# Patient Record
Sex: FEMALE | Race: AMERICAN INDIAN OR ALASKA NATIVE | NOT HISPANIC OR LATINO | Employment: OTHER | URBAN - METROPOLITAN AREA
[De-identification: names, ages, dates, MRNs, and addresses within clinical notes are randomized per-mention and may not be internally consistent; named-entity substitution may affect disease eponyms.]

---

## 2017-07-30 ENCOUNTER — HOSPITAL ENCOUNTER (EMERGENCY)
Facility: HOSPITAL | Age: 72
Discharge: HOME | End: 2017-07-31
Attending: EMERGENCY MEDICINE
Payer: MEDICARE

## 2017-07-30 DIAGNOSIS — R42 DIZZINESS: ICD-10-CM

## 2017-07-30 DIAGNOSIS — M54.50 ACUTE MIDLINE LOW BACK PAIN WITHOUT SCIATICA: Primary | ICD-10-CM

## 2017-07-30 DIAGNOSIS — M54.50 PAIN, LUMBAR REGION: ICD-10-CM

## 2017-07-30 LAB
ANION GAP SERPL CALC-SCNC: 10 MMOL/L
BASOPHILS # BLD AUTO: 0 K/UL
BASOPHILS NFR BLD: 0.3 %
BUN SERPL-MCNC: 10 MG/DL
CALCIUM SERPL-MCNC: 9.4 MG/DL
CHLORIDE SERPL-SCNC: 103 MMOL/L
CO2 SERPL-SCNC: 27 MMOL/L
CREAT SERPL-MCNC: 0.8 MG/DL
DIFFERENTIAL METHOD: ABNORMAL
EOSINOPHIL # BLD AUTO: 0 K/UL
EOSINOPHIL NFR BLD: 0.4 %
ERYTHROCYTE [DISTWIDTH] IN BLOOD BY AUTOMATED COUNT: 14.2 %
EST. GFR  (AFRICAN AMERICAN): >60 ML/MIN/1.73 M^2
EST. GFR  (NON AFRICAN AMERICAN): >60 ML/MIN/1.73 M^2
GLUCOSE SERPL-MCNC: 96 MG/DL
HCT VFR BLD AUTO: 36.5 %
HGB BLD-MCNC: 12.3 G/DL
LYMPHOCYTES # BLD AUTO: 2.4 K/UL
LYMPHOCYTES NFR BLD: 29.2 %
MCH RBC QN AUTO: 29.6 PG
MCHC RBC AUTO-ENTMCNC: 33.7 G/DL
MCV RBC AUTO: 88 FL
MONOCYTES # BLD AUTO: 0.6 K/UL
MONOCYTES NFR BLD: 7.4 %
NEUTROPHILS # BLD AUTO: 5.2 K/UL
NEUTROPHILS NFR BLD: 62.7 %
PLATELET # BLD AUTO: 319 K/UL
PMV BLD AUTO: 7.1 FL
POTASSIUM SERPL-SCNC: 4.1 MMOL/L
RBC # BLD AUTO: 4.15 M/UL
SODIUM SERPL-SCNC: 140 MMOL/L
WBC # BLD AUTO: 8.4 K/UL

## 2017-07-30 PROCEDURE — 85025 COMPLETE CBC W/AUTO DIFF WBC: CPT

## 2017-07-30 PROCEDURE — 80048 BASIC METABOLIC PNL TOTAL CA: CPT

## 2017-07-30 PROCEDURE — 25000003 PHARM REV CODE 250: Performed by: EMERGENCY MEDICINE

## 2017-07-30 PROCEDURE — 36415 COLL VENOUS BLD VENIPUNCTURE: CPT

## 2017-07-30 PROCEDURE — 99284 EMERGENCY DEPT VISIT MOD MDM: CPT

## 2017-07-30 RX ORDER — ONDANSETRON 4 MG/1
4 TABLET, ORALLY DISINTEGRATING ORAL
Status: COMPLETED | OUTPATIENT
Start: 2017-07-30 | End: 2017-07-30

## 2017-07-30 RX ORDER — MECLIZINE HYDROCHLORIDE 25 MG/1
25 TABLET ORAL
Status: COMPLETED | OUTPATIENT
Start: 2017-07-30 | End: 2017-07-30

## 2017-07-30 RX ADMIN — MECLIZINE HYDROCHLORIDE 25 MG: 25 TABLET ORAL at 10:07

## 2017-07-30 RX ADMIN — ONDANSETRON 4 MG: 4 TABLET, ORALLY DISINTEGRATING ORAL at 10:07

## 2017-07-31 VITALS
OXYGEN SATURATION: 95 % | BODY MASS INDEX: 21.6 KG/M2 | DIASTOLIC BLOOD PRESSURE: 73 MMHG | HEIGHT: 60 IN | SYSTOLIC BLOOD PRESSURE: 166 MMHG | RESPIRATION RATE: 16 BRPM | HEART RATE: 66 BPM | WEIGHT: 110 LBS

## 2017-07-31 RX ORDER — MECLIZINE HYDROCHLORIDE 25 MG/1
25 TABLET ORAL 3 TIMES DAILY PRN
Qty: 20 TABLET | Refills: 0 | Status: SHIPPED | OUTPATIENT
Start: 2017-07-31 | End: 2017-12-24

## 2017-07-31 NOTE — ED NOTES
Pt presents with complaints of back pain and dizziness. Pt reports falling down concrete steps a week ago and hit her lower back and has been hurting since then. No loss of bowel or urine and no noted changes in normal elimination routines. Pt moves all extremities well. Pt requesting pain meds but can hardly keep eyes open. Somnolent. Pt denies taking any pain meds.

## 2017-07-31 NOTE — ED NOTES
Continues to complain of pain but easily drifts off to sleep and has to be aroused from sleep everytime the room is entered. Pt reinforced MD instructions that he was not giving her any pain meds at this time.

## 2017-07-31 NOTE — ED NOTES
Pt ambulating well in [place] but complains of lower back pain with ambulation. More alert than upon arrival.

## 2017-07-31 NOTE — ED PROVIDER NOTES
Encounter Date: 7/30/2017    SCRIBE #1 NOTE: ISpencer, am scribing for, and in the presence of, Dr. Moe .       History     Chief Complaint   Patient presents with    Fatigue     Started yesterday     Back Pain       07/30/2017 10:02 PM     Chief Complaint: Dizziness      Josseline Stinson is a 71 y.o. female with a history of CHF, COPD, CAD and GERD who presents to the ED via EMS with an complaint of severe lower back pain and dizziness. Associated Sx are blurry vision nausea, and vomiting. Pt reports Sx began after slipping on steps and landing on her back 2 days ago. She denies head trauma. Pt denies CP, upper back pain, prior dizziness and incontinence. Pt has not taken any medication for the pain. Allergens include Cortisone, Darvocet A500, Toradol, and Tramadol.      The history is provided by the patient.     Review of patient's allergies indicates:   Allergen Reactions    Cortisone      SWELLING    Darvocet a500 [propoxyphene n-acetaminophen]     Toradol [ketorolac]     Tramadol Nausea Only     Past Medical History:   Diagnosis Date    Allergy     Anxiety     CHF (congestive heart failure)     COPD (chronic obstructive pulmonary disease)     Coronary artery disease     Encounter for blood transfusion     GERD (gastroesophageal reflux disease)     Ulcer      Past Surgical History:   Procedure Laterality Date    FRACTURE SURGERY Left     elbow and wrist    HIP SURGERY Right     x 4    HYSTERECTOMY      SMALL INTESTINE SURGERY       Family History   Problem Relation Age of Onset    Cancer Mother      colon    Diabetes Mother     Heart disease Mother     Heart disease Father     Cancer Sister      breast    Heart disease Sister     Cancer Brother      leukemia    Heart disease Brother     Heart disease Maternal Grandmother     Heart disease Maternal Grandfather      Social History   Substance Use Topics    Smoking status: Current Some Day Smoker     Packs/day: 0.25     Years:  45.00     Types: Cigarettes    Smokeless tobacco: Never Used    Alcohol use No     Review of Systems   Constitutional: Negative for fever.   HENT: Negative for sore throat.    Eyes: Positive for visual disturbance (Blurry).   Respiratory: Negative for shortness of breath.    Cardiovascular: Negative for chest pain.   Gastrointestinal: Positive for nausea and vomiting.   Genitourinary: Negative for dysuria.        - for incontinence.   Musculoskeletal: Positive for back pain (Lower).   Skin: Negative for rash.   Neurological: Positive for dizziness. Negative for weakness.   Hematological: Does not bruise/bleed easily.       Physical Exam     Initial Vitals [07/30/17 2128]   BP Pulse Resp Temp SpO2   118/74 78 16 -- 96 %      MAP       88.67         Physical Exam    Nursing note and vitals reviewed.  Constitutional: She appears well-developed and well-nourished. She is not diaphoretic. She is sleeping.  Non-toxic appearance. She does not have a sickly appearance. She does not appear ill. No distress.   HENT:   Head: Normocephalic and atraumatic.   Neck: Normal range of motion. Neck supple.   Cardiovascular: Normal rate, regular rhythm, normal heart sounds and intact distal pulses.   No murmur heard.  Pulmonary/Chest: No respiratory distress. She has no wheezes. She has no rhonchi. She has no rales.   Abdominal: Soft. She exhibits no distension. There is no tenderness. There is no rebound.   Musculoskeletal: She exhibits no edema.        Cervical back: Normal.        Thoracic back: Normal.        Lumbar back: She exhibits tenderness and bony tenderness.        Back:    Neurological: She is oriented to person, place, and time.   Trouble following basic commands due to somnolence. Hard time keeping eyes open. Strength limited by pain and somnolence. No apparent nystagmus.   Skin: Skin is warm and dry.   Psychiatric: She has a normal mood and affect. Her behavior is normal. Judgment and thought content normal.          ED Course   Procedures  Labs Reviewed - No data to display       X-Rays:   Independently Interpreted Readings:   Other Readings:  L spine nad    Medical Decision Making:   History:   Old Medical Records: I decided to obtain old medical records.  Old Records Summarized: other records.       <> Summary of Records: Prescription database reveals numerous recent prescriptions for narcotics and benzodiazepines from multiple providers  Clinical Tests:   Radiological Study: Ordered and Reviewed            Scribe Attestation:   Scribe #1: I performed the above scribed service and the documentation accurately describes the services I performed. I attest to the accuracy of the note.    Attending Attestation:           Physician Attestation for Scribe:  Physician Attestation Statement for Scribe #1: I, Dr. Moe, reviewed documentation, as scribed by Spencer Hoskins in my presence, and it is both accurate and complete.                 ED Course   Comment By Time   Patient presents complaining of back pain after a fall 2 days ago also significant dizziness.  On exam she is somnolent and has trouble keeping her eyes open.  Review of the prescription database demonstrates multiple narcotics and benzodiazepines from numerous prescribers.  I will hold back on anything for pain at this time given her clinical somnolence.  She was actually asleep on the EMS gurney when she arrived.  Complaining of dizziness but no sign at this time of cerebellar stroke clinically.  I will give her something for the dizziness and get some basic labs and imaging her low back. Neil Moe MD 07/30 2210   Got up and ambulated easily without difficulty.  Numerous prescriptions for controlled substances recently.  No indication for any further prescriptions.  X-rays unremarkable labs are unremarkable.  On arrival patient was somnolent and exhibited signs of impairment secondary to most likely prescription drugs.  That has improved and she can  be discharged.  I doubt any intracranial cause of the dizziness.  Has responded well to meclizine.  I do not think she needs an MRI, no sign of cerebellar findings on exam. On DC re-examined with improvement in level of alertness which is now normal and normal gait without cerebellar findings Neil Moe MD 07/31 4388     Clinical Impression:   The primary encounter diagnosis was Acute midline low back pain without sciatica. Diagnoses of Pain, lumbar region and Dizziness were also pertinent to this visit.                           Neil Moe MD  07/31/17 0666

## 2017-10-11 ENCOUNTER — TELEPHONE (OUTPATIENT)
Dept: PRIMARY CARE CLINIC | Facility: CLINIC | Age: 72
End: 2017-10-11

## 2018-01-02 PROBLEM — I21.4 NSTEMI (NON-ST ELEVATED MYOCARDIAL INFARCTION): Status: ACTIVE | Noted: 2018-01-02

## 2018-01-03 PROBLEM — E44.1 MALNUTRITION OF MILD DEGREE: Chronic | Status: ACTIVE | Noted: 2018-01-03

## 2018-05-30 ENCOUNTER — HOSPITAL ENCOUNTER (EMERGENCY)
Facility: HOSPITAL | Age: 73
Discharge: LEFT BEFORE DISCHARGE | End: 2018-05-30
Payer: MEDICARE

## 2018-05-30 VITALS
TEMPERATURE: 98 F | DIASTOLIC BLOOD PRESSURE: 60 MMHG | HEIGHT: 64 IN | BODY MASS INDEX: 18.1 KG/M2 | SYSTOLIC BLOOD PRESSURE: 114 MMHG | RESPIRATION RATE: 26 BRPM | WEIGHT: 106 LBS | HEART RATE: 97 BPM | OXYGEN SATURATION: 99 %

## 2018-05-30 DIAGNOSIS — R06.02 SHORTNESS OF BREATH: ICD-10-CM

## 2018-05-30 PROCEDURE — 99900 PR LEFT WITHOUTBEING SEEN-EMERGENCY: CPT | Mod: ,,, | Performed by: EMERGENCY MEDICINE

## 2018-05-30 PROCEDURE — 93010 ELECTROCARDIOGRAM REPORT: CPT | Mod: ,,, | Performed by: INTERNAL MEDICINE

## 2018-05-30 PROCEDURE — 93005 ELECTROCARDIOGRAM TRACING: CPT

## 2018-05-30 PROCEDURE — 99900041 HC LEFT WITHOUT BEING SEEN- EMERGENCY

## 2019-05-04 PROBLEM — R07.9 CHEST PAIN: Status: ACTIVE | Noted: 2019-05-04

## 2019-05-04 PROBLEM — R55 NEAR SYNCOPE: Status: ACTIVE | Noted: 2019-05-04

## 2019-05-04 PROBLEM — E87.6 HYPOKALEMIA: Status: ACTIVE | Noted: 2019-05-04

## 2019-05-04 PROBLEM — R94.31 EKG ABNORMALITIES: Status: ACTIVE | Noted: 2019-05-04

## 2019-05-05 PROBLEM — R07.9 CHEST PAIN: Status: RESOLVED | Noted: 2019-05-04 | Resolved: 2019-05-05

## 2019-05-05 PROBLEM — R55 NEAR SYNCOPE: Status: RESOLVED | Noted: 2019-05-04 | Resolved: 2019-05-05

## 2019-05-05 PROBLEM — E87.6 HYPOKALEMIA: Status: RESOLVED | Noted: 2019-05-04 | Resolved: 2019-05-05

## 2019-05-14 ENCOUNTER — OFFICE VISIT (OUTPATIENT)
Dept: PRIMARY CARE CLINIC | Facility: CLINIC | Age: 74
End: 2019-05-14
Payer: MEDICARE

## 2019-05-14 VITALS
RESPIRATION RATE: 18 BRPM | WEIGHT: 111 LBS | BODY MASS INDEX: 18.95 KG/M2 | HEIGHT: 64 IN | HEART RATE: 61 BPM | OXYGEN SATURATION: 100 % | SYSTOLIC BLOOD PRESSURE: 166 MMHG | DIASTOLIC BLOOD PRESSURE: 69 MMHG

## 2019-05-14 DIAGNOSIS — M81.0 OSTEOPOROSIS, UNSPECIFIED OSTEOPOROSIS TYPE, UNSPECIFIED PATHOLOGICAL FRACTURE PRESENCE: ICD-10-CM

## 2019-05-14 DIAGNOSIS — M25.571 ARTHRALGIA OF RIGHT FOOT: ICD-10-CM

## 2019-05-14 DIAGNOSIS — F32.A ANXIETY AND DEPRESSION: ICD-10-CM

## 2019-05-14 DIAGNOSIS — I10 ESSENTIAL HYPERTENSION: Chronic | ICD-10-CM

## 2019-05-14 DIAGNOSIS — R42 DIZZINESS: ICD-10-CM

## 2019-05-14 DIAGNOSIS — F32.A DEPRESSION, UNSPECIFIED DEPRESSION TYPE: ICD-10-CM

## 2019-05-14 DIAGNOSIS — I25.10 CORONARY ARTERY DISEASE INVOLVING NATIVE CORONARY ARTERY OF NATIVE HEART WITHOUT ANGINA PECTORIS: Chronic | ICD-10-CM

## 2019-05-14 DIAGNOSIS — J44.9 CHRONIC OBSTRUCTIVE PULMONARY DISEASE, UNSPECIFIED COPD TYPE: ICD-10-CM

## 2019-05-14 DIAGNOSIS — J18.9 PNEUMONIA OF RIGHT LOWER LOBE DUE TO INFECTIOUS ORGANISM: ICD-10-CM

## 2019-05-14 DIAGNOSIS — K21.9 GASTROESOPHAGEAL REFLUX DISEASE, ESOPHAGITIS PRESENCE NOT SPECIFIED: ICD-10-CM

## 2019-05-14 DIAGNOSIS — Z12.31 VISIT FOR SCREENING MAMMOGRAM: ICD-10-CM

## 2019-05-14 DIAGNOSIS — Z87.891 HISTORY OF TOBACCO ABUSE: ICD-10-CM

## 2019-05-14 DIAGNOSIS — E78.00 HYPERCHOLESTEREMIA: Chronic | ICD-10-CM

## 2019-05-14 DIAGNOSIS — G43.511 INTRACTABLE PERSISTENT MIGRAINE AURA WITHOUT CEREBRAL INFARCTION AND WITH STATUS MIGRAINOSUS: ICD-10-CM

## 2019-05-14 DIAGNOSIS — F41.9 ANXIETY AND DEPRESSION: ICD-10-CM

## 2019-05-14 DIAGNOSIS — E87.6 HYPOKALEMIA: Primary | ICD-10-CM

## 2019-05-14 DIAGNOSIS — I21.4 NSTEMI (NON-ST ELEVATED MYOCARDIAL INFARCTION): ICD-10-CM

## 2019-05-14 DIAGNOSIS — E03.9 HYPOTHYROIDISM, UNSPECIFIED TYPE: ICD-10-CM

## 2019-05-14 DIAGNOSIS — Z95.5 HX OF HEART ARTERY STENT: ICD-10-CM

## 2019-05-14 DIAGNOSIS — M89.9 DISORDER OF BONE: ICD-10-CM

## 2019-05-14 DIAGNOSIS — R55 SYNCOPE, UNSPECIFIED SYNCOPE TYPE: ICD-10-CM

## 2019-05-14 DIAGNOSIS — M19.90 OSTEOARTHRITIS, UNSPECIFIED OSTEOARTHRITIS TYPE, UNSPECIFIED SITE: ICD-10-CM

## 2019-05-14 DIAGNOSIS — Z87.11 HISTORY OF GASTRIC ULCER: ICD-10-CM

## 2019-05-14 PROCEDURE — G0009 PNEUMOCOCCAL CONJUGATE VACCINE 13-VALENT LESS THAN 5YO & GREATER THAN: ICD-10-PCS | Mod: S$GLB,,, | Performed by: FAMILY MEDICINE

## 2019-05-14 PROCEDURE — 3077F PR MOST RECENT SYSTOLIC BLOOD PRESSURE >= 140 MM HG: ICD-10-PCS | Mod: CPTII,S$GLB,, | Performed by: FAMILY MEDICINE

## 2019-05-14 PROCEDURE — 3078F DIAST BP <80 MM HG: CPT | Mod: CPTII,S$GLB,, | Performed by: FAMILY MEDICINE

## 2019-05-14 PROCEDURE — 3077F SYST BP >= 140 MM HG: CPT | Mod: CPTII,S$GLB,, | Performed by: FAMILY MEDICINE

## 2019-05-14 PROCEDURE — G0009 ADMIN PNEUMOCOCCAL VACCINE: HCPCS | Mod: S$GLB,,, | Performed by: FAMILY MEDICINE

## 2019-05-14 PROCEDURE — 99999 PR PBB SHADOW E&M-EST. PATIENT-LVL V: ICD-10-PCS | Mod: PBBFAC,,, | Performed by: FAMILY MEDICINE

## 2019-05-14 PROCEDURE — 99999 PR PBB SHADOW E&M-EST. PATIENT-LVL V: CPT | Mod: PBBFAC,,, | Performed by: FAMILY MEDICINE

## 2019-05-14 PROCEDURE — 99214 PR OFFICE/OUTPT VISIT, EST, LEVL IV, 30-39 MIN: ICD-10-PCS | Mod: 25,S$GLB,, | Performed by: FAMILY MEDICINE

## 2019-05-14 PROCEDURE — 99499 UNLISTED E&M SERVICE: CPT | Mod: S$GLB,,, | Performed by: FAMILY MEDICINE

## 2019-05-14 PROCEDURE — 1101F PR PT FALLS ASSESS DOC 0-1 FALLS W/OUT INJ PAST YR: ICD-10-PCS | Mod: CPTII,S$GLB,, | Performed by: FAMILY MEDICINE

## 2019-05-14 PROCEDURE — 1101F PT FALLS ASSESS-DOCD LE1/YR: CPT | Mod: CPTII,S$GLB,, | Performed by: FAMILY MEDICINE

## 2019-05-14 PROCEDURE — 99214 OFFICE O/P EST MOD 30 MIN: CPT | Mod: 25,S$GLB,, | Performed by: FAMILY MEDICINE

## 2019-05-14 PROCEDURE — 99499 RISK ADDL DX/OHS AUDIT: ICD-10-PCS | Mod: S$GLB,,, | Performed by: FAMILY MEDICINE

## 2019-05-14 PROCEDURE — 90670 PNEUMOCOCCAL CONJUGATE VACCINE 13-VALENT LESS THAN 5YO & GREATER THAN: ICD-10-PCS | Mod: S$GLB,,, | Performed by: FAMILY MEDICINE

## 2019-05-14 PROCEDURE — 90670 PCV13 VACCINE IM: CPT | Mod: S$GLB,,, | Performed by: FAMILY MEDICINE

## 2019-05-14 PROCEDURE — 3078F PR MOST RECENT DIASTOLIC BLOOD PRESSURE < 80 MM HG: ICD-10-PCS | Mod: CPTII,S$GLB,, | Performed by: FAMILY MEDICINE

## 2019-05-14 RX ORDER — OMEPRAZOLE 40 MG/1
40 CAPSULE, DELAYED RELEASE ORAL DAILY
Qty: 30 CAPSULE | Refills: 5 | Status: SHIPPED | OUTPATIENT
Start: 2019-05-14 | End: 2019-06-27 | Stop reason: SDUPTHER

## 2019-05-14 RX ORDER — ALENDRONATE SODIUM 70 MG/1
70 TABLET ORAL
Qty: 4 TABLET | Refills: 5 | Status: SHIPPED | OUTPATIENT
Start: 2019-05-14 | End: 2019-09-12

## 2019-05-14 RX ORDER — POTASSIUM CHLORIDE 750 MG/1
10 CAPSULE, EXTENDED RELEASE ORAL ONCE
Qty: 1 CAPSULE | Refills: 0 | Status: SHIPPED | OUTPATIENT
Start: 2019-05-14 | End: 2019-05-14

## 2019-05-14 RX ORDER — METOPROLOL SUCCINATE 25 MG/1
25 TABLET, EXTENDED RELEASE ORAL DAILY
Qty: 30 TABLET | Refills: 5 | Status: SHIPPED | OUTPATIENT
Start: 2019-05-14 | End: 2019-12-11 | Stop reason: SDUPTHER

## 2019-05-14 RX ORDER — BUTALBITAL, ACETAMINOPHEN AND CAFFEINE 50; 325; 40 MG/1; MG/1; MG/1
1 TABLET ORAL EVERY 4 HOURS PRN
Qty: 30 TABLET | Refills: 2 | Status: SHIPPED | OUTPATIENT
Start: 2019-05-14 | End: 2019-06-13

## 2019-05-14 RX ORDER — ATORVASTATIN CALCIUM 40 MG/1
40 TABLET, FILM COATED ORAL DAILY
Qty: 30 TABLET | Refills: 5 | Status: SHIPPED | OUTPATIENT
Start: 2019-05-14 | End: 2019-11-11 | Stop reason: SDUPTHER

## 2019-05-14 RX ORDER — CLOPIDOGREL BISULFATE 75 MG/1
75 TABLET ORAL DAILY
Qty: 30 TABLET | Refills: 5 | Status: ON HOLD | OUTPATIENT
Start: 2019-05-14 | End: 2019-07-09 | Stop reason: HOSPADM

## 2019-05-14 RX ORDER — ALENDRONATE SODIUM 70 MG/1
TABLET ORAL
COMMUNITY
Start: 2019-05-03 | End: 2019-05-14 | Stop reason: SDUPTHER

## 2019-05-14 RX ORDER — IBUPROFEN 400 MG/1
400 TABLET ORAL 3 TIMES DAILY
Qty: 60 TABLET | Refills: 5 | Status: SHIPPED | OUTPATIENT
Start: 2019-05-14 | End: 2019-06-27

## 2019-05-14 RX ORDER — TRAZODONE HYDROCHLORIDE 100 MG/1
100 TABLET ORAL NIGHTLY
COMMUNITY
Start: 2019-05-10 | End: 2019-09-12

## 2019-05-14 RX ORDER — MELOXICAM 15 MG/1
15 TABLET ORAL DAILY
Qty: 30 TABLET | Refills: 5 | Status: SHIPPED | OUTPATIENT
Start: 2019-05-14 | End: 2019-06-27

## 2019-05-14 RX ORDER — HYDROCODONE BITARTRATE AND ACETAMINOPHEN 5; 325 MG/1; MG/1
1 TABLET ORAL EVERY 6 HOURS PRN
Qty: 30 TABLET | Refills: 0 | Status: SHIPPED | OUTPATIENT
Start: 2019-05-14 | End: 2019-06-04

## 2019-05-14 RX ORDER — PANTOPRAZOLE SODIUM 40 MG/1
40 TABLET, DELAYED RELEASE ORAL DAILY
Qty: 30 TABLET | Refills: 5 | Status: SHIPPED | OUTPATIENT
Start: 2019-05-14 | End: 2019-06-27

## 2019-05-14 RX ORDER — ESCITALOPRAM OXALATE 20 MG/1
20 TABLET ORAL DAILY
Qty: 30 TABLET | Refills: 5 | Status: SHIPPED | OUTPATIENT
Start: 2019-05-14 | End: 2020-02-17

## 2019-05-14 NOTE — PROGRESS NOTES
Patient ID verified by name and . Allergies reviewed with patient. Prevnar 13 vaccine administered IM in left deltoid using aseptic technique. Aspirated with no blood noted. Patient tolerated well. Given per physicians order. No adverse reaction noted.

## 2019-05-14 NOTE — PROGRESS NOTES
Subjective:       Patient ID: Josseline Stinson is a 73 y.o. female.    Chief Complaint: Establish Care and Medication Refill    HPI:  73-year-old female in for new PCP and medication refills---patient seen in the emergency room [facility]---yesterday.  Patient was sitting talking to her brother became dizzy and had an episode of loss of consciousness.  Unconscious about 15 min.  Patient was transported to emergency room via ambulance.  In ER took blood did EKG placed on monitor--given medication for nausea.  Told had a slight case of pneumonia in the right lung and was discharged.  Was given Fioricet an antibiotic.Pt did not fill yet-Levaquin.       Patient been in the emergency room x3 05/04/2019 chest pain, near syncope, poison ivy, hypokalemia, contusion to the foot--patient had x-rays supposed to be in a cast boot but hard to walk in the cast boot.  Was in the hospital for 2 days.  Seen in the emergency room 05/11/2019 arthralgia of the foot in chest pain. Seen in the emergency room again on 05/13/2019 costochondritis and right lower lobe pneumonia.       Patient in requesting refills Fioricet Norco Klonopin.       No fever, no runny nose, no sore throat, +cough, +phlegm white  ROS:  Skin: no psoriasis, eczema, skin cancer  HEENT: No headache, ocular pain, blurred vision, diplopia, epistaxis,+hoarseness +change in voice, no  thyroid trouble  Lung: No pneumonia, asthma, Tb, wheezing, SOB, +COPD has inhalers albuterol  Heart: + chest pain--9 now--was in the epigastric area near the xiphoid process radiating to the back was told secondary to anxiety,no  ankle edema, palpitations, MI, rinku murmur, +hypertension,+ hyperlipidemia , +CAD with stents times 15 sees Dr Baltazar has not seen him in over a year no bypass  Abdomen: No nausea, vomiting, diarrhea, constipation, +ulcers, +GERD-on Protonix no hepatitis, gallbladder disease, melena, hematochezia, hematemesis  : no UTI, renal disease, stones  MS: no  fractures, O/A, lupus, rheumatoid, gout--history of fracture right hip with 5 hip surgeries, fractured left elbow fractured right wrist with a plate no history of osteoporosis history of ankle demineralization  Neuro:+ dizziness,+ LOC, seizures   No diabetes, no anemia, + anxiety, + depression--lives by herself--2 dogs--no money no food  , 1 daughter estranged-patient states is not seen on so long not sure which he looks like, 3 grandchildren one , lives alone--on disability  benefitsx     Objective:   Physical Exam:  General: Well nourished, well developed,  + thin slightly emaciated  Skin: No lesions  HEENT: Eyes PERRLA, EOM intact, irregular pupil secondary to bilateral cataract surgery nose patent, throat non-erythematous upper dentures edentulous lower jaw ears TMs clear  NECK: Supple, no bruits, No JVD, no nodes  Lungs: Clear, no rales, rhonchi, wheezing decreased breath sounds bilaterally  Heart: Regular rate and rhythm, no murmurs, gallops, or rubs  Abdomen: flat, bowel sounds positive, no tenderness, or organomegaly  MS:  +kyphosis--arthritis several joints right hip left wrist left elbow--all had surgeries all with arthritis--especially sore in the right ankle reflexes 2/4  Neuro: Alert, CN intact, oriented X 3 Romberg negative heel-toe intact  Extremities: No cyanosis, clubbing, or edema         Assessment:       1. Hypokalemia    2. Pneumonia of right lower lobe due to infectious organism    3. Arthralgia of right foot    4. 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 Essential hypertension    6. Hypercholesteremia    7. Coronary artery disease involving native coronary artery of native heart without angina pectoris    8. NSTEMI (non-ST elevated myocardial infarction)    9. Hx of heart artery stent    10. Chronic obstructive pulmonary disease, unspecified COPD type    11. History of tobacco abuse    12. Osteoarthritis, unspecified  osteoarthritis type, unspecified site    13. Osteoporosis, unspecified osteoporosis type, unspecified pathological fracture presence    14. Dizziness    15. Syncope, unspecified syncope type    16. Anxiety and depression    17. Depression, unspecified depression type    18. History of gastric ulcer    19. Gastroesophageal reflux disease, esophagitis presence not specified    20. Hypothyroidism, unspecified type    21. Disorder of bone     22. Visit for screening mammogram        Plan:       Hypokalemia  -     Magnesium; Future; Expected date: 05/14/2019  -     Ambulatory Referral to Cardiology    Pneumonia of right lower lobe due to infectious organism    Arthralgia of right foot  -     meloxicam (MOBIC) 15 MG tablet; Take 1 tablet (15 mg total) by mouth once daily.  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  -     CT Head W Wo Contrast; Future; Expected date: 05/14/2019    Essential hypertension  -     CBC auto differential; Future; Expected date: 05/14/2019  -     Comprehensive metabolic panel; Future; Expected date: 05/14/2019  -     Fecal Immunochemical Test (iFOBT); Future; Expected date: 05/14/2019  -     X-Ray Chest PA And Lateral; Future; Expected date: 05/14/2019  -     POCT urine dipstick without microscope  -     Ambulatory Referral to Cardiology    Hypercholesteremia  -     Lipid panel; Future; Expected date: 05/14/2019  -     Ambulatory Referral to Cardiology    Coronary artery disease involving native coronary artery of native heart without angina pectoris  -     Ambulatory Referral to Cardiology    NSTEMI (non-ST elevated myocardial infarction)  -     Ambulatory Referral to Cardiology    Hx of heart artery stent  -     Ambulatory Referral to Cardiology    Chronic obstructive pulmonary disease, unspecified COPD type    History of tobacco abuse    Osteoarthritis, unspecified  osteoarthritis type, unspecified site  -     Ambulatory referral to Psychiatry    Osteoporosis, unspecified osteoporosis type, unspecified pathological fracture presence  -     Ambulatory referral to Psychiatry    Dizziness  -     Transthoracic echo (TTE) 2D with Color Flow; Future  -     Holter monitor - 24 hour; Future  -     US Carotid Bilateral; Future; Expected date: 05/14/2019  -     CT Head W Wo Contrast; Future; Expected date: 05/14/2019    Syncope, unspecified syncope type  -     Transthoracic echo (TTE) 2D with Color Flow; Future  -     Holter monitor - 24 hour; Future  -     US Carotid Bilateral; Future; Expected date: 05/14/2019  -     CT Head W Wo Contrast; Future; Expected date: 05/14/2019    Anxiety and depression  -     DXA Bone Density Spine And Hip; Future; Expected date: 05/14/2019    Depression, unspecified depression type  -     DXA Bone Density Spine And Hip; Future; Expected date: 05/14/2019    History of gastric ulcer    Gastroesophageal reflux disease, esophagitis presence not specified    Hypothyroidism, unspecified type  -     T4, free; Future; Expected date: 05/14/2019  -     TSH; Future; Expected date: 05/14/2019    Disorder of bone   -     DXA Bone Density Spine And Hip; Future; Expected date: 05/14/2019    Visit for screening mammogram  -     Mammo Digital Screening Bilat without CA; Future; Expected date: 05/28/2019    Other orders  -     pantoprazole (PROTONIX) 40 MG tablet; Take 1 tablet (40 mg total) by mouth once daily.  Dispense: 30 tablet; Refill: 5  -     metoprolol succinate (TOPROL-XL) 25 MG 24 hr tablet; Take 1 tablet (25 mg total) by mouth once daily.  Dispense: 30 tablet; Refill: 5  -     escitalopram oxalate (LEXAPRO) 20 MG tablet; Take 1 tablet (20 mg total) by mouth once daily.  Dispense: 30 tablet; Refill: 5  -     clopidogrel (PLAVIX) 75 mg tablet; Take 1 tablet (75 mg total) by mouth once daily.  Dispense: 30 tablet; Refill: 5  -     atorvastatin (LIPITOR) 40 MG  tablet; Take 1 tablet (40 mg total) by mouth once daily.  Dispense: 30 tablet; Refill: 5  -     alendronate (FOSAMAX) 70 MG tablet; Take 1 tablet (70 mg total) by mouth every 7 days.  Dispense: 4 tablet; Refill: 5  -     (In Office Administered) Pneumococcal Conjugate Vaccine (13 Valent) (IM)  -     potassium chloride (MICRO-K) 10 MEQ CpSR; Take 1 capsule (10 mEq total) by mouth once. for 1 dose  Dispense: 1 capsule; Refill: 0  -     butalbital-acetaminophen-caffeine -40 mg (FIORICET, ESGIC) -40 mg per tablet; Take 1 tablet by mouth every 4 (four) hours as needed for Pain.  Dispense: 30 tablet; Refill: 2  -     HYDROcodone-acetaminophen (NORCO) 5-325 mg per tablet; Take 1 tablet by mouth every 6 (six) hours as needed.  Dispense: 30 tablet; Refill: 0  -     omeprazole (PRILOSEC) 40 MG capsule; Take 1 capsule (40 mg total) by mouth once daily.  Dispense: 30 capsule; Refill: 5  -     ibuprofen (ADVIL,MOTRIN) 400 MG tablet; Take 1 tablet (400 mg total) by mouth 3 (three) times daily.  Dispense: 60 tablet; Refill: 5      Micro-K 10 mg 1 p.o. b.i.d. x7 days--CBCs CMP T4 TSH stool guaiac UA lipid magnesium chest x-ray  Patient with syncopal episode--dizziness-carotid ultrasound/24 hr Holter/see if echocardiogram was done in the hospital of  get report give to patient/CT scan the brain with and without contrast--patient has history of hypertension/hyperlipidemia/old MI/CAD with stents times 15--sees Dr Baltazar   COPD--patient quit smoking 2 months ago was smoking 1 pack per day times 20 years--told should help her gain weight  Patient appears somewhat emaciated weight 111 needs to take multivitamin consider B12 shots Periactin in sure--needs social service to help her to get food stamps any other financial assistance to help her to get her medical care  Patient has and ankle irregularity with demineralization needs a bone scan--may need MRI the ankle to rule out fracture--has a cast boot--will send 2  orthopedistDr Vicente or Dr Hartman evaluate ankle   Pt requesting Fioricet Norco in Klonopin--will give Fioricet--[place]--ibuprofen--with omeprazole due to history of ulcer--[facility] and if she needs Klonopin told government does not want as mixing Norco and benzodiazepines  Patient has allergies listed but has been on Fioricet Norco in the past without difficulty has taken ibuprofen told to stop if develops any GI upset

## 2019-05-16 ENCOUNTER — TELEPHONE (OUTPATIENT)
Dept: ORTHOPEDICS | Facility: CLINIC | Age: 74
End: 2019-05-16

## 2019-05-16 DIAGNOSIS — M79.671 CHRONIC FOOT PAIN, RIGHT: Primary | ICD-10-CM

## 2019-05-16 DIAGNOSIS — G89.29 CHRONIC FOOT PAIN, RIGHT: Primary | ICD-10-CM

## 2019-05-16 NOTE — TELEPHONE ENCOUNTER
Called and spoke with patient regarding the orthopedic referral. Appointment scheduled for Dr. Hartman for Aug 15, 2019 at 9:45. Order placed for an updated xray of her right foot. Patient is aware of the appointment time and date. She was also informed that she will need to have the xray completed before her scheduled appointment. Patient verbalized understanding; all questions answered; no other issues discussed.

## 2019-05-29 ENCOUNTER — TELEPHONE (OUTPATIENT)
Dept: PRIMARY CARE CLINIC | Facility: CLINIC | Age: 74
End: 2019-05-29

## 2019-05-29 NOTE — TELEPHONE ENCOUNTER
----- Message from Ade Mckeon sent at 5/29/2019  1:08 PM CDT -----  Contact: Patient  Type:  Patient Returning Call    Who Called:  Josseline, patient  Who Left Message for Patient:  Tigist  Does the patient know what this is regarding?:  Test results  Best Call Back Number:  [phone]  Additional Information:  Missed your call, please call her back. Thanks.

## 2019-05-29 NOTE — TELEPHONE ENCOUNTER
----- Message from Aram Mitchell sent at 5/29/2019  1:31 PM CDT -----  Type:  Patient Returning Call    Who Called:  Patient  Who Left Message for Patient:  NA  Does the patient know what this is regarding?:  Test results  Best Call Back Number:  [phone]  Additional Information:

## 2019-05-29 NOTE — TELEPHONE ENCOUNTER
----- Message from Dony Woo MD sent at 5/22/2019 11:52 PM CDT -----  Call tell Bone density Osteoporosis hips needs be on fosfamax 70 mg 1 po q week #12 3 refills redo bone density 2-3 years . ECHO cardigram WNL, Chest Xrayu Linear density left lower lobe ??early pneumonia Mammogram WNL repeat one year Lab Na 131   better 150 Plt 366 No new tx Needs do CBC,CMP,lipid in 6 mo If still with cough symptoms needs come in get rechecked

## 2019-05-31 NOTE — TELEPHONE ENCOUNTER
Patient tolerates Fosamax fine, but has been currently taking fosamax and bone density has not improved does she continue current dose?

## 2019-06-04 ENCOUNTER — OFFICE VISIT (OUTPATIENT)
Dept: PRIMARY CARE CLINIC | Facility: CLINIC | Age: 74
End: 2019-06-04
Payer: MEDICARE

## 2019-06-04 VITALS
RESPIRATION RATE: 18 BRPM | HEART RATE: 66 BPM | SYSTOLIC BLOOD PRESSURE: 93 MMHG | BODY MASS INDEX: 18.95 KG/M2 | DIASTOLIC BLOOD PRESSURE: 49 MMHG | OXYGEN SATURATION: 99 % | WEIGHT: 111 LBS | HEIGHT: 64 IN

## 2019-06-04 DIAGNOSIS — M79.671 PAIN OF RIGHT HEEL: ICD-10-CM

## 2019-06-04 DIAGNOSIS — M25.571 CHRONIC PAIN OF RIGHT ANKLE: ICD-10-CM

## 2019-06-04 DIAGNOSIS — G43.511 INTRACTABLE PERSISTENT MIGRAINE AURA WITHOUT CEREBRAL INFARCTION AND WITH STATUS MIGRAINOSUS: ICD-10-CM

## 2019-06-04 DIAGNOSIS — F41.9 ANXIETY AND DEPRESSION: ICD-10-CM

## 2019-06-04 DIAGNOSIS — Z95.5 HX OF HEART ARTERY STENT: ICD-10-CM

## 2019-06-04 DIAGNOSIS — S39.012D SACROILIAC STRAIN, SUBSEQUENT ENCOUNTER: ICD-10-CM

## 2019-06-04 DIAGNOSIS — I25.10 CORONARY ARTERY DISEASE INVOLVING NATIVE CORONARY ARTERY OF NATIVE HEART WITHOUT ANGINA PECTORIS: Chronic | ICD-10-CM

## 2019-06-04 DIAGNOSIS — F32.A ANXIETY AND DEPRESSION: ICD-10-CM

## 2019-06-04 DIAGNOSIS — M19.90 OSTEOARTHRITIS, UNSPECIFIED OSTEOARTHRITIS TYPE, UNSPECIFIED SITE: ICD-10-CM

## 2019-06-04 DIAGNOSIS — G89.4 CHRONIC PAIN SYNDROME: ICD-10-CM

## 2019-06-04 DIAGNOSIS — F41.9 ANXIETY: ICD-10-CM

## 2019-06-04 DIAGNOSIS — I10 ESSENTIAL HYPERTENSION: Chronic | ICD-10-CM

## 2019-06-04 DIAGNOSIS — G89.29 CHRONIC PAIN OF RIGHT ANKLE: ICD-10-CM

## 2019-06-04 DIAGNOSIS — M25.519 SHOULDER PAIN, UNSPECIFIED CHRONICITY, UNSPECIFIED LATERALITY: ICD-10-CM

## 2019-06-04 DIAGNOSIS — I21.4 NSTEMI (NON-ST ELEVATED MYOCARDIAL INFARCTION): ICD-10-CM

## 2019-06-04 DIAGNOSIS — I50.9 CONGESTIVE HEART FAILURE, UNSPECIFIED HF CHRONICITY, UNSPECIFIED HEART FAILURE TYPE: ICD-10-CM

## 2019-06-04 DIAGNOSIS — I95.9 HYPOTENSION, UNSPECIFIED HYPOTENSION TYPE: Primary | ICD-10-CM

## 2019-06-04 DIAGNOSIS — F32.A DEPRESSION, UNSPECIFIED DEPRESSION TYPE: ICD-10-CM

## 2019-06-04 DIAGNOSIS — E78.00 HYPERCHOLESTEREMIA: Chronic | ICD-10-CM

## 2019-06-04 DIAGNOSIS — J44.9 CHRONIC OBSTRUCTIVE PULMONARY DISEASE, UNSPECIFIED COPD TYPE: ICD-10-CM

## 2019-06-04 DIAGNOSIS — S39.012D LUMBOSACRAL STRAIN, SUBSEQUENT ENCOUNTER: ICD-10-CM

## 2019-06-04 PROCEDURE — 3078F DIAST BP <80 MM HG: CPT | Mod: CPTII,S$GLB,, | Performed by: FAMILY MEDICINE

## 2019-06-04 PROCEDURE — 99214 PR OFFICE/OUTPT VISIT, EST, LEVL IV, 30-39 MIN: ICD-10-PCS | Mod: S$GLB,,, | Performed by: FAMILY MEDICINE

## 2019-06-04 PROCEDURE — 99214 OFFICE O/P EST MOD 30 MIN: CPT | Mod: S$GLB,,, | Performed by: FAMILY MEDICINE

## 2019-06-04 PROCEDURE — 99499 RISK ADDL DX/OHS AUDIT: ICD-10-PCS | Mod: S$GLB,,, | Performed by: FAMILY MEDICINE

## 2019-06-04 PROCEDURE — 99999 PR PBB SHADOW E&M-EST. PATIENT-LVL V: CPT | Mod: PBBFAC,,, | Performed by: FAMILY MEDICINE

## 2019-06-04 PROCEDURE — 99499 UNLISTED E&M SERVICE: CPT | Mod: S$GLB,,, | Performed by: FAMILY MEDICINE

## 2019-06-04 PROCEDURE — 3078F PR MOST RECENT DIASTOLIC BLOOD PRESSURE < 80 MM HG: ICD-10-PCS | Mod: CPTII,S$GLB,, | Performed by: FAMILY MEDICINE

## 2019-06-04 PROCEDURE — 99999 PR PBB SHADOW E&M-EST. PATIENT-LVL V: ICD-10-PCS | Mod: PBBFAC,,, | Performed by: FAMILY MEDICINE

## 2019-06-04 PROCEDURE — 1101F PT FALLS ASSESS-DOCD LE1/YR: CPT | Mod: CPTII,S$GLB,, | Performed by: FAMILY MEDICINE

## 2019-06-04 PROCEDURE — 3074F PR MOST RECENT SYSTOLIC BLOOD PRESSURE < 130 MM HG: ICD-10-PCS | Mod: CPTII,S$GLB,, | Performed by: FAMILY MEDICINE

## 2019-06-04 PROCEDURE — 1101F PR PT FALLS ASSESS DOC 0-1 FALLS W/OUT INJ PAST YR: ICD-10-PCS | Mod: CPTII,S$GLB,, | Performed by: FAMILY MEDICINE

## 2019-06-04 PROCEDURE — 3074F SYST BP LT 130 MM HG: CPT | Mod: CPTII,S$GLB,, | Performed by: FAMILY MEDICINE

## 2019-06-04 RX ORDER — LORAZEPAM 1 MG/1
TABLET ORAL
Qty: 30 TABLET | Refills: 1 | Status: SHIPPED | OUTPATIENT
Start: 2019-06-04 | End: 2019-06-27

## 2019-06-04 RX ORDER — HYDROCODONE BITARTRATE AND ACETAMINOPHEN 5; 325 MG/1; MG/1
1 TABLET ORAL EVERY 6 HOURS PRN
Qty: 30 TABLET | Refills: 0 | Status: SHIPPED | OUTPATIENT
Start: 2019-06-04 | End: 2019-06-27

## 2019-06-04 NOTE — PROGRESS NOTES
Subjective:       Patient ID: Josseline Stinson is a 73 y.o. female.    Chief Complaint: Anxiety and Pain    HPI:  73-year-old female in for anxiety and pain--blood pressure noted to be 93/49.  Patient states blood pressures have been dropping since yesterday--yesterday a.m. was 96/51.  Patient with history of recent right lower lobe pneumonia/hyperlipidemia/hypertension/CHF/NSTEMI/CAD with stents times 15 no CABG.  History hypothyroidism GERD gastric ulcer osteopenia on Fosamax COPD with bronchospasm on albuterol.       Pain --all over--especially right leg--history of a fractured right heel ---orthopedic gave patient some Tylenol No.  3--patient states dumped them--may patient very sick and vomiting.        Quit smoking 2 months ago       Anxiety--no food, no money, lives in apartment--gets $600 from social security--and $550 goes to the apartment---has someone helping with light bill.  Occasionally gets fish every now and then--friend brings patient 2 bottles of propane to use because she does not have a stove.       Office visit 05/14/2019  73-year-old female in for new PCP and medication refills---patient seen in the emergency room [facility]---yesterday.  Patient was sitting talking to her brother became dizzy and had an episode of loss of consciousness.  Unconscious about 15 min.  Patient was transported to emergency room via ambulance.  In ER took blood did EKG placed on monitor--given medication for nausea.  Told had a slight case of pneumonia in the right lung and was discharged.  Was given Fioricet an antibiotic.Pt did not fill yet-Levaquin.       Patient been in the emergency room x3 05/04/2019 chest pain, near syncope, poison ivy, hypokalemia, contusion to the foot--patient had x-rays supposed to be in a cast boot but hard to walk in the cast boot.  Was in the hospital for 2 days.  Seen in the emergency room 05/11/2019 arthralgia of the foot in chest pain. Seen in the emergency room again on  2019 costochondritis and right lower lobe pneumonia.       Patient in requesting refills Fioricfely Brunnerco Klonopin.       No fever, no runny nose, no sore throat, +cough, +phlegm white  ROS:  Skin: no psoriasis, eczema, skin cancer   HEENT: + occas headache due nerves, no  ocular pain, blurred vision, diplopia, epistaxis,hoarseness change in voice, no  thyroid trouble-history cataract surgery  Lung: No pneumonia, asthma, Tb, wheezing, SOB, +COPD has inhalers albuterol, quit smoking  Heart: + chest pain--occas ,no  ankle edema, palpitations, +MI, no rinku murmur, +hypertension,+ hyperlipidemia , +CAD with stents times 15 sees Dr Baltazar has not seen him in over a year no bypass  Abdomen: No nausea, vomiting, diarrhea, constipation, +ulcers, +GERD-on Protonix no hepatitis, gallbladder disease, melena, hematochezia, hematemesis  : no UTI, renal disease, stones  MS: no fractures, O/A, lupus, rheumatoid, gout--history of a fracture right heel has a cast boot presently not wearing it has a cane wearing high top shoes to support the ankle history of fracture right hip with 5 hip surgeries, fractured left elbow fractured right wrist with a plate no history of osteoporosis history of ankle demineralization  Neuro:+ dizziness,+ LOC, seizures   No diabetes, no anemia, + anxiety, + depression--lives by herself--2 dogs--no money no food  , 1 daughter estranged-patient states is not seen on so long not sure which he looks like, 3 grandchildren one , lives alone--on disability  benefitsx     Objective:   Physical Exam:  General: Well nourished, well developed,  + thin slightly emaciated  Skin: No lesions  HEENT: Eyes PERRLA, EOM intact, irregular pupil secondary to bilateral cataract surgery nose patent, throat non-erythematous upper dentures edentulous lower jaw ears TMs clear  NECK: Supple, no bruits, No JVD, no nodes  Lungs: Clear, no rales, rhonchi, wheezing decreased breath sounds bilaterally--left  greater than right--recent pneumonia  Heart: Regular rate and rhythm, gallops, or rubs +heart murmur  Abdomen: flat, bowel sounds positive, no tenderness, or organomegaly  MS:  +kyphosis--arthritis several joints right hip left wrist left elbow--all had surgeries all with arthritis--especially sore in the right ankle also complaining of low back pain L1 S1 bilaterally and sacroiliac areas bilaterally anterior flexion 20° extension 10° lateral flexion rotation 10°--difficulty ambulating but appears to be due to ankle and heel pain right foot reflexes 2/4  Neuro: Alert, CN intact, oriented X 3 Romberg negative heel-toe intact  Extremities: No cyanosis, clubbing, or edema         Assessment:       1. Hypotension, unspecified hypotension type    2. Anxiety    3. Chronic pain syndrome    4. Essential hypertension    5. Hypercholesteremia    6. Coronary artery disease involving native coronary artery of native heart without angina pectoris    7. NSTEMI (non-ST elevated myocardial infarction)    8. Hx of heart artery stent    9. Congestive heart failure, unspecified HF chronicity, unspecified heart failure type    10. Chronic obstructive pulmonary disease, unspecified COPD type    11. Anxiety and depression    12. Depression, unspecified depression type    13. 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 Osteoarthritis, unspecified osteoarthritis type, unspecified site    15. Pain of right heel    16. Chronic pain of right ankle    17. Lumbosacral strain, subsequent encounter    18. Sacroiliac strain, subsequent encounter    19. Shoulder pain, unspecified chronicity, unspecified laterality        Plan:       Hypotension, unspecified hypotension type  -     Ambulatory referral to Cardiology    Anxiety  -     Ambulatory referral to Psychiatry    Chronic pain syndrome  -     Ambulatory Referral to Pain Clinic    Essential hypertension  -     Ambulatory referral to  Cardiology    Hypercholesteremia  -     Ambulatory referral to Cardiology    Coronary artery disease involving native coronary artery of native heart without angina pectoris  -     Ambulatory referral to Cardiology    NSTEMI (non-ST elevated myocardial infarction)  -     Ambulatory referral to Cardiology    Hx of heart artery stent  -     Ambulatory referral to Cardiology    Congestive heart failure, unspecified HF chronicity, unspecified heart failure type  -     Ambulatory referral to Cardiology    Chronic obstructive pulmonary disease, unspecified COPD type    Anxiety and depression    Depression, unspecified depression type    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  Dispense: 30 tablet; Refill: 0  -     LORazepam (ATIVAN) 1 MG tablet; Half p.o. b.i.d. or 1 p.o. q.d. p.r.n. anxiety  Dispense: 30 tablet; Refill: 1      hypotension--BP 93/49--patient with significant cardiac history--hypertension/hyperlipidemia/congestive heart failure/heart murmur/CAD with stents times 15/right carotid surgery--patient was seeing  --has not seen in about a year --had recent echocardiogram ejection fraction 60%--no significant findings despite heart murmur--24 Holter unable fine results appears to be in progress--patient is on Toprol 25 mg q.d.--patient needs to stop this if okay with Dr. Baltazar   review of recent lab 05/21/2019--CBCs CMP lipid T4 TSH all appear basically normal--x-ray of the ankle and heel show some significant arthritis ankle and 1st MTP area  History  recent syncopal episode-carotid ultrasound no hemodynamically significant lesion--has had surgery right carotid  COPD--patient quit smoking 2 months ago was smoking 1 pack per day times 20 years--told should help her gain weight  Patient appears somewhat emaciated weight 111 needs to take multivitamin consider B12 shots Periactin in sure--needs social service to help her to get food stamps any other financial assistance to help her to get her medical care  Patient has and ankle irregularity with demineralization needs a bone scan--may need MRI the ankle to rule out fracture--has a cast boot--will send 2 orthopedistDr Vicente or Dr Hartman evaluate ankle --x-rays of the ankle and foot only show arthritis  Pt requesting Fioricet Norco ands Xanax--will give -[place]--ibuprofen--with omeprazole due to history of ulcer-- Ativan 1 mg 1/2 po bid or 1 po qhs prn anxiety-- [facility] needs for anxiety and depression ---tell ligament does not like makes a opioids and benzodiazepine a.m.--needs to go to pain clinic for her pain medication  Patient has allergies listed but has been on Fioricet Norco in the past without difficulty has taken ibuprofen told to stop if develops any GI upset  Due to hypotension needs go to ER Get Chest Xray EKG, cardiac test BNP, Treponin -discuss findings with Dr Baltazar for ?? Admit if test warrant verse follow up in his office . May just need IV fluids  If pt admitted see if social

## 2019-06-05 ENCOUNTER — TELEPHONE (OUTPATIENT)
Dept: PRIMARY CARE CLINIC | Facility: CLINIC | Age: 74
End: 2019-06-05

## 2019-06-05 NOTE — TELEPHONE ENCOUNTER
----- Message from Aram Mitchell sent at 6/5/2019 10:52 AM CDT -----  Type:  Pharmacy Calling to Clarify an RX    Name of Caller:  Jazmine  Pharmacy Name:    [facility] - [address] [place] [address]  Phone: [phone] Fax: [phone]      Prescription Name:  HYDROcodone-acetaminophen (NORCO) 5-325 mg per tablet  What do they need to clarify?:  Caller states that the patient has Tylenol 3 from a different doctor  Best Call Back Number:   [phone]   Additional Information:

## 2019-06-05 NOTE — TELEPHONE ENCOUNTER
----- Message from Jasmyn Montero sent at 6/5/2019  3:19 PM CDT -----  Contact: self  Patient need authorization on traZODone (DESYREL) 100 MG tablet and LORazepam (ATIVAN) 1 MG tablet        [facility] [place], [place] - [address]  [address] [place] [address]  Phone: [phone] Fax: [phone]    Patient contact is [phone] (home)

## 2019-06-12 DIAGNOSIS — J40 BRONCHITIS: ICD-10-CM

## 2019-06-18 RX ORDER — ALBUTEROL SULFATE 90 UG/1
AEROSOL, METERED RESPIRATORY (INHALATION)
Qty: 18 G | Refills: 5 | Status: SHIPPED | OUTPATIENT
Start: 2019-06-18 | End: 2019-09-12

## 2019-06-20 ENCOUNTER — NURSE TRIAGE (OUTPATIENT)
Dept: ADMINISTRATIVE | Facility: CLINIC | Age: 74
End: 2019-06-20

## 2019-06-20 NOTE — TELEPHONE ENCOUNTER
She walked 3 blocks and she started sweating and couldn't breath or walk, thought she was going to pass out. She had to have someone come and pick her up.      96/50, hr 83, about 20 minutes ago.  She took another reading while on the line with me, now 126/73, hr 83, she is feeling tired and widespread body aches.  Hx of htn and is on toprol.  She is having upper back pain, between her shoulder blades, has had it before, only when her bp is acting up.  She also reports the inside of her mouth is dry.  She declines [facility] due to they don't take her insurance, she declines ED due to not having transportation.  States she'll see Dr. Woo next week.        Reason for Disposition   Drinking very little and has signs of dehydration (e.g., no urine > 12 hours, very dry mouth, very lightheaded)    Protocols used: LOW BLOOD PRESSURE-A-OH

## 2019-06-20 NOTE — TELEPHONE ENCOUNTER
Spoke to patient, strongly advised her to go to the ED. Pt states she will call the ambulance to come pick her up.

## 2019-06-27 ENCOUNTER — OFFICE VISIT (OUTPATIENT)
Dept: PRIMARY CARE CLINIC | Facility: CLINIC | Age: 74
End: 2019-06-27
Payer: MEDICARE

## 2019-06-27 VITALS
SYSTOLIC BLOOD PRESSURE: 129 MMHG | OXYGEN SATURATION: 100 % | WEIGHT: 110 LBS | HEIGHT: 64 IN | HEART RATE: 62 BPM | RESPIRATION RATE: 18 BRPM | DIASTOLIC BLOOD PRESSURE: 66 MMHG | BODY MASS INDEX: 18.78 KG/M2

## 2019-06-27 DIAGNOSIS — Z95.5 HX OF HEART ARTERY STENT: ICD-10-CM

## 2019-06-27 DIAGNOSIS — M19.90 OSTEOARTHRITIS, UNSPECIFIED OSTEOARTHRITIS TYPE, UNSPECIFIED SITE: ICD-10-CM

## 2019-06-27 DIAGNOSIS — K21.9 GASTROESOPHAGEAL REFLUX DISEASE, ESOPHAGITIS PRESENCE NOT SPECIFIED: Chronic | ICD-10-CM

## 2019-06-27 DIAGNOSIS — S39.012D LUMBOSACRAL STRAIN, SUBSEQUENT ENCOUNTER: ICD-10-CM

## 2019-06-27 DIAGNOSIS — I50.9 CONGESTIVE HEART FAILURE, UNSPECIFIED HF CHRONICITY, UNSPECIFIED HEART FAILURE TYPE: ICD-10-CM

## 2019-06-27 DIAGNOSIS — I95.9 HYPOTENSION, UNSPECIFIED HYPOTENSION TYPE: ICD-10-CM

## 2019-06-27 DIAGNOSIS — R51.9 NONINTRACTABLE HEADACHE, UNSPECIFIED CHRONICITY PATTERN, UNSPECIFIED HEADACHE TYPE: ICD-10-CM

## 2019-06-27 DIAGNOSIS — L73.9 FOLLICULITIS: Primary | ICD-10-CM

## 2019-06-27 DIAGNOSIS — J44.9 CHRONIC OBSTRUCTIVE PULMONARY DISEASE, UNSPECIFIED COPD TYPE: ICD-10-CM

## 2019-06-27 DIAGNOSIS — I21.4 NSTEMI (NON-ST ELEVATED MYOCARDIAL INFARCTION): ICD-10-CM

## 2019-06-27 DIAGNOSIS — I10 ESSENTIAL HYPERTENSION: Chronic | ICD-10-CM

## 2019-06-27 DIAGNOSIS — Z87.891 HISTORY OF TOBACCO ABUSE: ICD-10-CM

## 2019-06-27 DIAGNOSIS — E03.9 HYPOTHYROIDISM, UNSPECIFIED TYPE: ICD-10-CM

## 2019-06-27 DIAGNOSIS — E78.00 HYPERCHOLESTEREMIA: Chronic | ICD-10-CM

## 2019-06-27 PROBLEM — S39.012A LUMBOSACRAL STRAIN: Status: ACTIVE | Noted: 2019-06-27

## 2019-06-27 PROCEDURE — 99499 UNLISTED E&M SERVICE: CPT | Mod: S$GLB,,, | Performed by: FAMILY MEDICINE

## 2019-06-27 PROCEDURE — 3078F DIAST BP <80 MM HG: CPT | Mod: CPTII,S$GLB,, | Performed by: FAMILY MEDICINE

## 2019-06-27 PROCEDURE — 99999 PR PBB SHADOW E&M-EST. PATIENT-LVL V: CPT | Mod: PBBFAC,,, | Performed by: FAMILY MEDICINE

## 2019-06-27 PROCEDURE — 99214 PR OFFICE/OUTPT VISIT, EST, LEVL IV, 30-39 MIN: ICD-10-PCS | Mod: S$GLB,,, | Performed by: FAMILY MEDICINE

## 2019-06-27 PROCEDURE — 3074F PR MOST RECENT SYSTOLIC BLOOD PRESSURE < 130 MM HG: ICD-10-PCS | Mod: CPTII,S$GLB,, | Performed by: FAMILY MEDICINE

## 2019-06-27 PROCEDURE — 99499 RISK ADDL DX/OHS AUDIT: ICD-10-PCS | Mod: S$GLB,,, | Performed by: FAMILY MEDICINE

## 2019-06-27 PROCEDURE — 3078F PR MOST RECENT DIASTOLIC BLOOD PRESSURE < 80 MM HG: ICD-10-PCS | Mod: CPTII,S$GLB,, | Performed by: FAMILY MEDICINE

## 2019-06-27 PROCEDURE — 1101F PT FALLS ASSESS-DOCD LE1/YR: CPT | Mod: CPTII,S$GLB,, | Performed by: FAMILY MEDICINE

## 2019-06-27 PROCEDURE — 99214 OFFICE O/P EST MOD 30 MIN: CPT | Mod: S$GLB,,, | Performed by: FAMILY MEDICINE

## 2019-06-27 PROCEDURE — 99999 PR PBB SHADOW E&M-EST. PATIENT-LVL V: ICD-10-PCS | Mod: PBBFAC,,, | Performed by: FAMILY MEDICINE

## 2019-06-27 PROCEDURE — 1101F PR PT FALLS ASSESS DOC 0-1 FALLS W/OUT INJ PAST YR: ICD-10-PCS | Mod: CPTII,S$GLB,, | Performed by: FAMILY MEDICINE

## 2019-06-27 PROCEDURE — 3074F SYST BP LT 130 MM HG: CPT | Mod: CPTII,S$GLB,, | Performed by: FAMILY MEDICINE

## 2019-06-27 RX ORDER — ISOSORBIDE MONONITRATE 20 MG/1
TABLET ORAL DAILY
COMMUNITY
Start: 2019-06-26 | End: 2019-09-12

## 2019-06-27 RX ORDER — OMEPRAZOLE 40 MG/1
40 CAPSULE, DELAYED RELEASE ORAL DAILY
Qty: 30 CAPSULE | Refills: 5 | Status: ON HOLD | OUTPATIENT
Start: 2019-06-27 | End: 2019-07-06 | Stop reason: HOSPADM

## 2019-06-27 RX ORDER — ARIPIPRAZOLE 2 MG/1
2 TABLET ORAL DAILY
Refills: 12 | COMMUNITY
Start: 2019-06-05 | End: 2019-09-12

## 2019-06-27 RX ORDER — MUPIROCIN 20 MG/G
OINTMENT TOPICAL 3 TIMES DAILY
Qty: 22 G | Refills: 1 | Status: SHIPPED | OUTPATIENT
Start: 2019-06-27 | End: 2019-07-02 | Stop reason: SDUPTHER

## 2019-06-27 RX ORDER — BETAMETHASONE VALERATE 1.2 MG/G
CREAM TOPICAL 2 TIMES DAILY
Qty: 60 G | Refills: 2 | Status: SHIPPED | OUTPATIENT
Start: 2019-06-27 | End: 2019-08-15

## 2019-06-27 RX ORDER — BUTALBITAL, ACETAMINOPHEN AND CAFFEINE 50; 325; 40 MG/1; MG/1; MG/1
TABLET ORAL
Qty: 30 TABLET | Refills: 2 | Status: ON HOLD | OUTPATIENT
Start: 2019-06-27 | End: 2019-07-09 | Stop reason: SDUPTHER

## 2019-06-27 RX ORDER — LORAZEPAM 1 MG/1
TABLET ORAL
Qty: 30 TABLET | Refills: 2 | Status: SHIPPED | OUTPATIENT
Start: 2019-06-27 | End: 2019-07-22 | Stop reason: SDUPTHER

## 2019-06-27 RX ORDER — SULFAMETHOXAZOLE AND TRIMETHOPRIM 800; 160 MG/1; MG/1
1 TABLET ORAL 2 TIMES DAILY
Qty: 20 TABLET | Refills: 0 | Status: ON HOLD | OUTPATIENT
Start: 2019-06-27 | End: 2019-07-06 | Stop reason: HOSPADM

## 2019-06-27 RX ORDER — BUTALBITAL, ACETAMINOPHEN AND CAFFEINE 50; 325; 40 MG/1; MG/1; MG/1
1 TABLET ORAL EVERY 4 HOURS PRN
Refills: 2 | COMMUNITY
Start: 2019-06-13 | End: 2019-06-27 | Stop reason: SDUPTHER

## 2019-06-27 RX ORDER — DICLOFENAC SODIUM 50 MG/1
TABLET, DELAYED RELEASE ORAL
Qty: 60 TABLET | Refills: 5 | Status: ON HOLD | OUTPATIENT
Start: 2019-06-27 | End: 2019-07-06 | Stop reason: HOSPADM

## 2019-06-27 NOTE — PROGRESS NOTES
Subjective:       Patient ID: Josseline Stinson is a 73 y.o. female.    Chief Complaint: Headache and Rash (on arms and leg)    HPI:  73-year-old female in for multiple medical complaints---  Headache--states never went away--only relief is Fioricet--freq at least once a month -- occipital area temples bilaterally to the frontal area--qualityh sharp stabbing---severity 7/10  Rash --follicular lesion legs bilaterally arms bilaterally--1 swimming in a private pulled near her home--pruritus  Body pain--lower back and legs hurt--has arthritis in hands fingers are crocked--patient gets occasional cramps in her feet and her calves --a mainly low back pain  Still with anxiety  Quit smoking 3 months ago  Lab done in May CBCs CMP lipids T4 TSH and magnesium were all within normal limits except for slightly decreased sodium 131 chloride 92      Office visit 06/04/2019  73-year-old female in for anxiety and pain--blood pressure noted to be 93/49.  Patient states blood pressures have been dropping since yesterday--yesterday a.m. was 96/51.  Patient with history of recent right lower lobe pneumonia/hyperlipidemia/hypertension/CHF/NSTEMI/CAD with stents times 15 no CABG.  History hypothyroidism GERD gastric ulcer osteopenia on Fosamax COPD with bronchospasm on albuterol.       Pain --all over--especially right leg--history of a fractured right heel ---orthopedic gave patient some Tylenol No.  3--patient states dumped them--may patient very sick and vomiting.        Quit smoking 2 months ago       Anxiety--no food, no money, lives in apartment--gets $600 from social security--and $550 goes to the apartment---has someone helping with light bill.  Occasionally gets fish every now and then--friend brings patient 2 bottles of propane to use because she does not have a stove.       ROS:  Skin: no psoriasis, eczema, skin cancer   HEENT: + occas headache see history of present illness no  ocular pain, blurred vision, diplopia,  epistaxis,hoarseness change in voice, no  thyroid trouble-history cataract surgery  Lung: No pneumonia, asthma, Tb, wheezing, SOB, +COPD has inhalers albuterol, quit smoking  Heart: + chest pain--occas ,no  ankle edema, palpitations, +MI, no rinku murmur, +hypertension /66,+ hyperlipidemia , +CAD with stents times 15 sees Dr Baltazar has not seen him in over a year no bypass  Abdomen: No nausea, vomiting, diarrhea, constipation, +ulcers, +GERD-on Protonix no hepatitis, gallbladder disease, melena, hematochezia, hematemesis  : no UTI, renal disease, stones  MS: no fractures, O/A, lupus, rheumatoid, gout--history of a fracture right heel was in a cast boot--has a history of osteoporosis no history of a bone density--has pain lower back hands feet calves  Neuro:+ dizziness,+ LOC, seizures   No diabetes, no anemia, + anxiety, + depression--lives by herself--2 dogs--no money no food--doing better --patient keeping herself busy  , 1 daughter estranged-patient states is not seen on so long not sure which he looks like, 3 grandchildren one , lives alone--on disability  benefitsx     Objective:   Physical Exam:  General: Well nourished, well developed,  + thin slightly emaciated  Skin: No lesions  HEENT: Eyes PERRLA, EOM intact, irregular pupil secondary to bilateral cataract surgery nose patent, throat non-erythematous upper dentures edentulous lower jaw ears TMs clear  NECK: Supple, no bruits, No JVD, no nodes  Lungs: Clear, no rales, rhonchi, wheezing decreased breath sounds bilaterally--left greater than right--recent pneumonia  Heart: Regular rate and rhythm, gallops, or rubs +heart murmur  Abdomen: flat, bowel sounds positive, no tenderness, or organomegaly  MS:  +kyphosis--arthritis several joints right hip left wrist left elbow--all had surgeries all with arthritis--especially sore in the right ankle also complaining of low back pain L1 S1 bilaterally and sacroiliac areas bilaterally anterior  flexion 20° extension 10° lateral flexion rotation 10°--some pain in the hand 2nd arthritis in the hands bilaterally but overall good hand grasp and good opposition thumb index thumb 5th digit--still with some pain in the left ankle main pain back  Neuro: Alert, CN intact, oriented X 3 Romberg negative heel-toe intact  Extremities: No cyanosis, clubbing, or edema         Assessment:       1. Folliculitis    2. Nonintractable headache, unspecified chronicity pattern, unspecified headache type    3. Osteoarthritis, unspecified osteoarthritis type, unspecified site    4. Lumbosacral strain, subsequent encounter    5. Chronic obstructive pulmonary disease, unspecified COPD type    6. Hypercholesteremia    7. Essential hypertension    8. NSTEMI (non-ST elevated myocardial infarction)    9. Hx of heart artery stent    10. Congestive heart failure, unspecified HF chronicity, unspecified heart failure type    11. Hypotension, unspecified hypotension type    12. Hypothyroidism, unspecified type    13. History of tobacco abuse    14. Gastroesophageal reflux disease, esophagitis presence not specified        Plan:       Folliculitis    Nonintractable headache, unspecified chronicity pattern, unspecified headache type    Osteoarthritis, unspecified osteoarthritis type, unspecified site  -     Ambulatory Referral to Orthopedics    Lumbosacral strain, subsequent encounter  -     Ambulatory Referral to Orthopedics  -     Ambulatory Referral to Orthopedics    Chronic obstructive pulmonary disease, unspecified COPD type    Hypercholesteremia    Essential hypertension    NSTEMI (non-ST elevated myocardial infarction)    Hx of heart artery stent    Congestive heart failure, unspecified HF chronicity, unspecified heart failure type    Hypotension, unspecified hypotension type    Hypothyroidism, unspecified type    History of tobacco abuse    Gastroesophageal reflux disease, esophagitis presence not specified    Other orders  -      sulfamethoxazole-trimethoprim 800-160mg (BACTRIM DS) 800-160 mg Tab; Take 1 tablet by mouth 2 (two) times daily. for 10 days  Dispense: 20 tablet; Refill: 0  -     mupirocin (BACTROBAN) 2 % ointment; Apply topically 3 (three) times daily.  Dispense: 22 g; Refill: 1  -     betamethasone valerate 0.1% (VALISONE) 0.1 % Crea; Apply topically 2 (two) times daily.  Dispense: 60 g; Refill: 2  -     omeprazole (PRILOSEC) 40 MG capsule; Take 1 capsule (40 mg total) by mouth once daily.  Dispense: 30 capsule; Refill: 5  -     diclofenac (VOLTAREN) 50 MG EC tablet; One p.o. b.i.d. p.r.n.  are ankle or foot pain--no other NSAIDs be sure to take with omeprazole to prevent gastritis.  If develops gastritis  Dispense: 60 tablet; Refill: 5  -     LORazepam (ATIVAN) 1 MG tablet; One p.o. q.d. p.r.n. anxiety use p.r.n. not q.d.  Dispense: 30 tablet; Refill: 2  -     butalbital-acetaminophen-caffeine -40 mg (FIORICET, ESGIC) -40 mg per tablet; One p.o. q.d. p.r.n. headache not q.d. but p.r.n. headache  Dispense: 30 tablet; Refill: 2      Sees Dr Baltazar patient with significant cardiac history--hypertension/hyperlipidemia/congestive heart failure/heart murmur/CAD with stents times 15/right carotid surgery  COPD--patient quit smoking 3 months ago was smoking 1 pack per day times 20 years--told should help her gain weight  Patient has and ankle irregularity with demineralization needs a bone density  Pt requesting Fioricet Norco ands Xanax--will give Ativan--Fioricet--Ultram--referral to orthopedist Dr Vicente  Patient may benefit from omeprazole along with Voltaren 50 mg b.i.d. has a history of GERD and gastritis with NSAIDs  If symptoms persist in continues knee pain medication need to go pain clinic  Needs to do a headache diary---requiring Fioricet routinely will need to have a trial of Imitrex or Sonny Gore neurologist  Skin rash--folliculitis--swimming in different pools--appears to be staph--spotting similar  to impetigo--needs Bactrim DS 1 p.o. b.i.d. and Bactroban ointment if itching persist can add Valisone  Redo lab in 6 months CBCs CMP lipid

## 2019-07-04 ENCOUNTER — HOSPITAL ENCOUNTER (INPATIENT)
Facility: HOSPITAL | Age: 74
LOS: 2 days | Discharge: HOME | DRG: 378 | End: 2019-07-06
Attending: FAMILY MEDICINE | Admitting: FAMILY MEDICINE
Payer: MEDICARE

## 2019-07-04 DIAGNOSIS — E87.5 HYPERKALEMIA: Primary | ICD-10-CM

## 2019-07-04 DIAGNOSIS — K92.2 GI BLEED: ICD-10-CM

## 2019-07-04 DIAGNOSIS — K92.2 ACUTE GI HEMORRHAGE: ICD-10-CM

## 2019-07-04 PROBLEM — E87.6 HYPOKALEMIA: Status: RESOLVED | Noted: 2019-05-04 | Resolved: 2019-07-04

## 2019-07-04 PROBLEM — Z87.11 HISTORY OF GASTRIC ULCER: Status: RESOLVED | Noted: 2019-05-14 | Resolved: 2019-07-04

## 2019-07-04 PROBLEM — R42 DIZZINESS: Status: RESOLVED | Noted: 2019-05-14 | Resolved: 2019-07-04

## 2019-07-04 PROBLEM — R55 SYNCOPE: Status: RESOLVED | Noted: 2019-05-14 | Resolved: 2019-07-04

## 2019-07-04 PROBLEM — Z12.31 VISIT FOR SCREENING MAMMOGRAM: Status: RESOLVED | Noted: 2019-05-14 | Resolved: 2019-07-04

## 2019-07-04 PROBLEM — D64.9 ANEMIA: Status: ACTIVE | Noted: 2019-07-04

## 2019-07-04 PROBLEM — S39.012A LUMBOSACRAL STRAIN: Status: RESOLVED | Noted: 2019-06-27 | Resolved: 2019-07-04

## 2019-07-04 PROBLEM — M89.9 DISORDER OF BONE: Status: RESOLVED | Noted: 2019-05-14 | Resolved: 2019-07-04

## 2019-07-04 PROBLEM — F32.A DEPRESSION: Status: RESOLVED | Noted: 2019-05-14 | Resolved: 2019-07-04

## 2019-07-04 PROBLEM — I95.9 HYPOTENSION: Status: RESOLVED | Noted: 2019-06-04 | Resolved: 2019-07-04

## 2019-07-04 PROBLEM — J18.9 PNEUMONIA OF RIGHT LOWER LOBE DUE TO INFECTIOUS ORGANISM: Status: RESOLVED | Noted: 2019-05-14 | Resolved: 2019-07-04

## 2019-07-04 PROBLEM — I21.4 NSTEMI (NON-ST ELEVATED MYOCARDIAL INFARCTION): Status: RESOLVED | Noted: 2018-01-02 | Resolved: 2019-07-04

## 2019-07-04 LAB
ALBUMIN SERPL BCP-MCNC: 2.5 G/DL (ref 3.5–5.2)
ALP SERPL-CCNC: 102 U/L (ref 55–135)
ALT SERPL W/O P-5'-P-CCNC: 31 U/L (ref 10–44)
ANION GAP SERPL CALC-SCNC: 16 MMOL/L (ref 8–16)
ANION GAP SERPL CALC-SCNC: 17 MMOL/L (ref 8–16)
AST SERPL-CCNC: 48 U/L (ref 10–40)
BASOPHILS # BLD AUTO: 0.04 K/UL (ref 0–0.2)
BASOPHILS NFR BLD: 0.5 % (ref 0–1.9)
BILIRUB SERPL-MCNC: 0.4 MG/DL (ref 0.1–1)
BUN SERPL-MCNC: 57 MG/DL (ref 8–23)
BUN SERPL-MCNC: 63 MG/DL (ref 8–23)
CALCIUM SERPL-MCNC: 9.6 MG/DL (ref 8.7–10.5)
CALCIUM SERPL-MCNC: 9.7 MG/DL (ref 8.7–10.5)
CHLORIDE SERPL-SCNC: 124 MMOL/L (ref 95–110)
CHLORIDE SERPL-SCNC: 125 MMOL/L (ref 95–110)
CO2 SERPL-SCNC: 11 MMOL/L (ref 23–29)
CO2 SERPL-SCNC: 14 MMOL/L (ref 23–29)
CREAT SERPL-MCNC: 1.1 MG/DL (ref 0.5–1.4)
CREAT SERPL-MCNC: 1.2 MG/DL (ref 0.5–1.4)
DIFFERENTIAL METHOD: ABNORMAL
EOSINOPHIL # BLD AUTO: 0 K/UL (ref 0–0.5)
EOSINOPHIL NFR BLD: 0 % (ref 0–8)
ERYTHROCYTE [DISTWIDTH] IN BLOOD BY AUTOMATED COUNT: 15.6 % (ref 11.5–14.5)
EST. GFR  (AFRICAN AMERICAN): 52 ML/MIN/1.73 M^2
EST. GFR  (AFRICAN AMERICAN): 58 ML/MIN/1.73 M^2
EST. GFR  (NON AFRICAN AMERICAN): 45 ML/MIN/1.73 M^2
EST. GFR  (NON AFRICAN AMERICAN): 50 ML/MIN/1.73 M^2
GLUCOSE SERPL-MCNC: 127 MG/DL (ref 70–110)
GLUCOSE SERPL-MCNC: 127 MG/DL (ref 70–110)
HCT VFR BLD AUTO: 31.5 % (ref 37–48.5)
HGB BLD-MCNC: 10.2 G/DL (ref 12–16)
LYMPHOCYTES # BLD AUTO: 0.9 K/UL (ref 1–4.8)
LYMPHOCYTES NFR BLD: 10.8 % (ref 18–48)
MCH RBC QN AUTO: 29.7 PG (ref 27–31)
MCHC RBC AUTO-ENTMCNC: 32.4 G/DL (ref 32–36)
MCV RBC AUTO: 92 FL (ref 82–98)
MONOCYTES # BLD AUTO: 1.1 K/UL (ref 0.3–1)
MONOCYTES NFR BLD: 12.5 % (ref 4–15)
NEUTROPHILS # BLD AUTO: 6.5 K/UL (ref 1.8–7.7)
NEUTROPHILS NFR BLD: 76.2 % (ref 38–73)
PLATELET # BLD AUTO: 319 K/UL (ref 150–350)
PMV BLD AUTO: 9 FL (ref 9.2–12.9)
POTASSIUM SERPL-SCNC: 4.3 MMOL/L (ref 3.5–5.1)
POTASSIUM SERPL-SCNC: 5.8 MMOL/L (ref 3.5–5.1)
PROT SERPL-MCNC: 7.2 G/DL (ref 6–8.4)
RBC # BLD AUTO: 3.43 M/UL (ref 4–5.4)
SODIUM SERPL-SCNC: 153 MMOL/L (ref 136–145)
SODIUM SERPL-SCNC: 154 MMOL/L (ref 136–145)
WBC # BLD AUTO: 8.72 K/UL (ref 3.9–12.7)

## 2019-07-04 PROCEDURE — 94640 AIRWAY INHALATION TREATMENT: CPT

## 2019-07-04 PROCEDURE — 80053 COMPREHEN METABOLIC PANEL: CPT

## 2019-07-04 PROCEDURE — 36569 INSJ PICC 5 YR+ W/O IMAGING: CPT

## 2019-07-04 PROCEDURE — 94761 N-INVAS EAR/PLS OXIMETRY MLT: CPT

## 2019-07-04 PROCEDURE — 11000001 HC ACUTE MED/SURG PRIVATE ROOM

## 2019-07-04 PROCEDURE — 85025 COMPLETE CBC W/AUTO DIFF WBC: CPT

## 2019-07-04 PROCEDURE — 63600175 PHARM REV CODE 636 W HCPCS: Performed by: FAMILY MEDICINE

## 2019-07-04 PROCEDURE — 36415 COLL VENOUS BLD VENIPUNCTURE: CPT

## 2019-07-04 PROCEDURE — 25000242 PHARM REV CODE 250 ALT 637 W/ HCPCS: Performed by: FAMILY MEDICINE

## 2019-07-04 PROCEDURE — 25000003 PHARM REV CODE 250: Performed by: NURSE PRACTITIONER

## 2019-07-04 PROCEDURE — 25000003 PHARM REV CODE 250: Performed by: FAMILY MEDICINE

## 2019-07-04 PROCEDURE — 80048 BASIC METABOLIC PNL TOTAL CA: CPT

## 2019-07-04 RX ORDER — SODIUM CHLORIDE 0.9 % (FLUSH) 0.9 %
10 SYRINGE (ML) INJECTION
Status: DISCONTINUED | OUTPATIENT
Start: 2019-07-04 | End: 2019-07-06 | Stop reason: HOSPADM

## 2019-07-04 RX ORDER — ONDANSETRON 2 MG/ML
4 INJECTION INTRAMUSCULAR; INTRAVENOUS EVERY 8 HOURS PRN
Status: DISCONTINUED | OUTPATIENT
Start: 2019-07-04 | End: 2019-07-06 | Stop reason: HOSPADM

## 2019-07-04 RX ORDER — ALBUTEROL SULFATE 2.5 MG/.5ML
20 SOLUTION RESPIRATORY (INHALATION) ONCE
Status: COMPLETED | OUTPATIENT
Start: 2019-07-04 | End: 2019-07-04

## 2019-07-04 RX ORDER — PROMETHAZINE HYDROCHLORIDE 25 MG/1
25 TABLET ORAL ONCE
Status: COMPLETED | OUTPATIENT
Start: 2019-07-04 | End: 2019-07-04

## 2019-07-04 RX ORDER — ALBUTEROL SULFATE 90 UG/1
2 AEROSOL, METERED RESPIRATORY (INHALATION) EVERY 6 HOURS PRN
Status: DISCONTINUED | OUTPATIENT
Start: 2019-07-04 | End: 2019-07-06 | Stop reason: HOSPADM

## 2019-07-04 RX ORDER — MUPIROCIN 20 MG/G
OINTMENT TOPICAL
Qty: 22 G | Refills: 1 | Status: SHIPPED | OUTPATIENT
Start: 2019-07-04 | End: 2019-07-22 | Stop reason: SDUPTHER

## 2019-07-04 RX ORDER — METOPROLOL TARTRATE 1 MG/ML
5 INJECTION, SOLUTION INTRAVENOUS EVERY 6 HOURS PRN
Status: DISCONTINUED | OUTPATIENT
Start: 2019-07-04 | End: 2019-07-06

## 2019-07-04 RX ORDER — HYDRALAZINE HYDROCHLORIDE 20 MG/ML
10 INJECTION INTRAMUSCULAR; INTRAVENOUS EVERY 6 HOURS
Status: DISCONTINUED | OUTPATIENT
Start: 2019-07-04 | End: 2019-07-06

## 2019-07-04 RX ORDER — ONDANSETRON 8 MG/1
8 TABLET, ORALLY DISINTEGRATING ORAL ONCE
Status: COMPLETED | OUTPATIENT
Start: 2019-07-04 | End: 2019-07-04

## 2019-07-04 RX ORDER — SODIUM CHLORIDE 9 MG/ML
INJECTION, SOLUTION INTRAVENOUS CONTINUOUS
Status: DISCONTINUED | OUTPATIENT
Start: 2019-07-04 | End: 2019-07-05

## 2019-07-04 RX ORDER — METOPROLOL TARTRATE 1 MG/ML
5 INJECTION, SOLUTION INTRAVENOUS EVERY 8 HOURS
Status: DISCONTINUED | OUTPATIENT
Start: 2019-07-04 | End: 2019-07-06

## 2019-07-04 RX ORDER — HYDRALAZINE HYDROCHLORIDE 25 MG/1
25 TABLET, FILM COATED ORAL ONCE
Status: COMPLETED | OUTPATIENT
Start: 2019-07-04 | End: 2019-07-04

## 2019-07-04 RX ORDER — SODIUM CHLORIDE 0.9 % (FLUSH) 0.9 %
10 SYRINGE (ML) INJECTION EVERY 6 HOURS
Status: DISCONTINUED | OUTPATIENT
Start: 2019-07-05 | End: 2019-07-06 | Stop reason: HOSPADM

## 2019-07-04 RX ADMIN — ALBUTEROL SULFATE 20 MG: 2.5 SOLUTION RESPIRATORY (INHALATION) at 03:07

## 2019-07-04 RX ADMIN — PROMETHAZINE HYDROCHLORIDE 25 MG: 25 TABLET ORAL at 05:07

## 2019-07-04 RX ADMIN — METOPROLOL TARTRATE 5 MG: 1 INJECTION, SOLUTION INTRAVENOUS at 08:07

## 2019-07-04 RX ADMIN — HYDRALAZINE HYDROCHLORIDE 25 MG: 25 TABLET, FILM COATED ORAL at 01:07

## 2019-07-04 RX ADMIN — LORAZEPAM 1 MG: 2 INJECTION INTRAMUSCULAR; INTRAVENOUS at 07:07

## 2019-07-04 RX ADMIN — SODIUM CHLORIDE: 0.9 INJECTION, SOLUTION INTRAVENOUS at 07:07

## 2019-07-04 RX ADMIN — HYDRALAZINE HYDROCHLORIDE 10 MG: 20 INJECTION INTRAMUSCULAR; INTRAVENOUS at 07:07

## 2019-07-04 RX ADMIN — ONDANSETRON 8 MG: 8 TABLET, ORALLY DISINTEGRATING ORAL at 01:07

## 2019-07-04 RX ADMIN — METOPROLOL TARTRATE 5 MG: 1 INJECTION, SOLUTION INTRAVENOUS at 10:07

## 2019-07-04 NOTE — ASSESSMENT & PLAN NOTE
History of gastric ulcer  History of GI bleed per patient   GERD  Anemia  Hold Plavix and Voltaren  start protonix drip   NPO  GI consult

## 2019-07-04 NOTE — PLAN OF CARE
" (Physician in Lead of Transfers) /Regional Referral Center Note    Patients name/MRN: Josseline Stinson/MRN [id number]     Referring Physician or Mid-Level provider giving report: Dr. Alex Matos (Emergency Medicine)  Contact number: [phone]    Referral Facility: [facility]     Date/Time of Acceptance: 7/4/2019 7:34 AM    : Annetta Spencer MD; Case discussed with Dr. Anastasia Fernández at [facility] who is accepting physician      Accepting facility: [facility]    Reason for transfer request: Needs GI service not available at [place] [facility]    Report from Physician/Mid-Level Provider/HPI: 74 y/o female with chronic pain, migraine headaches, COPD, history of gastric ulcers, GERD, anxiety, CAD with prior NSTEMI and coronary stenting with last stent on 1/3/2018 to left circumflex presented to [facility] ED with 2-day history of nausea and vomiting and mid-epigastric pain and noted blood in her stool. Patient initially was very agitated and given Benadryl + Haldol + Ativan and much calmer. While in ED, patient had large melanotic stool that was heme positive. Patient had labs drawn with H/H 10.8/32 at 2200 and repeat H/H 10.2/30.3 at 0630 and stable. Patient given Protonix 80 mg IV x 1 in ED. Patient lucid and conversant at present according ED physician. Patient self-reports history of gastric ulcers and GI bleed in past. Patient at home on Plavix but not on Coumadin or NOACS. Patient does admit to NSAID use. Patient has been hemodynamically stable in ED. Abdominal X-rays done in ED was unremarkable. INR 1.2 and Platelet count normal at 296,000. No GI available at [facility] so request made for transfer for hospital admit for evaluation for GI bleed.     VS: /93   Pulse 105   Temp 98.5 °F (36.9 °C) (Oral)   Resp 18   Ht 5' 4" (1.626 m)   Wt 45.4 kg (100 lb)   SpO2 97%   BMI 17.16 kg/m²     Past Medical/Surgical History: See EPIC    Meds: " See EPIC    Labs: See EPIC    Imaging: See EPIC    To Do List upon arrival:     Admit to Inpatient   NPO for now   GI consult   Continue IV Protonix BID to treat GI bleed    Hold Plavix     Annetta Spencer MD  Senior Staff Physician  Department of Hospital Medicine  [facility]/   [phone]

## 2019-07-04 NOTE — H&P
[facility] Hospital Medicine  History & Physical    Patient Name: Josseline Stinson  MRN: [id number]  Admission Date: 7/4/2019  Attending Physician: Anastasia Fernández MD  Primary Care Provider: Bradford Crockett MD PhD         Patient information was obtained from patient and ER records.     Subjective:     Principal Problem:Acute GI hemorrhage    Chief Complaint:   Chief Complaint   Patient presents with    Fatigue        HPI: Josseline Stinson is a 72 y/o female with chronic pain, migraine headaches, COPD, history of gastric ulcers, GERD, anxiety, CAD with prior NSTEMI and coronary stenting with last stent on 1/3/2018 to left circumflex presented to [place] ED with 2-day history of nausea and vomiting and mid-epigastric pain and noted blood in her stool. Patient initially was very agitated and given Benadryl + Haldol + Ativan and much calmer. While in ED, patient had large melanotic stool that was heme positive. Patient had labs drawn with H/H 10.8/32 at 2200 and repeat H/H 10.2/30.3 at 0630 and stable. Patient given Protonix 80 mg IV x 1 in ED. Patient lucid and conversant at present according ED physician. Patient self-reports history of gastric ulcers and GI bleed in past. Patient at home on Plavix but not on Coumadin or NOACS. Patient does admit to NSAID use. Patient has been hemodynamically stable in ED. Abdominal X-rays done in ED was unremarkable. INR 1.2 and Platelet count normal at 296,000.   Patient transfer to [facility] for GI evaluation and recommendation. Patient admitted to hospital medicine service for medical management. Hold Plavix and Voltaren, start protonix drip, NPO, GI consult    Past Medical History:   Diagnosis Date    Anxiety     CHF (congestive heart failure)     Chronic pain syndrome 6/4/2019    COPD (chronic obstructive pulmonary disease)     Coronary artery disease involving native coronary artery of native heart without angina pectoris 3/21/2016    Encounter for  blood transfusion     Essential hypertension 3/21/2016    GERD (gastroesophageal reflux disease)     History of gastric ulcer 5/14/2019    Hx of heart artery stent 5/14/2019    Hypercholesteremia 3/21/2016    MI (myocardial infarction)     x4    NSTEMI (non-ST elevated myocardial infarction) 1/2/2018    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 3/21/2016    Pneumonia of right lower lobe due to infectious organism 5/14/2019    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    Ulcer        Past Surgical History:   Procedure Laterality Date    carotid artery surgeriy      catheterization cardiac cath Left 01/03/2017    Angioplasty    FRACTURE SURGERY Left     elbow and wrist    HIP SURGERY Right     x 4    HYSTERECTOMY  1972    Left heart cath Left 1/3/2018    Performed by [facility] at [facility] CATH [facility]    Stent KAN coronary  1/3/2018    Performed by [facility]zar MD at [facility] CATH LAB       Review of patient's allergies indicates:   Allergen Reactions    Cortisone Swelling     SWELLING    Darvocet a500 [propoxyphene n-acetaminophen] Nausea And Vomiting    Toradol [ketorolac] Nausea And Vomiting    Tramadol Nausea And Vomiting    Codeine Palpitations     dizzy       Current Facility-Administered Medications on File Prior to Encounter   Medication    [COMPLETED] 0.9%  NaCl infusion    [COMPLETED] diphenhydrAMINE injection 25 mg    [COMPLETED] famotidine (PF) injection 20 mg    [COMPLETED] fentaNYL injection 25 mcg    [COMPLETED] haloperidol lactate injection 5 mg    [COMPLETED] LORazepam tablet 1 mg    [COMPLETED] pantoprazole injection 80 mg     Current Outpatient Medications on File Prior to Encounter   Medication Sig    albuterol (PROVENTIL/VENTOLIN HFA) 90 mcg/actuation inhaler USE ONE PUFF EVERY FOUR TO SIX HOURS AS NEEDED    alendronate (FOSAMAX) 70 MG tablet Take 1 tablet (70 mg total) by mouth every 7 days.    ARIPiprazole (ABILIFY) 2 MG Tab Take 2 mg by mouth once  daily.    atorvastatin (LIPITOR) 40 MG tablet Take 1 tablet (40 mg total) by mouth once daily.    betamethasone valerate 0.1% (VALISONE) 0.1 % Crea Apply topically 2 (two) times daily.    butalbital-acetaminophen-caffeine -40 mg (FIORICET, ESGIC) -40 mg per tablet One p.o. q.d. p.r.n. headache not q.d. but p.r.n. headache    clopidogrel (PLAVIX) 75 mg tablet Take 1 tablet (75 mg total) by mouth once daily.    diclofenac (VOLTAREN) 50 MG EC tablet One p.o. b.i.d. p.r.n.  are ankle or foot pain--no other NSAIDs be sure to take with omeprazole to prevent gastritis.  If develops gastritis    escitalopram oxalate (LEXAPRO) 20 MG tablet Take 1 tablet (20 mg total) by mouth once daily.    isosorbide mononitrate (ISMO,MONOKET) 20 MG Tab     LORazepam (ATIVAN) 1 MG tablet One p.o. q.d. p.r.n. anxiety use p.r.n. not q.d.    metoprolol succinate (TOPROL-XL) 25 MG 24 hr tablet Take 1 tablet (25 mg total) by mouth once daily.    mupirocin (BACTROBAN) 2 % ointment APPLY TO THE AFFECTED AREA THREE TIMES DAILY    omeprazole (PRILOSEC) 40 MG capsule Take 1 capsule (40 mg total) by mouth once daily.    prochlorperazine (COMPAZINE) 10 MG tablet Take 1 tablet (10 mg total) by mouth every 6 (six) hours as needed.    sulfamethoxazole-trimethoprim 800-160mg (BACTRIM DS) 800-160 mg Tab Take 1 tablet by mouth 2 (two) times daily. for 10 days    traZODone (DESYREL) 100 MG tablet Take 100 mg by mouth every evening.      Family History     Problem Relation (Age of Onset)    Breast cancer Daughter    Cancer Mother, Sister, Brother    Diabetes Mother    Heart disease Mother, Father, Sister, Brother, Maternal Grandmother, Maternal Grandfather        Tobacco Use    Smoking status: Heavy Tobacco Smoker     Packs/day: 0.25     Years: 45.00     Pack years: 11.25     Types: Cigarettes    Smokeless tobacco: Never Used   Substance and Sexual Activity    Alcohol use: No     Alcohol/week: 0.0 oz    Drug use: No     Sexual activity: Yes     Partners: Male     Review of Systems   Constitutional: Negative for fatigue and fever.   HENT: Positive for sinus pressure. Negative for congestion.    Eyes: Negative for photophobia and visual disturbance.   Respiratory: Negative for cough and chest tightness.    Cardiovascular: Negative for chest pain.   Gastrointestinal: Positive for nausea. Negative for abdominal distention, abdominal pain, anal bleeding, blood in stool, constipation, diarrhea and vomiting.   Endocrine: Negative for polydipsia.   Genitourinary: Negative for dysuria, hematuria and urgency.   Musculoskeletal: Negative for arthralgias.   Skin: Negative for rash.   Neurological: Negative for dizziness, weakness and headaches.   Psychiatric/Behavioral: Negative for decreased concentration and dysphoric mood.     Objective:     Vital Signs (Most Recent):  Temp: 97.6 °F (36.4 °C) (07/04/19 1055)  Pulse: 106 (07/04/19 1055)  Resp: 16 (07/04/19 1055)  BP: (!) 206/110 (07/04/19 1055)  SpO2: 99 % (07/04/19 1055) Vital Signs (24h Range):  Temp:  [97.6 °F (36.4 °C)-98.5 °F (36.9 °C)] 97.6 °F (36.4 °C)  Pulse:  [] 106  Resp:  [15-26] 16  SpO2:  [95 %-100 %] 99 %  BP: (128-206)/() 206/110     Weight: 46.8 kg (103 lb 2.8 oz)  Body mass index is 17.71 kg/m².    Physical Exam   Constitutional: She is oriented to person, place, and time. She appears well-developed and well-nourished.   HENT:   Head: Normocephalic and atraumatic.   Mouth/Throat: Oropharynx is clear and moist.   Eyes: Pupils are equal, round, and reactive to light. EOM are normal.   Neck: Neck supple.   Cardiovascular: Normal rate, regular rhythm, normal heart sounds and intact distal pulses. Exam reveals no gallop and no friction rub.   No murmur heard.  Pulmonary/Chest: Breath sounds normal.   Abdominal: Soft. Bowel sounds are normal. She exhibits no distension and no mass. There is tenderness. There is no rebound.   Musculoskeletal: She exhibits no edema or  tenderness.   Neurological: She is alert and oriented to person, place, and time. She has normal reflexes.   Skin: Skin is warm.   Psychiatric: She has a normal mood and affect. Her behavior is normal.         CRANIAL NERVES     CN III, IV, VI   Pupils are equal, round, and reactive to light.  Extraocular motions are normal.        Significant Labs:   CBC:   Recent Labs   Lab 07/03/19 2202 07/04/19  0631   WBC 5.40  --    HGB 10.8* 10.2*   HCT 32.7* 30.3*     --      CMP:   Recent Labs   Lab 07/03/19 2202      K 5.1   *   CO2 14*   *   BUN 81*   CREATININE 1.7*   CALCIUM 8.1*   PROT 7.1   ALBUMIN 3.0*   BILITOT 0.7   ALKPHOS 89   AST 46*   ALT 30   ANIONGAP 15   EGFRNONAA 29.7*     Lactic Acid: No results for input(s): LACTATE in the last 48 hours.  Prealbumin: No results for input(s): PREALBUMIN in the last 48 hours.    Significant Imaging: none    Assessment/Plan:     * Acute GI hemorrhage  History of gastric ulcer  History of GI bleed per patient   GERD  Anemia  Hold Plavix and Voltaren  start protonix drip   NPO  GI consult    Hyperkalemia  Treat        Congestive heart failure  Stable  meds on hold patient NPO        Hypothyroidism  Not on medication  TSH on 5/2019 wnl    Chronic obstructive pulmonary disease  Stable   Continue nebs        Essential hypertension  Uncontrolled  Hold po Metoprolol  IV hydralazine and lopressor prn       Coronary artery disease involving native coronary artery of native heart without angina pectoris  Hypercholesterolemia  Hold Lipitor and Plavix        VTE Risk Mitigation (From admission, onward)        Ordered     Place sequential compression device  Until discontinued      07/04/19 1132     Place JARROD hose  Until discontinued      07/04/19 1132     IP VTE HIGH RISK PATIENT  Once      07/04/19 1132             Anastasia Fernández MD  Department of Hospital Medicine   [facility]

## 2019-07-04 NOTE — PROCEDURES
"Josseline Stinson is a 73 y.o. female patient.    Temp: 96.7 °F (35.9 °C) (07/04/19 1546)  Pulse: (!) 114 (07/04/19 1600)  Resp: 20 (07/04/19 1546)  BP: (!) 174/88 (07/04/19 1714)  SpO2: 98 % (07/04/19 1544)  Weight: 46.8 kg (103 lb 2.8 oz) (07/04/19 1055)  Height: 5' 4" (162.6 cm) (07/04/19 1055)    PICC  Date/Time: 7/4/2019 5:15 PM  Performed by: Bony Godoy RN  Consent Done: Yes  Time out: Immediately prior to procedure a time out was called to verify the correct patient, procedure, equipment, support staff and site/side marked as required  Indications: med administration and vascular access  Anesthesia: local infiltration  Local anesthetic: lidocaine 1% without epinephrine  Anesthetic Total (mL): 2  Preparation: skin prepped with ChloraPrep  Skin prep agent dried: skin prep agent completely dried prior to procedure  Sterile barriers: all five maximum sterile barriers used - cap, mask, sterile gown, sterile gloves, and large sterile sheet  Hand hygiene: hand hygiene performed prior to central venous catheter insertion  Location details: right basilic  Catheter type: double lumen  Catheter size: 5 Fr  Catheter Length: 32 (2cm external)cm    Ultrasound guidance: yes  Vessel Caliber: medium, compressibility normal  Needle advanced into vessel with real time Ultrasound guidance.  Guidewire confirmed in vessel.  Sterile sheath used.  Number of attempts: 1  Post-procedure: blood return through all ports, chlorhexidine patch and sterile dressing applied            Bony Godoy  7/4/2019  "

## 2019-07-04 NOTE — HPI
Josseline Stinson is a 74 y/o female with chronic pain, migraine headaches, COPD, history of gastric ulcers, GERD, anxiety, CAD with prior NSTEMI and coronary stenting with last stent on 1/3/2018 to left circumflex presented to [place] ED with 2-day history of nausea and vomiting and mid-epigastric pain and noted blood in her stool. Patient initially was very agitated and given Benadryl + Haldol + Ativan and much calmer. While in ED, patient had large melanotic stool that was heme positive. Patient had labs drawn with H/H 10.8/32 at 2200 and repeat H/H 10.2/30.3 at 0630 and stable. Patient given Protonix 80 mg IV x 1 in ED. Patient lucid and conversant at present according ED physician. Patient self-reports history of gastric ulcers and GI bleed in past. Patient at home on Plavix but not on Coumadin or NOACS. Patient does admit to NSAID use. Patient has been hemodynamically stable in ED. Abdominal X-rays done in ED was unremarkable. INR 1.2 and Platelet count normal at 296,000.   Patient transfer to [facility] for GI evaluation and recommendation. Patient admitted to hospital medicine service for medical management. Hold Plavix and Voltaren, start protonix drip, NPO, GI consult

## 2019-07-04 NOTE — SUBJECTIVE & OBJECTIVE
Past Medical History:   Diagnosis Date    Anxiety     CHF (congestive heart failure)     Chronic pain syndrome 6/4/2019    COPD (chronic obstructive pulmonary disease)     Coronary artery disease involving native coronary artery of native heart without angina pectoris 3/21/2016    Encounter for blood transfusion     Essential hypertension 3/21/2016    GERD (gastroesophageal reflux disease)     History of gastric ulcer 5/14/2019    Hx of heart artery stent 5/14/2019    Hypercholesteremia 3/21/2016    MI (myocardial infarction)     x4    NSTEMI (non-ST elevated myocardial infarction) 1/2/2018    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 3/21/2016    Pneumonia of right lower lobe due to infectious organism 5/14/2019    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    Ulcer        Past Surgical History:   Procedure Laterality Date    carotid artery surgeriy      catheterization cardiac cath Left 01/03/2017    Angioplasty    FRACTURE SURGERY Left     elbow and wrist    HIP SURGERY Right     x 4    HYSTERECTOMY  1972    Left heart cath Left 1/3/2018    [facility]    Stent KAN coronary  1/3/2018    Performed by [facility] at [facility] CATH [facility]       Review of patient's allergies indicates:   Allergen Reactions    Cortisone Swelling     SWELLING    Darvocet a500 [propoxyphene n-acetaminophen] Nausea And Vomiting    Toradol [ketorolac] Nausea And Vomiting    Tramadol Nausea And Vomiting    Codeine Palpitations     dizzy       Current Facility-Administered Medications on File Prior to Encounter   Medication    [COMPLETED] 0.9%  NaCl infusion    [COMPLETED] diphenhydrAMINE injection 25 mg    [COMPLETED] famotidine (PF) injection 20 mg    [COMPLETED] fentaNYL injection 25 mcg    [COMPLETED] haloperidol lactate injection 5 mg    [COMPLETED] LORazepam tablet 1 mg    [COMPLETED] pantoprazole injection 80 mg     Current Outpatient Medications on  File Prior to Encounter   Medication Sig    albuterol (PROVENTIL/VENTOLIN HFA) 90 mcg/actuation inhaler USE ONE PUFF EVERY FOUR TO SIX HOURS AS NEEDED    alendronate (FOSAMAX) 70 MG tablet Take 1 tablet (70 mg total) by mouth every 7 days.    ARIPiprazole (ABILIFY) 2 MG Tab Take 2 mg by mouth once daily.    atorvastatin (LIPITOR) 40 MG tablet Take 1 tablet (40 mg total) by mouth once daily.    betamethasone valerate 0.1% (VALISONE) 0.1 % Crea Apply topically 2 (two) times daily.    butalbital-acetaminophen-caffeine -40 mg (FIORICET, ESGIC) -40 mg per tablet One p.o. q.d. p.r.n. headache not q.d. but p.r.n. headache    clopidogrel (PLAVIX) 75 mg tablet Take 1 tablet (75 mg total) by mouth once daily.    diclofenac (VOLTAREN) 50 MG EC tablet One p.o. b.i.d. p.r.n.  are ankle or foot pain--no other NSAIDs be sure to take with omeprazole to prevent gastritis.  If develops gastritis    escitalopram oxalate (LEXAPRO) 20 MG tablet Take 1 tablet (20 mg total) by mouth once daily.    isosorbide mononitrate (ISMO,MONOKET) 20 MG Tab     LORazepam (ATIVAN) 1 MG tablet One p.o. q.d. p.r.n. anxiety use p.r.n. not q.d.    metoprolol succinate (TOPROL-XL) 25 MG 24 hr tablet Take 1 tablet (25 mg total) by mouth once daily.    mupirocin (BACTROBAN) 2 % ointment APPLY TO THE AFFECTED AREA THREE TIMES DAILY    omeprazole (PRILOSEC) 40 MG capsule Take 1 capsule (40 mg total) by mouth once daily.    prochlorperazine (COMPAZINE) 10 MG tablet Take 1 tablet (10 mg total) by mouth every 6 (six) hours as needed.    sulfamethoxazole-trimethoprim 800-160mg (BACTRIM DS) 800-160 mg Tab Take 1 tablet by mouth 2 (two) times daily. for 10 days    traZODone (DESYREL) 100 MG tablet Take 100 mg by mouth every evening.      Family History     Problem Relation (Age of Onset)    Breast cancer Daughter    Cancer Mother, Sister, Brother    Diabetes Mother    Heart disease Mother, Father, Sister, Brother, Maternal  Grandmother, Maternal Grandfather        Tobacco Use    Smoking status: Heavy Tobacco Smoker     Packs/day: 0.25     Years: 45.00     Pack years: 11.25     Types: Cigarettes    Smokeless tobacco: Never Used   Substance and Sexual Activity    Alcohol use: No     Alcohol/week: 0.0 oz    Drug use: No    Sexual activity: Yes     Partners: Male     Review of Systems   Constitutional: Negative for fatigue and fever.   HENT: Positive for sinus pressure. Negative for congestion.    Eyes: Negative for photophobia and visual disturbance.   Respiratory: Negative for cough and chest tightness.    Cardiovascular: Negative for chest pain.   Gastrointestinal: Positive for nausea. Negative for abdominal distention, abdominal pain, anal bleeding, blood in stool, constipation, diarrhea and vomiting.   Endocrine: Negative for polydipsia.   Genitourinary: Negative for dysuria, hematuria and urgency.   Musculoskeletal: Negative for arthralgias.   Skin: Negative for rash.   Neurological: Negative for dizziness, weakness and headaches.   Psychiatric/Behavioral: Negative for decreased concentration and dysphoric mood.     Objective:     Vital Signs (Most Recent):  Temp: 97.6 °F (36.4 °C) (07/04/19 1055)  Pulse: 106 (07/04/19 1055)  Resp: 16 (07/04/19 1055)  BP: (!) 206/110 (07/04/19 1055)  SpO2: 99 % (07/04/19 1055) Vital Signs (24h Range):  Temp:  [97.6 °F (36.4 °C)-98.5 °F (36.9 °C)] 97.6 °F (36.4 °C)  Pulse:  [] 106  Resp:  [15-26] 16  SpO2:  [95 %-100 %] 99 %  BP: (128-206)/() 206/110     Weight: 46.8 kg (103 lb 2.8 oz)  Body mass index is 17.71 kg/m².    Physical Exam   Constitutional: She is oriented to person, place, and time. She appears well-developed and well-nourished.   HENT:   Head: Normocephalic and atraumatic.   Mouth/Throat: Oropharynx is clear and moist.   Eyes: Pupils are equal, round, and reactive to light. EOM are normal.   Neck: Neck supple.   Cardiovascular: Normal rate, regular rhythm, normal heart  sounds and intact distal pulses. Exam reveals no gallop and no friction rub.   No murmur heard.  Pulmonary/Chest: Breath sounds normal.   Abdominal: Soft. Bowel sounds are normal. She exhibits no distension and no mass. There is tenderness. There is no rebound.   Musculoskeletal: She exhibits no edema or tenderness.   Neurological: She is alert and oriented to person, place, and time. She has normal reflexes.   Skin: Skin is warm.   Psychiatric: She has a normal mood and affect. Her behavior is normal.         CRANIAL NERVES     CN III, IV, VI   Pupils are equal, round, and reactive to light.  Extraocular motions are normal.        Significant Labs:   CBC:   Recent Labs   Lab 07/03/19 2202 07/04/19  0631   WBC 5.40  --    HGB 10.8* 10.2*   HCT 32.7* 30.3*     --      CMP:   Recent Labs   Lab 07/03/19 2202      K 5.1   *   CO2 14*   *   BUN 81*   CREATININE 1.7*   CALCIUM 8.1*   PROT 7.1   ALBUMIN 3.0*   BILITOT 0.7   ALKPHOS 89   AST 46*   ALT 30   ANIONGAP 15   EGFRNONAA 29.7*     Lactic Acid: No results for input(s): LACTATE in the last 48 hours.  Prealbumin: No results for input(s): PREALBUMIN in the last 48 hours.    Significant Imaging: none

## 2019-07-04 NOTE — PLAN OF CARE
Performed Virtual round and attempted to complete admission, but pt having CXR for PICC line placement.  Pt reports nausea relief from Phenergan, but not feeling well enough at this time to answer questions.

## 2019-07-05 ENCOUNTER — ANESTHESIA (OUTPATIENT)
Dept: ENDOSCOPY | Facility: HOSPITAL | Age: 74
DRG: 378 | End: 2019-07-05
Payer: MEDICARE

## 2019-07-05 ENCOUNTER — ANESTHESIA EVENT (OUTPATIENT)
Dept: ENDOSCOPY | Facility: HOSPITAL | Age: 74
DRG: 378 | End: 2019-07-05
Payer: MEDICARE

## 2019-07-05 ENCOUNTER — CLINICAL SUPPORT (OUTPATIENT)
Dept: SMOKING CESSATION | Facility: CLINIC | Age: 74
End: 2019-07-05
Payer: COMMERCIAL

## 2019-07-05 DIAGNOSIS — F17.210 CIGARETTE SMOKER: Primary | ICD-10-CM

## 2019-07-05 PROBLEM — E87.0 HYPERNATREMIA: Status: ACTIVE | Noted: 2019-07-05

## 2019-07-05 LAB
ALBUMIN SERPL BCP-MCNC: 2.3 G/DL (ref 3.5–5.2)
ALP SERPL-CCNC: 86 U/L (ref 55–135)
ALT SERPL W/O P-5'-P-CCNC: 28 U/L (ref 10–44)
ANION GAP SERPL CALC-SCNC: 11 MMOL/L (ref 8–16)
AST SERPL-CCNC: 45 U/L (ref 10–40)
BASOPHILS # BLD AUTO: 0.01 K/UL (ref 0–0.2)
BASOPHILS NFR BLD: 0.1 % (ref 0–1.9)
BILIRUB SERPL-MCNC: 0.4 MG/DL (ref 0.1–1)
BUN SERPL-MCNC: 48 MG/DL (ref 8–23)
CALCIUM SERPL-MCNC: 9.2 MG/DL (ref 8.7–10.5)
CHLORIDE SERPL-SCNC: 129 MMOL/L (ref 95–110)
CO2 SERPL-SCNC: 18 MMOL/L (ref 23–29)
CREAT SERPL-MCNC: 1 MG/DL (ref 0.5–1.4)
DIFFERENTIAL METHOD: ABNORMAL
EOSINOPHIL # BLD AUTO: 0 K/UL (ref 0–0.5)
EOSINOPHIL NFR BLD: 0 % (ref 0–8)
ERYTHROCYTE [DISTWIDTH] IN BLOOD BY AUTOMATED COUNT: 15.5 % (ref 11.5–14.5)
EST. GFR  (AFRICAN AMERICAN): >60 ML/MIN/1.73 M^2
EST. GFR  (NON AFRICAN AMERICAN): 56 ML/MIN/1.73 M^2
GLUCOSE SERPL-MCNC: 128 MG/DL (ref 70–110)
HCT VFR BLD AUTO: 26.8 % (ref 37–48.5)
HGB BLD-MCNC: 8.7 G/DL (ref 12–16)
LYMPHOCYTES # BLD AUTO: 1.6 K/UL (ref 1–4.8)
LYMPHOCYTES NFR BLD: 19.5 % (ref 18–48)
MCH RBC QN AUTO: 29.8 PG (ref 27–31)
MCHC RBC AUTO-ENTMCNC: 32.5 G/DL (ref 32–36)
MCV RBC AUTO: 92 FL (ref 82–98)
MONOCYTES # BLD AUTO: 0.8 K/UL (ref 0.3–1)
MONOCYTES NFR BLD: 10.2 % (ref 4–15)
NEUTROPHILS # BLD AUTO: 5.5 K/UL (ref 1.8–7.7)
NEUTROPHILS NFR BLD: 70.2 % (ref 38–73)
PLATELET # BLD AUTO: 316 K/UL (ref 150–350)
PMV BLD AUTO: 8.5 FL (ref 9.2–12.9)
POTASSIUM SERPL-SCNC: 4.1 MMOL/L (ref 3.5–5.1)
PROT SERPL-MCNC: 6 G/DL (ref 6–8.4)
RBC # BLD AUTO: 2.92 M/UL (ref 4–5.4)
SODIUM SERPL-SCNC: 158 MMOL/L (ref 136–145)
WBC # BLD AUTO: 8.02 K/UL (ref 3.9–12.7)

## 2019-07-05 PROCEDURE — 99999 PR PBB SHADOW E&M-EST. PATIENT-LVL II: ICD-10-PCS | Mod: PBBFAC,,,

## 2019-07-05 PROCEDURE — 80053 COMPREHEN METABOLIC PANEL: CPT

## 2019-07-05 PROCEDURE — 99407 PR TOBACCO USE CESSATION INTENSIVE >10 MINUTES: ICD-10-PCS | Mod: S$GLB,,,

## 2019-07-05 PROCEDURE — 43235 EGD DIAGNOSTIC BRUSH WASH: CPT | Mod: ,,, | Performed by: INTERNAL MEDICINE

## 2019-07-05 PROCEDURE — 37000009 HC ANESTHESIA EA ADD 15 MINS: Performed by: INTERNAL MEDICINE

## 2019-07-05 PROCEDURE — 25000003 PHARM REV CODE 250: Performed by: FAMILY MEDICINE

## 2019-07-05 PROCEDURE — 94761 N-INVAS EAR/PLS OXIMETRY MLT: CPT

## 2019-07-05 PROCEDURE — 37000008 HC ANESTHESIA 1ST 15 MINUTES: Performed by: INTERNAL MEDICINE

## 2019-07-05 PROCEDURE — 43235 PR EGD, FLEX, DIAGNOSTIC: ICD-10-PCS | Mod: ,,, | Performed by: INTERNAL MEDICINE

## 2019-07-05 PROCEDURE — 85025 COMPLETE CBC W/AUTO DIFF WBC: CPT

## 2019-07-05 PROCEDURE — 99999 PR PBB SHADOW E&M-EST. PATIENT-LVL II: CPT | Mod: PBBFAC,,,

## 2019-07-05 PROCEDURE — 99407 BEHAV CHNG SMOKING > 10 MIN: CPT | Mod: S$GLB,,,

## 2019-07-05 PROCEDURE — 25000003 PHARM REV CODE 250: Performed by: NURSE PRACTITIONER

## 2019-07-05 PROCEDURE — 99223 1ST HOSP IP/OBS HIGH 75: CPT | Mod: ,,, | Performed by: INTERNAL MEDICINE

## 2019-07-05 PROCEDURE — 43235 EGD DIAGNOSTIC BRUSH WASH: CPT | Performed by: INTERNAL MEDICINE

## 2019-07-05 PROCEDURE — 63600175 PHARM REV CODE 636 W HCPCS: Performed by: INTERNAL MEDICINE

## 2019-07-05 PROCEDURE — S4991 NICOTINE PATCH NONLEGEND: HCPCS | Performed by: FAMILY MEDICINE

## 2019-07-05 PROCEDURE — 63600175 PHARM REV CODE 636 W HCPCS: Performed by: NURSE ANESTHETIST, CERTIFIED REGISTERED

## 2019-07-05 PROCEDURE — A4216 STERILE WATER/SALINE, 10 ML: HCPCS | Performed by: FAMILY MEDICINE

## 2019-07-05 PROCEDURE — 11000001 HC ACUTE MED/SURG PRIVATE ROOM

## 2019-07-05 PROCEDURE — 25000003 PHARM REV CODE 250: Performed by: NURSE ANESTHETIST, CERTIFIED REGISTERED

## 2019-07-05 PROCEDURE — 63600175 PHARM REV CODE 636 W HCPCS: Performed by: FAMILY MEDICINE

## 2019-07-05 PROCEDURE — C9113 INJ PANTOPRAZOLE SODIUM, VIA: HCPCS | Performed by: INTERNAL MEDICINE

## 2019-07-05 PROCEDURE — 99223 PR INITIAL HOSPITAL CARE,LEVL III: ICD-10-PCS | Mod: ,,, | Performed by: INTERNAL MEDICINE

## 2019-07-05 RX ORDER — LIDOCAINE HCL/PF 100 MG/5ML
SYRINGE (ML) INTRAVENOUS
Status: DISCONTINUED | OUTPATIENT
Start: 2019-07-05 | End: 2019-07-05

## 2019-07-05 RX ORDER — IBUPROFEN 200 MG
1 TABLET ORAL DAILY
Status: DISCONTINUED | OUTPATIENT
Start: 2019-07-05 | End: 2019-07-06 | Stop reason: HOSPADM

## 2019-07-05 RX ORDER — OXYCODONE AND ACETAMINOPHEN 7.5; 325 MG/1; MG/1
1 TABLET ORAL EVERY 6 HOURS PRN
Status: DISCONTINUED | OUTPATIENT
Start: 2019-07-05 | End: 2019-07-06 | Stop reason: HOSPADM

## 2019-07-05 RX ORDER — SODIUM CHLORIDE 9 MG/ML
INJECTION, SOLUTION INTRAVENOUS CONTINUOUS PRN
Status: DISCONTINUED | OUTPATIENT
Start: 2019-07-05 | End: 2019-07-05

## 2019-07-05 RX ORDER — PANTOPRAZOLE SODIUM 40 MG/1
40 TABLET, DELAYED RELEASE ORAL 2 TIMES DAILY
Status: DISCONTINUED | OUTPATIENT
Start: 2019-07-05 | End: 2019-07-06 | Stop reason: HOSPADM

## 2019-07-05 RX ORDER — PROPOFOL 10 MG/ML
VIAL (ML) INTRAVENOUS
Status: DISCONTINUED | OUTPATIENT
Start: 2019-07-05 | End: 2019-07-05

## 2019-07-05 RX ORDER — PROPOFOL 10 MG/ML
VIAL (ML) INTRAVENOUS CONTINUOUS PRN
Status: DISCONTINUED | OUTPATIENT
Start: 2019-07-05 | End: 2019-07-05

## 2019-07-05 RX ORDER — DEXTROSE MONOHYDRATE 50 MG/ML
INJECTION, SOLUTION INTRAVENOUS CONTINUOUS
Status: DISCONTINUED | OUTPATIENT
Start: 2019-07-05 | End: 2019-07-06

## 2019-07-05 RX ORDER — OXYCODONE AND ACETAMINOPHEN 7.5; 325 MG/1; MG/1
1 TABLET ORAL EVERY 4 HOURS PRN
Status: DISCONTINUED | OUTPATIENT
Start: 2019-07-05 | End: 2019-07-05

## 2019-07-05 RX ADMIN — DEXTROSE MONOHYDRATE: 5 INJECTION, SOLUTION INTRAVENOUS at 07:07

## 2019-07-05 RX ADMIN — METOPROLOL TARTRATE 5 MG: 1 INJECTION, SOLUTION INTRAVENOUS at 11:07

## 2019-07-05 RX ADMIN — OXYCODONE AND ACETAMINOPHEN 1 TABLET: 7.5; 325 TABLET ORAL at 02:07

## 2019-07-05 RX ADMIN — Medication 10 ML: at 12:07

## 2019-07-05 RX ADMIN — OXYCODONE AND ACETAMINOPHEN 1 TABLET: 7.5; 325 TABLET ORAL at 08:07

## 2019-07-05 RX ADMIN — Medication 10 ML: at 05:07

## 2019-07-05 RX ADMIN — SODIUM CHLORIDE: 0.9 INJECTION, SOLUTION INTRAVENOUS at 05:07

## 2019-07-05 RX ADMIN — Medication 10 ML: at 06:07

## 2019-07-05 RX ADMIN — NICOTINE 1 PATCH: 21 PATCH, EXTENDED RELEASE TRANSDERMAL at 03:07

## 2019-07-05 RX ADMIN — PROPOFOL 50 MG: 10 INJECTION, EMULSION INTRAVENOUS at 12:07

## 2019-07-05 RX ADMIN — HYDRALAZINE HYDROCHLORIDE 10 MG: 20 INJECTION INTRAMUSCULAR; INTRAVENOUS at 12:07

## 2019-07-05 RX ADMIN — PANTOPRAZOLE SODIUM 40 MG: 40 TABLET, DELAYED RELEASE ORAL at 08:07

## 2019-07-05 RX ADMIN — Medication 10 ML: at 11:07

## 2019-07-05 RX ADMIN — METOPROLOL TARTRATE 5 MG: 1 INJECTION, SOLUTION INTRAVENOUS at 05:07

## 2019-07-05 RX ADMIN — METOPROLOL TARTRATE 5 MG: 1 INJECTION, SOLUTION INTRAVENOUS at 10:07

## 2019-07-05 RX ADMIN — PROPOFOL 150 MCG/KG/MIN: 10 INJECTION, EMULSION INTRAVENOUS at 12:07

## 2019-07-05 RX ADMIN — METOPROLOL TARTRATE 5 MG: 1 INJECTION, SOLUTION INTRAVENOUS at 03:07

## 2019-07-05 RX ADMIN — DEXTROSE MONOHYDRATE: 5 INJECTION, SOLUTION INTRAVENOUS at 08:07

## 2019-07-05 RX ADMIN — OXYCODONE AND ACETAMINOPHEN 1 TABLET: 7.5; 325 TABLET ORAL at 09:07

## 2019-07-05 RX ADMIN — HYDRALAZINE HYDROCHLORIDE 10 MG: 20 INJECTION INTRAMUSCULAR; INTRAVENOUS at 05:07

## 2019-07-05 RX ADMIN — SODIUM CHLORIDE: 0.9 INJECTION, SOLUTION INTRAVENOUS at 11:07

## 2019-07-05 RX ADMIN — PANTOPRAZOLE SODIUM 8 MG/HR: 40 INJECTION, POWDER, FOR SOLUTION INTRAVENOUS at 11:07

## 2019-07-05 RX ADMIN — LIDOCAINE HYDROCHLORIDE 75 MG: 20 INJECTION, SOLUTION INTRAVENOUS at 12:07

## 2019-07-05 RX ADMIN — HYDRALAZINE HYDROCHLORIDE 10 MG: 20 INJECTION INTRAMUSCULAR; INTRAVENOUS at 06:07

## 2019-07-05 NOTE — NURSING
Charge nurse note- Secure chat sent to NP chairs. notified her of patient HR is the 130's- 140's. Awaiting further orders

## 2019-07-05 NOTE — PROGRESS NOTES
[facility] Hospital Medicine  Progress Note    Patient Name: Josseline Stinson  MRN: [id number]  Patient Class: IP- Inpatient   Admission Date: 7/4/2019  Length of Stay: 1 days  Attending Physician: Anastasia Fernández*  Primary Care Provider: Bradford Crockett MD PhD        Subjective:     Principal Problem:Acute GI hemorrhage      HPI:  Josseline Stinson is a 74 y/o female with chronic pain, migraine headaches, COPD, history of gastric ulcers, GERD, anxiety, CAD with prior NSTEMI and coronary stenting with last stent on 1/3/2018 to left circumflex presented to [facility] ED with 2-day history of nausea and vomiting and mid-epigastric pain and noted blood in her stool. Patient initially was very agitated and given Benadryl + Haldol + Ativan and much calmer. While in ED, patient had large melanotic stool that was heme positive. Patient had labs drawn with H/H 10.8/32 at 2200 and repeat H/H 10.2/30.3 at 0630 and stable. Patient given Protonix 80 mg IV x 1 in ED. Patient lucid and conversant at present according ED physician. Patient self-reports history of gastric ulcers and GI bleed in past. Patient at home on Plavix but not on Coumadin or NOACS. Patient does admit to NSAID use. Patient has been hemodynamically stable in ED. Abdominal X-rays done in ED was unremarkable. INR 1.2 and Platelet count normal at 296,000.   Patient transfer to [facility] for GI evaluation and recommendation. Patient admitted to hospital medicine service for medical management. Hold Plavix and Voltaren, start protonix drip, NPO, GI consult    Overview/Hospital Course:  S/p endoscopy- rec's switch PPI to po x 6-8 weeks, starts liquid diet and advance. D/c in Am if H/H is stable. FU with GI in 4 weeks.   Hypernatremia- switch her IVF to dex     Interval History: awake and alert.   H/H down trending -awaiting GI eval for possible endoscopy- rec's switch PPI to po x 6-8 weeks, starts liquid diet and advance  D/c in Am if H/H is stable.  FU with GI in 4 weeks.       Hypernatremia- switch her IVF to dex       Review of Systems   Constitutional: Negative for fatigue and fever.   Cardiovascular: Negative for chest pain.   Gastrointestinal: Positive for nausea. Negative for abdominal distention, abdominal pain and vomiting.   Musculoskeletal: Negative for arthralgias.   Skin: Negative for rash.   Neurological: Negative for headaches.   Psychiatric/Behavioral: Negative for decreased concentration and dysphoric mood.     Objective:     Vital Signs (Most Recent):  Temp: 98.3 °F (36.8 °C) (07/05/19 0448)  Pulse: 107 (07/05/19 0645)  Resp: 20 (07/05/19 0448)  BP: (!) 146/69 (07/05/19 0645)  SpO2: 96 % (07/05/19 0406) Vital Signs (24h Range):  Temp:  [96.6 °F (35.9 °C)-99 °F (37.2 °C)] 98.3 °F (36.8 °C)  Pulse:  [] 107  Resp:  [16-22] 20  SpO2:  [96 %-99 %] 96 %  BP: (124-206)/() 146/69     Weight: 44.8 kg (98 lb 12.3 oz)  Body mass index is 16.95 kg/m².    Intake/Output Summary (Last 24 hours) at 7/5/2019 0709  Last data filed at 7/5/2019 0520  Gross per 24 hour   Intake 995 ml   Output 1300 ml   Net -305 ml      Physical Exam   Constitutional: She is oriented to person, place, and time. She appears well-developed and well-nourished.   HENT:   Head: Normocephalic and atraumatic.   Neck: Neck supple.   Cardiovascular: Normal rate, regular rhythm, normal heart sounds and intact distal pulses. Exam reveals no gallop and no friction rub.   Pulmonary/Chest: Breath sounds normal.   Abdominal: Soft. Bowel sounds are normal. She exhibits no distension. There is no tenderness.   Musculoskeletal: She exhibits no edema or tenderness.   Neurological: She is alert and oriented to person, place, and time. She has normal reflexes.   Skin: Skin is warm.   Psychiatric: She has a normal mood and affect. Her behavior is normal.       Significant Labs:   CBC:   Recent Labs   Lab 07/03/19  2202 07/04/19  0631 07/04/19  1216 07/05/19  0625   WBC 5.40  --  8.72 8.02    HGB 10.8* 10.2* 10.2* 8.7*   HCT 32.7* 30.3* 31.5* 26.8*     --  319 316     CMP:   Recent Labs   Lab 07/03/19  2202 07/04/19  1216 07/04/19  1919 07/05/19  0625    153* 154* 158*   K 5.1 5.8* 4.3 4.1   * 125* 124* 129*   CO2 14* 11* 14* 18*   * 127* 127* 128*   BUN 81* 63* 57* 48*   CREATININE 1.7* 1.2 1.1 1.0   CALCIUM 8.1* 9.7 9.6 9.2   PROT 7.1 7.2  --  6.0   ALBUMIN 3.0* 2.5*  --  2.3*   BILITOT 0.7 0.4  --  0.4   ALKPHOS 89 102  --  86   AST 46* 48*  --  45*   ALT 30 31  --  28   ANIONGAP 15 17* 16 11   EGFRNONAA 29.7* 45* 50* 56*     Troponin: No results for input(s): TROPONINI in the last 48 hours.  Urine Culture: No results for input(s): LABURIN in the last 48 hours.  Urine Studies: No results for input(s): COLORU, APPEARANCEUA, PHUR, SPECGRAV, PROTEINUA, GLUCUA, KETONESU, BILIRUBINUA, OCCULTUA, NITRITE, UROBILINOGEN, LEUKOCYTESUR, RBCUA, WBCUA, BACTERIA, SQUAMEPITHEL, HYALINECASTS in the last 48 hours.    Invalid input(s): WRIGHTSUR    Significant Imaging: I have reviewed all pertinent imaging results/findings within the past 24 hours.      Assessment/Plan:      * Acute GI hemorrhage  History of gastric ulcer  History of GI bleed per patient   GERD  Anemia  Hold Plavix and Voltaren  start protonix drip   NPO.d/c  GI consult-  appreciate rec's- endoscopy suspect melena from old GI bleed.    switch PPI to po x 6-8 weeks, starts liquid diet and advance  D/c in Am if H/H is stable. FU with GI in 4 weeks.       Hypernatremia    Switch IVF to dex.     Hyperkalemia  Treat        Congestive heart failure  Stable  meds on hold patient NPO        Hypothyroidism  Not on medication  TSH on 5/2019 wnl    Chronic obstructive pulmonary disease  Stable   Continue nebs        Essential hypertension  Uncontrolled  Hold po Metoprolol  IV hydralazine and lopressor prn       Coronary artery disease involving native coronary artery of native heart without angina pectoris  Hypercholesterolemia  Hold  Lipitor and Plavix        VTE Risk Mitigation (From admission, onward)        Ordered     Place sequential compression device  Until discontinued      07/04/19 1132     Place JARROD hose  Until discontinued      07/04/19 1132     IP VTE HIGH RISK PATIENT  Once      07/04/19 1132                Anastasia Fernández MD  Department of Hospital Medicine   [facility]

## 2019-07-05 NOTE — CONSULTS
[facility]  Gastroenterology  Consult Note    Patient Name: Josseline Stinson  MRN: [id number]  Admission Date: 7/4/2019  Hospital Length of Stay: 1 days  Code Status: Full Code   Attending Provider: Anastasia Fernández*   Consulting Provider: Lorna Paula MD  Primary Care Physician: Bradford Crockett MD PhD  Principal Problem:Acute GI hemorrhage    Inpatient consult to Gastroenterology  Consult performed by: Lorna Paula MD  Consult ordered by: Anastasia Fernández MD  Reason for consult: GI Bleed        Subjective:     HPI:  This is a 72yo female with a h/o tob use, cad s/p PCI here with melena. She had two episodes, moderate amount, tarry over the last 2 days. Associated n/v. She takes plavix, denies other nsaids. No abdominal pain, but notes chronic diffuse arthralgias. No hematemesis, dysphagia or odynophagia.     The following portions of the patient's history were reviewed and updated as appropriate: allergies, current medications, past family history, past medical history, past social history, past surgical history and problem list.      Past Medical History:   Diagnosis Date    Anxiety     CHF (congestive heart failure)     Chronic pain syndrome 6/4/2019    COPD (chronic obstructive pulmonary disease)     Coronary artery disease involving native coronary artery of native heart without angina pectoris 3/21/2016    Encounter for blood transfusion     Essential hypertension 3/21/2016    GERD (gastroesophageal reflux disease)     History of gastric ulcer 5/14/2019    Hx of heart artery stent 5/14/2019    Hypercholesteremia 3/21/2016    MI (myocardial infarction)     x4    NSTEMI (non-ST elevated myocardial infarction) 1/2/2018    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 3/21/2016    Pneumonia of right lower lobe due to infectious organism 5/14/2019    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    Ulcer        Past Surgical History:   Procedure Laterality Date     carotid artery surgeriy      catheterization cardiac cath Left 01/03/2017    Angioplasty    FRACTURE SURGERY Left     elbow and wrist    HIP SURGERY Right     x 4    HYSTERECTOMY  1972    Left heart cath Left 1/3/2018    Performed by [facility] CATH LAB    Stent KAN coronary  1/3/2018    Performed by [facility]zar MD at [facility] CATH LAB       Review of patient's allergies indicates:   Allergen Reactions    Cortisone Swelling     SWELLING    Darvocet a500 [propoxyphene n-acetaminophen] Nausea And Vomiting    Toradol [ketorolac] Nausea And Vomiting    Tramadol Nausea And Vomiting    Codeine Palpitations     dizzy     Family History     Problem Relation (Age of Onset)    Breast cancer Daughter    Cancer Mother, Sister, Brother    Diabetes Mother    Heart disease Mother, Father, Sister, Brother, Maternal Grandmother, Maternal Grandfather        Tobacco Use    Smoking status: Heavy Tobacco Smoker     Packs/day: 1.00     Years: 51.00     Pack years: 51.00     Types: Cigarettes     Start date: 1968    Smokeless tobacco: Never Used    Tobacco comment: Patient enrolled in tobacco trust and ambulatory referral  to cessation   Substance and Sexual Activity    Alcohol use: No     Alcohol/week: 0.0 oz    Drug use: No    Sexual activity: Yes     Partners: Male     Review of Systems   Constitutional: Negative for appetite change, chills and fever.   HENT: Negative for postnasal drip and trouble swallowing.    Eyes: Negative for pain and redness.   Respiratory: Negative for chest tightness and shortness of breath.    Cardiovascular: Negative for chest pain and leg swelling.   Gastrointestinal: Positive for blood in stool and nausea. Negative for anal bleeding, constipation, diarrhea, rectal pain and vomiting.   Endocrine: Negative for cold intolerance and heat intolerance.   Genitourinary: Negative for difficulty urinating and hematuria.   Musculoskeletal: Positive for arthralgias and back pain.    Skin: Negative for color change and pallor.   Allergic/Immunologic: Negative for environmental allergies and food allergies.   Neurological: Negative for dizziness and light-headedness.   Hematological: Negative for adenopathy. Does not bruise/bleed easily.   Psychiatric/Behavioral: Negative for agitation and behavioral problems.     Objective:     Vital Signs (Most Recent):  Temp: 97.9 °F (36.6 °C) (07/05/19 0727)  Pulse: (!) 111 (07/05/19 0800)  Resp: 20 (07/05/19 0727)  BP: (!) 150/73 (07/05/19 0727)  SpO2: 96 % (07/05/19 0406) Vital Signs (24h Range):  Temp:  [96.6 °F (35.9 °C)-99 °F (37.2 °C)] 97.9 °F (36.6 °C)  Pulse:  [] 111  Resp:  [16-20] 20  SpO2:  [96 %-99 %] 96 %  BP: (124-206)/() 150/73     Weight: 44.8 kg (98 lb 12.3 oz) (07/05/19 0448)  Body mass index is 16.95 kg/m².      Intake/Output Summary (Last 24 hours) at 7/5/2019 1015  Last data filed at 7/5/2019 0520  Gross per 24 hour   Intake 995 ml   Output 1300 ml   Net -305 ml       Lines/Drains/Airways     Peripherally Inserted Central Catheter Line                 PICC Double Lumen 07/04/19 1715 right basilic less than 1 day                Physical Exam   Constitutional: She is oriented to person, place, and time. She appears well-developed and well-nourished. No distress.   HENT:   Head: Normocephalic and atraumatic.   Eyes: Conjunctivae are normal. No scleral icterus.   Neck: Normal range of motion. Neck supple. No tracheal deviation present. No thyromegaly present.   Cardiovascular: Normal rate and regular rhythm. Exam reveals no gallop and no friction rub.   Pulmonary/Chest: Effort normal and breath sounds normal. No respiratory distress. She has no wheezes.   Abdominal: Soft. Bowel sounds are normal. She exhibits no distension. There is no tenderness.   Musculoskeletal:        Right wrist: She exhibits normal range of motion and no tenderness.        Left wrist: She exhibits normal range of motion and no tenderness.    Lymphadenopathy:        Head (right side): No submental and no submandibular adenopathy present.        Head (left side): No submental and no submandibular adenopathy present.   Neurological: She is alert and oriented to person, place, and time.   Skin: Skin is warm and dry. No rash noted. She is not diaphoretic. No erythema.   Psychiatric: She has a normal mood and affect. Her behavior is normal.   Nursing note and vitals reviewed.      Significant Labs:  CBC:   Recent Labs   Lab 07/03/19  2202 07/04/19  0631 07/04/19  1216 07/05/19  0625   WBC 5.40  --  8.72 8.02   HGB 10.8* 10.2* 10.2* 8.7*   HCT 32.7* 30.3* 31.5* 26.8*     --  319 316       Significant Imaging:  Imaging results within the past 24 hours have been reviewed.    Assessment/Plan:     * Acute GI hemorrhage  - ppi  - npo  - egd; risks/benefits explained in detail  - monitor hct  - hold plavix        Thank you for your consult. I will follow-up with patient. Please contact us if you have any additional questions.    Lorna Paula MD  Gastroenterology  [facility]    Patient has evidence of significant GI bleed. We believe urgent EGD is indicated.

## 2019-07-05 NOTE — PLAN OF CARE
Problem: Adult Inpatient Plan of Care  Goal: Plan of Care Review  Outcome: Ongoing (interventions implemented as appropriate)  Pt AAOx4, no family members at bedside throughout shift. Pt complains of intermittent pain but denies n/v/d and SOB. Pt remains NPO pending GI evaluation. IVF infusing per MAR to PICC - dressing CDI. Pt had two large bloody BMs on shift, NP notified. Cardiac monitoring continued. Safety maintained, bed alarm on - will cont to monitor.

## 2019-07-05 NOTE — ASSESSMENT & PLAN NOTE
History of gastric ulcer  History of GI bleed per patient   GERD  Anemia  Hold Plavix and Voltaren  start protonix drip   NPO.d/c  GI consult-  appreciate rec's- endoscopy suspect melena from old GI bleed.    switch PPI to po x 6-8 weeks, starts liquid diet and advance  D/c in Am if H/H is stable. FU with GI in 4 weeks.

## 2019-07-05 NOTE — HOSPITAL COURSE
S/p endoscopy- rec's switch PPI to po x 6-8 weeks, starts liquid diet and advance. D/c in Am if H/H is stable. FU with GI in 4 weeks.   Hypernatremia- switch her IVF to dex   H/H down trending. Repeat H/H at 2pm if stable will discharge

## 2019-07-05 NOTE — HPI
This is a 74yo female with a h/o tob use, cad s/p PCI here with melena. She had two episodes, moderate amount, tarry over the last 2 days. Associated n/v. She takes plavix, denies other nsaids. No abdominal pain, but notes chronic diffuse arthralgias. No hematemesis, dysphagia or odynophagia.     The following portions of the patient's history were reviewed and updated as appropriate: allergies, current medications, past family history, past medical history, past social history, past surgical history and problem list.

## 2019-07-05 NOTE — ANESTHESIA PREPROCEDURE EVALUATION
07/05/2019  Josseline Stinson is a 73 y.o., female admitted 7/4/19 with acute GIB/melena needs urgent EGD.     Past Medical History:   Diagnosis Date    Anxiety     CHF (congestive heart failure)     Chronic pain syndrome 6/4/2019    COPD (chronic obstructive pulmonary disease)     Coronary artery disease involving native coronary artery of native heart without angina pectoris 3/21/2016    Encounter for blood transfusion     Essential hypertension 3/21/2016    GERD (gastroesophageal reflux disease)     History of gastric ulcer 5/14/2019    Hx of heart artery stent 5/14/2019    Hypercholesteremia 3/21/2016    MI (myocardial infarction)     x4    NSTEMI (non-ST elevated myocardial infarction) 1/2/2018    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    I have reviewed the Nursing Notes.   I have reviewed the Medications.     Review of Systems  Anesthesia Hx:   Denies Personal Hx of Anesthesia complications.   Social:  Smoker    Hematology/Oncology:         -- Anemia:   Cardiovascular:   Hypertension Past MI CAD  CABG/stent (PCI 5/2019)  CHF hyperlipidemia ECG has been reviewed.    Pulmonary:   COPD    Renal/:  Renal/ Normal     Hepatic/GI:   GERD    Musculoskeletal:   Arthritis     Neurological:   Headaches    Endocrine:   Hypothyroidism    Psych:   anxiety          Physical Exam  General:  Cachexia      Dental:  Dental Findings: Edentulous   Chest/Lungs:  Chest/Lungs Clear    Heart/Vascular:  Heart Findings: Rate: Tachycardia  Rhythm: Regular Rhythm           Lab Results   Component Value Date    WBC 8.02 07/05/2019    HGB 8.7 (L) 07/05/2019    HCT 26.8 (L) 07/05/2019      07/05/2019    CHOL 168 05/21/2019    TRIG 177 (H) 05/21/2019    HDL 49 05/21/2019    ALT 28 07/05/2019    AST 45 (H) 07/05/2019     (H) 07/05/2019    K 4.1 07/05/2019     (HH) 07/05/2019    CREATININE 1.0 07/05/2019    BUN 48 (H) 07/05/2019    CO2 18 (L) 07/05/2019    TSH 1.32 05/21/2019    INR 1.2 07/04/2019    HGBA1C 5.6 05/05/2019     Echo 5/2019  · Mild concentric left ventricular hypertrophy.  · Normal left ventricular systolic function. The estimated ejection fraction is 60%  · Grade I (mild) left ventricular diastolic dysfunction consistent with impaired relaxation.  · Normal right ventricular systolic function.  · Mild left atrial enlargement.  · Mild mitral regurgitation.  · Normal central venous pressure (3 mm Hg).  · The estimated PA systolic pressure is 28 mm Hg         Anesthesia Plan  Type of Anesthesia, risks & benefits discussed:  Anesthesia Type:  MAC, general  Patient's Preference:   Intra-op Monitoring Plan:   Intra-op Monitoring Plan Comments:   Post Op Pain Control Plan:   Post Op Pain Control Plan Comments:   Induction:    Beta Blocker:  Patient is on a Beta-Blocker and has not received dose within the past 24 hours due to non-compliance or for other reasons (Patient should receive a perioperative dose or document why it is withheld). Perioperative Beta Blocker not given due to: Other (see comments)    Beta Blocker Comments: GIB  Informed Consent: Patient understands risks and agrees with Anesthesia plan.  Questions answered. Anesthesia consent signed with patient.  ASA Score: 4  emergent   Day of Surgery Review of History & Physical:            Ready For Surgery From Anesthesia Perspective.

## 2019-07-05 NOTE — PLAN OF CARE
VN note: VN cued into patient's room for introduction. VN informed patient that VN would be working closely along side bedside nurse, PCT, and the rest of care team and making rounds throughout the shift. She verbalized understanding. Allowed time for questions. VN will continue to be available to patient and intervene prn.      Admission questions completed. Home Medication list reviewed.

## 2019-07-05 NOTE — PLAN OF CARE
Pt is A+Ox4. C/o pain, nausea with mild relief from oral medication per MAR. Pt has had consistently elevated BP, medicated with oral hydraliazine x1, IV orders received, PICC placement in process at this time. Pt voiding appropriately via bedpan, no BM this shift. Safety precautions maintained. Will continue to monitor and intervene as needed.

## 2019-07-05 NOTE — [employer] PATIENT INSTRUCTIONS
Discharge Summary/Instructions after an Endoscopic Procedure  Patient Name: Josseline Stinson  Patient MRN: [id number]  Patient YOB: 1945 Friday, July 05, 2019  Dane Khoury MD  RESTRICTIONS:  During your procedure today, you received medications for sedation.  These   medications may affect your judgment, balance and coordination.  Therefore,   for 24 hours, you have the following restrictions:   - DO NOT drive a car, operate machinery, make legal/financial decisions,   sign important papers or drink alcohol.    ACTIVITY:  Today: no heavy lifting, straining or running due to procedural   sedation/anesthesia.  The following day: return to full activity including work.  DIET:  Eat and drink normally unless instructed otherwise.     TREATMENT FOR COMMON SIDE EFFECTS:  - Mild abdominal pain, nausea, belching, bloating or excessive gas:  rest,   eat lightly and use a heating pad.  - Sore Throat: treat with throat lozenges and/or gargle with warm salt   water.  - Because air was used during the procedure, expelling large amounts of air   from your rectum or belching is normal.  - If a bowel prep was taken, you may not have a bowel movement for 1-3 days.    This is normal.  SYMPTOMS TO WATCH FOR AND REPORT TO YOUR PHYSICIAN:  1. Abdominal pain or bloating, other than gas cramps.  2. Chest pain.  3. Back pain.  4. Signs of infection such as: chills or fever occurring within 24 hours   after the procedure.  5. Rectal bleeding, which would show as bright red, maroon, or black stools.   (A tablespoon of blood from the rectum is not serious, especially if   hemorrhoids are present.)  6. Vomiting.  7. Weakness or dizziness.  GO DIRECTLY TO THE NEAREST EMERGENCY ROOM IF YOU HAVE ANY OF THE FOLLOWING:      Difficulty breathing              Chills and/or fever over 101 F   Persistent vomiting and/or vomiting blood   Severe abdominal pain   Severe chest pain   Black, tarry stools   Bleeding- more than one tablespoon   Any  other symptom or condition that you feel may need urgent attention  Your doctor recommends these additional instructions:  If any biopsies were taken, your doctors clinic will contact you in 1 to 2   weeks with any results.  - Return patient to hospital [facility] for ongoing care.   - Suspect melena is from recent GI bleeding from gastric ulcers.   - May change PPI from IV drip to 40mg PO BID.  Continue that for next 6-8   weeks, before resuming 40mg PO QD.  - Would continue holding plavix for 48-72 hours (if OK from cardiovascular   standpoint).  - May start liquid diet, and advanced as tolerated.  - Follow CBC.  - If no ongoing evidence for blood loss in next 24 hours-> may DC with above   recommendations.  - Follow-up in [facility] GI clinic in 4 weeks.  For questions, problems or results please call your physician - Dane Khoury MD at Work:  [phone].  EMERGENCY PHONE NUMBER: [phone],  LAB RESULTS: [phone]  IF A COMPLICATION OR EMERGENCY SITUATION ARISES AND YOU ARE UNABLE TO REACH   YOUR PHYSICIAN - GO DIRECTLY TO THE EMERGENCY ROOM.  Dane Khoury MD  7/5/2019 12:26:42 PM  This report has been verified and signed electronically.  [employer]

## 2019-07-05 NOTE — TRANSFER OF CARE
"Anesthesia Transfer of Care Note    Patient: Josseline Stinson    Procedure(s) Performed: Procedure(s) (LRB):  ESOPHAGOGASTRODUODENOSCOPY (EGD) (N/A)    Patient location: GI    Anesthesia Type: MAC    Transport from OR: Transported from OR on room air with adequate spontaneous ventilation    Post pain: adequate analgesia    Post assessment: no apparent anesthetic complications    Post vital signs: stable    Level of consciousness: awake    Nausea/Vomiting: no nausea/vomiting    Complications: none    Transfer of care protocol was followed      Last vitals:   Visit Vitals  BP (!) 162/87   Pulse 95   Temp 36.6 °C (97.9 °F)   Resp 16   Ht 5' 4" (1.626 m)   Wt 44.8 kg (98 lb 12.3 oz)   SpO2 (!) 94%   Breastfeeding? No   BMI 16.95 kg/m²     "

## 2019-07-05 NOTE — SUBJECTIVE & OBJECTIVE
Interval History: awake and alert.   H/H down trending -awaiting GI eval for possible endoscopy- rec's switch PPI to po x 6-8 weeks, starts liquid diet and advance  D/c in Am if H/H is stable. FU with GI in 4 weeks.       Hypernatremia- switch her IVF to dex       Review of Systems   Constitutional: Negative for fatigue and fever.   Cardiovascular: Negative for chest pain.   Gastrointestinal: Positive for nausea. Negative for abdominal distention, abdominal pain and vomiting.   Musculoskeletal: Negative for arthralgias.   Skin: Negative for rash.   Neurological: Negative for headaches.   Psychiatric/Behavioral: Negative for decreased concentration and dysphoric mood.     Objective:     Vital Signs (Most Recent):  Temp: 98.3 °F (36.8 °C) (07/05/19 0448)  Pulse: 107 (07/05/19 0645)  Resp: 20 (07/05/19 0448)  BP: (!) 146/69 (07/05/19 0645)  SpO2: 96 % (07/05/19 0406) Vital Signs (24h Range):  Temp:  [96.6 °F (35.9 °C)-99 °F (37.2 °C)] 98.3 °F (36.8 °C)  Pulse:  [] 107  Resp:  [16-22] 20  SpO2:  [96 %-99 %] 96 %  BP: (124-206)/() 146/69     Weight: 44.8 kg (98 lb 12.3 oz)  Body mass index is 16.95 kg/m².    Intake/Output Summary (Last 24 hours) at 7/5/2019 0709  Last data filed at 7/5/2019 0520  Gross per 24 hour   Intake 995 ml   Output 1300 ml   Net -305 ml      Physical Exam   Constitutional: She is oriented to person, place, and time. She appears well-developed and well-nourished.   HENT:   Head: Normocephalic and atraumatic.   Neck: Neck supple.   Cardiovascular: Normal rate, regular rhythm, normal heart sounds and intact distal pulses. Exam reveals no gallop and no friction rub.   Pulmonary/Chest: Breath sounds normal.   Abdominal: Soft. Bowel sounds are normal. She exhibits no distension. There is no tenderness.   Musculoskeletal: She exhibits no edema or tenderness.   Neurological: She is alert and oriented to person, place, and time. She has normal reflexes.   Skin: Skin is warm.   Psychiatric: She  has a normal mood and affect. Her behavior is normal.       Significant Labs:   CBC:   Recent Labs   Lab 07/03/19 2202 07/04/19  0631 07/04/19 1216 07/05/19  0625   WBC 5.40  --  8.72 8.02   HGB 10.8* 10.2* 10.2* 8.7*   HCT 32.7* 30.3* 31.5* 26.8*     --  319 316     CMP:   Recent Labs   Lab 07/03/19 2202 07/04/19 1216 07/04/19  1919 07/05/19  0625    153* 154* 158*   K 5.1 5.8* 4.3 4.1   * 125* 124* 129*   CO2 14* 11* 14* 18*   * 127* 127* 128*   BUN 81* 63* 57* 48*   CREATININE 1.7* 1.2 1.1 1.0   CALCIUM 8.1* 9.7 9.6 9.2   PROT 7.1 7.2  --  6.0   ALBUMIN 3.0* 2.5*  --  2.3*   BILITOT 0.7 0.4  --  0.4   ALKPHOS 89 102  --  86   AST 46* 48*  --  45*   ALT 30 31  --  28   ANIONGAP 15 17* 16 11   EGFRNONAA 29.7* 45* 50* 56*     Troponin: No results for input(s): TROPONINI in the last 48 hours.  Urine Culture: No results for input(s): LABURIN in the last 48 hours.  Urine Studies: No results for input(s): COLORU, APPEARANCEUA, PHUR, SPECGRAV, PROTEINUA, GLUCUA, KETONESU, BILIRUBINUA, OCCULTUA, NITRITE, UROBILINOGEN, LEUKOCYTESUR, RBCUA, WBCUA, BACTERIA, SQUAMEPITHEL, HYALINECASTS in the last 48 hours.    Invalid input(s): WRIGHTSUR    Significant Imaging: I have reviewed all pertinent imaging results/findings within the past 24 hours.

## 2019-07-05 NOTE — PLAN OF CARE
This  put name on white board and explained blue discharge folder to patient. Discharge planning brochure and/or business card given to patient.  Patient verbalized understanding.    TN met with patient. She states prior to arrival she was living at home with significant other. Pt reports that neither one of them drive. Pt uses [employer] transportation to get to and from dr roman. Pt states that she has a straight cane for home use if needed. No HH needed prior to admission.     Future Appointments   Date Time Provider Department Center   8/15/2019  9:45 AM Albino Hartman MD [facility]   9/27/2019 11:00 AM Dony Woo MD [facility]      07/05/19 1400   Discharge Assessment   Assessment Type Discharge Planning Assessment   Confirmed/corrected address and phone number on facesheet? Yes   Assessment information obtained from? Patient;Medical Record   Expected Length of Stay (days) 3   Communicated expected length of stay with patient/caregiver yes   Prior to hospitilization cognitive status: Alert/Oriented   Prior to hospitalization functional status: Independent   Current cognitive status: Alert/Oriented   Current Functional Status: Independent   Facility Arrived From: ED   Lives With significant other   Able to Return to Prior Arrangements yes   Is patient able to care for self after discharge? Yes   Who are your caregiver(s) and their phone number(s)? Yan (CHARISMA) [phone]   Patient's perception of discharge disposition home or selfcare   Readmission Within the Last 30 Days no previous admission in last 30 days   Patient currently being followed by outpatient case management? No   Patient currently receives any other outside agency services? No   Equipment Currently Used at Home cane, straight   Do you have any problems affording any of your prescribed medications? No   Is the patient taking medications as prescribed? yes   Does the patient have transportation  home? Yes   Transportation Anticipated health plan transportation   Does the patient receive services at the [facility]? No   Discharge Plan A Home;Home with family   DME Needed Upon Discharge  none   Patient/Family in Agreement with Plan yes

## 2019-07-05 NOTE — PROGRESS NOTES
Patient enrolled in tobacco trust.  Referral to ambulatory smoking cessation program.  Requested order for 21mg nicotine patch.  Handout given.

## 2019-07-05 NOTE — SUBJECTIVE & OBJECTIVE
Past Medical History:   Diagnosis Date    Anxiety     CHF (congestive heart failure)     Chronic pain syndrome 6/4/2019    COPD (chronic obstructive pulmonary disease)     Coronary artery disease involving native coronary artery of native heart without angina pectoris 3/21/2016    Encounter for blood transfusion     Essential hypertension 3/21/2016    GERD (gastroesophageal reflux disease)     History of gastric ulcer 5/14/2019    Hx of heart artery stent 5/14/2019    Hypercholesteremia 3/21/2016    MI (myocardial infarction)     x4    NSTEMI (non-ST elevated myocardial infarction) 1/2/2018    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 CATH LAB    Stent KAN coronary  1/3/2018    Performed by [facility]akrishna Bal[facility]zar MD at [facility] CATH LAB       Review of patient's allergies indicates:   Allergen Reactions    Cortisone Swelling     SWELLING    Darvocet a500 [propoxyphene n-acetaminophen] Nausea And Vomiting    Toradol [ketorolac] Nausea And Vomiting    Tramadol Nausea And Vomiting    Codeine Palpitations     dizzy     Family History     Problem Relation (Age of Onset)    Breast cancer Daughter    Cancer Mother, Sister, Brother    Diabetes Mother    Heart disease Mother, Father, Sister, Brother, Maternal Grandmother, Maternal Grandfather        Tobacco Use    Smoking status: Heavy Tobacco Smoker     Packs/day: 1.00     Years: 51.00     Pack years: 51.00     Types: Cigarettes     Start date: 1968    Smokeless tobacco: Never Used    Tobacco  comment: Patient enrolled in tobacco trust and ambulatory referral  to cessation   Substance and Sexual Activity    Alcohol use: No     Alcohol/week: 0.0 oz    Drug use: No    Sexual activity: Yes     Partners: Male     Review of Systems   Constitutional: Negative for appetite change, chills and fever.   HENT: Negative for postnasal drip and trouble swallowing.    Eyes: Negative for pain and redness.   Respiratory: Negative for chest tightness and shortness of breath.    Cardiovascular: Negative for chest pain and leg swelling.   Gastrointestinal: Positive for blood in stool and nausea. Negative for anal bleeding, constipation, diarrhea, rectal pain and vomiting.   Endocrine: Negative for cold intolerance and heat intolerance.   Genitourinary: Negative for difficulty urinating and hematuria.   Musculoskeletal: Positive for arthralgias and back pain.   Skin: Negative for color change and pallor.   Allergic/Immunologic: Negative for environmental allergies and food allergies.   Neurological: Negative for dizziness and light-headedness.   Hematological: Negative for adenopathy. Does not bruise/bleed easily.   Psychiatric/Behavioral: Negative for agitation and behavioral problems.     Objective:     Vital Signs (Most Recent):  Temp: 97.9 °F (36.6 °C) (07/05/19 0727)  Pulse: (!) 111 (07/05/19 0800)  Resp: 20 (07/05/19 0727)  BP: (!) 150/73 (07/05/19 0727)  SpO2: 96 % (07/05/19 0406) Vital Signs (24h Range):  Temp:  [96.6 °F (35.9 °C)-99 °F (37.2 °C)] 97.9 °F (36.6 °C)  Pulse:  [] 111  Resp:  [16-20] 20  SpO2:  [96 %-99 %] 96 %  BP: (124-206)/() 150/73     Weight: 44.8 kg (98 lb 12.3 oz) (07/05/19 0448)  Body mass index is 16.95 kg/m².      Intake/Output Summary (Last 24 hours) at 7/5/2019 1015  Last data filed at 7/5/2019 0520  Gross per 24 hour   Intake 995 ml   Output 1300 ml   Net -305 ml       Lines/Drains/Airways     Peripherally Inserted Central Catheter Line                 PICC Double Lumen  07/04/19 1715 right basilic less than 1 day                Physical Exam   Constitutional: She is oriented to person, place, and time. She appears well-developed and well-nourished. No distress.   HENT:   Head: Normocephalic and atraumatic.   Eyes: Conjunctivae are normal. No scleral icterus.   Neck: Normal range of motion. Neck supple. No tracheal deviation present. No thyromegaly present.   Cardiovascular: Normal rate and regular rhythm. Exam reveals no gallop and no friction rub.   Pulmonary/Chest: Effort normal and breath sounds normal. No respiratory distress. She has no wheezes.   Abdominal: Soft. Bowel sounds are normal. She exhibits no distension. There is no tenderness.   Musculoskeletal:        Right wrist: She exhibits normal range of motion and no tenderness.        Left wrist: She exhibits normal range of motion and no tenderness.   Lymphadenopathy:        Head (right side): No submental and no submandibular adenopathy present.        Head (left side): No submental and no submandibular adenopathy present.   Neurological: She is alert and oriented to person, place, and time.   Skin: Skin is warm and dry. No rash noted. She is not diaphoretic. No erythema.   Psychiatric: She has a normal mood and affect. Her behavior is normal.   Nursing note and vitals reviewed.      Significant Labs:  CBC:   Recent Labs   Lab 07/03/19  2202 07/04/19  0631 07/04/19  1216 07/05/19  0625   WBC 5.40  --  8.72 8.02   HGB 10.8* 10.2* 10.2* 8.7*   HCT 32.7* 30.3* 31.5* 26.8*     --  319 316       Significant Imaging:  Imaging results within the past 24 hours have been reviewed.

## 2019-07-05 NOTE — ANESTHESIA POSTPROCEDURE EVALUATION
Anesthesia Post Evaluation    Patient: Josseline Stinson    Procedure(s) Performed: Procedure(s) (LRB):  ESOPHAGOGASTRODUODENOSCOPY (EGD) (N/A)    Final Anesthesia Type: MAC  Patient location during evaluation: GI PACU  Patient participation: Yes- Able to Participate  Level of consciousness: awake and alert and oriented  Post-procedure vital signs: reviewed and stable  Pain management: adequate  Airway patency: patent  PONV status at discharge: No PONV  Anesthetic complications: no      Cardiovascular status: blood pressure returned to baseline  Respiratory status: unassisted, room air and spontaneous ventilation  Hydration status: euvolemic  Follow-up not needed.          Vitals Value Taken Time   /77 7/5/2019 12:18 PM   Temp 36.4 °C (97.5 °F) 7/5/2019 12:18 PM   Pulse 98 7/5/2019 12:18 PM   Resp 20 7/5/2019 12:18 PM   SpO2 100 % 7/5/2019 12:18 PM         No case tracking events are documented in the log.      Pain/Vibha Score: Pain Rating Prior to Med Admin: 10 (7/5/2019  9:11 AM)  Vibha Score: 10 (7/5/2019 12:18 PM)

## 2019-07-05 NOTE — PLAN OF CARE
Problem: Adult Inpatient Plan of Care  Goal: Plan of Care Review  Outcome: Ongoing (interventions implemented as appropriate)  Pt resting comfortably on room air with no distress noted. Will continue to monitor.

## 2019-07-06 VITALS
BODY MASS INDEX: 19 KG/M2 | WEIGHT: 111.31 LBS | SYSTOLIC BLOOD PRESSURE: 105 MMHG | HEIGHT: 64 IN | OXYGEN SATURATION: 98 % | RESPIRATION RATE: 18 BRPM | DIASTOLIC BLOOD PRESSURE: 60 MMHG | TEMPERATURE: 98 F | HEART RATE: 108 BPM

## 2019-07-06 LAB
ALBUMIN SERPL BCP-MCNC: 2.1 G/DL (ref 3.5–5.2)
ALP SERPL-CCNC: 64 U/L (ref 55–135)
ALT SERPL W/O P-5'-P-CCNC: 26 U/L (ref 10–44)
ANION GAP SERPL CALC-SCNC: 4 MMOL/L (ref 8–16)
AST SERPL-CCNC: 36 U/L (ref 10–40)
BASOPHILS # BLD AUTO: 0.01 K/UL (ref 0–0.2)
BASOPHILS # BLD AUTO: 0.01 K/UL (ref 0–0.2)
BASOPHILS NFR BLD: 0.1 % (ref 0–1.9)
BASOPHILS NFR BLD: 0.1 % (ref 0–1.9)
BILIRUB SERPL-MCNC: 0.3 MG/DL (ref 0.1–1)
BUN SERPL-MCNC: 27 MG/DL (ref 8–23)
CALCIUM SERPL-MCNC: 8.5 MG/DL (ref 8.7–10.5)
CHLORIDE SERPL-SCNC: 114 MMOL/L (ref 95–110)
CO2 SERPL-SCNC: 23 MMOL/L (ref 23–29)
CREAT SERPL-MCNC: 0.7 MG/DL (ref 0.5–1.4)
DIFFERENTIAL METHOD: ABNORMAL
DIFFERENTIAL METHOD: ABNORMAL
EOSINOPHIL # BLD AUTO: 0 K/UL (ref 0–0.5)
EOSINOPHIL # BLD AUTO: 0 K/UL (ref 0–0.5)
EOSINOPHIL NFR BLD: 0.1 % (ref 0–8)
EOSINOPHIL NFR BLD: 0.2 % (ref 0–8)
ERYTHROCYTE [DISTWIDTH] IN BLOOD BY AUTOMATED COUNT: 14.9 % (ref 11.5–14.5)
ERYTHROCYTE [DISTWIDTH] IN BLOOD BY AUTOMATED COUNT: 15.2 % (ref 11.5–14.5)
EST. GFR  (AFRICAN AMERICAN): >60 ML/MIN/1.73 M^2
EST. GFR  (NON AFRICAN AMERICAN): >60 ML/MIN/1.73 M^2
GLUCOSE SERPL-MCNC: 111 MG/DL (ref 70–110)
HCT VFR BLD AUTO: 22.7 % (ref 37–48.5)
HCT VFR BLD AUTO: 23.3 % (ref 37–48.5)
HGB BLD-MCNC: 7.3 G/DL (ref 12–16)
HGB BLD-MCNC: 7.7 G/DL (ref 12–16)
LYMPHOCYTES # BLD AUTO: 1.9 K/UL (ref 1–4.8)
LYMPHOCYTES # BLD AUTO: 2.1 K/UL (ref 1–4.8)
LYMPHOCYTES NFR BLD: 19.9 % (ref 18–48)
LYMPHOCYTES NFR BLD: 24.6 % (ref 18–48)
MCH RBC QN AUTO: 29.7 PG (ref 27–31)
MCH RBC QN AUTO: 30.3 PG (ref 27–31)
MCHC RBC AUTO-ENTMCNC: 32.2 G/DL (ref 32–36)
MCHC RBC AUTO-ENTMCNC: 33 G/DL (ref 32–36)
MCV RBC AUTO: 92 FL (ref 82–98)
MCV RBC AUTO: 92 FL (ref 82–98)
MONOCYTES # BLD AUTO: 0.9 K/UL (ref 0.3–1)
MONOCYTES # BLD AUTO: 1.1 K/UL (ref 0.3–1)
MONOCYTES NFR BLD: 10.6 % (ref 4–15)
MONOCYTES NFR BLD: 11.3 % (ref 4–15)
NEUTROPHILS # BLD AUTO: 5.4 K/UL (ref 1.8–7.7)
NEUTROPHILS # BLD AUTO: 6.1 K/UL (ref 1.8–7.7)
NEUTROPHILS NFR BLD: 64.6 % (ref 38–73)
NEUTROPHILS NFR BLD: 68.5 % (ref 38–73)
PLATELET # BLD AUTO: 245 K/UL (ref 150–350)
PLATELET # BLD AUTO: 262 K/UL (ref 150–350)
PMV BLD AUTO: 8.2 FL (ref 9.2–12.9)
PMV BLD AUTO: 8.2 FL (ref 9.2–12.9)
POTASSIUM SERPL-SCNC: 3.6 MMOL/L (ref 3.5–5.1)
PROT SERPL-MCNC: 4.9 G/DL (ref 6–8.4)
RBC # BLD AUTO: 2.46 M/UL (ref 4–5.4)
RBC # BLD AUTO: 2.54 M/UL (ref 4–5.4)
SODIUM SERPL-SCNC: 141 MMOL/L (ref 136–145)
WBC # BLD AUTO: 8.57 K/UL (ref 3.9–12.7)
WBC # BLD AUTO: 9.38 K/UL (ref 3.9–12.7)

## 2019-07-06 PROCEDURE — A4216 STERILE WATER/SALINE, 10 ML: HCPCS | Performed by: FAMILY MEDICINE

## 2019-07-06 PROCEDURE — 25000003 PHARM REV CODE 250: Performed by: NURSE PRACTITIONER

## 2019-07-06 PROCEDURE — 85025 COMPLETE CBC W/AUTO DIFF WBC: CPT | Mod: 91

## 2019-07-06 PROCEDURE — S4991 NICOTINE PATCH NONLEGEND: HCPCS | Performed by: FAMILY MEDICINE

## 2019-07-06 PROCEDURE — 94761 N-INVAS EAR/PLS OXIMETRY MLT: CPT

## 2019-07-06 PROCEDURE — 25000003 PHARM REV CODE 250: Performed by: FAMILY MEDICINE

## 2019-07-06 PROCEDURE — 63600175 PHARM REV CODE 636 W HCPCS: Performed by: FAMILY MEDICINE

## 2019-07-06 PROCEDURE — 80053 COMPREHEN METABOLIC PANEL: CPT

## 2019-07-06 RX ORDER — ESCITALOPRAM OXALATE 20 MG/1
20 TABLET ORAL DAILY
Status: DISCONTINUED | OUTPATIENT
Start: 2019-07-06 | End: 2019-07-06 | Stop reason: HOSPADM

## 2019-07-06 RX ORDER — ARIPIPRAZOLE 2 MG/1
2 TABLET ORAL DAILY
Status: DISCONTINUED | OUTPATIENT
Start: 2019-07-06 | End: 2019-07-06 | Stop reason: HOSPADM

## 2019-07-06 RX ORDER — IBUPROFEN 200 MG
1 TABLET ORAL DAILY
Refills: 0 | COMMUNITY
Start: 2019-07-06 | End: 2019-09-12

## 2019-07-06 RX ORDER — METOPROLOL SUCCINATE 25 MG/1
25 TABLET, EXTENDED RELEASE ORAL DAILY
Status: DISCONTINUED | OUTPATIENT
Start: 2019-07-06 | End: 2019-07-06 | Stop reason: HOSPADM

## 2019-07-06 RX ORDER — PANTOPRAZOLE SODIUM 40 MG/1
40 TABLET, DELAYED RELEASE ORAL 2 TIMES DAILY
Qty: 60 TABLET | Refills: 11 | Status: ON HOLD | OUTPATIENT
Start: 2019-07-06 | End: 2019-07-08 | Stop reason: HOSPADM

## 2019-07-06 RX ORDER — ISOSORBIDE MONONITRATE 10 MG/1
20 TABLET ORAL DAILY
Status: DISCONTINUED | OUTPATIENT
Start: 2019-07-06 | End: 2019-07-06 | Stop reason: HOSPADM

## 2019-07-06 RX ORDER — ATORVASTATIN CALCIUM 40 MG/1
40 TABLET, FILM COATED ORAL DAILY
Status: DISCONTINUED | OUTPATIENT
Start: 2019-07-06 | End: 2019-07-06 | Stop reason: HOSPADM

## 2019-07-06 RX ADMIN — ARIPIPRAZOLE 2 MG: 2 TABLET ORAL at 02:07

## 2019-07-06 RX ADMIN — NICOTINE 1 PATCH: 21 PATCH, EXTENDED RELEASE TRANSDERMAL at 09:07

## 2019-07-06 RX ADMIN — HYDRALAZINE HYDROCHLORIDE 10 MG: 20 INJECTION INTRAMUSCULAR; INTRAVENOUS at 12:07

## 2019-07-06 RX ADMIN — OXYCODONE AND ACETAMINOPHEN 1 TABLET: 7.5; 325 TABLET ORAL at 02:07

## 2019-07-06 RX ADMIN — PANTOPRAZOLE SODIUM 40 MG: 40 TABLET, DELAYED RELEASE ORAL at 09:07

## 2019-07-06 RX ADMIN — ISOSORBIDE MONONITRATE 20 MG: 10 TABLET ORAL at 02:07

## 2019-07-06 RX ADMIN — OXYCODONE AND ACETAMINOPHEN 1 TABLET: 7.5; 325 TABLET ORAL at 05:07

## 2019-07-06 RX ADMIN — Medication 10 ML: at 12:07

## 2019-07-06 RX ADMIN — ESCITALOPRAM OXALATE 20 MG: 20 TABLET, FILM COATED ORAL at 02:07

## 2019-07-06 RX ADMIN — DEXTROSE MONOHYDRATE: 5 INJECTION, SOLUTION INTRAVENOUS at 06:07

## 2019-07-06 RX ADMIN — OXYCODONE AND ACETAMINOPHEN 1 TABLET: 7.5; 325 TABLET ORAL at 08:07

## 2019-07-06 RX ADMIN — ATORVASTATIN CALCIUM 40 MG: 40 TABLET, FILM COATED ORAL at 02:07

## 2019-07-06 RX ADMIN — Medication 10 ML: at 06:07

## 2019-07-06 RX ADMIN — METOPROLOL SUCCINATE 25 MG: 25 TABLET, FILM COATED, EXTENDED RELEASE ORAL at 02:07

## 2019-07-06 NOTE — NURSING
Pain escalation  Requesting pain medication after drinking sips of cold water   No pain at 7:10   Now level 10

## 2019-07-06 NOTE — PLAN OF CARE
Cued into patient's room.  Permission received per patient to turn camera to view patient.  Introduced as VN for night shift that will be working with floor nurse and nursing assistant.  Suzette CERRATO at bedside.  Educated patient on VN's role in patient care. Plan of care reviewed with patient. Education per flowsheet.  Opportunity given for questions and questions answered.  States she is hungry; Susan PCT notified of patient to be on clear liquid diet.  Instructed to call for assistance.  Will cont to monitor.

## 2019-07-06 NOTE — PROGRESS NOTES
[facility] Hospital Medicine  Progress Note    Patient Name: Josseline Stinson  MRN: [id number]  Patient Class: IP- Inpatient   Admission Date: 7/4/2019  Length of Stay: 2 days  Attending Physician: Anastasia Fernández*  Primary Care Provider: Dony Woo MD        Subjective:     Principal Problem:Acute GI hemorrhage      HPI:  Josseline Stinson is a 74 y/o female with chronic pain, migraine headaches, COPD, history of gastric ulcers, GERD, anxiety, CAD with prior NSTEMI and coronary stenting with last stent on 1/3/2018 to left circumflex presented to [place] ED with 2-day history of nausea and vomiting and mid-epigastric pain and noted blood in her stool. Patient initially was very agitated and given Benadryl + Haldol + Ativan and much calmer. While in ED, patient had large melanotic stool that was heme positive. Patient had labs drawn with H/H 10.8/32 at 2200 and repeat H/H 10.2/30.3 at 0630 and stable. Patient given Protonix 80 mg IV x 1 in ED. Patient lucid and conversant at present according ED physician. Patient self-reports history of gastric ulcers and GI bleed in past. Patient at home on Plavix but not on Coumadin or NOACS. Patient does admit to NSAID use. Patient has been hemodynamically stable in ED. Abdominal X-rays done in ED was unremarkable. INR 1.2 and Platelet count normal at 296,000.   Patient transfer to [facility] for GI evaluation and recommendation. Patient admitted to hospital medicine service for medical management. Hold Plavix and Voltaren, start protonix drip, NPO, GI consult    Overview/Hospital Course:  S/p endoscopy- rec's switch PPI to po x 6-8 weeks, starts liquid diet and advance. D/c in Am if H/H is stable. FU with GI in 4 weeks.   Hypernatremia- switch her IVF to dex   H/H down trending. Repeat H/H at 2pm if stable will discharge    Interval History: awake and alert. Tolerating meals. Denies nausea. Requesting to be discharge    Reported 2 BM yesterday, one with  blood .   H/H trending down. Will repeat 2pm if stable will discharge.   Endoscopy- rec's switch PPI to po x 6-8 weeks, starts liquid diet and advance  D/c in Am if H/H is stable. FU with GI in 4 weeks.       Hypernatremia- switch her IVF to dex. Resolved. Discontinue      Review of Systems   Constitutional: Negative for fatigue and fever.   Cardiovascular: Negative for chest pain.   Gastrointestinal: Negative for abdominal distention, abdominal pain, nausea and vomiting.   Musculoskeletal: Negative for arthralgias.   Skin: Negative for rash.   Neurological: Negative for headaches.   Psychiatric/Behavioral: Negative for decreased concentration and dysphoric mood.     Objective:     Vital Signs (Most Recent):  Temp: 97.6 °F (36.4 °C) (07/06/19 0712)  Pulse: 108 (07/06/19 0808)  Resp: 18 (07/06/19 0712)  BP: 112/62 (07/06/19 0712)  SpO2: 98 % (07/06/19 0736) Vital Signs (24h Range):  Temp:  [96.2 °F (35.7 °C)-98.7 °F (37.1 °C)] 97.6 °F (36.4 °C)  Pulse:  [] 108  Resp:  [16-20] 18  SpO2:  [94 %-100 %] 98 %  BP: (103-178)/(56-87) 112/62     Weight: 50.5 kg (111 lb 5.3 oz)  Body mass index is 19.11 kg/m².    Intake/Output Summary (Last 24 hours) at 7/6/2019 0822  Last data filed at 7/6/2019 0500  Gross per 24 hour   Intake 2416.67 ml   Output 1800 ml   Net 616.67 ml      Physical Exam   Constitutional: She is oriented to person, place, and time. She appears well-developed and well-nourished.   HENT:   Head: Normocephalic and atraumatic.   Neck: Neck supple.   Cardiovascular: Normal rate, regular rhythm, normal heart sounds and intact distal pulses. Exam reveals no gallop and no friction rub.   Pulmonary/Chest: Breath sounds normal.   Abdominal: Soft. Bowel sounds are normal. She exhibits no distension. There is no tenderness.   Musculoskeletal: She exhibits no edema or tenderness.   Neurological: She is alert and oriented to person, place, and time. She has normal reflexes.   Skin: Skin is warm.   Psychiatric: She  has a normal mood and affect. Her behavior is normal.       Significant Labs:   CBC:   Recent Labs   Lab 07/04/19  1216 07/05/19  0625 07/06/19  0507   WBC 8.72 8.02 8.57   HGB 10.2* 8.7* 7.3*   HCT 31.5* 26.8* 22.7*    316 245     CMP:   Recent Labs   Lab 07/04/19  1216 07/04/19  1919 07/05/19  0625 07/06/19  0507   * 154* 158* 141   K 5.8* 4.3 4.1 3.6   * 124* 129* 114*   CO2 11* 14* 18* 23   * 127* 128* 111*   BUN 63* 57* 48* 27*   CREATININE 1.2 1.1 1.0 0.7   CALCIUM 9.7 9.6 9.2 8.5*   PROT 7.2  --  6.0 4.9*   ALBUMIN 2.5*  --  2.3* 2.1*   BILITOT 0.4  --  0.4 0.3   ALKPHOS 102  --  86 64   AST 48*  --  45* 36   ALT 31  --  28 26   ANIONGAP 17* 16 11 4*   EGFRNONAA 45* 50* 56* >60     Troponin: No results for input(s): TROPONINI in the last 48 hours.  Urine Culture: No results for input(s): LABURIN in the last 48 hours.  Urine Studies: No results for input(s): COLORU, APPEARANCEUA, PHUR, SPECGRAV, PROTEINUA, GLUCUA, KETONESU, BILIRUBINUA, OCCULTUA, NITRITE, UROBILINOGEN, LEUKOCYTESUR, RBCUA, WBCUA, BACTERIA, SQUAMEPITHEL, HYALINECASTS in the last 48 hours.    Invalid input(s): WRIGHTSUR    Significant Imaging: I have reviewed all pertinent imaging results/findings within the past 24 hours.      Assessment/Plan:      * Acute GI hemorrhage  History of gastric ulcer  History of GI bleed per patient   GERD  Anemia  Hold Plavix and Voltaren  start protonix drip   NPO.d/c  GI consult-  appreciate rec's- endoscopy suspect melena from old GI bleed.    switch PPI to po x 6-8 weeks, starts liquid diet and advance  D/c in Am if H/H is stable. FU with GI in 4 weeks.       Hypernatremia    Switch IVF to dex.     Hyperkalemia  Treat        Congestive heart failure  Stable  meds on hold patient NPO        Hypothyroidism  Not on medication  TSH on 5/2019 wnl    Chronic obstructive pulmonary disease  Stable   Continue nebs        Essential hypertension  Uncontrolled  Hold po Metoprolol  IV hydralazine  and lopressor prn       Coronary artery disease involving native coronary artery of native heart without angina pectoris  Hypercholesterolemia  Hold Lipitor and Plavix        VTE Risk Mitigation (From admission, onward)        Ordered     Place sequential compression device  Until discontinued      07/04/19 1132     Place JARROD hose  Until discontinued      07/04/19 1132     IP VTE HIGH RISK PATIENT  Once      07/04/19 1132                Anastasia Fernández MD  Department of Hospital Medicine   [facility]

## 2019-07-06 NOTE — PLAN OF CARE
Discharge teaching given via VN. Pt demonstrated understanding utilizing the teachback method. All questions answered. Floor nurse notified. Awaiting transport.

## 2019-07-06 NOTE — PLAN OF CARE
4:48 pm, TN contacted by floor nurse, pt requires transportation home. TN contacted case mgmt supervisor, Sridevi Liao, she gave permission to arrange transportation with [facility]. TN placed order with Patient [facility] [phone] option 5.  time requested for 6:00pm.    Discharge orders noted, no HH or HME ordered.    Future Appointments   Date Time Provider Department Center   8/15/2019  9:45 AM Albino Hartman MD [facility]   9/27/2019 11:00 AM Dony Woo MD [facility]       Pt's nurse will go over medications/signs and symptoms prior to discharge       07/06/19 1544   Final Note   Assessment Type Final Discharge Note   Anticipated Discharge Disposition Home   What phone number can be called within the next 1-3 days to see how you are doing after discharge? [phone]   Hospital Follow Up  Appt(s) scheduled? No  (Ambulatory Referral placed with gastroenterology, their offices will contact patient with appointment information.)   Right Care Referral Info   Post Acute Recommendation No Care     Magy Rdz, RN Transitional Navigator  [phone]

## 2019-07-06 NOTE — DISCHARGE SUMMARY
[facility] Hospital Medicine  Discharge Summary      Patient Name: Josseline Stinson  MRN: [id number]  Admission Date: 7/4/2019  Hospital Length of Stay: 2 days  Discharge Date and Time: 7/6/2019  6:59 PM  Attending Physician: Anastasia Fernández*   Discharging Provider: Anastasia Fernández MD  Primary Care Provider: Dony Woo MD      HPI:   Josseline Stinson is a 72 y/o female with chronic pain, migraine headaches, COPD, history of gastric ulcers, GERD, anxiety, CAD with prior NSTEMI and coronary stenting with last stent on 1/3/2018 to left circumflex presented to [place] ED with 2-day history of nausea and vomiting and mid-epigastric pain and noted blood in her stool. Patient initially was very agitated and given Benadryl + Haldol + Ativan and much calmer. While in ED, patient had large melanotic stool that was heme positive. Patient had labs drawn with H/H 10.8/32 at 2200 and repeat H/H 10.2/30.3 at 0630 and stable. Patient given Protonix 80 mg IV x 1 in ED. Patient lucid and conversant at present according ED physician. Patient self-reports history of gastric ulcers and GI bleed in past. Patient at home on Plavix but not on Coumadin or NOACS. Patient does admit to NSAID use. Patient has been hemodynamically stable in ED. Abdominal X-rays done in ED was unremarkable. INR 1.2 and Platelet count normal at 296,000.   Patient transfer to [facility] for GI evaluation and recommendation. Patient admitted to hospital medicine service for medical management. Hold Plavix and Voltaren, start protonix drip, NPO, GI consult    Procedure(s) (LRB):  ESOPHAGOGASTRODUODENOSCOPY (EGD) (N/A)      Hospital Course:   S/p endoscopy- rec's switch PPI to po x 6-8 weeks, starts liquid diet and advance. D/c in Am if H/H is stable. FU with GI in 4 weeks.   Hypernatremia- switch her IVF to dex   H/H down trending. Repeat H/H at 2pm if stable will discharge     Consults:   Consults (From admission, onward)         Status Ordering Provider     Inpatient consult to Gastroenterology  Once     Provider:  Lorna Paula MD    Completed RUTH ARGUETA     Inpatient consult to PICC team ([facility])  Once     Provider:  (Not yet assigned)    Completed RUTH ARGUETA.          * Acute GI hemorrhage  History of gastric ulcer  History of GI bleed per patient   GERD  Anemia  Hold Plavix and Voltaren  start protonix drip   NPO.d/c  GI consult-  appreciate rec's- endoscopy suspect melena from old GI bleed.    switch PPI to po x 6-8 weeks, starts liquid diet and advance  D/c in Am if H/H is stable. FU with GI in 4 weeks.       Hypernatremia    Switch IVF to dex. resolved    Hyperkalemia  Treat        Chronic pain syndrome        Congestive heart failure  Stable  meds on hold patient NPO  restart        Hypothyroidism  Not on medication  TSH on 5/2019 wnl    Chronic obstructive pulmonary disease  Stable   Continue nebs        Essential hypertension  Uncontrolled  Hold po Metoprolol  IV hydralazine and lopressor prn   Restart home meds      Coronary artery disease involving native coronary artery of native heart without angina pectoris  Hypercholesterolemia  Hold Lipitor and Plavix  restart        Final Active Diagnoses:    Diagnosis Date Noted POA    PRINCIPAL PROBLEM:  Acute GI hemorrhage [K92.2] 07/04/2019 Yes    Hypernatremia [E87.0] 07/05/2019 Yes    Anemia [D64.9] 07/04/2019 Yes    Hyperkalemia [E87.5] 07/04/2019 Yes    Chronic pain syndrome [G89.4] 06/04/2019 Yes    Congestive heart failure [I50.9] 06/04/2019 Yes    Chronic obstructive pulmonary disease [J44.9] 05/14/2019 Yes    Hypothyroidism [E03.9] 05/14/2019 Yes    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 [G43.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] 03/21/2016 Yes    Coronary artery disease involving native coronary artery of native heart without angina pectoris [I25.10] 03/21/2016 Yes     Chronic    Essential hypertension [I10] 03/21/2016 Yes     Chronic    Gastroesophageal  reflux disease [K21.9] 03/21/2016 Yes     Chronic    Hypercholesteremia [E78.00] 03/21/2016 Yes     Chronic      Problems Resolved During this Admission:       Discharged Condition: stable    Disposition: Home or Self Care    Follow Up:    Patient Instructions:      Ambulatory referral to Gastroenterology   Referral Priority: Routine Referral Type: Consultation   Referral Reason: Specialty Services Required   Requested Specialty: Gastroenterology   Number of Visits Requested: 1     Diet Cardiac     Activity as tolerated       Significant Diagnostic Studies:     Pending Diagnostic Studies:     None         Medications:  Reconciled Home Medications:      Medication List      START taking these medications    nicotine 21 mg/24 hr  Commonly known as:  NICODERM CQ  Place 1 patch onto the skin once daily.     pantoprazole 40 MG tablet  Commonly known as:  PROTONIX  Take 1 tablet (40 mg total) by mouth 2 (two) times daily.        CONTINUE taking these medications    albuterol 90 mcg/actuation inhaler  Commonly known as:  PROVENTIL/VENTOLIN HFA  USE ONE PUFF EVERY FOUR TO SIX HOURS AS NEEDED     alendronate 70 MG tablet  Commonly known as:  FOSAMAX  Take 1 tablet (70 mg total) by mouth every 7 days.     butalbital-acetaminophen-caffeine -40 mg -40 mg per tablet  Commonly known as:  FIORICET, ESGIC  One p.o. q.d. p.r.n. headache not q.d. but p.r.n. headache     clopidogrel 75 mg tablet  Commonly known as:  PLAVIX  Take 1 tablet (75 mg total) by mouth once daily.     LORazepam 1 MG tablet  Commonly known as:  ATIVAN  One p.o. q.d. p.r.n. anxiety use p.r.n. not q.d.     metoprolol succinate 25 MG 24 hr tablet  Commonly known as:  TOPROL-XL  Take 1 tablet (25 mg total) by mouth once daily.     mupirocin 2 % ointment  Commonly known as:  BACTROBAN  APPLY TO THE AFFECTED AREA THREE TIMES DAILY     prochlorperazine 10 MG tablet  Commonly known as:  COMPAZINE  Take 1 tablet (10 mg total) by mouth every 6 (six) hours as  needed.     traZODone 100 MG tablet  Commonly known as:  DESYREL  Take 100 mg by mouth every evening.        STOP taking these medications    diclofenac 50 MG EC tablet  Commonly known as:  VOLTAREN     omeprazole 40 MG capsule  Commonly known as:  PRILOSEC     sulfamethoxazole-trimethoprim 800-160mg 800-160 mg Tab  Commonly known as:  BACTRIM DS        ASK your doctor about these medications    ARIPiprazole 2 MG Tab  Commonly known as:  ABILIFY  Take 2 mg by mouth once daily.     atorvastatin 40 MG tablet  Commonly known as:  LIPITOR  Take 1 tablet (40 mg total) by mouth once daily.     betamethasone valerate 0.1% 0.1 % Crea  Commonly known as:  VALISONE  Apply topically 2 (two) times daily.     escitalopram oxalate 20 MG tablet  Commonly known as:  LEXAPRO  Take 1 tablet (20 mg total) by mouth once daily.     isosorbide mononitrate 20 MG Tab  Commonly known as:  ISMO,MONOKET  once daily.            Indwelling Lines/Drains at time of discharge:   Lines/Drains/Airways     Peripherally Inserted Central Catheter Line                 PICC Double Lumen 07/04/19 1715 right basilic 1 day                Time spent on the discharge of patient: 45 minutes  Patient was seen and examined on the date of discharge and determined to be suitable for discharge.         Anastasia Fernández MD  Department of Hospital Medicine  [facility]

## 2019-07-06 NOTE — SUBJECTIVE & OBJECTIVE
Interval History: awake and alert. Tolerating meals. Denies nausea. Requesting to be discharge    Reported 2 BM yesterday, one with blood .   H/H trending down. Will repeat 2pm if stable will discharge.   Endoscopy- rec's switch PPI to po x 6-8 weeks, starts liquid diet and advance  D/c in Am if H/H is stable. FU with GI in 4 weeks.       Hypernatremia- switch her IVF to dex. Resolved. Discontinue      Review of Systems   Constitutional: Negative for fatigue and fever.   Cardiovascular: Negative for chest pain.   Gastrointestinal: Negative for abdominal distention, abdominal pain, nausea and vomiting.   Musculoskeletal: Negative for arthralgias.   Skin: Negative for rash.   Neurological: Negative for headaches.   Psychiatric/Behavioral: Negative for decreased concentration and dysphoric mood.     Objective:     Vital Signs (Most Recent):  Temp: 97.6 °F (36.4 °C) (07/06/19 0712)  Pulse: 108 (07/06/19 0808)  Resp: 18 (07/06/19 0712)  BP: 112/62 (07/06/19 0712)  SpO2: 98 % (07/06/19 0736) Vital Signs (24h Range):  Temp:  [96.2 °F (35.7 °C)-98.7 °F (37.1 °C)] 97.6 °F (36.4 °C)  Pulse:  [] 108  Resp:  [16-20] 18  SpO2:  [94 %-100 %] 98 %  BP: (103-178)/(56-87) 112/62     Weight: 50.5 kg (111 lb 5.3 oz)  Body mass index is 19.11 kg/m².    Intake/Output Summary (Last 24 hours) at 7/6/2019 0822  Last data filed at 7/6/2019 0500  Gross per 24 hour   Intake 2416.67 ml   Output 1800 ml   Net 616.67 ml      Physical Exam   Constitutional: She is oriented to person, place, and time. She appears well-developed and well-nourished.   HENT:   Head: Normocephalic and atraumatic.   Neck: Neck supple.   Cardiovascular: Normal rate, regular rhythm, normal heart sounds and intact distal pulses. Exam reveals no gallop and no friction rub.   Pulmonary/Chest: Breath sounds normal.   Abdominal: Soft. Bowel sounds are normal. She exhibits no distension. There is no tenderness.   Musculoskeletal: She exhibits no edema or tenderness.    Neurological: She is alert and oriented to person, place, and time. She has normal reflexes.   Skin: Skin is warm.   Psychiatric: She has a normal mood and affect. Her behavior is normal.       Significant Labs:   CBC:   Recent Labs   Lab 07/04/19  1216 07/05/19  0625 07/06/19  0507   WBC 8.72 8.02 8.57   HGB 10.2* 8.7* 7.3*   HCT 31.5* 26.8* 22.7*    316 245     CMP:   Recent Labs   Lab 07/04/19  1216 07/04/19  1919 07/05/19  0625 07/06/19  0507   * 154* 158* 141   K 5.8* 4.3 4.1 3.6   * 124* 129* 114*   CO2 11* 14* 18* 23   * 127* 128* 111*   BUN 63* 57* 48* 27*   CREATININE 1.2 1.1 1.0 0.7   CALCIUM 9.7 9.6 9.2 8.5*   PROT 7.2  --  6.0 4.9*   ALBUMIN 2.5*  --  2.3* 2.1*   BILITOT 0.4  --  0.4 0.3   ALKPHOS 102  --  86 64   AST 48*  --  45* 36   ALT 31  --  28 26   ANIONGAP 17* 16 11 4*   EGFRNONAA 45* 50* 56* >60     Troponin: No results for input(s): TROPONINI in the last 48 hours.  Urine Culture: No results for input(s): LABURIN in the last 48 hours.  Urine Studies: No results for input(s): COLORU, APPEARANCEUA, PHUR, SPECGRAV, PROTEINUA, GLUCUA, KETONESU, BILIRUBINUA, OCCULTUA, NITRITE, UROBILINOGEN, LEUKOCYTESUR, RBCUA, WBCUA, BACTERIA, SQUAMEPITHEL, HYALINECASTS in the last 48 hours.    Invalid input(s): GENO    Significant Imaging: I have reviewed all pertinent imaging results/findings within the past 24 hours.

## 2019-07-06 NOTE — PLAN OF CARE
Problem: Adult Inpatient Plan of Care  Goal: Plan of Care Review  Outcome: Ongoing (interventions implemented as appropriate)  Pt on RA with documented sats.

## 2019-07-07 NOTE — NURSING
Pt ride here  Telemetry removed  Has all discharge instructions and personal belongings   To [facility] wheelchair [facility]

## 2019-07-07 NOTE — NURSING
Pt notified will be discharged today  Pt stated transportation had previously discussed with hospital personnel   Pt stated she was to have transportation provided for her since she has no way to get home    Case management contacted

## 2019-07-08 ENCOUNTER — HOSPITAL ENCOUNTER (INPATIENT)
Facility: HOSPITAL | Age: 74
LOS: 2 days | Discharge: LEFT BEFORE DISCHARGE | DRG: 378 | End: 2019-07-10
Attending: FAMILY MEDICINE | Admitting: FAMILY MEDICINE
Payer: MEDICARE

## 2019-07-08 ENCOUNTER — CLINICAL SUPPORT (OUTPATIENT)
Dept: SMOKING CESSATION | Facility: CLINIC | Age: 74
End: 2019-07-08
Payer: COMMERCIAL

## 2019-07-08 ENCOUNTER — ANESTHESIA (OUTPATIENT)
Dept: ENDOSCOPY | Facility: HOSPITAL | Age: 74
DRG: 378 | End: 2019-07-08
Payer: MEDICARE

## 2019-07-08 ENCOUNTER — ANESTHESIA EVENT (OUTPATIENT)
Dept: MEDSURG UNIT | Facility: HOSPITAL | Age: 74
DRG: 378 | End: 2019-07-08
Payer: MEDICARE

## 2019-07-08 ENCOUNTER — ANESTHESIA EVENT (OUTPATIENT)
Dept: ENDOSCOPY | Facility: HOSPITAL | Age: 74
DRG: 378 | End: 2019-07-08
Payer: MEDICARE

## 2019-07-08 ENCOUNTER — ANESTHESIA (OUTPATIENT)
Dept: MEDSURG UNIT | Facility: HOSPITAL | Age: 74
DRG: 378 | End: 2019-07-08
Payer: MEDICARE

## 2019-07-08 DIAGNOSIS — D64.9 ANEMIA, UNSPECIFIED TYPE: ICD-10-CM

## 2019-07-08 DIAGNOSIS — K92.1 MELENA: Primary | ICD-10-CM

## 2019-07-08 DIAGNOSIS — K92.2 GI BLEED: ICD-10-CM

## 2019-07-08 DIAGNOSIS — F17.210 CIGARETTE SMOKER: Primary | ICD-10-CM

## 2019-07-08 PROBLEM — J44.9 CHRONIC OBSTRUCTIVE PULMONARY DISEASE: Chronic | Status: ACTIVE | Noted: 2019-05-14

## 2019-07-08 PROBLEM — I50.32 CHRONIC DIASTOLIC CONGESTIVE HEART FAILURE: Chronic | Status: ACTIVE | Noted: 2019-06-04

## 2019-07-08 PROBLEM — E03.9 HYPOTHYROIDISM: Chronic | Status: ACTIVE | Noted: 2019-05-14

## 2019-07-08 LAB
ABO + RH BLD: NORMAL
ALBUMIN SERPL BCP-MCNC: 2.7 G/DL (ref 3.5–5.2)
ALP SERPL-CCNC: 80 U/L (ref 55–135)
ALT SERPL W/O P-5'-P-CCNC: 25 U/L (ref 10–44)
ANION GAP SERPL CALC-SCNC: 13 MMOL/L (ref 8–16)
AST SERPL-CCNC: 27 U/L (ref 10–40)
BASOPHILS # BLD AUTO: 0.01 K/UL (ref 0–0.2)
BASOPHILS NFR BLD: 0.1 % (ref 0–1.9)
BILIRUB SERPL-MCNC: 0.3 MG/DL (ref 0.1–1)
BLD GP AB SCN CELLS X3 SERPL QL: NORMAL
BUN SERPL-MCNC: 15 MG/DL (ref 8–23)
CALCIUM SERPL-MCNC: 8.7 MG/DL (ref 8.7–10.5)
CHLORIDE SERPL-SCNC: 107 MMOL/L (ref 95–110)
CO2 SERPL-SCNC: 17 MMOL/L (ref 23–29)
CREAT SERPL-MCNC: 0.6 MG/DL (ref 0.5–1.4)
DIFFERENTIAL METHOD: ABNORMAL
EOSINOPHIL # BLD AUTO: 0.1 K/UL (ref 0–0.5)
EOSINOPHIL NFR BLD: 0.6 % (ref 0–8)
ERYTHROCYTE [DISTWIDTH] IN BLOOD BY AUTOMATED COUNT: 14.6 % (ref 11.5–14.5)
EST. GFR  (AFRICAN AMERICAN): >60 ML/MIN/1.73 M^2
EST. GFR  (NON AFRICAN AMERICAN): >60 ML/MIN/1.73 M^2
GLUCOSE SERPL-MCNC: 85 MG/DL (ref 70–110)
HCT VFR BLD AUTO: 25.9 % (ref 37–48.5)
HGB BLD-MCNC: 7.1 G/DL (ref 12–16)
HGB BLD-MCNC: 8.2 G/DL (ref 12–16)
LYMPHOCYTES # BLD AUTO: 1.6 K/UL (ref 1–4.8)
LYMPHOCYTES NFR BLD: 16.3 % (ref 18–48)
MCH RBC QN AUTO: 29.5 PG (ref 27–31)
MCHC RBC AUTO-ENTMCNC: 31.7 G/DL (ref 32–36)
MCV RBC AUTO: 93 FL (ref 82–98)
MONOCYTES # BLD AUTO: 0.8 K/UL (ref 0.3–1)
MONOCYTES NFR BLD: 7.9 % (ref 4–15)
NEUTROPHILS # BLD AUTO: 7 K/UL (ref 1.8–7.7)
NEUTROPHILS NFR BLD: 75.1 % (ref 38–73)
OB PNL STL: POSITIVE
PLATELET # BLD AUTO: 316 K/UL (ref 150–350)
PMV BLD AUTO: 8.6 FL (ref 9.2–12.9)
POTASSIUM SERPL-SCNC: 3.7 MMOL/L (ref 3.5–5.1)
PROT SERPL-MCNC: 5.8 G/DL (ref 6–8.4)
RBC # BLD AUTO: 2.78 M/UL (ref 4–5.4)
SODIUM SERPL-SCNC: 137 MMOL/L (ref 136–145)
WBC # BLD AUTO: 9.68 K/UL (ref 3.9–12.7)

## 2019-07-08 PROCEDURE — 36430 TRANSFUSION BLD/BLD COMPNT: CPT

## 2019-07-08 PROCEDURE — 43235 PR EGD, FLEX, DIAGNOSTIC: ICD-10-PCS | Mod: ,,, | Performed by: INTERNAL MEDICINE

## 2019-07-08 PROCEDURE — 99223 PR INITIAL HOSPITAL CARE,LEVL III: ICD-10-PCS | Mod: ,,, | Performed by: INTERNAL MEDICINE

## 2019-07-08 PROCEDURE — 25000003 PHARM REV CODE 250: Performed by: PHYSICIAN ASSISTANT

## 2019-07-08 PROCEDURE — 37000009 HC ANESTHESIA EA ADD 15 MINS: Performed by: INTERNAL MEDICINE

## 2019-07-08 PROCEDURE — 25000003 PHARM REV CODE 250: Performed by: INTERNAL MEDICINE

## 2019-07-08 PROCEDURE — 63600175 PHARM REV CODE 636 W HCPCS: Performed by: NURSE PRACTITIONER

## 2019-07-08 PROCEDURE — 85018 HEMOGLOBIN: CPT

## 2019-07-08 PROCEDURE — 25000003 PHARM REV CODE 250: Performed by: NURSE ANESTHETIST, CERTIFIED REGISTERED

## 2019-07-08 PROCEDURE — 43235 EGD DIAGNOSTIC BRUSH WASH: CPT | Performed by: INTERNAL MEDICINE

## 2019-07-08 PROCEDURE — P9021 RED BLOOD CELLS UNIT: HCPCS

## 2019-07-08 PROCEDURE — 43235 EGD DIAGNOSTIC BRUSH WASH: CPT | Mod: ,,, | Performed by: INTERNAL MEDICINE

## 2019-07-08 PROCEDURE — 11000001 HC ACUTE MED/SURG PRIVATE ROOM

## 2019-07-08 PROCEDURE — 99999 PR PBB SHADOW E&M-EST. PATIENT-LVL II: CPT | Mod: PBBFAC,,,

## 2019-07-08 PROCEDURE — 99407 PR TOBACCO USE CESSATION INTENSIVE >10 MINUTES: ICD-10-PCS | Mod: S$GLB,,,

## 2019-07-08 PROCEDURE — 80053 COMPREHEN METABOLIC PANEL: CPT

## 2019-07-08 PROCEDURE — S4991 NICOTINE PATCH NONLEGEND: HCPCS | Performed by: FAMILY MEDICINE

## 2019-07-08 PROCEDURE — 36415 COLL VENOUS BLD VENIPUNCTURE: CPT

## 2019-07-08 PROCEDURE — 82272 OCCULT BLD FECES 1-3 TESTS: CPT

## 2019-07-08 PROCEDURE — 63600175 PHARM REV CODE 636 W HCPCS: Performed by: NURSE ANESTHETIST, CERTIFIED REGISTERED

## 2019-07-08 PROCEDURE — 86920 COMPATIBILITY TEST SPIN: CPT

## 2019-07-08 PROCEDURE — 86901 BLOOD TYPING SEROLOGIC RH(D): CPT

## 2019-07-08 PROCEDURE — C9113 INJ PANTOPRAZOLE SODIUM, VIA: HCPCS | Performed by: NURSE PRACTITIONER

## 2019-07-08 PROCEDURE — 25000003 PHARM REV CODE 250: Performed by: FAMILY MEDICINE

## 2019-07-08 PROCEDURE — 94761 N-INVAS EAR/PLS OXIMETRY MLT: CPT

## 2019-07-08 PROCEDURE — 63600175 PHARM REV CODE 636 W HCPCS: Performed by: FAMILY MEDICINE

## 2019-07-08 PROCEDURE — 99223 1ST HOSP IP/OBS HIGH 75: CPT | Mod: ,,, | Performed by: INTERNAL MEDICINE

## 2019-07-08 PROCEDURE — 25000003 PHARM REV CODE 250: Performed by: NURSE PRACTITIONER

## 2019-07-08 PROCEDURE — 37000008 HC ANESTHESIA 1ST 15 MINUTES: Performed by: INTERNAL MEDICINE

## 2019-07-08 PROCEDURE — 99999 PR PBB SHADOW E&M-EST. PATIENT-LVL II: ICD-10-PCS | Mod: PBBFAC,,,

## 2019-07-08 PROCEDURE — 85025 COMPLETE CBC W/AUTO DIFF WBC: CPT

## 2019-07-08 PROCEDURE — 99407 BEHAV CHNG SMOKING > 10 MIN: CPT | Mod: S$GLB,,,

## 2019-07-08 PROCEDURE — 36000 PLACE NEEDLE IN VEIN: CPT | Performed by: STUDENT IN AN ORGANIZED HEALTH CARE EDUCATION/TRAINING PROGRAM

## 2019-07-08 RX ORDER — PANTOPRAZOLE SODIUM 20 MG/1
40 TABLET, DELAYED RELEASE ORAL
Qty: 180 TABLET | Refills: 1 | Status: SHIPPED | OUTPATIENT
Start: 2019-07-08 | End: 2019-11-07 | Stop reason: ALTCHOICE

## 2019-07-08 RX ORDER — ATORVASTATIN CALCIUM 40 MG/1
40 TABLET, FILM COATED ORAL DAILY
Status: DISCONTINUED | OUTPATIENT
Start: 2019-07-08 | End: 2019-07-10 | Stop reason: HOSPADM

## 2019-07-08 RX ORDER — DICYCLOMINE HYDROCHLORIDE 10 MG/ML
10 INJECTION INTRAMUSCULAR EVERY 6 HOURS PRN
Status: DISCONTINUED | OUTPATIENT
Start: 2019-07-08 | End: 2019-07-10 | Stop reason: HOSPADM

## 2019-07-08 RX ORDER — HYDROCODONE BITARTRATE AND ACETAMINOPHEN 500; 5 MG/1; MG/1
TABLET ORAL
Status: DISCONTINUED | OUTPATIENT
Start: 2019-07-08 | End: 2019-07-10 | Stop reason: HOSPADM

## 2019-07-08 RX ORDER — ISOSORBIDE MONONITRATE 10 MG/1
20 TABLET ORAL DAILY
Status: DISCONTINUED | OUTPATIENT
Start: 2019-07-08 | End: 2019-07-10 | Stop reason: HOSPADM

## 2019-07-08 RX ORDER — METOPROLOL SUCCINATE 25 MG/1
25 TABLET, EXTENDED RELEASE ORAL DAILY
Status: DISCONTINUED | OUTPATIENT
Start: 2019-07-08 | End: 2019-07-10 | Stop reason: HOSPADM

## 2019-07-08 RX ORDER — ARIPIPRAZOLE 2 MG/1
2 TABLET ORAL DAILY
Status: DISCONTINUED | OUTPATIENT
Start: 2019-07-08 | End: 2019-07-10 | Stop reason: HOSPADM

## 2019-07-08 RX ORDER — FENTANYL CITRATE 50 UG/ML
INJECTION, SOLUTION INTRAMUSCULAR; INTRAVENOUS
Status: DISCONTINUED | OUTPATIENT
Start: 2019-07-08 | End: 2019-07-08

## 2019-07-08 RX ORDER — ESCITALOPRAM OXALATE 20 MG/1
20 TABLET ORAL DAILY
Status: DISCONTINUED | OUTPATIENT
Start: 2019-07-08 | End: 2019-07-10 | Stop reason: HOSPADM

## 2019-07-08 RX ORDER — BUTALBITAL, ACETAMINOPHEN AND CAFFEINE 50; 325; 40 MG/1; MG/1; MG/1
1 TABLET ORAL EVERY 4 HOURS PRN
Status: DISCONTINUED | OUTPATIENT
Start: 2019-07-08 | End: 2019-07-10 | Stop reason: HOSPADM

## 2019-07-08 RX ORDER — OXYCODONE AND ACETAMINOPHEN 7.5; 325 MG/1; MG/1
1 TABLET ORAL EVERY 6 HOURS PRN
Status: DISCONTINUED | OUTPATIENT
Start: 2019-07-08 | End: 2019-07-10 | Stop reason: HOSPADM

## 2019-07-08 RX ORDER — PANTOPRAZOLE SODIUM 40 MG/1
40 TABLET, DELAYED RELEASE ORAL 2 TIMES DAILY
Status: DISCONTINUED | OUTPATIENT
Start: 2019-07-08 | End: 2019-07-08

## 2019-07-08 RX ORDER — IBUPROFEN 200 MG
1 TABLET ORAL DAILY
Status: DISCONTINUED | OUTPATIENT
Start: 2019-07-08 | End: 2019-07-10 | Stop reason: HOSPADM

## 2019-07-08 RX ORDER — SODIUM CHLORIDE 0.9 % (FLUSH) 0.9 %
10 SYRINGE (ML) INJECTION
Status: DISCONTINUED | OUTPATIENT
Start: 2019-07-08 | End: 2019-07-10 | Stop reason: HOSPADM

## 2019-07-08 RX ORDER — PANTOPRAZOLE SODIUM 40 MG/1
40 TABLET, DELAYED RELEASE ORAL 2 TIMES DAILY
Status: DISCONTINUED | OUTPATIENT
Start: 2019-07-08 | End: 2019-07-10 | Stop reason: HOSPADM

## 2019-07-08 RX ORDER — MIDAZOLAM HYDROCHLORIDE 1 MG/ML
INJECTION, SOLUTION INTRAMUSCULAR; INTRAVENOUS
Status: DISCONTINUED | OUTPATIENT
Start: 2019-07-08 | End: 2019-07-08

## 2019-07-08 RX ORDER — DEXTROMETHORPHAN/PSEUDOEPHED 2.5-7.5/.8
DROPS ORAL
Status: COMPLETED | OUTPATIENT
Start: 2019-07-08 | End: 2019-07-08

## 2019-07-08 RX ORDER — ONDANSETRON 2 MG/ML
4 INJECTION INTRAMUSCULAR; INTRAVENOUS EVERY 8 HOURS PRN
Status: DISCONTINUED | OUTPATIENT
Start: 2019-07-08 | End: 2019-07-10 | Stop reason: HOSPADM

## 2019-07-08 RX ORDER — POLYETHYLENE GLYCOL 3350, SODIUM SULFATE ANHYDROUS, SODIUM BICARBONATE, SODIUM CHLORIDE, POTASSIUM CHLORIDE 236; 22.74; 6.74; 5.86; 2.97 G/4L; G/4L; G/4L; G/4L; G/4L
4000 POWDER, FOR SOLUTION ORAL ONCE
Status: COMPLETED | OUTPATIENT
Start: 2019-07-08 | End: 2019-07-08

## 2019-07-08 RX ORDER — ALBUTEROL SULFATE 90 UG/1
2 AEROSOL, METERED RESPIRATORY (INHALATION) EVERY 6 HOURS PRN
Status: DISCONTINUED | OUTPATIENT
Start: 2019-07-08 | End: 2019-07-10 | Stop reason: HOSPADM

## 2019-07-08 RX ORDER — PROPOFOL 10 MG/ML
VIAL (ML) INTRAVENOUS
Status: DISCONTINUED | OUTPATIENT
Start: 2019-07-08 | End: 2019-07-08

## 2019-07-08 RX ORDER — SODIUM CHLORIDE 9 MG/ML
INJECTION, SOLUTION INTRAVENOUS CONTINUOUS PRN
Status: DISCONTINUED | OUTPATIENT
Start: 2019-07-08 | End: 2019-07-08

## 2019-07-08 RX ORDER — RAMELTEON 8 MG/1
8 TABLET ORAL NIGHTLY PRN
Status: DISCONTINUED | OUTPATIENT
Start: 2019-07-08 | End: 2019-07-10 | Stop reason: HOSPADM

## 2019-07-08 RX ADMIN — ONDANSETRON 4 MG: 2 INJECTION INTRAMUSCULAR; INTRAVENOUS at 07:07

## 2019-07-08 RX ADMIN — ESCITALOPRAM OXALATE 20 MG: 20 TABLET, FILM COATED ORAL at 09:07

## 2019-07-08 RX ADMIN — OXYCODONE AND ACETAMINOPHEN 1 TABLET: 7.5; 325 TABLET ORAL at 07:07

## 2019-07-08 RX ADMIN — SODIUM CHLORIDE: 0.9 INJECTION, SOLUTION INTRAVENOUS at 03:07

## 2019-07-08 RX ADMIN — OXYCODONE AND ACETAMINOPHEN 1 TABLET: 7.5; 325 TABLET ORAL at 08:07

## 2019-07-08 RX ADMIN — SIMETHICONE 66 MG: 20 SUSPENSION/ DROPS ORAL at 04:07

## 2019-07-08 RX ADMIN — PANTOPRAZOLE SODIUM 40 MG: 40 TABLET, DELAYED RELEASE ORAL at 09:07

## 2019-07-08 RX ADMIN — FENTANYL CITRATE 100 MCG: 50 INJECTION, SOLUTION INTRAMUSCULAR; INTRAVENOUS at 03:07

## 2019-07-08 RX ADMIN — ATORVASTATIN CALCIUM 40 MG: 40 TABLET, FILM COATED ORAL at 09:07

## 2019-07-08 RX ADMIN — ONDANSETRON 4 MG: 2 INJECTION INTRAMUSCULAR; INTRAVENOUS at 03:07

## 2019-07-08 RX ADMIN — ISOSORBIDE MONONITRATE 20 MG: 10 TABLET ORAL at 09:07

## 2019-07-08 RX ADMIN — ARIPIPRAZOLE 2 MG: 2 TABLET ORAL at 09:07

## 2019-07-08 RX ADMIN — MIDAZOLAM 2 MG: 1 INJECTION INTRAMUSCULAR; INTRAVENOUS at 03:07

## 2019-07-08 RX ADMIN — METOPROLOL SUCCINATE 25 MG: 25 TABLET, FILM COATED, EXTENDED RELEASE ORAL at 09:07

## 2019-07-08 RX ADMIN — BUTALBITAL, ACETAMINOPHEN, AND CAFFEINE 1 TABLET: 50; 325; 40 TABLET ORAL at 09:07

## 2019-07-08 RX ADMIN — POLYETHYLENE GLYCOL 3350, SODIUM SULFATE ANHYDROUS, SODIUM BICARBONATE, SODIUM CHLORIDE, POTASSIUM CHLORIDE 4000 ML: 236; 22.74; 6.74; 5.86; 2.97 POWDER, FOR SOLUTION ORAL at 05:07

## 2019-07-08 RX ADMIN — PROPOFOL 40 MG: 10 INJECTION, EMULSION INTRAVENOUS at 04:07

## 2019-07-08 RX ADMIN — NICOTINE 1 PATCH: 21 PATCH, EXTENDED RELEASE TRANSDERMAL at 09:07

## 2019-07-08 RX ADMIN — PANTOPRAZOLE SODIUM 8 MG/HR: 40 INJECTION, POWDER, FOR SOLUTION INTRAVENOUS at 03:07

## 2019-07-08 RX ADMIN — DICYCLOMINE HYDROCHLORIDE 10 MG: 20 INJECTION, SOLUTION INTRAMUSCULAR at 05:07

## 2019-07-08 RX ADMIN — SODIUM CHLORIDE: 0.9 INJECTION, SOLUTION INTRAVENOUS at 07:07

## 2019-07-08 RX ADMIN — PROPOFOL 80 MG: 10 INJECTION, EMULSION INTRAVENOUS at 04:07

## 2019-07-08 NOTE — INTERVAL H&P NOTE
The patient has been examined and the H&P has been reviewed:    I concur with the findings and no changes have occurred since H&P was written.    Anesthesia/Surgery risks, benefits and alternative options discussed and understood by patient/family.          Active Hospital Problems    Diagnosis  POA    *GI bleed [K92.2]  Yes    Congestive heart failure [I50.9]  Yes    Chronic obstructive pulmonary disease [J44.9]  Yes    History of tobacco abuse [Z87.891]  Not Applicable    Anxiety [F41.9]  Yes    Hypothyroidism [E03.9]  Yes    Hypercholesteremia [E78.00]  Yes     Chronic    Coronary artery disease involving native coronary artery of native heart without angina pectoris [I25.10]  Yes     Chronic    Essential hypertension [I10]  Yes     Chronic    Primary insomnia [F51.01]  Yes     Chronic      Resolved Hospital Problems   No resolved problems to display.

## 2019-07-08 NOTE — PLAN OF CARE
Problem: Adult Inpatient Plan of Care  Goal: Plan of Care Review  Outcome: Ongoing (interventions implemented as appropriate)     07/08/19 0380   Plan of Care Review   Plan of Care Reviewed With patient   AAO,vss,no active bleeding reported at this moment,Protonix IV begun per mar to left EJ,safety and comfort maintained.Continuing to carry out orders,lab notified this RN they will send another phlebotomist to collect type screen since the pt is a difficult stick and she couldn't get it and didn't have enough blood for cbc.Yvonne NP notified of above via the VN.

## 2019-07-08 NOTE — SUBJECTIVE & OBJECTIVE
Past Medical History:   Diagnosis Date    Anxiety     Arthritis     CHF (congestive heart failure)     Chronic pain syndrome 6/4/2019    COPD (chronic obstructive pulmonary disease)     Coronary artery disease involving native coronary artery of native heart without angina pectoris 3/21/2016    Encounter for blood transfusion     Essential hypertension 3/21/2016    GERD (gastroesophageal reflux disease)     History of gastric ulcer 5/14/2019    Hx of heart artery stent 5/14/2019    Hypercholesteremia 3/21/2016    MI (myocardial infarction)     x4    NSTEMI (non-ST elevated myocardial infarction) 1/2/2018    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 3/21/2016    Pneumonia of right lower lobe due to infectious organism 5/14/2019    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    Thyroid disease     Ulcer        Past Surgical History:   Procedure Laterality Date    carotid artery surgeriy      catheterization cardiac cath Left 01/03/2017    Angioplasty    ESOPHAGOGASTRODUODENOSCOPY (EGD) N/A 7/5/2019    [facility] ENDO    FRACTURE SURGERY Left     elbow and wrist    HIP SURGERY Right     x 4    HYSTERECTOMY  1972    Left heart cath Left 1/3/2018    Performed by [facility]rish[facility]zar [facility] at [facility] CATH LAB    Stent KAN coronary  1/3/2018    Performed by Ramakrishna Baltazar MD at [facility] CATH LAB       Review of patient's allergies indicates:   Allergen Reactions    Cortisone Swelling     SWELLING    Darvocet a500 [propoxyphene n-acetaminophen] Nausea And Vomiting    Toradol [ketorolac] Nausea And Vomiting    Tramadol Nausea And Vomiting    Codeine Palpitations     dizzy     Family History     Problem Relation (Age of Onset)    Breast cancer Daughter    Cancer Mother, Sister, Brother    Diabetes Mother    Heart disease Mother, Father, Sister, Brother, Maternal Grandmother, Maternal Grandfather        Tobacco Use    Smoking status: Heavy Tobacco Smoker     Packs/day:  1.00     Years: 51.00     Pack years: 51.00     Types: Cigarettes     Start date: 1968    Smokeless tobacco: Never Used    Tobacco comment: Patient enrolled in tobacco trust and ambulatory referral  to cessation   Substance and Sexual Activity    Alcohol use: No     Alcohol/week: 0.0 oz    Drug use: No    Sexual activity: Yes     Partners: Male     Review of Systems   Constitutional: Positive for fatigue. Negative for activity change, appetite change, chills and fever.   HENT: Negative for postnasal drip and trouble swallowing.    Eyes: Negative for pain and redness.   Respiratory: Negative for chest tightness and shortness of breath.    Cardiovascular: Negative for chest pain and leg swelling.   Gastrointestinal: Positive for abdominal pain and blood in stool. Negative for anal bleeding, constipation, diarrhea, nausea, rectal pain and vomiting.   Endocrine: Negative for cold intolerance and heat intolerance.   Genitourinary: Negative for difficulty urinating and hematuria.   Musculoskeletal: Positive for arthralgias and back pain.   Skin: Negative for color change and pallor.   Allergic/Immunologic: Negative for environmental allergies and food allergies.   Neurological: Positive for headaches. Negative for dizziness and light-headedness.   Hematological: Negative for adenopathy. Does not bruise/bleed easily.   Psychiatric/Behavioral: Negative for agitation and behavioral problems.     Objective:     Vital Signs (Most Recent):  Temp: 98 °F (36.7 °C) (07/08/19 0844)  Pulse: 88 (07/08/19 0844)  Resp: 16 (07/08/19 0844)  BP: (!) 142/67 (07/08/19 0844)  SpO2: 96 % (07/08/19 0834) Vital Signs (24h Range):  Temp:  [98 °F (36.7 °C)-98.6 °F (37 °C)] 98 °F (36.7 °C)  Pulse:  [] 88  Resp:  [16-19] 16  SpO2:  [96 %-100 %] 96 %  BP: (110-142)/(58-73) 142/67     Weight: 51 kg (112 lb 7 oz) (07/08/19 0447)  Body mass index is 19.3 kg/m².      Intake/Output Summary (Last 24 hours) at 7/8/2019 1854  Last data filed at  7/8/2019 0622  Gross per 24 hour   Intake 30.33 ml   Output 900 ml   Net -869.67 ml       Lines/Drains/Airways     Peripheral Intravenous Line                 Peripheral IV - Single Lumen 07/07/19 2055 20 G Left Other less than 1 day                Physical Exam   Constitutional: She is oriented to person, place, and time. She appears well-developed and well-nourished. No distress.   HENT:   Head: Normocephalic and atraumatic.   Eyes: Conjunctivae are normal. No scleral icterus.   Neck: Normal range of motion. Neck supple. No tracheal deviation present. No thyromegaly present.   Cardiovascular: Normal rate and regular rhythm. Exam reveals no gallop and no friction rub.   No murmur heard.  Pulmonary/Chest: Effort normal. No respiratory distress. She has no wheezes.   Abdominal: Soft. Bowel sounds are normal. She exhibits no distension. There is no tenderness.   Musculoskeletal:        Right wrist: She exhibits normal range of motion and no tenderness.        Left wrist: She exhibits normal range of motion and no tenderness.   Lymphadenopathy:        Head (right side): No submental and no submandibular adenopathy present.        Head (left side): No submental and no submandibular adenopathy present.   Neurological: She is alert and oriented to person, place, and time.   Skin: Skin is warm and dry. No rash noted. She is not diaphoretic. No erythema.   Psychiatric: She has a normal mood and affect. Her behavior is normal.   Nursing note and vitals reviewed.      Significant Labs:  CBC:   Recent Labs   Lab 07/06/19  1501 07/07/19  2058 07/08/19  0639 07/08/19  1119   WBC 9.38 8.60 9.68  --    HGB 7.7* 7.5* 8.2* 7.1*   HCT 23.3* 22.7* 25.9*  --     305 316  --      CMP:   Recent Labs   Lab 07/08/19  0321   GLU 85   CALCIUM 8.7   ALBUMIN 2.7*   PROT 5.8*      K 3.7   CO2 17*      BUN 15   CREATININE 0.6   ALKPHOS 80   ALT 25   AST 27   BILITOT 0.3       Significant Imaging:  Imaging results within the  past 24 hours have been reviewed.

## 2019-07-08 NOTE — HPI
This is a 73-year-old female with a past medical history of peptic ulcer disease, tobacco use, coronary artery disease on aspirin and Plavix, hypertension, COPD here with recurrent GI bleeding.  She noted the episode as a black bowel movement the day prior to admission.  There was a moderate amount associated with some crampy abdominal discomfort.  No other exacerbating or relieving factors or other associated symptoms.  She had an upper endoscopy last Friday noting multiple gastric ulcers in the duodenal ulcer, no high risk stigmata was noted. +nsaids for HAs.     The following portions of the patient's history were reviewed and updated as appropriate: allergies, current medications, past family history, past medical history, past social history, past surgical history and problem list.

## 2019-07-08 NOTE — PLAN OF CARE
Problem: Adult Inpatient Plan of Care  Goal: Plan of Care Review  Outcome: Ongoing (interventions implemented as appropriate)  Pt AAO x 4. VSS. NAD. Tolerating clear liquid diet. Iv infiltrated 2 times this shift while attempting to give blood. Awaiting anesthesia for new iv placement. EGD performed today. Colonoscopy scheduled for tomorrow, prep given to pt. Pain controlled with prn meds. Safety maintained.WIll continue to monitor.

## 2019-07-08 NOTE — CONSULTS
[facility]  Gastroenterology  Consult Note    Patient Name: Josseline Stinson  MRN: [id number]  Admission Date: 7/8/2019  Hospital Length of Stay: 0 days  Code Status: Full Code   Attending Provider: Anastasia Fernández*   Consulting Provider: Lorna Paula MD  Primary Care Physician: Dony Woo MD  Principal Problem:GI bleed    Inpatient consult to [facility]  Consult performed by: Lorna Paula MD  Consult ordered by: Anastasia Fernández MD  Reason for consult: GI bleed        Subjective:     HPI:  This is a 73-year-old female with a past medical history of peptic ulcer disease, tobacco use, coronary artery disease on aspirin and Plavix, hypertension, COPD here with recurrent GI bleeding.  She noted the episode as a black bowel movement the day prior to admission.  There was a moderate amount associated with some crampy abdominal discomfort.  No other exacerbating or relieving factors or other associated symptoms.  She had an upper endoscopy last Friday noting multiple gastric ulcers in the duodenal ulcer, no high risk stigmata was noted. +nsaids for HAs.     The following portions of the patient's history were reviewed and updated as appropriate: allergies, current medications, past family history, past medical history, past social history, past surgical history and problem list.      Past Medical History:   Diagnosis Date    Anxiety     Arthritis     CHF (congestive heart failure)     Chronic pain syndrome 6/4/2019    COPD (chronic obstructive pulmonary disease)     Coronary artery disease involving native coronary artery of native heart without angina pectoris 3/21/2016    Encounter for blood transfusion     Essential hypertension 3/21/2016    GERD (gastroesophageal reflux disease)     History of gastric ulcer 5/14/2019    Hx of heart artery stent 5/14/2019    Hypercholesteremia 3/21/2016    MI (myocardial infarction)     x4    NSTEMI (non-ST elevated  myocardial infarction) 1/2/2018    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 3/21/2016    Pneumonia of right lower lobe due to infectious organism 5/14/2019    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    Thyroid disease     Ulcer        Past Surgical History:   Procedure Laterality Date    carotid artery surgeriy      catheterization cardiac cath Left 01/03/2017    Angioplasty    ESOPHAGOGASTRODUODENOSCOPY (EGD) N/A 7/5/2019    [facility]O    FRACTURE SURGERY Left     elbow and wrist    HIP SURGERY Right     x 4    HYSTERECTOMY  1972    Left heart cath Left 1/3/2018    Performed by [facility]zar [facility] at [facility] CATH LAB    Stent KAN coronary  1/3/2018    Performed by Ramakrishna Bal[facility]zar MD at [facility] CATH LAB       Review of patient's allergies indicates:   Allergen Reactions    Cortisone Swelling     SWELLING    Darvocet a500 [propoxyphene n-acetaminophen] Nausea And Vomiting    Toradol [ketorolac] Nausea And Vomiting    Tramadol Nausea And Vomiting    Codeine Palpitations     dizzy     Family History     Problem Relation (Age of Onset)    Breast cancer Daughter    Cancer Mother, Sister, Brother    Diabetes Mother    Heart disease Mother, Father, Sister, Brother, Maternal Grandmother, Maternal Grandfather        Tobacco Use    Smoking status: Heavy Tobacco Smoker     Packs/day: 1.00     Years: 51.00     Pack years: 51.00     Types: Cigarettes     Start date: 1968    Smokeless tobacco: Never Used    Tobacco comment: Patient enrolled in tobacco trust and ambulatory referral  to cessation   Substance and Sexual Activity    Alcohol use: No     Alcohol/week: 0.0 oz    Drug use: No    Sexual activity: Yes     Partners: Male     Review of Systems   Constitutional: Positive for fatigue. Negative for activity change, appetite change, chills and fever.   HENT: Negative for postnasal drip and trouble swallowing.    Eyes: Negative for pain and redness.   Respiratory:  Negative for chest tightness and shortness of breath.    Cardiovascular: Negative for chest pain and leg swelling.   Gastrointestinal: Positive for abdominal pain and blood in stool. Negative for anal bleeding, constipation, diarrhea, nausea, rectal pain and vomiting.   Endocrine: Negative for cold intolerance and heat intolerance.   Genitourinary: Negative for difficulty urinating and hematuria.   Musculoskeletal: Positive for arthralgias and back pain.   Skin: Negative for color change and pallor.   Allergic/Immunologic: Negative for environmental allergies and food allergies.   Neurological: Positive for headaches. Negative for dizziness and light-headedness.   Hematological: Negative for adenopathy. Does not bruise/bleed easily.   Psychiatric/Behavioral: Negative for agitation and behavioral problems.     Objective:     Vital Signs (Most Recent):  Temp: 98 °F (36.7 °C) (07/08/19 0844)  Pulse: 88 (07/08/19 0844)  Resp: 16 (07/08/19 0844)  BP: (!) 142/67 (07/08/19 0844)  SpO2: 96 % (07/08/19 0834) Vital Signs (24h Range):  Temp:  [98 °F (36.7 °C)-98.6 °F (37 °C)] 98 °F (36.7 °C)  Pulse:  [] 88  Resp:  [16-19] 16  SpO2:  [96 %-100 %] 96 %  BP: (110-142)/(58-73) 142/67     Weight: 51 kg (112 lb 7 oz) (07/08/19 0447)  Body mass index is 19.3 kg/m².      Intake/Output Summary (Last 24 hours) at 7/8/2019 1209  Last data filed at 7/8/2019 0622  Gross per 24 hour   Intake 30.33 ml   Output 900 ml   Net -869.67 ml       Lines/Drains/Airways     Peripheral Intravenous Line                 Peripheral IV - Single Lumen 07/07/19 2055 20 G Left Other less than 1 day                Physical Exam   Constitutional: She is oriented to person, place, and time. She appears well-developed and well-nourished. No distress.   HENT:   Head: Normocephalic and atraumatic.   Eyes: Conjunctivae are normal. No scleral icterus.   Neck: Normal range of motion. Neck supple. No tracheal deviation present. No thyromegaly present.    Cardiovascular: Normal rate and regular rhythm. Exam reveals no gallop and no friction rub.   No murmur heard.  Pulmonary/Chest: Effort normal. No respiratory distress. She has no wheezes.   Abdominal: Soft. Bowel sounds are normal. She exhibits no distension. There is no tenderness.   Musculoskeletal:        Right wrist: She exhibits normal range of motion and no tenderness.        Left wrist: She exhibits normal range of motion and no tenderness.   Lymphadenopathy:        Head (right side): No submental and no submandibular adenopathy present.        Head (left side): No submental and no submandibular adenopathy present.   Neurological: She is alert and oriented to person, place, and time.   Skin: Skin is warm and dry. No rash noted. She is not diaphoretic. No erythema.   Psychiatric: She has a normal mood and affect. Her behavior is normal.   Nursing note and vitals reviewed.      Significant Labs:  CBC:   Recent Labs   Lab 07/06/19  1501 07/07/19  2058 07/08/19  0639 07/08/19  1119   WBC 9.38 8.60 9.68  --    HGB 7.7* 7.5* 8.2* 7.1*   HCT 23.3* 22.7* 25.9*  --     305 316  --      CMP:   Recent Labs   Lab 07/08/19  0321   GLU 85   CALCIUM 8.7   ALBUMIN 2.7*   PROT 5.8*      K 3.7   CO2 17*      BUN 15   CREATININE 0.6   ALKPHOS 80   ALT 25   AST 27   BILITOT 0.3       Significant Imaging:  Imaging results within the past 24 hours have been reviewed.    Assessment/Plan:     * GI bleed  - repeat egd  - ppi  - npo  - colonoscopy if negative  - monitor hct, transfuse as needed  - hold asa/plavix        Thank you for your consult. I will follow-up with patient. Please contact us if you have any additional questions.    Lorna Paula MD  Gastroenterology  [facility]

## 2019-07-08 NOTE — H&P (VIEW-ONLY)
[facility]  Gastroenterology  Consult Note    Patient Name: Josseline Stinson  MRN: [id number]  Admission Date: 7/8/2019  Hospital Length of Stay: 0 days  Code Status: Full Code   Attending Provider: Anastasia Fernández*   Consulting Provider: Lorna Paula MD  Primary Care Physician: Dony Woo MD  Principal Problem:GI bleed    Inpatient consult to [facility]  Consult performed by: Lorna Paula MD  Consult ordered by: Anastasia Fernández MD  Reason for consult: GI bleed        Subjective:     HPI:  This is a 73-year-old female with a past medical history of peptic ulcer disease, tobacco use, coronary artery disease on aspirin and Plavix, hypertension, COPD here with recurrent GI bleeding.  She noted the episode as a black bowel movement the day prior to admission.  There was a moderate amount associated with some crampy abdominal discomfort.  No other exacerbating or relieving factors or other associated symptoms.  She had an upper endoscopy last Friday noting multiple gastric ulcers in the duodenal ulcer, no high risk stigmata was noted. +nsaids for HAs.     The following portions of the patient's history were reviewed and updated as appropriate: allergies, current medications, past family history, past medical history, past social history, past surgical history and problem list.      Past Medical History:   Diagnosis Date    Anxiety     Arthritis     CHF (congestive heart failure)     Chronic pain syndrome 6/4/2019    COPD (chronic obstructive pulmonary disease)     Coronary artery disease involving native coronary artery of native heart without angina pectoris 3/21/2016    Encounter for blood transfusion     Essential hypertension 3/21/2016    GERD (gastroesophageal reflux disease)     History of gastric ulcer 5/14/2019    Hx of heart artery stent 5/14/2019    Hypercholesteremia 3/21/2016    MI (myocardial infarction)     x4    NSTEMI (non-ST elevated  myocardial infarction) 1/2/2018    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 3/21/2016    Pneumonia of right lower lobe due to infectious organism 5/14/2019    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    Thyroid disease     Ulcer        Past Surgical History:   Procedure Laterality Date    carotid artery surgeriy      catheterization cardiac cath Left 01/03/2017    Angioplasty    ESOPHAGOGASTRODUODENOSCOPY (EGD) N/A 7/5/2019    [facility] ENDO    FRACTURE SURGERY Left     elbow and wrist    HIP SURGERY Right     x 4    HYSTERECTOMY  1972    Left heart cath Left 1/3/2018    Per[facility]    Stent KAN coronary  1/3/2018    Performed by [facility] at [facility] CATH LAB       Review of patient's allergies indicates:   Allergen Reactions    Cortisone Swelling     SWELLING    Darvocet a500 [propoxyphene n-acetaminophen] Nausea And Vomiting    Toradol [ketorolac] Nausea And Vomiting    Tramadol Nausea And Vomiting    Codeine Palpitations     dizzy     Family History     Problem Relation (Age of Onset)    Breast cancer Daughter    Cancer Mother, Sister, Brother    Diabetes Mother    Heart disease Mother, Father, Sister, Brother, Maternal Grandmother, Maternal Grandfather        Tobacco Use    Smoking status: Heavy Tobacco Smoker     Packs/day: 1.00     Years: 51.00     Pack years: 51.00     Types: Cigarettes     Start date: 1968    Smokeless tobacco: Never Used    Tobacco comment: Patient enrolled in tobacco trust and ambulatory referral  to cessation   Substance and Sexual Activity    Alcohol use: No     Alcohol/week: 0.0 oz    Drug use: No    Sexual activity: Yes     Partners: Male     Review of Systems   Constitutional: Positive for fatigue. Negative for activity change, appetite change, chills and fever.   HENT: Negative for postnasal drip and trouble swallowing.    Eyes: Negative for pain and redness.   Respiratory:  Negative for chest tightness and shortness of breath.    Cardiovascular: Negative for chest pain and leg swelling.   Gastrointestinal: Positive for abdominal pain and blood in stool. Negative for anal bleeding, constipation, diarrhea, nausea, rectal pain and vomiting.   Endocrine: Negative for cold intolerance and heat intolerance.   Genitourinary: Negative for difficulty urinating and hematuria.   Musculoskeletal: Positive for arthralgias and back pain.   Skin: Negative for color change and pallor.   Allergic/Immunologic: Negative for environmental allergies and food allergies.   Neurological: Positive for headaches. Negative for dizziness and light-headedness.   Hematological: Negative for adenopathy. Does not bruise/bleed easily.   Psychiatric/Behavioral: Negative for agitation and behavioral problems.     Objective:     Vital Signs (Most Recent):  Temp: 98 °F (36.7 °C) (07/08/19 0844)  Pulse: 88 (07/08/19 0844)  Resp: 16 (07/08/19 0844)  BP: (!) 142/67 (07/08/19 0844)  SpO2: 96 % (07/08/19 0834) Vital Signs (24h Range):  Temp:  [98 °F (36.7 °C)-98.6 °F (37 °C)] 98 °F (36.7 °C)  Pulse:  [] 88  Resp:  [16-19] 16  SpO2:  [96 %-100 %] 96 %  BP: (110-142)/(58-73) 142/67     Weight: 51 kg (112 lb 7 oz) (07/08/19 0447)  Body mass index is 19.3 kg/m².      Intake/Output Summary (Last 24 hours) at 7/8/2019 1209  Last data filed at 7/8/2019 0622  Gross per 24 hour   Intake 30.33 ml   Output 900 ml   Net -869.67 ml       Lines/Drains/Airways     Peripheral Intravenous Line                 Peripheral IV - Single Lumen 07/07/19 2055 20 G Left Other less than 1 day                Physical Exam   Constitutional: She is oriented to person, place, and time. She appears well-developed and well-nourished. No distress.   HENT:   Head: Normocephalic and atraumatic.   Eyes: Conjunctivae are normal. No scleral icterus.   Neck: Normal range of motion. Neck supple. No tracheal deviation present. No thyromegaly present.    Cardiovascular: Normal rate and regular rhythm. Exam reveals no gallop and no friction rub.   No murmur heard.  Pulmonary/Chest: Effort normal. No respiratory distress. She has no wheezes.   Abdominal: Soft. Bowel sounds are normal. She exhibits no distension. There is no tenderness.   Musculoskeletal:        Right wrist: She exhibits normal range of motion and no tenderness.        Left wrist: She exhibits normal range of motion and no tenderness.   Lymphadenopathy:        Head (right side): No submental and no submandibular adenopathy present.        Head (left side): No submental and no submandibular adenopathy present.   Neurological: She is alert and oriented to person, place, and time.   Skin: Skin is warm and dry. No rash noted. She is not diaphoretic. No erythema.   Psychiatric: She has a normal mood and affect. Her behavior is normal.   Nursing note and vitals reviewed.      Significant Labs:  CBC:   Recent Labs   Lab 07/06/19  1501 07/07/19  2058 07/08/19  0639 07/08/19  1119   WBC 9.38 8.60 9.68  --    HGB 7.7* 7.5* 8.2* 7.1*   HCT 23.3* 22.7* 25.9*  --     305 316  --      CMP:   Recent Labs   Lab 07/08/19  0321   GLU 85   CALCIUM 8.7   ALBUMIN 2.7*   PROT 5.8*      K 3.7   CO2 17*      BUN 15   CREATININE 0.6   ALKPHOS 80   ALT 25   AST 27   BILITOT 0.3       Significant Imaging:  Imaging results within the past 24 hours have been reviewed.    Assessment/Plan:     * GI bleed  - repeat egd  - ppi  - npo  - colonoscopy if negative  - monitor hct, transfuse as needed  - hold asa/plavix        Thank you for your consult. I will follow-up with patient. Please contact us if you have any additional questions.    Lorna Paula MD  Gastroenterology  [facility]

## 2019-07-08 NOTE — NURSING
Attempted to start PIV X1, unsuccessful. Patient refused any more attempts. States wants to go home, asking if she can take meds by mouth and go home. Explained plan of care to patient but still states she wants to go home and states sill sign AMA papers. Medical Team paged and notified. Will come to speak with patient.

## 2019-07-08 NOTE — ASSESSMENT & PLAN NOTE
History of Gastric Ulcer  History of GI bleed  GERD]  Anemia    Hold Plavix and Voltaren  Protonix gtt  Transfuse 1-unit PRBCs  GI consulted, possible EGD in am  NPO

## 2019-07-08 NOTE — [employer] PATIENT INSTRUCTIONS
Discharge Summary/Instructions after an Endoscopic Procedure  Patient Name: Josseline Stinson  Patient MRN: [id number]  Patient YOB: 1945 Monday, July 08, 2019  Sandra Alicea MD  RESTRICTIONS:  During your procedure today, you received medications for sedation.  These   medications may affect your judgment, balance and coordination.  Therefore,   for 24 hours, you have the following restrictions:   - DO NOT drive a car, operate machinery, make legal/financial decisions,   sign important papers or drink alcohol.    ACTIVITY:  Today: no heavy lifting, straining or running due to procedural   sedation/anesthesia.  The following day: return to full activity including work.  DIET:  Eat and drink normally unless instructed otherwise.     TREATMENT FOR COMMON SIDE EFFECTS:  - Mild abdominal pain, nausea, belching, bloating or excessive gas:  rest,   eat lightly and use a heating pad.  - Sore Throat: treat with throat lozenges and/or gargle with warm salt   water.  - Because air was used during the procedure, expelling large amounts of air   from your rectum or belching is normal.  - If a bowel prep was taken, you may not have a bowel movement for 1-3 days.    This is normal.  SYMPTOMS TO WATCH FOR AND REPORT TO YOUR PHYSICIAN:  1. Abdominal pain or bloating, other than gas cramps.  2. Chest pain.  3. Back pain.  4. Signs of infection such as: chills or fever occurring within 24 hours   after the procedure.  5. Rectal bleeding, which would show as bright red, maroon, or black stools.   (A tablespoon of blood from the rectum is not serious, especially if   hemorrhoids are present.)  6. Vomiting.  7. Weakness or dizziness.  GO DIRECTLY TO THE NEAREST EMERGENCY ROOM IF YOU HAVE ANY OF THE FOLLOWING:      Difficulty breathing              Chills and/or fever over 101 F   Persistent vomiting and/or vomiting blood   Severe abdominal pain   Severe chest pain   Black, tarry stools   Bleeding- more than one  tablespoon   Any other symptom or condition that you feel may need urgent attention  Your doctor recommends these additional instructions:  If any biopsies were taken, your doctors clinic will contact you in 1 to 2   weeks with any results.  - Return patient to hospital [facility] for ongoing care.   - Clear liquid diet with colon prep tonight.   - Perform a colonoscopy tomorrow.   - Continue to hold Plavix.  - She was advised strongly to stop NSAIDS while taking Plavix as the risk   for ulceration with serious bleeding is high for her.  She will need to   discuss with her PCP about alternative pain medications.  For questions, problems or results please call your physician - Sandra Alicea MD at Work:  [phone].  EMERGENCY PHONE NUMBER: [phone],  LAB RESULTS: [phone]  IF A COMPLICATION OR EMERGENCY SITUATION ARISES AND YOU ARE UNABLE TO REACH   YOUR PHYSICIAN - GO DIRECTLY TO THE EMERGENCY ROOM.  MD Sandra Almaguer MD  7/8/2019 4:36:11 PM  This report has been verified and signed electronically.  [employer]

## 2019-07-08 NOTE — ANESTHESIA PREPROCEDURE EVALUATION
07/08/2019  Josseline Stinson is a 73 y.o., female admitted with acute GIB/melena for repeat EGD.     Past Medical History:   Diagnosis Date    Anxiety     Arthritis     CHF (congestive heart failure)     Chronic pain syndrome 6/4/2019    COPD (chronic obstructive pulmonary disease)     Coronary artery disease involving native coronary artery of native heart without angina pectoris 3/21/2016    Encounter for blood transfusion     Essential hypertension 3/21/2016    GERD (gastroesophageal reflux disease)     History of gastric ulcer 5/14/2019    Hx of heart artery stent 5/14/2019    Hypercholesteremia 3/21/2016    MI (myocardial infarction)     x4    NSTEMI (non-ST elevated myocardial infarction) 1/2/2018    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     I have reviewed the Nursing Notes.   I have reviewed the Medications.     Review of Systems  Anesthesia Hx:   Denies Personal Hx of Anesthesia complications.   Social:  Smoker    Hematology/Oncology:         -- Anemia:   Cardiovascular:   Hypertension Past MI CAD  CABG/stent (PCI 5/2019)  CHF hyperlipidemia ECG has been reviewed.    Pulmonary:   COPD    Renal/:  Renal/ Normal     Hepatic/GI:   GERD    Musculoskeletal:   Arthritis     Neurological:   Headaches    Endocrine:   Hypothyroidism    Psych:   anxiety          Physical Exam  General:  Cachexia      Dental:  Dental Findings: Edentulous   Chest/Lungs:  Chest/Lungs Clear    Heart/Vascular:  Heart Findings: Rate: Tachycardia  Rhythm: Regular Rhythm           Lab Results   Component Value Date    WBC 9.68 07/08/2019    HGB 7.1 (L) 07/08/2019    HCT  25.9 (L) 07/08/2019     07/08/2019    CHOL 168 05/21/2019    TRIG 177 (H) 05/21/2019    HDL 49 05/21/2019    ALT 25 07/08/2019    AST 27 07/08/2019     07/08/2019    K 3.7 07/08/2019     07/08/2019    CREATININE 0.6 07/08/2019    BUN 15 07/08/2019    CO2 17 (L) 07/08/2019    TSH 1.32 05/21/2019    INR 1.2 07/07/2019    HGBA1C 5.6 05/05/2019     Echo 5/2019  · Mild concentric left ventricular hypertrophy.  · Normal left ventricular systolic function. The estimated ejection fraction is 60%  · Grade I (mild) left ventricular diastolic dysfunction consistent with impaired relaxation.  · Normal right ventricular systolic function.  · Mild left atrial enlargement.  · Mild mitral regurgitation.  · Normal central venous pressure (3 mm Hg).  · The estimated PA systolic pressure is 28 mm Hg         Anesthesia Plan  Type of Anesthesia, risks & benefits discussed:  Anesthesia Type:  MAC, general  Patient's Preference:   Intra-op Monitoring Plan:   Intra-op Monitoring Plan Comments:   Post Op Pain Control Plan:   Post Op Pain Control Plan Comments:   Induction:    Beta Blocker:  Patient is on a Beta-Blocker and has not received dose within the past 24 hours due to non-compliance or for other reasons (Patient should receive a perioperative dose or document why it is withheld). Perioperative Beta Blocker not given due to: Other (see comments)    Beta Blocker Comments: GIB  Informed Consent: Patient understands risks and agrees with Anesthesia plan.  Questions answered. Anesthesia consent signed with patient.  ASA Score: 4     Day of Surgery Review of History & Physical:            Ready For Surgery From Anesthesia Perspective.

## 2019-07-08 NOTE — ASSESSMENT & PLAN NOTE
- repeat egd  - ppi  - npo  - colonoscopy if negative  - monitor hct, transfuse as needed  - hold asa/plavix

## 2019-07-08 NOTE — SUBJECTIVE & OBJECTIVE
Past Medical History:   Diagnosis Date    Anxiety     CHF (congestive heart failure)     Chronic pain syndrome 6/4/2019    COPD (chronic obstructive pulmonary disease)     Coronary artery disease involving native coronary artery of native heart without angina pectoris 3/21/2016    Encounter for blood transfusion     Essential hypertension 3/21/2016    GERD (gastroesophageal reflux disease)     History of gastric ulcer 5/14/2019    Hx of heart artery stent 5/14/2019    Hypercholesteremia 3/21/2016    MI (myocardial infarction)     x4    NSTEMI (non-ST elevated myocardial infarction) 1/2/2018    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 3/21/2016    Pneumonia of right lower lobe due to infectious organism 5/14/2019    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    Ulcer        Past Surgical History:   Procedure Laterality Date    carotid artery surgeriy      catheterization cardiac cath Left 01/03/2017    Angioplasty    FRACTURE SURGERY Left     elbow and wrist    HIP SURGERY Right     x 4    HYSTERECTOMY  1972    Left heart cath Left 1/3/2018    Performed by [facility]zar [facility] at [facility] CATH LAB    Stent KAN coronary  1/3/2018    Performed by [facility]zar MD at [facility] CATH LAB       Review of patient's allergies indicates:   Allergen Reactions    Cortisone Swelling     SWELLING    Darvocet a500 [propoxyphene n-acetaminophen] Nausea And Vomiting    Toradol [ketorolac] Nausea And Vomiting    Tramadol Nausea And Vomiting    Codeine Palpitations     dizzy       Current Facility-Administered Medications on File Prior to Encounter   Medication    [COMPLETED] famotidine (PF) injection 40 mg    [COMPLETED] morphine injection 6 mg    [COMPLETED] ondansetron injection 4 mg     Current Outpatient Medications on File Prior to Encounter   Medication Sig    albuterol (PROVENTIL/VENTOLIN HFA) 90 mcg/actuation inhaler USE ONE PUFF EVERY FOUR TO SIX HOURS AS NEEDED    alendronate (FOSAMAX) 70  MG tablet Take 1 tablet (70 mg total) by mouth every 7 days.    ARIPiprazole (ABILIFY) 2 MG Tab Take 2 mg by mouth once daily.    atorvastatin (LIPITOR) 40 MG tablet Take 1 tablet (40 mg total) by mouth once daily.    betamethasone valerate 0.1% (VALISONE) 0.1 % Crea Apply topically 2 (two) times daily.    butalbital-acetaminophen-caffeine -40 mg (FIORICET, ESGIC) -40 mg per tablet One p.o. q.d. p.r.n. headache not q.d. but p.r.n. headache    clopidogrel (PLAVIX) 75 mg tablet Take 1 tablet (75 mg total) by mouth once daily.    escitalopram oxalate (LEXAPRO) 20 MG tablet Take 1 tablet (20 mg total) by mouth once daily.    isosorbide mononitrate (ISMO,MONOKET) 20 MG Tab once daily.     LORazepam (ATIVAN) 1 MG tablet One p.o. q.d. p.r.n. anxiety use p.r.n. not q.d.    metoprolol succinate (TOPROL-XL) 25 MG 24 hr tablet Take 1 tablet (25 mg total) by mouth once daily.    mupirocin (BACTROBAN) 2 % ointment APPLY TO THE AFFECTED AREA THREE TIMES DAILY    nicotine (NICODERM CQ) 21 mg/24 hr Place 1 patch onto the skin once daily.    pantoprazole (PROTONIX) 40 MG tablet Take 1 tablet (40 mg total) by mouth 2 (two) times daily.    prochlorperazine (COMPAZINE) 10 MG tablet Take 1 tablet (10 mg total) by mouth every 6 (six) hours as needed.    traZODone (DESYREL) 100 MG tablet Take 100 mg by mouth every evening.      Family History     Problem Relation (Age of Onset)    Breast cancer Daughter    Cancer Mother, Sister, Brother    Diabetes Mother    Heart disease Mother, Father, Sister, Brother, Maternal Grandmother, Maternal Grandfather        Tobacco Use    Smoking status: Heavy Tobacco Smoker     Packs/day: 1.00     Years: 51.00     Pack years: 51.00     Types: Cigarettes     Start date: 1968    Smokeless tobacco: Never Used    Tobacco comment: Patient enrolled in tobacco trust and ambulatory referral  to cessation   Substance and Sexual Activity    Alcohol use: No     Alcohol/week: 0.0 oz     Drug use: No    Sexual activity: Yes     Partners: Male     Review of Systems   Constitutional: Negative for appetite change, fatigue and fever.   HENT: Negative for congestion, dental problem, facial swelling and nosebleeds.    Eyes: Negative for visual disturbance.   Respiratory: Negative for cough, choking, chest tightness and shortness of breath.    Gastrointestinal: Positive for abdominal pain and blood in stool. Negative for abdominal distention, nausea and vomiting.   Endocrine: Negative for polydipsia and polyuria.   Genitourinary: Negative for difficulty urinating, flank pain and pelvic pain.   Musculoskeletal: Negative for back pain and gait problem.   Skin: Negative for color change and rash.   Allergic/Immunologic: Negative for food allergies.   Neurological: Positive for weakness. Negative for dizziness, seizures, light-headedness and headaches.   Psychiatric/Behavioral: Negative for agitation.     Objective:     Vital Signs (Most Recent):  Temp: 98.6 °F (37 °C) (07/08/19 0229)  Pulse: 86 (07/08/19 0229)  Resp: 16 (07/08/19 0229)  BP: 133/60 (07/08/19 0229) Vital Signs (24h Range):  Temp:  [98.3 °F (36.8 °C)-98.6 °F (37 °C)] 98.6 °F (37 °C)  Pulse:  [] 86  Resp:  [16-19] 16  SpO2:  [97 %-100 %] 98 %  BP: (110-133)/(58-73) 133/60     Weight: 49 kg (108 lb 0.4 oz)  Body mass index is 18.54 kg/m².    Physical Exam   Constitutional: She is oriented to person, place, and time. She appears well-developed and well-nourished. No distress.   HENT:   Head: Normocephalic and atraumatic.   Eyes: Pupils are equal, round, and reactive to light. EOM are normal.   Neck: Normal range of motion. Neck supple. No JVD present.   Cardiovascular: Normal rate and regular rhythm.   Pulmonary/Chest: Effort normal and breath sounds normal. No respiratory distress.   Abdominal: Soft. Bowel sounds are normal. There is tenderness. There is guarding.   Musculoskeletal: Normal range of motion.   Neurological: She is alert and  oriented to person, place, and time.   Skin: Skin is warm and dry. Capillary refill takes less than 2 seconds. She is not diaphoretic.   Psychiatric: She has a normal mood and affect.         CRANIAL NERVES     CN III, IV, VI   Pupils are equal, round, and reactive to light.  Extraocular motions are normal.        Significant Labs:   BMP:   Recent Labs   Lab 07/07/19 2058         K 3.5      CO2 23   BUN 19   CREATININE 0.6   CALCIUM 8.2*     CBC:   Recent Labs   Lab 07/06/19  0507 07/06/19  1501 07/07/19 2058   WBC 8.57 9.38 8.60   HGB 7.3* 7.7* 7.5*   HCT 22.7* 23.3* 22.7*    262 305     CMP:   Recent Labs   Lab 07/06/19 0507 07/07/19 2058    137   K 3.6 3.5   * 107   CO2 23 23   * 102   BUN 27* 19   CREATININE 0.7 0.6   CALCIUM 8.5* 8.2*   PROT 4.9* 5.6*   ALBUMIN 2.1* 2.7*   BILITOT 0.3 0.3   ALKPHOS 64 65   AST 36 29   ALT 26 24   ANIONGAP 4* 7*   EGFRNONAA >60 >60.0     Coagulation:   Recent Labs   Lab 07/07/19 2058   INR 1.2   APTT 24.4     Lactic Acid: No results for input(s): LACTATE in the last 48 hours.  Lipase:   Recent Labs   Lab 07/07/19 2058   LIPASE 40     TSH:   Recent Labs   Lab 05/21/19  0755   TSH 1.32   All pertinent labs within the past 24 hours have been reviewed.    Significant Imaging: I have reviewed all pertinent imaging results/findings within the past 24 hours.

## 2019-07-08 NOTE — HPI
Josseline Stinsno is a 73-year-old female with a past medical history of chronic pain, migraine headaches, COPD, history of gastric ulcers, GERD, anxiety, CAD with prior NSTEMI and coronary stenting with last stent on 1/3/2018 to left circumflex. She lives in [place], [place]. Her PCP is Dr. Dony Woo.    She presented to [place] ED 7/7/19 with a chief complaint of bloody stool. Associated symptoms include abdominal pain. Patient describes a sharp, dull epigastric abdominal pain without radiation. She was admitted to [facility] 7/4-7/6/19 for GI Bleed in which she obtained an EKG that revealed ulcers. On presentation to ED patients states pain was 10/10, currently patient rates pain 5/10. ED workup revealed Hgb (7.5), Hct (22.7), RDW (14.6), Ca (8.2), Total Protein (5.6), Albumin (2.7), FOBT that was positive on 7/4/19. ED consulted GI who request transfusion of 1-unit PRBCs, NPO after midnight for possible EGD in am. Patient transferred to [facility] as no GI service is available at current facility, admitted by Hospital Medicine for further evaluation and treatment.

## 2019-07-08 NOTE — PLAN OF CARE
This  put name on white board and explained blue discharge folder to patient. Discharge planning brochure and/or business card given to patient.  Patient verbalized understanding.    TN called and spoke to pt significant other Yan. TN attempted to meet with patient. Pt currently in Endo procedure. Per SO, pt and him live together. She has straight cane for home use but no other equipment. Pt and SO do not drive. She uses [facility] transport. Will continue to monitor needs through discharge.     Future Appointments   Date Time Provider Department Center   8/15/2019  9:45 AM Albino Hartman MD [facility]   9/27/2019 11:00 AM Dony Woo MD [facility]        07/08/19 1521   Discharge Assessment   Assessment Type Discharge Planning Assessment   Confirmed/corrected address and phone number on facesheet? Yes   Assessment information obtained from? Medical Record;Caregiver   Expected Length of Stay (days) 3   Communicated expected length of stay with patient/caregiver yes  (Pt in procedure)   Prior to hospitilization cognitive status: Alert/Oriented   Prior to hospitalization functional status: Assistive Equipment;Independent   Current cognitive status: Alert/Oriented   Current Functional Status: Independent;Assistive Equipment   Facility Arrived From: ED   Lives With significant other   Able to Return to Prior Arrangements yes   Is patient able to care for self after discharge? Yes   Who are your caregiver(s) and their phone number(s)? Yan (SO) [phone]   Patient's perception of discharge disposition home or selfcare   Readmission Within the Last 30 Days current reason for admission unrelated to previous admission   Patient currently being followed by outpatient case management? No   Patient currently receives any other outside agency services? No   Equipment Currently Used at Home cane, straight   Do you have any problems affording any of your prescribed  medications? No   Is the patient taking medications as prescribed? yes   Does the patient have transportation home? Yes   Transportation Anticipated health plan transportation   Does the patient receive services at the [facility]? No   Discharge Plan A Home;Home with family   DME Needed Upon Discharge  none   Patient/Family in Agreement with Plan yes   Readmission Questionnaire   At the time of your discharge, did someone talk to you about what your health problems were? Yes   At the time of discharge, did someone talk to you about what to watch out for regarding worsening of your health problem? Yes   At the time of discharge, did someone talk to you about what to do if you experienced worsening of your health problem? Yes   At the time of discharge, did someone talk to you about which medication to take when you left the hospital and which ones to stop taking? Yes   At the time of discharge, did someone talk to you about when and where to follow up with a doctor after you left the hospital? Yes   What do you believe caused you to be sick enough to be re-admitted? GI bleed   How often do you need to have someone help you when you read instructions, pamphlets, or other written material from your doctor or pharmacy? Never   Do you have problems taking your medications as prescribed? No   Do you have any problems affording any of  your prescribed medications? Yes   Do you have problems obtaining/receiving your medications? No   Does the patient have transportation to healthcare appointments? Yes   Living Arrangements house   Does the patient have family/friends to help with healtcare needs after discharge? yes   Does your caregiver provide all the help you need? Yes   Are you currently feeling confused? No   Are you currently having problems thinking? No   Are you currently having memory problems? No   Have you felt down, depressed, or hopeless? Unable to Assess   Have you felt little interest or pleasure in  doing things? Unable to assess   In the last 7 days, my sleep quality was: good

## 2019-07-08 NOTE — H&P
[facility] Hospital Medicine  History & Physical    Patient Name: Josseline Stinson  MRN: [id number]  Admission Date: 7/8/2019  Attending Physician: Anastasia Fernández*   Primary Care Provider: Dony Woo MD         Patient information was obtained from patient and ER records.     Subjective:     Principal Problem:GI bleed    Chief Complaint: Blood in stool.       HPI: Josseline Stinson is a 73-year-old female with a past medical history of chronic pain, migraine headaches, COPD, history of gastric ulcers, GERD, anxiety, CAD with prior NSTEMI and coronary stenting with last stent on 1/3/2018 to left circumflex. She lives in [place], [place]. Her PCP is Dr. Dony Woo.    She presented to [place] ED 7/7/19 with a chief complaint of bloody stool. Associated symptoms include abdominal pain. Patient describes a sharp, dull epigastric abdominal pain without radiation. She was admitted to [facility] 7/4-7/6/19 for GI Bleed in which she obtained an EKG that revealed ulcers. On presentation to ED patients states pain was 10/10, currently patient rates pain 5/10. ED workup revealed Hgb (7.5), Hct (22.7), RDW (14.6), Ca (8.2), Total Protein (5.6), Albumin (2.7), FOBT that was positive on 7/4/19. ED consulted GI who request transfusion of 1-unit PRBCs, NPO after midnight for possible EGD in am. Patient transferred to [facility] as no GI service is available at current facility, admitted by Hospital Medicine for further evaluation and treatment.    Past Medical History:   Diagnosis Date    Anxiety     CHF (congestive heart failure)     Chronic pain syndrome 6/4/2019    COPD (chronic obstructive pulmonary disease)     Coronary artery disease involving native coronary artery of native heart without angina pectoris 3/21/2016    Encounter for blood transfusion     Essential hypertension 3/21/2016    GERD (gastroesophageal reflux disease)     History of gastric ulcer 5/14/2019    Hx of heart artery  stent 5/14/2019    Hypercholesteremia 3/21/2016    MI (myocardial infarction)     x4    NSTEMI (non-ST elevated myocardial infarction) 1/2/2018    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 3/21/2016    Pneumonia of right lower lobe due to infectious organism 5/14/2019    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    Ulcer        Past Surgical History:   Procedure Laterality Date    carotid artery surgeriy      catheterization cardiac cath Left 01/03/2017    Angioplasty    FRACTURE SURGERY Left     elbow and wrist    HIP SURGERY Right     x 4    HYSTERECTOMY  1972    Left heart cath Left 1/3/2018    [facility]ATH [facility]    Stent KAN coronary  1/3/2018    Performed by [facility] at [facility] CATH LAB       Review of patient's allergies indicates:   Allergen Reactions    Cortisone Swelling     SWELLING    Darvocet a500 [propoxyphene n-acetaminophen] Nausea And Vomiting    Toradol [ketorolac] Nausea And Vomiting    Tramadol Nausea And Vomiting    Codeine Palpitations     dizzy       Current Facility-Administered Medications on File Prior to Encounter   Medication    [COMPLETED] famotidine (PF) injection 40 mg    [COMPLETED] morphine injection 6 mg    [COMPLETED] ondansetron injection 4 mg     Current Outpatient Medications on File Prior to Encounter   Medication Sig    albuterol (PROVENTIL/VENTOLIN HFA) 90 mcg/actuation inhaler USE ONE PUFF EVERY FOUR TO SIX HOURS AS NEEDED    alendronate (FOSAMAX) 70 MG tablet Take 1 tablet (70 mg total) by mouth every 7 days.    ARIPiprazole (ABILIFY) 2 MG Tab Take 2 mg by mouth once daily.    atorvastatin (LIPITOR) 40 MG tablet Take 1 tablet (40 mg total) by mouth once daily.    betamethasone valerate 0.1% (VALISONE) 0.1 % Crea Apply topically 2 (two) times daily.    butalbital-acetaminophen-caffeine -40 mg (FIORICET, ESGIC) -40 mg per tablet One p.o. q.d. p.r.n. headache not q.d. but p.r.n. headache     clopidogrel (PLAVIX) 75 mg tablet Take 1 tablet (75 mg total) by mouth once daily.    escitalopram oxalate (LEXAPRO) 20 MG tablet Take 1 tablet (20 mg total) by mouth once daily.    isosorbide mononitrate (ISMO,MONOKET) 20 MG Tab once daily.     LORazepam (ATIVAN) 1 MG tablet One p.o. q.d. p.r.n. anxiety use p.r.n. not q.d.    metoprolol succinate (TOPROL-XL) 25 MG 24 hr tablet Take 1 tablet (25 mg total) by mouth once daily.    mupirocin (BACTROBAN) 2 % ointment APPLY TO THE AFFECTED AREA THREE TIMES DAILY    nicotine (NICODERM CQ) 21 mg/24 hr Place 1 patch onto the skin once daily.    pantoprazole (PROTONIX) 40 MG tablet Take 1 tablet (40 mg total) by mouth 2 (two) times daily.    prochlorperazine (COMPAZINE) 10 MG tablet Take 1 tablet (10 mg total) by mouth every 6 (six) hours as needed.    traZODone (DESYREL) 100 MG tablet Take 100 mg by mouth every evening.      Family History     Problem Relation (Age of Onset)    Breast cancer Daughter    Cancer Mother, Sister, Brother    Diabetes Mother    Heart disease Mother, Father, Sister, Brother, Maternal Grandmother, Maternal Grandfather        Tobacco Use    Smoking status: Heavy Tobacco Smoker     Packs/day: 1.00     Years: 51.00     Pack years: 51.00     Types: Cigarettes     Start date: 1968    Smokeless tobacco: Never Used    Tobacco comment: Patient enrolled in tobacco trust and ambulatory referral  to cessation   Substance and Sexual Activity    Alcohol use: No     Alcohol/week: 0.0 oz    Drug use: No    Sexual activity: Yes     Partners: Male     Review of Systems   Constitutional: Negative for appetite change, fatigue and fever.   HENT: Negative for congestion, dental problem, facial swelling and nosebleeds.    Eyes: Negative for visual disturbance.   Respiratory: Negative for cough, choking, chest tightness and shortness of breath.    Gastrointestinal: Positive for abdominal pain and blood in stool. Negative for abdominal distention, nausea  and vomiting.   Endocrine: Negative for polydipsia and polyuria.   Genitourinary: Negative for difficulty urinating, flank pain and pelvic pain.   Musculoskeletal: Negative for back pain and gait problem.   Skin: Negative for color change and rash.   Allergic/Immunologic: Negative for food allergies.   Neurological: Positive for weakness. Negative for dizziness, seizures, light-headedness and headaches.   Psychiatric/Behavioral: Negative for agitation.     Objective:     Vital Signs (Most Recent):  Temp: 98.6 °F (37 °C) (07/08/19 0229)  Pulse: 86 (07/08/19 0229)  Resp: 16 (07/08/19 0229)  BP: 133/60 (07/08/19 0229) Vital Signs (24h Range):  Temp:  [98.3 °F (36.8 °C)-98.6 °F (37 °C)] 98.6 °F (37 °C)  Pulse:  [] 86  Resp:  [16-19] 16  SpO2:  [97 %-100 %] 98 %  BP: (110-133)/(58-73) 133/60     Weight: 49 kg (108 lb 0.4 oz)  Body mass index is 18.54 kg/m².    Physical Exam   Constitutional: She is oriented to person, place, and time. She appears well-developed and well-nourished. No distress.   HENT:   Head: Normocephalic and atraumatic.   Eyes: Pupils are equal, round, and reactive to light. EOM are normal.   Neck: Normal range of motion. Neck supple. No JVD present.   Cardiovascular: Normal rate and regular rhythm.   Pulmonary/Chest: Effort normal and breath sounds normal. No respiratory distress.   Abdominal: Soft. Bowel sounds are normal. There is tenderness. There is guarding.   Musculoskeletal: Normal range of motion.   Neurological: She is alert and oriented to person, place, and time.   Skin: Skin is warm and dry. Capillary refill takes less than 2 seconds. She is not diaphoretic.   Psychiatric: She has a normal mood and affect.         CRANIAL NERVES     CN III, IV, VI   Pupils are equal, round, and reactive to light.  Extraocular motions are normal.        Significant Labs:   BMP:   Recent Labs   Lab 07/07/19 2058         K 3.5      CO2 23   BUN 19   CREATININE 0.6   CALCIUM 8.2*      CBC:   Recent Labs   Lab 07/06/19  0507 07/06/19  1501 07/07/19 2058   WBC 8.57 9.38 8.60   HGB 7.3* 7.7* 7.5*   HCT 22.7* 23.3* 22.7*    262 305     CMP:   Recent Labs   Lab 07/06/19  0507 07/07/19 2058    137   K 3.6 3.5   * 107   CO2 23 23   * 102   BUN 27* 19   CREATININE 0.7 0.6   CALCIUM 8.5* 8.2*   PROT 4.9* 5.6*   ALBUMIN 2.1* 2.7*   BILITOT 0.3 0.3   ALKPHOS 64 65   AST 36 29   ALT 26 24   ANIONGAP 4* 7*   EGFRNONAA >60 >60.0     Coagulation:   Recent Labs   Lab 07/07/19 2058   INR 1.2   APTT 24.4     Lactic Acid: No results for input(s): LACTATE in the last 48 hours.  Lipase:   Recent Labs   Lab 07/07/19 2058   LIPASE 40     TSH:   Recent Labs   Lab 05/21/19  0755   TSH 1.32   All pertinent labs within the past 24 hours have been reviewed.    Significant Imaging: I have reviewed all pertinent imaging results/findings within the past 24 hours.             Assessment/Plan:     * GI bleed  History of Gastric Ulcer  History of GI bleed  GERD]  Anemia    Hold Plavix and Voltaren  Protonix gtt  Transfuse 1-unit PRBCs  GI consulted, possible EGD in am  NPO    Congestive heart failure  Stable, continue home meds    Hypothyroidism  TSH wnl, no home meds      Anxiety  Continue escitalopram oxalate     History of tobacco abuse  Nicotine patch  Smoking cessation counseling       Chronic obstructive pulmonary disease  Continue Duo neb treatments      Primary insomnia  Ramelteon prn insomnia      Essential hypertension  Continue home meds      Coronary artery disease involving native coronary artery of native heart without angina pectoris  Essential Hypertension    Continue Toprol XL and isosorbide      Hypercholesteremia  Continue Lipitor        VTE Risk Mitigation (From admission, onward)        Ordered     Place sequential compression device  Until discontinued      07/08/19 0218     Place JARROD hose  Until discontinued      07/08/19 0218             Yael Edwards, APRN,  FNP-C  Department of Hospital Medicine   [facility]

## 2019-07-08 NOTE — NURSING
NP Chairs notified of patient's nausea and requesting a non-oral pain medication; new order received.

## 2019-07-08 NOTE — PLAN OF CARE
VN rounds:  VN cued into pt's room with pt's permission.  Pt resting in bed in low position and call bell at side. Fall risk protocol discussed with pt.  VN instructed to call for assistance.  Pt aware and agreeable.   Pt reports constant headaches but pain lessened since receiving pain med earlier.  No acute distress noted.  Allowed time for questions. Pt anxious to be discharged, emotional support provided.  Will continue to be available and intervene as needed.

## 2019-07-08 NOTE — PROGRESS NOTES
Individual Follow-Up Form    7/8/2019    Quit Date: To be determined    Clinical Status of Patient: Inpatient    Length of Service: 30 minutes    Continuing Medication: yes  Patches 21 mg    Comments: Smoking cessation education and materials provided. Pt was recently enrolled in the [employer] (7/5/19), but was only home from the hospital for 3 days before this current re-admission.  Pt states she is looking forward to participating in the Ambulatory Smoking Cessation program.    Diagnosis: F17.210    Next Visit: Ambulatory referral to Smoking Cessation program following hospital discharge.

## 2019-07-08 NOTE — OR NURSING
Patient transferred to endo dept for EGD with Dr. Alicea. Alert and talkative, no c/o pain or discomfort. Chart reviewed, vital signs recorded and consent verified prior to exam. Comfort measures provided.

## 2019-07-08 NOTE — ANESTHESIA POSTPROCEDURE EVALUATION
Anesthesia Post Evaluation    Patient: Josseline Stinson    Procedure(s) Performed: Procedure(s) (LRB):  ESOPHAGOGASTRODUODENOSCOPY (EGD) (N/A)    Final Anesthesia Type: MAC  Patient location during evaluation: OPS  Patient participation: Yes- Able to Participate  Level of consciousness: awake and alert  Post-procedure vital signs: reviewed and stable  Airway patency: patent  PONV status at discharge: No PONV  Anesthetic complications: no      Cardiovascular status: blood pressure returned to baseline and hemodynamically stable  Respiratory status: spontaneous ventilation  Hydration status: euvolemic  Follow-up not needed.          Vitals Value Taken Time   /59 7/8/2019 12:19 PM   Temp 37.1 °C (98.7 °F) 7/8/2019 12:19 PM   Pulse 101 7/8/2019 12:19 PM   Resp 18 7/8/2019 12:19 PM   SpO2 96 % 7/8/2019  8:34 AM         No case tracking events are documented in the log.      Pain/Vibha Score: Pain Rating Prior to Med Admin: 5 (7/8/2019  1:20 PM)

## 2019-07-08 NOTE — TRANSFER OF CARE
"Anesthesia Transfer of Care Note    Patient: Josseline Stinson    Procedure(s) Performed: Procedure(s) (LRB):  ESOPHAGOGASTRODUODENOSCOPY (EGD) (N/A)    Patient location: GI    Anesthesia Type: MAC    Transport from OR: Transported from OR on room air with adequate spontaneous ventilation    Post pain: adequate analgesia    Post assessment: no apparent anesthetic complications    Post vital signs: stable    Level of consciousness: awake and alert    Nausea/Vomiting: no nausea/vomiting    Complications: none    Transfer of care protocol was followed      Last vitals:   Visit Vitals  BP (!) 111/59 (Patient Position: Lying)   Pulse 101   Temp 37.1 °C (98.7 °F) (Oral)   Resp 18   Ht 5' 4" (1.626 m)   Wt 51 kg (112 lb 7 oz)   SpO2 96%   Breastfeeding? No   BMI 19.30 kg/m²     "

## 2019-07-08 NOTE — PLAN OF CARE
Cued into patient's room.  Permission received per patient to turn camera to view patient.  Introduced as VN for night shift that will be working with floor nurse and nursing assistant.  Educated patient on VN's role in patient care. Plan of care reviewed with patient. Education per flowsheet.  Opportunity given for questions and questions answered.  No complaints.  Admission assessment questions answered.  Instructed to call for assistance.  Will cont to monitor.

## 2019-07-08 NOTE — PROGRESS NOTES
Pt unit of blood started, iv infiltrated after first 15 mins. MD notified. Awaiting anesthesia to come place new iv. Blood bank notified, Tech stated not to bring blood due to it being started. Will continue to monitor.

## 2019-07-08 NOTE — PROGRESS NOTES
Pt arrived to unit via stretcher. AAO x 4. VSS. NAD. Plan of care reviewed with pt. Pt understands plan. Safety maintained. Will continue to monitor.

## 2019-07-08 NOTE — NURSING
PAtient signed blood tranfusion consent, NS up to infuse KVO to prime line. IV site to left EJ leaking at site, dressing removed to assess, catheter kinked and site leaking- removed. Blood returned to blood bank.

## 2019-07-09 ENCOUNTER — ANESTHESIA (OUTPATIENT)
Dept: ENDOSCOPY | Facility: HOSPITAL | Age: 74
DRG: 378 | End: 2019-07-09
Payer: MEDICARE

## 2019-07-09 ENCOUNTER — ANESTHESIA EVENT (OUTPATIENT)
Dept: ENDOSCOPY | Facility: HOSPITAL | Age: 74
DRG: 378 | End: 2019-07-09
Payer: MEDICARE

## 2019-07-09 VITALS
WEIGHT: 115.75 LBS | DIASTOLIC BLOOD PRESSURE: 75 MMHG | OXYGEN SATURATION: 99 % | SYSTOLIC BLOOD PRESSURE: 163 MMHG | RESPIRATION RATE: 16 BRPM | TEMPERATURE: 98 F | BODY MASS INDEX: 19.76 KG/M2 | HEIGHT: 64 IN | HEART RATE: 95 BPM

## 2019-07-09 PROBLEM — K25.9 GASTRIC ULCER WITHOUT HEMORRHAGE OR PERFORATION: Status: ACTIVE | Noted: 2019-07-09

## 2019-07-09 LAB
ALBUMIN SERPL BCP-MCNC: 2.3 G/DL (ref 3.5–5.2)
ALP SERPL-CCNC: 68 U/L (ref 55–135)
ALT SERPL W/O P-5'-P-CCNC: 19 U/L (ref 10–44)
ANION GAP SERPL CALC-SCNC: 13 MMOL/L (ref 8–16)
AST SERPL-CCNC: 30 U/L (ref 10–40)
BASOPHILS # BLD AUTO: 0.02 K/UL (ref 0–0.2)
BASOPHILS NFR BLD: 0.2 % (ref 0–1.9)
BILIRUB SERPL-MCNC: 0.8 MG/DL (ref 0.1–1)
BUN SERPL-MCNC: 10 MG/DL (ref 8–23)
CALCIUM SERPL-MCNC: 8.4 MG/DL (ref 8.7–10.5)
CHLORIDE SERPL-SCNC: 110 MMOL/L (ref 95–110)
CO2 SERPL-SCNC: 18 MMOL/L (ref 23–29)
CREAT SERPL-MCNC: 0.6 MG/DL (ref 0.5–1.4)
DIFFERENTIAL METHOD: ABNORMAL
EOSINOPHIL # BLD AUTO: 0.2 K/UL (ref 0–0.5)
EOSINOPHIL NFR BLD: 1.5 % (ref 0–8)
ERYTHROCYTE [DISTWIDTH] IN BLOOD BY AUTOMATED COUNT: 14.9 % (ref 11.5–14.5)
EST. GFR  (AFRICAN AMERICAN): >60 ML/MIN/1.73 M^2
EST. GFR  (NON AFRICAN AMERICAN): >60 ML/MIN/1.73 M^2
GLUCOSE SERPL-MCNC: 82 MG/DL (ref 70–110)
HCT VFR BLD AUTO: 29 % (ref 37–48.5)
HGB BLD-MCNC: 9.6 G/DL (ref 12–16)
LYMPHOCYTES # BLD AUTO: 1.4 K/UL (ref 1–4.8)
LYMPHOCYTES NFR BLD: 13 % (ref 18–48)
MCH RBC QN AUTO: 30.1 PG (ref 27–31)
MCHC RBC AUTO-ENTMCNC: 33.1 G/DL (ref 32–36)
MCV RBC AUTO: 91 FL (ref 82–98)
MONOCYTES # BLD AUTO: 0.9 K/UL (ref 0.3–1)
MONOCYTES NFR BLD: 8 % (ref 4–15)
NEUTROPHILS # BLD AUTO: 8.2 K/UL (ref 1.8–7.7)
NEUTROPHILS NFR BLD: 77.3 % (ref 38–73)
PLATELET # BLD AUTO: 278 K/UL (ref 150–350)
PMV BLD AUTO: 9.7 FL (ref 9.2–12.9)
POTASSIUM SERPL-SCNC: 4.5 MMOL/L (ref 3.5–5.1)
PROT SERPL-MCNC: 5.5 G/DL (ref 6–8.4)
RBC # BLD AUTO: 3.19 M/UL (ref 4–5.4)
SODIUM SERPL-SCNC: 141 MMOL/L (ref 136–145)
WBC # BLD AUTO: 10.93 K/UL (ref 3.9–12.7)

## 2019-07-09 PROCEDURE — 25000003 PHARM REV CODE 250: Performed by: NURSE ANESTHETIST, CERTIFIED REGISTERED

## 2019-07-09 PROCEDURE — 45378 PR COLONOSCOPY,DIAGNOSTIC: ICD-10-PCS | Mod: 53,,, | Performed by: INTERNAL MEDICINE

## 2019-07-09 PROCEDURE — 25000003 PHARM REV CODE 250: Performed by: FAMILY MEDICINE

## 2019-07-09 PROCEDURE — 37000008 HC ANESTHESIA 1ST 15 MINUTES: Performed by: INTERNAL MEDICINE

## 2019-07-09 PROCEDURE — 63600175 PHARM REV CODE 636 W HCPCS: Performed by: FAMILY MEDICINE

## 2019-07-09 PROCEDURE — 80053 COMPREHEN METABOLIC PANEL: CPT

## 2019-07-09 PROCEDURE — 94761 N-INVAS EAR/PLS OXIMETRY MLT: CPT

## 2019-07-09 PROCEDURE — 37000009 HC ANESTHESIA EA ADD 15 MINS: Performed by: INTERNAL MEDICINE

## 2019-07-09 PROCEDURE — 63600175 PHARM REV CODE 636 W HCPCS: Performed by: NURSE PRACTITIONER

## 2019-07-09 PROCEDURE — S4991 NICOTINE PATCH NONLEGEND: HCPCS | Performed by: FAMILY MEDICINE

## 2019-07-09 PROCEDURE — 63600175 PHARM REV CODE 636 W HCPCS: Performed by: NURSE ANESTHETIST, CERTIFIED REGISTERED

## 2019-07-09 PROCEDURE — 11000001 HC ACUTE MED/SURG PRIVATE ROOM

## 2019-07-09 PROCEDURE — 25000003 PHARM REV CODE 250: Performed by: PHYSICIAN ASSISTANT

## 2019-07-09 PROCEDURE — 45378 DIAGNOSTIC COLONOSCOPY: CPT | Mod: 53 | Performed by: INTERNAL MEDICINE

## 2019-07-09 PROCEDURE — 45378 DIAGNOSTIC COLONOSCOPY: CPT | Mod: 53,,, | Performed by: INTERNAL MEDICINE

## 2019-07-09 PROCEDURE — 85025 COMPLETE CBC W/AUTO DIFF WBC: CPT

## 2019-07-09 PROCEDURE — 36415 COLL VENOUS BLD VENIPUNCTURE: CPT

## 2019-07-09 RX ORDER — PROPOFOL 10 MG/ML
VIAL (ML) INTRAVENOUS CONTINUOUS PRN
Status: DISCONTINUED | OUTPATIENT
Start: 2019-07-09 | End: 2019-07-09

## 2019-07-09 RX ORDER — LIDOCAINE HCL/PF 100 MG/5ML
SYRINGE (ML) INTRAVENOUS
Status: DISCONTINUED | OUTPATIENT
Start: 2019-07-09 | End: 2019-07-09

## 2019-07-09 RX ORDER — PROPOFOL 10 MG/ML
VIAL (ML) INTRAVENOUS
Status: DISCONTINUED | OUTPATIENT
Start: 2019-07-09 | End: 2019-07-09

## 2019-07-09 RX ORDER — SODIUM CHLORIDE 9 MG/ML
INJECTION, SOLUTION INTRAVENOUS CONTINUOUS PRN
Status: DISCONTINUED | OUTPATIENT
Start: 2019-07-09 | End: 2019-07-09

## 2019-07-09 RX ORDER — ONDANSETRON 2 MG/ML
INJECTION INTRAMUSCULAR; INTRAVENOUS
Status: DISCONTINUED | OUTPATIENT
Start: 2019-07-09 | End: 2019-07-09

## 2019-07-09 RX ORDER — BUTALBITAL, ACETAMINOPHEN AND CAFFEINE 50; 325; 40 MG/1; MG/1; MG/1
TABLET ORAL
Qty: 30 TABLET | Refills: 0 | Status: SHIPPED | OUTPATIENT
Start: 2019-07-09 | End: 2019-07-22 | Stop reason: SDUPTHER

## 2019-07-09 RX ADMIN — METOPROLOL SUCCINATE 25 MG: 25 TABLET, FILM COATED, EXTENDED RELEASE ORAL at 04:07

## 2019-07-09 RX ADMIN — ONDANSETRON 4 MG: 2 INJECTION, SOLUTION INTRAMUSCULAR; INTRAVENOUS at 03:07

## 2019-07-09 RX ADMIN — OXYCODONE AND ACETAMINOPHEN 1 TABLET: 7.5; 325 TABLET ORAL at 04:07

## 2019-07-09 RX ADMIN — ATORVASTATIN CALCIUM 40 MG: 40 TABLET, FILM COATED ORAL at 04:07

## 2019-07-09 RX ADMIN — NICOTINE 1 PATCH: 21 PATCH, EXTENDED RELEASE TRANSDERMAL at 08:07

## 2019-07-09 RX ADMIN — ISOSORBIDE MONONITRATE 20 MG: 10 TABLET ORAL at 04:07

## 2019-07-09 RX ADMIN — DICYCLOMINE HYDROCHLORIDE 10 MG: 20 INJECTION, SOLUTION INTRAMUSCULAR at 10:07

## 2019-07-09 RX ADMIN — ESCITALOPRAM OXALATE 20 MG: 20 TABLET, FILM COATED ORAL at 04:07

## 2019-07-09 RX ADMIN — SODIUM CHLORIDE: 0.9 INJECTION, SOLUTION INTRAVENOUS at 02:07

## 2019-07-09 RX ADMIN — LIDOCAINE HYDROCHLORIDE 75 MG: 20 INJECTION, SOLUTION INTRAVENOUS at 02:07

## 2019-07-09 RX ADMIN — PROPOFOL 70 MG: 10 INJECTION, EMULSION INTRAVENOUS at 02:07

## 2019-07-09 RX ADMIN — ONDANSETRON 4 MG: 2 INJECTION INTRAMUSCULAR; INTRAVENOUS at 04:07

## 2019-07-09 RX ADMIN — PROPOFOL 150 MCG/KG/MIN: 10 INJECTION, EMULSION INTRAVENOUS at 02:07

## 2019-07-09 RX ADMIN — PANTOPRAZOLE SODIUM 40 MG: 40 TABLET, DELAYED RELEASE ORAL at 04:07

## 2019-07-09 RX ADMIN — ARIPIPRAZOLE 2 MG: 2 TABLET ORAL at 04:07

## 2019-07-09 NOTE — ANESTHESIA POSTPROCEDURE EVALUATION
Anesthesia Post Evaluation    Patient: Josseline Stinson    Procedure(s) Performed: Procedure(s) (LRB):  COLONOSCOPY (N/A)    Final Anesthesia Type: MAC  Patient location during evaluation: GI PACU  Patient participation: Yes- Able to Participate  Level of consciousness: awake and alert and oriented  Post-procedure vital signs: reviewed and stable  Pain management: adequate  Airway patency: patent  PONV status at discharge: No PONV  Anesthetic complications: no      Cardiovascular status: blood pressure returned to baseline  Respiratory status: unassisted, spontaneous ventilation and room air  Hydration status: euvolemic  Follow-up not needed.          Vitals Value Taken Time   /80 7/9/2019  1:45 PM   Temp 36.8 °C (98.2 °F) 7/9/2019  1:45 PM   Pulse 93 7/9/2019  1:45 PM   Resp 18 7/9/2019  1:45 PM   SpO2 100 % 7/9/2019  1:45 PM         No case tracking events are documented in the log.      Pain/Vibha Score: Pain Rating Prior to Med Admin: 10 (7/9/2019  4:08 AM)  Vibha Score: 10 (7/8/2019  4:55 PM)

## 2019-07-09 NOTE — PLAN OF CARE
Problem: Adult Inpatient Plan of Care  Goal: Plan of Care Review  Outcome: Ongoing (interventions implemented as appropriate)  Patient resting in bed, AAOx4. Medications administered as ordered. Patient received one unit of blood this shift. Golytely prep in process. Telemetry on, NSR. Encouraged to call with needs or concerns. Will continue to monitor.

## 2019-07-09 NOTE — HOSPITAL COURSE
The patient was admitted to the CDU for observation. EGD done on yesterday; unrevealing. Colonoscopy is scheduled for today. Per GI, colonoscopy was aborted as the patients bowels were noted with large amounts of stool and melena. The patient was instructed that she will need to undergo another prep overnight for a repeat colonoscopy in the AM. The patient states she is leaving against medical advice as she will not stay another night. Will have the patient follow up with her PCP and GI in 1 week. Will also ask the patient to follow up with her Cardiologist in 1 week to discuss the need to cont Plavix, asked the patient to hold Plavix and ASA until she speaks with her Cardiologist. Instructed the patient to take Protonix as directed.  Protonix was ordered and delivered to the bedside by the [facility].

## 2019-07-09 NOTE — DISCHARGE SUMMARY
[facility] Hospital Medicine  Discharge Summary      Patient Name: Josseline Stinson  MRN: [id number]  Admission Date: 7/8/2019  Hospital Length of Stay: 1 days  Discharge Date and Time:  07/09/2019 5:52 PM  Attending Physician: Joshua Florez MD   Discharging Provider: Minna Burrell NP  Primary Care Provider: Dony Woo MD      HPI:   Josseline Stinson is a 73-year-old female with a past medical history of chronic pain, migraine headaches, COPD, history of gastric ulcers, GERD, anxiety, CAD with prior NSTEMI and coronary stenting with last stent on 1/3/2018 to left circumflex. She lives in [place], [place]. Her PCP is Dr. Dony Woo.    She presented to [facility] 7/7/19 with a chief complaint of bloody stool. Associated symptoms include abdominal pain. Patient describes a sharp, dull epigastric abdominal pain without radiation. She was admitted to [facility] 7/4-7/6/19 for GI Bleed in which she obtained an EKG that revealed ulcers. On presentation to ED patients states pain was 10/10, currently patient rates pain 5/10. ED workup revealed Hgb (7.5), Hct (22.7), RDW (14.6), Ca (8.2), Total Protein (5.6), Albumin (2.7), FOBT that was positive on 7/4/19. ED consulted GI who request transfusion of 1-unit PRBCs, NPO after midnight for possible EGD in am. Patient transferred to [facility] as no GI service is available at current facility, admitted by Hospital Medicine for further evaluation and treatment.    Procedure(s) (LRB):  COLONOSCOPY (N/A)      Hospital Course:   The patient was admitted to the CDU for observation. EGD done on yesterday; unrevealing. Colonoscopy is scheduled for today. Per GI, colonoscopy was aborted as the patients bowels were noted with large amounts of stool and melena. The patient was instructed that she will need to undergo another prep overnight for a repeat colonoscopy in the AM. The patient states she is leaving against medical advice as she will not stay  another night. Will have the patient follow up with her PCP and GI in 1 week. Will also ask the patient to follow up with her Cardiologist in 1 week to discuss the need to cont Plavix, asked the patient to hold Plavix and ASA until she speaks with her Cardiologist. Instructed the patient to take Protonix as directed.  Protonix was ordered and delivered to the bedside by the [facility].      Consults:   Consults (From admission, onward)        Status Ordering Provider     Inpatient consult to Anesthesiology  Once     Provider:  (Not yet assigned)    Acknowledged DENADRE CHUN     Inpatient consult to Gastroenterology-[facility]  Once     Provider:  (Not yet assigned)    Completed INNOCENT-RUTH AGUILAR          * GI bleed  History of Gastric Ulcer  History of GI bleed  GERD  Anemia    -Hold Plavix  -Protonix PO BID  -S/p  1-unit PRBCs  -We appreciate GI, EGD on yesterday, colonoscopy on today. Colonoscopy was aborted on today as the patients bowels are noted with large amounts of stool and melena. The patient was instructed that she will need a repeat colonoscopy on tomorrow. Patient refused, she states she will leave AMA. Instructed the patient on the risk and benefits of not completing treatments, patient states she is not going to stay any longer. AMA paper work signed by the patient, nurses witnessed.   -ordered cardiac diet    Chronic diastolic congestive heart failure  Stable, continue home meds    Hypothyroidism  Most recent TSH stable, will follow.       Anxiety  Continue escitalopram oxalate     History of tobacco abuse  Nicotine patch  Smoking cessation counseling       Chronic obstructive pulmonary disease  Chronic.  -Continue Duo neb treatments.        Primary insomnia  Resume trazodone on d/c       Coronary artery disease involving native coronary artery of native heart without angina pectoris  Essential Hypertension    -Continue Toprol XL and isosorbide.  -SBP  range---125-161      Hypercholesteremia  Continue Lipitor        Final Active Diagnoses:    Diagnosis Date Noted POA    PRINCIPAL PROBLEM:  GI bleed [K92.2] 07/04/2019 Yes    Gastric ulcer without hemorrhage or perforation [K25.9] 07/09/2019 Yes    Chronic diastolic congestive heart failure [I50.32] 06/04/2019 Yes     Chronic    Chronic obstructive pulmonary disease [J44.9] 05/14/2019 Yes     Chronic    History of tobacco abuse [Z87.891] 05/14/2019 Not Applicable    Anxiety [F41.9] 05/14/2019 Yes    Hypothyroidism [E03.9] 05/14/2019 Yes     Chronic    Hypercholesteremia [E78.00] 03/21/2016 Yes     Chronic    Coronary artery disease involving native coronary artery of native heart without angina pectoris [I25.10] 03/21/2016 Yes     Chronic    Essential hypertension [I10] 03/21/2016 Yes     Chronic    Primary insomnia [F51.01] 03/21/2016 Yes     Chronic      Problems Resolved During this Admission:       Discharged Condition: against medical advice    Disposition: Home or Self Care    Follow Up:  Follow-up Information     Dony Woo MD In 1 week.    Specialty:  Family Medicine  Why:  hospital follow up  Contact information:  [address] [facility] [address]  [phone]             Lorna Paula MD In 1 week.    Specialty:  Gastroenterology  Why:  hospital follow up  Contact information:  [address]  [address]  [facility] [address]  [phone]             Ramakrishna Baltazar MD In 1 week.    Specialties:  Cardiology, Electrophysiology  Why:  hospital follow up  Contact information:  [address]  [address]  [facility] [address]  [phone]                 Patient Instructions:      Diet Cardiac     Notify your health care provider if you experience any of the following:  temperature >100.4     Notify your health care provider if you experience any of the following:  persistent nausea and vomiting or diarrhea     Notify your health care provider if you experience any of the  following:  severe uncontrolled pain     Notify your health care provider if you experience any of the following:  difficulty breathing or increased cough     Notify your health care provider if you experience any of the following:  severe persistent headache     Notify your health care provider if you experience any of the following:  worsening rash     Notify your health care provider if you experience any of the following:  persistent dizziness, light-headedness, or visual disturbances     Notify your health care provider if you experience any of the following:  increased confusion or weakness     Activity as tolerated       Significant Diagnostic Studies: Labs:   CMP   Recent Labs   Lab 07/07/19 2058 07/08/19  0321 07/09/19  0342    137 141   K 3.5 3.7 4.5    107 110   CO2 23 17* 18*    85 82   BUN 19 15 10   CREATININE 0.6 0.6 0.6   CALCIUM 8.2* 8.7 8.4*   PROT 5.6* 5.8* 5.5*   ALBUMIN 2.7* 2.7* 2.3*   BILITOT 0.3 0.3 0.8   ALKPHOS 65 80 68   AST 29 27 30   ALT 24 25 19   ANIONGAP 7* 13 13   ESTGFRAFRICA >60.0 >60 >60   EGFRNONAA >60.0 >60 >60    and CBC   Recent Labs   Lab 07/07/19 2058 07/08/19  0639 07/08/19  1119 07/09/19  0342   WBC 8.60 9.68  --  10.93   HGB 7.5* 8.2* 7.1* 9.6*   HCT 22.7* 25.9*  --  29.0*    316  --  278       Pending Diagnostic Studies:     None         Medications:  Reconciled Home Medications:      Medication List      CHANGE how you take these medications    butalbital-acetaminophen-caffeine -40 mg -40 mg per tablet  Commonly known as:  FIORICET, ESGIC  TAKE 1 TABLET BY MOUTH EVERYDAY  What changed:  additional instructions     pantoprazole 20 MG tablet  Commonly known as:  PROTONIX  Take 2 tablets (40 mg total) by mouth 2 (two) times daily before meals.  What changed:    · medication strength  · when to take this        CONTINUE taking these medications    albuterol 90 mcg/actuation inhaler  Commonly known as:  PROVENTIL/VENTOLIN HFA  USE ONE  PUFF EVERY FOUR TO SIX HOURS AS NEEDED     alendronate 70 MG tablet  Commonly known as:  FOSAMAX  Take 1 tablet (70 mg total) by mouth every 7 days.     ARIPiprazole 2 MG Tab  Commonly known as:  ABILIFY  Take 2 mg by mouth once daily.     atorvastatin 40 MG tablet  Commonly known as:  LIPITOR  Take 1 tablet (40 mg total) by mouth once daily.     betamethasone valerate 0.1% 0.1 % Crea  Commonly known as:  VALISONE  Apply topically 2 (two) times daily.     escitalopram oxalate 20 MG tablet  Commonly known as:  LEXAPRO  Take 1 tablet (20 mg total) by mouth once daily.     isosorbide mononitrate 20 MG Tab  Commonly known as:  ISMO,MONOKET  once daily.     LORazepam 1 MG tablet  Commonly known as:  ATIVAN  One p.o. q.d. p.r.n. anxiety use p.r.n. not q.d.     metoprolol succinate 25 MG 24 hr tablet  Commonly known as:  TOPROL-XL  Take 1 tablet (25 mg total) by mouth once daily.     mupirocin 2 % ointment  Commonly known as:  BACTROBAN  APPLY TO THE AFFECTED AREA THREE TIMES DAILY     nicotine 21 mg/24 hr  Commonly known as:  NICODERM CQ  Place 1 patch onto the skin once daily.     prochlorperazine 10 MG tablet  Commonly known as:  COMPAZINE  Take 1 tablet (10 mg total) by mouth every 6 (six) hours as needed.     traZODone 100 MG tablet  Commonly known as:  DESYREL  Take 100 mg by mouth every evening.        STOP taking these medications    clopidogrel 75 mg tablet  Commonly known as:  PLAVIX            Indwelling Lines/Drains at time of discharge:   Lines/Drains/Airways          None          Time spent on the discharge of patient: 35 minutes  Patient was seen and examined on the date of discharge and determined to be suitable for discharge.         Minna Burrell NP  Department of Hospital Medicine  [facility]

## 2019-07-09 NOTE — ASSESSMENT & PLAN NOTE
History of Gastric Ulcer  History of GI bleed  GERD  Anemia    -Hold Plavix  -Protonix PO BID  -S/p  1-unit PRBCs  -We appreciate GI, EGD on yesterday, colonoscopy on today. Colonoscopy was aborted on today as the patients bowels are noted with large amounts of stool and melena. The patient was instructed that she will need a repeat colonoscopy on tomorrow. Patient refused, she states she will leave AMA. Instructed the patient on the risk and benefits of not completing treatments, patient states she is not going to stay any longer. AMA paper work signed by the patient, nurses witnessed.   -ordered cardiac diet

## 2019-07-09 NOTE — TRANSFER OF CARE
"Anesthesia Transfer of Care Note    Patient: Josseline Stinson    Procedure(s) Performed: Procedure(s) (LRB):  COLONOSCOPY (N/A)    Patient location: GI    Anesthesia Type: MAC    Transport from OR: Transported from OR on room air with adequate spontaneous ventilation    Post pain: adequate analgesia    Post assessment: no apparent anesthetic complications    Post vital signs: stable    Level of consciousness: awake, alert and oriented    Nausea/Vomiting: no nausea/vomiting    Complications: none    Transfer of care protocol was followed      Last vitals:   Visit Vitals  BP (!) 148/80   Pulse 93   Temp 36.8 °C (98.2 °F)   Resp 18   Ht 5' 4" (1.626 m)   Wt 52.5 kg (115 lb 11.9 oz)   SpO2 100%   Breastfeeding? No   BMI 19.87 kg/m²     "

## 2019-07-09 NOTE — ANESTHESIA PREPROCEDURE EVALUATION
07/09/2019  Josseline Stinson is a 73 y.o., female admitted with recently discharge for GI, now represented with repeat GI bleed and acute blood loss     Past Medical History:   Diagnosis Date    Anxiety     Arthritis     CHF (congestive heart failure)     Chronic pain syndrome 6/4/2019    COPD (chronic obstructive pulmonary disease)     Coronary artery disease involving native coronary artery of native heart without angina pectoris 3/21/2016    Encounter for blood transfusion     Essential hypertension 3/21/2016    GERD (gastroesophageal reflux disease)     History of gastric ulcer 5/14/2019    Hx of heart artery stent 5/14/2019    Hypercholesteremia 3/21/2016    MI (myocardial infarction)     x4    NSTEMI (non-ST elevated myocardial infarction) 1/2/2018    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     I have reviewed the Nursing Notes.   I have reviewed the Medications.     Review of Systems  Anesthesia Hx:   Denies Personal Hx of Anesthesia complications.   Social:  Smoker    Hematology/Oncology:         -- Anemia:   Cardiovascular:   Hypertension Past MI CAD  CABG/stent (PCI 5/2019)  CHF hyperlipidemia ECG has been reviewed.    Pulmonary:   COPD    Renal/:  Renal/ Normal     Hepatic/GI:   GERD    Musculoskeletal:   Arthritis     Neurological:   Headaches    Endocrine:    Hypothyroidism    Psych:   anxiety          Physical Exam  General:  Cachexia      Dental:  Dental Findings: Edentulous   Chest/Lungs:  Chest/Lungs Clear    Heart/Vascular:  Heart Findings: Rate: Tachycardia  Rhythm: Regular Rhythm           Lab Results   Component Value Date    WBC 10.93 07/09/2019    HGB 9.6 (L) 07/09/2019    HCT 29.0 (L) 07/09/2019     07/09/2019    CHOL 168 05/21/2019    TRIG 177 (H) 05/21/2019    HDL 49 05/21/2019    ALT 19 07/09/2019    AST 30 07/09/2019     07/09/2019    K 4.5 07/09/2019     07/09/2019    CREATININE 0.6 07/09/2019    BUN 10 07/09/2019    CO2 18 (L) 07/09/2019    TSH 1.32 05/21/2019    INR 1.2 07/07/2019    HGBA1C 5.6 05/05/2019     Echo 5/2019  · Mild concentric left ventricular hypertrophy.  · Normal left ventricular systolic function. The estimated ejection fraction is 60%  · Grade I (mild) left ventricular diastolic dysfunction consistent with impaired relaxation.  · Normal right ventricular systolic function.  · Mild left atrial enlargement.  · Mild mitral regurgitation.  · Normal central venous pressure (3 mm Hg).  · The estimated PA systolic pressure is 28 mm Hg         Anesthesia Plan  Type of Anesthesia, risks & benefits discussed:  Anesthesia Type:  MAC  Patient's Preference:   Intra-op Monitoring Plan: standard ASA monitors  Intra-op Monitoring Plan Comments:   Post Op Pain Control Plan: per primary service following discharge from PACU  Post Op Pain Control Plan Comments:   Induction:    Beta Blocker:  Patient is on a Beta-Blocker and has received one dose within the past 24 hours (No further documentation required).       Informed Consent: Patient understands risks and agrees with Anesthesia plan.  Questions answered. Anesthesia consent signed with patient.  ASA Score: 3     Day of Surgery Review of History & Physical:            Ready For Surgery From Anesthesia Perspective.

## 2019-07-09 NOTE — ANESTHESIA PREPROCEDURE EVALUATION
07/09/2019  Josseline Stinson is a 73 y.o., female admitted with acute GIB/melena for colonoscopy under MAC. Had EGD NAAC.    Past Medical History:   Diagnosis Date    Anxiety     Arthritis     CHF (congestive heart failure)     Chronic pain syndrome 6/4/2019    COPD (chronic obstructive pulmonary disease)     Coronary artery disease involving native coronary artery of native heart without angina pectoris 3/21/2016    Encounter for blood transfusion     Essential hypertension 3/21/2016    GERD (gastroesophageal reflux disease)     History of gastric ulcer 5/14/2019    Hx of heart artery stent 5/14/2019    Hypercholesteremia 3/21/2016    MI (myocardial infarction)     x4    NSTEMI (non-ST elevated myocardial infarction) 1/2/2018    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     I have reviewed the Nursing Notes.   I have reviewed the Medications.     Review of Systems  Anesthesia Hx:   Denies Personal Hx of Anesthesia complications.   Social:  Smoker    Hematology/Oncology:         -- Anemia:   Cardiovascular:   Hypertension Past MI CAD  CABG/stent (PCI 5/2019)  CHF hyperlipidemia ECG has been reviewed.    Pulmonary:   COPD    Renal/:  Renal/ Normal     Hepatic/GI:   GERD    Musculoskeletal:   Arthritis     Neurological:   Headaches    Endocrine:   Hypothyroidism    Psych:   anxiety          Physical Exam  General:  Cachexia      Dental:  Dental Findings: Edentulous   Chest/Lungs:  Chest/Lungs Clear    Heart/Vascular:  Heart Findings: Rate: Tachycardia  Rhythm: Regular Rhythm           Lab Results   Component Value Date    WBC 10.93 07/09/2019    HGB  9.6 (L) 07/09/2019    HCT 29.0 (L) 07/09/2019     07/09/2019    CHOL 168 05/21/2019    TRIG 177 (H) 05/21/2019    HDL 49 05/21/2019    ALT 19 07/09/2019    AST 30 07/09/2019     07/09/2019    K 4.5 07/09/2019     07/09/2019    CREATININE 0.6 07/09/2019    BUN 10 07/09/2019    CO2 18 (L) 07/09/2019    TSH 1.32 05/21/2019    INR 1.2 07/07/2019    HGBA1C 5.6 05/05/2019     Echo 5/2019  · Mild concentric left ventricular hypertrophy.  · Normal left ventricular systolic function. The estimated ejection fraction is 60%  · Grade I (mild) left ventricular diastolic dysfunction consistent with impaired relaxation.  · Normal right ventricular systolic function.  · Mild left atrial enlargement.  · Mild mitral regurgitation.  · Normal central venous pressure (3 mm Hg).  · The estimated PA systolic pressure is 28 mm Hg         Anesthesia Plan  Type of Anesthesia, risks & benefits discussed:  Anesthesia Type:  MAC, general  Patient's Preference:   Intra-op Monitoring Plan:   Intra-op Monitoring Plan Comments:   Post Op Pain Control Plan:   Post Op Pain Control Plan Comments:   Induction:    Beta Blocker:  Patient is on a Beta-Blocker and has not received dose within the past 24 hours due to non-compliance or for other reasons (Patient should receive a perioperative dose or document why it is withheld). Perioperative Beta Blocker not given due to: Other (see comments)    Beta Blocker Comments: GIB  Informed Consent: Patient understands risks and agrees with Anesthesia plan.  Questions answered. Anesthesia consent signed with patient.  ASA Score: 4     Day of Surgery Review of History & Physical:            Ready For Surgery From Anesthesia Perspective.

## 2019-07-09 NOTE — PLAN OF CARE
Attempted to perform Virtual round, pt off unit for procedure at this time, will be available if needed.

## 2019-07-09 NOTE — PLAN OF CARE
Problem: Adult Inpatient Plan of Care  Goal: Plan of Care Review  Outcome: Ongoing (interventions implemented as appropriate)  Patient on RA, no respiratory distress noted. Will continue to monitor.

## 2019-07-09 NOTE — PROGRESS NOTES
[facility] Hospital Medicine  Progress Note    Patient Name: Josseline Stinson  MRN: [id number]  Patient Class: IP- Inpatient   Admission Date: 7/8/2019  Length of Stay: 1 days  Attending Physician: Joshua Florez MD  Primary Care Provider: Dony Woo MD        Subjective:     Principal Problem:GI bleed      HPI:  Josseline Stinson is a 73-year-old female with a past medical history of chronic pain, migraine headaches, COPD, history of gastric ulcers, GERD, anxiety, CAD with prior NSTEMI and coronary stenting with last stent on 1/3/2018 to left circumflex. She lives in [place], [place]. Her PCP is Dr. Dony Woo.    She presented to [facility] ED 7/7/19 with a chief complaint of bloody stool. Associated symptoms include abdominal pain. Patient describes a sharp, dull epigastric abdominal pain without radiation. She was admitted to [facility] 7/4-7/6/19 for GI Bleed in which she obtained an EKG that revealed ulcers. On presentation to ED patients states pain was 10/10, currently patient rates pain 5/10. ED workup revealed Hgb (7.5), Hct (22.7), RDW (14.6), Ca (8.2), Total Protein (5.6), Albumin (2.7), FOBT that was positive on 7/4/19. ED consulted GI who request transfusion of 1-unit PRBCs, NPO after midnight for possible EGD in am. Patient transferred to [facility] as no GI service is available at current facility, admitted by Hospital Medicine for further evaluation and treatment.    Overview/Hospital Course:  The patient was admitted to the CDU for observation. EGD done on yesterday; unrevealing. Colonoscopy is scheduled for today. Protonix was ordered and delivered to the bedside by the [facility].     Interval History: The patient is in the bed AAOx4, no distress noted. No complaints of pain noted. No family present at the bedside, will follow.     Review of Systems   Constitutional: Negative for activity change, appetite change, chills, diaphoresis, fatigue and fever.   HENT:  Negative for trouble swallowing.    Eyes: Negative for visual disturbance.   Respiratory: Negative for cough, chest tightness, shortness of breath and wheezing.    Cardiovascular: Negative for chest pain, palpitations and leg swelling.   Gastrointestinal: Negative for abdominal distention, abdominal pain, blood in stool, constipation, diarrhea, nausea and vomiting.   Genitourinary: Negative for difficulty urinating and hematuria.   Musculoskeletal: Negative for arthralgias, back pain, gait problem and myalgias.   Skin: Negative for color change, pallor, rash and wound.   Neurological: Negative for dizziness, tremors, seizures, syncope, facial asymmetry, speech difficulty, weakness, light-headedness, numbness and headaches.   Hematological: Does not bruise/bleed easily.   Psychiatric/Behavioral: Negative for agitation, confusion and hallucinations. The patient is not nervous/anxious.      Objective:     Vital Signs (Most Recent):  Temp: 97.5 °F (36.4 °C) (07/09/19 1135)  Pulse: 68 (07/09/19 1138)  Resp: 20 (07/09/19 1135)  BP: 125/63 (07/09/19 1135)  SpO2: 98 % (07/09/19 1221) Vital Signs (24h Range):  Temp:  [97.5 °F (36.4 °C)-99.2 °F (37.3 °C)] 97.5 °F (36.4 °C)  Pulse:  [] 68  Resp:  [16-20] 20  SpO2:  [97 %-100 %] 98 %  BP: (100-177)/(61-84) 125/63     Weight: 52.5 kg (115 lb 11.9 oz)  Body mass index is 19.87 kg/m².    Intake/Output Summary (Last 24 hours) at 7/9/2019 1316  Last data filed at 7/9/2019 0524  Gross per 24 hour   Intake 4592.5 ml   Output --   Net 4592.5 ml      Physical Exam   Constitutional: She is oriented to person, place, and time. She appears well-developed and well-nourished. No distress.   HENT:   Head: Normocephalic and atraumatic.   Eyes: Pupils are equal, round, and reactive to light.   Neck: Normal range of motion. Neck supple. No JVD present.   Cardiovascular: Normal rate, regular rhythm, normal heart sounds and intact distal pulses.   No murmur heard.  Pulmonary/Chest: Effort  normal and breath sounds normal. No stridor. No respiratory distress. She has no wheezes.   Abdominal: Soft. Bowel sounds are normal. She exhibits no distension. There is no tenderness. There is no guarding.   Musculoskeletal: Normal range of motion. She exhibits no edema or tenderness.   Neurological: She is alert and oriented to person, place, and time.   Skin: Skin is warm and dry. Capillary refill takes less than 2 seconds. She is not diaphoretic.   Psychiatric: She has a normal mood and affect. Her behavior is normal. Judgment and thought content normal.   Nursing note and vitals reviewed.      Significant Labs:   CBC:   Recent Labs   Lab 07/07/19 2058 07/08/19  0639 07/08/19  1119 07/09/19  0342   WBC 8.60 9.68  --  10.93   HGB 7.5* 8.2* 7.1* 9.6*   HCT 22.7* 25.9*  --  29.0*    316  --  278     CMP:   Recent Labs   Lab 07/07/19 2058 07/08/19  0321 07/09/19  0342    137 141   K 3.5 3.7 4.5    107 110   CO2 23 17* 18*    85 82   BUN 19 15 10   CREATININE 0.6 0.6 0.6   CALCIUM 8.2* 8.7 8.4*   PROT 5.6* 5.8* 5.5*   ALBUMIN 2.7* 2.7* 2.3*   BILITOT 0.3 0.3 0.8   ALKPHOS 65 80 68   AST 29 27 30   ALT 24 25 19   ANIONGAP 7* 13 13   EGFRNONAA >60.0 >60 >60       Significant Imaging: I have reviewed all pertinent imaging results/findings within the past 24 hours.      Assessment/Plan:      * GI bleed  History of Gastric Ulcer  History of GI bleed  GERD  Anemia    -Hold Plavix and Voltaren  -Protonix PO BID  -S/p  1-unit PRBCs  -We appreciate GI, EGD on yesterday, colonoscopy on today.   -NPO    Chronic diastolic congestive heart failure  Stable, continue home meds    Hypothyroidism  -check TSH.      Anxiety  Continue escitalopram oxalate     History of tobacco abuse  Nicotine patch  Smoking cessation counseling       Chronic obstructive pulmonary disease  Chronic.  -Continue Duo neb treatments.        Primary insomnia  Ramelteon prn insomnia      Essential hypertension        Coronary  artery disease involving native coronary artery of native heart without angina pectoris  Essential Hypertension    -Continue Toprol XL and isosorbide.  -SBP range---125-161      Hypercholesteremia  Continue Lipitor        VTE Risk Mitigation (From admission, onward)        Ordered     IP VTE HIGH RISK PATIENT  Once      07/08/19 1311     Place sequential compression device  Until discontinued      07/08/19 0218     Place JARROD hose  Until discontinued      07/08/19 0218                Minna Burrell NP  Department of Hospital Medicine   [facility]

## 2019-07-09 NOTE — ANESTHESIA PROCEDURE NOTES
Peripheral IV Insertion    Diagnosis: I87.9 Disorder of vein, unspecified. and I99.8 Other disorder of circulatory system    Patient location during procedure: floor  Procedure start time: 7/8/2019 6:54 PM  Timeout: 7/8/2019 6:54 PM  Procedure end time: 7/8/2019 6:59 PM    Staffing  Authorizing Provider: Bradford Powell MD  Performing Provider: Bradford Powell MD      Preanesthetic Checklist  Completed: patient identified, site marked, surgical consent, pre-op evaluation, timeout performed, IV checked, risks and benefits discussed, monitors and equipment checked and anesthesia consent givenPeripheral IV Insertion  Skin Prep: chlorhexidine gluconate and isopropyl alcohol  Local Infiltration: lidocaine  Orientation: right  Location: forearm  Catheter Size: 20 G  Catheter placement by Ultrasound guidance. Heme positive aspiration all ports.  Vessel Caliber: small, patent, compressibility normal  Vascular Doppler:  flow cephaladInsertion Attempts: 1  Assessment  Dressing: secured with tape and tegaderm  Patient: Tolerated well  Line flushed easily.

## 2019-07-09 NOTE — ASSESSMENT & PLAN NOTE
History of Gastric Ulcer  History of GI bleed  GERD  Anemia    -Hold Plavix and Voltaren  -Protonix PO BID  -S/p  1-unit PRBCs  -We appreciate GI, EGD on yesterday, colonoscopy on today.   -NPO

## 2019-07-09 NOTE — [employer]ATION PATIENT INSTRUCTIONS
Discharge Summary/Instructions after an Endoscopic Procedure  Patient Name: Josseline Stinson  Patient MRN: [id number]  Patient YOB: 1945 Tuesday, July 09, 2019  Lorna Paula MD  RESTRICTIONS:  During your procedure today, you received medications for sedation.  These   medications may affect your judgment, balance and coordination.  Therefore,   for 24 hours, you have the following restrictions:   - DO NOT drive a car, operate machinery, make legal/financial decisions,   sign important papers or drink alcohol.    ACTIVITY:  Today: no heavy lifting, straining or running due to procedural   sedation/anesthesia.  The following day: return to full activity including work.  DIET:  Eat and drink normally unless instructed otherwise.     TREATMENT FOR COMMON SIDE EFFECTS:  - Mild abdominal pain, nausea, belching, bloating or excessive gas:  rest,   eat lightly and use a heating pad.  - Sore Throat: treat with throat lozenges and/or gargle with warm salt   water.  - Because air was used during the procedure, expelling large amounts of air   from your rectum or belching is normal.  - If a bowel prep was taken, you may not have a bowel movement for 1-3 days.    This is normal.  SYMPTOMS TO WATCH FOR AND REPORT TO YOUR PHYSICIAN:  1. Abdominal pain or bloating, other than gas cramps.  2. Chest pain.  3. Back pain.  4. Signs of infection such as: chills or fever occurring within 24 hours   after the procedure.  5. Rectal bleeding, which would show as bright red, maroon, or black stools.   (A tablespoon of blood from the rectum is not serious, especially if   hemorrhoids are present.)  6. Vomiting.  7. Weakness or dizziness.  GO DIRECTLY TO THE NEAREST EMERGENCY ROOM IF YOU HAVE ANY OF THE FOLLOWING:      Difficulty breathing              Chills and/or fever over 101 F   Persistent vomiting and/or vomiting blood   Severe abdominal pain   Severe chest pain   Black, tarry stools   Bleeding- more than one tablespoon   Any  other symptom or condition that you feel may need urgent attention  Your doctor recommends these additional instructions:  If any biopsies were taken, your doctors clinic will contact you in 1 to 2   weeks with any results.  - Return patient to hospital [facility] for ongoing care.   - Clear liquid diet.   - Continue present medications.   - Repeat colonoscopy [day].   - Need to d/w cardiology regarding anti-coagulation.  For questions, problems or results please call your physician - Lorna Paula MD at Work:  [phone].  EMERGENCY PHONE NUMBER: [phone],  LAB RESULTS: [phone]  IF A COMPLICATION OR EMERGENCY SITUATION ARISES AND YOU ARE UNABLE TO REACH   YOUR PHYSICIAN - GO DIRECTLY TO THE EMERGENCY ROOM.  Lorna Paula MD  7/9/2019 3:19:34 PM  This report has been verified and signed electronically.  [employer]

## 2019-07-09 NOTE — PROGRESS NOTES
Pt in room requesting to go home AMA. LACE score is 79. Pt has had several frequent admissions recently. [place] informed pt on risk of leaving AMA. Pt states verbal understanding and is still requesting to leave. AMA paperwork signed. Pt requesting transport home to be set up. She was informed she will have to call an uber or cab at her own expense. No further questions.

## 2019-07-09 NOTE — PLAN OF CARE
Report received from Sylvia.   MAICOO since 3am. Prep completed, outcome liquidy brown   Patent IV access.  #[id number] R FA

## 2019-07-09 NOTE — SUBJECTIVE & OBJECTIVE
Interval History: The patient is in the bed AAOx4, no distress noted. No complaints of pain noted. No family present at the bedside, will follow.     Review of Systems   Constitutional: Negative for activity change, appetite change, chills, diaphoresis, fatigue and fever.   HENT: Negative for trouble swallowing.    Eyes: Negative for visual disturbance.   Respiratory: Negative for cough, chest tightness, shortness of breath and wheezing.    Cardiovascular: Negative for chest pain, palpitations and leg swelling.   Gastrointestinal: Negative for abdominal distention, abdominal pain, blood in stool, constipation, diarrhea, nausea and vomiting.   Genitourinary: Negative for difficulty urinating and hematuria.   Musculoskeletal: Negative for arthralgias, back pain, gait problem and myalgias.   Skin: Negative for color change, pallor, rash and wound.   Neurological: Negative for dizziness, tremors, seizures, syncope, facial asymmetry, speech difficulty, weakness, light-headedness, numbness and headaches.   Hematological: Does not bruise/bleed easily.   Psychiatric/Behavioral: Negative for agitation, confusion and hallucinations. The patient is not nervous/anxious.      Objective:     Vital Signs (Most Recent):  Temp: 97.5 °F (36.4 °C) (07/09/19 1135)  Pulse: 68 (07/09/19 1138)  Resp: 20 (07/09/19 1135)  BP: 125/63 (07/09/19 1135)  SpO2: 98 % (07/09/19 1221) Vital Signs (24h Range):  Temp:  [97.5 °F (36.4 °C)-99.2 °F (37.3 °C)] 97.5 °F (36.4 °C)  Pulse:  [] 68  Resp:  [16-20] 20  SpO2:  [97 %-100 %] 98 %  BP: (100-177)/(61-84) 125/63     Weight: 52.5 kg (115 lb 11.9 oz)  Body mass index is 19.87 kg/m².    Intake/Output Summary (Last 24 hours) at 7/9/2019 1316  Last data filed at 7/9/2019 0524  Gross per 24 hour   Intake 4592.5 ml   Output --   Net 4592.5 ml      Physical Exam   Constitutional: She is oriented to person, place, and time. She appears well-developed and well-nourished. No distress.   HENT:   Head:  Normocephalic and atraumatic.   Eyes: Pupils are equal, round, and reactive to light.   Neck: Normal range of motion. Neck supple. No JVD present.   Cardiovascular: Normal rate, regular rhythm, normal heart sounds and intact distal pulses.   No murmur heard.  Pulmonary/Chest: Effort normal and breath sounds normal. No stridor. No respiratory distress. She has no wheezes.   Abdominal: Soft. Bowel sounds are normal. She exhibits no distension. There is no tenderness. There is no guarding.   Musculoskeletal: Normal range of motion. She exhibits no edema or tenderness.   Neurological: She is alert and oriented to person, place, and time.   Skin: Skin is warm and dry. Capillary refill takes less than 2 seconds. She is not diaphoretic.   Psychiatric: She has a normal mood and affect. Her behavior is normal. Judgment and thought content normal.   Nursing note and vitals reviewed.      Significant Labs:   CBC:   Recent Labs   Lab 07/07/19 2058 07/08/19  0639 07/08/19  1119 07/09/19  0342   WBC 8.60 9.68  --  10.93   HGB 7.5* 8.2* 7.1* 9.6*   HCT 22.7* 25.9*  --  29.0*    316  --  278     CMP:   Recent Labs   Lab 07/07/19 2058 07/08/19  0321 07/09/19  0342    137 141   K 3.5 3.7 4.5    107 110   CO2 23 17* 18*    85 82   BUN 19 15 10   CREATININE 0.6 0.6 0.6   CALCIUM 8.2* 8.7 8.4*   PROT 5.6* 5.8* 5.5*   ALBUMIN 2.7* 2.7* 2.3*   BILITOT 0.3 0.3 0.8   ALKPHOS 65 80 68   AST 29 27 30   ALT 24 25 19   ANIONGAP 7* 13 13   EGFRNONAA >60.0 >60 >60       Significant Imaging: I have reviewed all pertinent imaging results/findings within the past 24 hours.

## 2019-07-10 NOTE — PHYSICIAN QUERY
"PT Name: Josseline Stinson  MR #: [id number]    Physician Query Form - Heart  Condition Clarification     CDS/: Yue Davis RN, CDS               Contact information: [email or web address]                                                                                                                        [phone]  This form is a permanent document in the medical record.     Query Date: July 10, 2019    By submitting this query, we are merely seeking further clarification of documentation. Please utilize your independent clinical judgment when addressing the question(s) below.    The medical record contains the following   Indicators     Supporting Clinical Findings Location in Medical Record    BNP     x EF 60% Anesthesia Note 7/5    Radiology findings     x Echo Results Echo 5/2019  · Mild concentric left ventricular hypertrophy.  · Normal left ventricular systolic function. The estimated ejection fraction is 60%  · Grade I (mild) left ventricular diastolic dysfunction consistent with impaired relaxation.  · Normal right ventricular systolic function.  · Mild left atrial enlargement.  · Mild mitral regurgitation.  · Normal central venous pressure (3 mm Hg).  · The estimated PA systolic pressure is 28 mm Hg Anesthesia Note 7/5    "Ascites" documented      "SOB" or "MCCABE" documented      "Hypoxia" documented     x Heart Failure documented Congestive heart failure  Stable DC Summary 7/6    "Edema" documented     x Diuretics/Meds meds on hold patient NPO     Treatment:      Other:      Heart failure (HF) can be acute, chronic or both. It is generally further specificed as systolic, diastolic, or combined. Lastly, it is important to identify an underlying etiology if known or suspected.     Common clues to acute exacerbation:  Rapidly progressive symptoms (w/in 2 weeks of presentation), using IV diuretics to treat, using supplemental O2, pulmonary edema on Xray, MI w/in 4 weeks, and/or BNP >500    Systolic Heart " Failure: is defined as chart documentation of a left ventricular ejection fraction (LVEF) less than 40%     Diastolic Heart Failure: is defined as a left ventricular ejection fraction (LVEF) greater than 40%   +      Evidence of diastolic dysfunction on echocardiography OR    Right heart catheterization wedge pressure above 12 mm Hg OR    Left heart catheterization left ventricular end diastolic pressure 18 mm Hg or above.    References: *American Heart Association    The clinical guidelines noted below are only system guidelines, and do not replace the providers clinical judgment.     Provider, please specify the diagnosis associated with above clinical findings                             [   ] Chronic Diastolic Heart Failure - Pre-existing diastolic HF diagnosis.  EF > 40%  without  acute HF symptoms documented  [   ] Other Type of Heart Failure (please specify type): _________________________  [   ] Other (please specify): ___________________________________  [x   ] Clinically Undetermined                          Please document in your progress notes daily for the duration of treatment until resolved and include in your discharge summary.

## 2019-07-10 NOTE — PHYSICIAN QUERY
PT Name: Josseline Stinson  MR #: [id number]     PHYSICIAN QUERY -  ACUITY OF CONDITION CLARIFICATION      CDS/: Yue Davis RN, CDS               Contact information: [email or web address]                                                                                                                        [phone]  This form is a permanent document in the medical record.     Query Date: July 10, 2019    By submitting this query, we are merely seeking further clarification of documentation to reflect the severity of illness of your patient. Please utilize your independent clinical judgment when addressing the question(s) below.    The Medical record reflects the following:     Indicators   Supporting Clinical Findings Location in Medical Record   x Documentation of condition Gastric ulcer DC Summary 7/6   x Lab Value(s) H&H: 10.2/31.5 --> 7.3/22.7 --> 7.7/23.3  Labs 7/4- 7/6   x Radiology Findings Five non-bleeding superficial gastric ulcers with no stigmata of bleeding were found in the cardia, at the incisura and in the gastric antrum.There was an area of retained food material in the gastric body that   made exam of underlying mucosa difficult. However, no active bleeding or blood clots were seen. A moderate post-ulcer deformity was found in the first portion of the duodenum   EGD 7/5   x Treatment                                 Medication start protonix drip    switch PPI to po x 6-8 weeks  H&P 7/4    HM PN 7/5     x Other history of gastric ulcers    presented to [facility] ED with 2-day history of nausea and vomiting and mid-epigastric pain and noted blood in her stool    Patient does admit to NSAID use H&P 7/4    H&P 7/4        H&P 7/4     Provider, please specify the acuity/chronicity of ___gastric ulcer___:    [   ] Chronic   [x   ] Acute and/on chronic   [   ] Other, please specify _________________________   [   ] Clinically Undetermined       Please document in your progress notes daily for the  duration of treatment until resolved, and include in your discharge summary.

## 2019-07-11 NOTE — PHYSICIAN QUERY
"PT Name: Josseline Stinson  MR #: [id number]    Physician Query Form - Consultant Diagnosis Clarification     Tyesha Mullins RN, CCDS  Desk # [phone]; arnulfo # [phone] [email or web address]    This form is a permanent document in the medical record.     Query Date: July 11, 2019    By submitting this query, we are merely seeking further clarification of documentation.  Please utilize your independent clinical judgment when addressing the question(s) below.    The Medical record contains the following:   Diagnosis Supporting Clinical Information Location in Medical Record   Cachexia  -- per Anesthesia Cachexia  BMI: 19.87    Ht: 5' 4"  Wt: 49 kg  BMI: 18.6    PMHx: chronic pain, migraine headaches     CAD with prior NSTEMI and coronary stenting with last stent on 1/3/2018 to left circumflex    GI Bleed  Gastric Ulcer w/o hemorrhage or perforation  GERD  Anemia  Chronic Diastolic CHF  Hypothyroidism  Anxiety  Tobacco abuse  COPD  Primary Insomnia Anesthesia Chart        VS Flow Sheet      DC Summary                DC Summary     Do you agree with the Consultants diagnosis of "cachexia"    [x  ] Yes   [  ] Yes, but it resolved prior to my assessment of the patient   [  ] No   [  ] Other/Clarification of findings:   [  ] Clinically undetermined                      "

## 2019-07-11 NOTE — PHYSICIAN QUERY
"PT Name: Josseline Stinson  MR #: [id number]     PHYSICIAN QUERY -  ACUITY OF CONDITION CLARIFICATION      Tyesha Mullins RN, CCDS  Desk # [phone]; arnulfo # [phone] [email or web address]      This form is a permanent document in the medical record.     Query Date: July 11, 2019    By submitting this query, we are merely seeking further clarification of documentation to reflect the severity of illness of your patient. Please utilize your independent clinical judgment when addressing the question(s) below.    The Medical record reflects the following:     Indicators   Supporting Clinical Findings Location in Medical Record   x Documentation of condition Gastric Ulcers  Non-bleeding gastric ulcers GI CN 7/18   x Lab Value(s) Hgb 7.1 on 7/8  8.2/25.8 on 7/8 Lab    Radiology Findings     x Treatment                                 Medication GI CN  EGD  Colonoscopy    PRBC x1  Transfer to [facility] for GI services    Protonix PO BID            Hold Plavix            S/p 1 u PRBC Orders          H&P        DC Summary   x Other Impression:      - Melena with acute bleeding or unknown   significance.None of the lesions seen today appear   to have bled recently, and appear endoscopically   improved since EGD 4 days ago.  - Normal esophagus.  - Medium-sized hiatal hernia.  - Non-bleeding gastric ulcers with no stigmata of   bleeding.  - Normal examined duodenum.  - Duodenal deformity.  - No specimens collected. EGD Report 7/8   x   hx pud    endoscopy last Friday noting multiple gastric ulcers in the duodenal ulcer, no high risk stigmata was noted.  GI CN 7/8   x  Per GI, colonoscopy was aborted as the patients bowels were noted with large amounts of stool and melena DC Summary     Provider, please specify the acuity/chronicity of "Gastric Ulcer"    [   ] Chronic   [   ] Acute and/on chronic   [ x]  Clinically Undetermined     Please document in your progress notes daily for the duration of treatment until resolved, and " include in your discharge summary.

## 2019-07-12 LAB
BLD PROD TYP BPU: NORMAL
BLD PROD TYP BPU: NORMAL
BLOOD UNIT EXPIRATION DATE: NORMAL
BLOOD UNIT EXPIRATION DATE: NORMAL
BLOOD UNIT TYPE CODE: 6200
BLOOD UNIT TYPE CODE: 6200
BLOOD UNIT TYPE: NORMAL
BLOOD UNIT TYPE: NORMAL
CODING SYSTEM: NORMAL
CODING SYSTEM: NORMAL
DISPENSE STATUS: NORMAL
DISPENSE STATUS: NORMAL
TRANS ERYTHROCYTES VOL PATIENT: NORMAL ML
TRANS ERYTHROCYTES VOL PATIENT: NORMAL ML

## 2019-07-12 NOTE — PHYSICIAN QUERY
"PT Name: Josseline Stinson  MR #: [id number]     PHYSICIAN QUERY -  ACUITY OF CONDITION CLARIFICATION      Tyesha Mullins RN, CCDS  Desk # [phone]; arnulfo # [phone] [email or web address]      This form is a permanent document in the medical record.     Query Date: July 12, 2019    By submitting this query, we are merely seeking further clarification of documentation to reflect the severity of illness of your patient. Please utilize your independent clinical judgment when addressing the question(s) below.    The Medical record reflects the following:    Indicators                         Supporting Clinical Findings Location in Medical Record   x Documentation of condition Gastric Ulcers  Non-bleeding gastric ulcers GI CN 7/18   x Lab Value(s) Hgb 7.1 on 7/8  8.2/25.8 on 7/8 Lab     Radiology Findings       x Treatment     Medication GI CN  EGD  Colonoscopy     PRBC x1  Transfer to [facility] for GI services     Protonix PO BID            Hold Plavix            S/p 1 u PRBC Orders              H&P           DC Summary   x Other Impression:      - Melena with acute bleeding or unknown   significance.None of the lesions seen today appear   to have bled recently, and appear endoscopically   improved since EGD 4 days ago.  - Normal esophagus.  - Medium-sized hiatal hernia.  - Non-bleeding gastric ulcers with no stigmata of   bleeding.  - Normal examined duodenum.  - Duodenal deformity.  - No specimens collected. EGD Report 7/8   x    hx pud    endoscopy last Friday noting multiple gastric ulcers in the duodenal ulcer, no high risk stigmata was noted.  GI CN 7/8   x   Per GI, colonoscopy was aborted as the patients bowels were noted with large amounts of stool and melena DC Summary      Provider, please specify the acuity/chronicity of "Gastric Ulcer"    [   ] Chronic   [   ] Acute and/on chronic   [   ] Other - specify: ________________   [ x  ]  Clinically Undetermined     Please document in your progress notes daily for " the duration of treatment until resolved, and include in your discharge summary.

## 2019-07-22 ENCOUNTER — OFFICE VISIT (OUTPATIENT)
Dept: PRIMARY CARE CLINIC | Facility: CLINIC | Age: 74
End: 2019-07-22
Payer: MEDICARE

## 2019-07-22 VITALS
OXYGEN SATURATION: 91 % | DIASTOLIC BLOOD PRESSURE: 58 MMHG | BODY MASS INDEX: 19.81 KG/M2 | HEART RATE: 93 BPM | WEIGHT: 116 LBS | TEMPERATURE: 98 F | SYSTOLIC BLOOD PRESSURE: 102 MMHG | HEIGHT: 64 IN | RESPIRATION RATE: 19 BRPM

## 2019-07-22 DIAGNOSIS — J44.9 CHRONIC OBSTRUCTIVE PULMONARY DISEASE, UNSPECIFIED COPD TYPE: Chronic | ICD-10-CM

## 2019-07-22 DIAGNOSIS — K21.9 GASTROESOPHAGEAL REFLUX DISEASE, ESOPHAGITIS PRESENCE NOT SPECIFIED: Chronic | ICD-10-CM

## 2019-07-22 DIAGNOSIS — E78.00 HYPERCHOLESTEREMIA: Chronic | ICD-10-CM

## 2019-07-22 DIAGNOSIS — R21 SKIN RASH: ICD-10-CM

## 2019-07-22 DIAGNOSIS — I87.2 VENOUS STASIS DERMATITIS, UNSPECIFIED LATERALITY: ICD-10-CM

## 2019-07-22 DIAGNOSIS — E03.9 HYPOTHYROIDISM, UNSPECIFIED TYPE: Chronic | ICD-10-CM

## 2019-07-22 DIAGNOSIS — I50.32 CHRONIC DIASTOLIC CONGESTIVE HEART FAILURE: Chronic | ICD-10-CM

## 2019-07-22 DIAGNOSIS — R60.0 PERIPHERAL EDEMA: Primary | ICD-10-CM

## 2019-07-22 DIAGNOSIS — M19.90 OSTEOARTHRITIS, UNSPECIFIED OSTEOARTHRITIS TYPE, UNSPECIFIED SITE: ICD-10-CM

## 2019-07-22 DIAGNOSIS — D64.9 ANEMIA, UNSPECIFIED TYPE: ICD-10-CM

## 2019-07-22 DIAGNOSIS — G89.4 CHRONIC PAIN SYNDROME: ICD-10-CM

## 2019-07-22 DIAGNOSIS — F41.9 ANXIETY: ICD-10-CM

## 2019-07-22 DIAGNOSIS — Z95.5 HX OF HEART ARTERY STENT: ICD-10-CM

## 2019-07-22 PROCEDURE — 1101F PR PT FALLS ASSESS DOC 0-1 FALLS W/OUT INJ PAST YR: ICD-10-PCS | Mod: CPTII,S$GLB,, | Performed by: FAMILY MEDICINE

## 2019-07-22 PROCEDURE — 3078F DIAST BP <80 MM HG: CPT | Mod: CPTII,S$GLB,, | Performed by: FAMILY MEDICINE

## 2019-07-22 PROCEDURE — 99499 RISK ADDL DX/OHS AUDIT: ICD-10-PCS | Mod: S$GLB,,, | Performed by: FAMILY MEDICINE

## 2019-07-22 PROCEDURE — 99214 PR OFFICE/OUTPT VISIT, EST, LEVL IV, 30-39 MIN: ICD-10-PCS | Mod: S$GLB,,, | Performed by: FAMILY MEDICINE

## 2019-07-22 PROCEDURE — 99214 OFFICE O/P EST MOD 30 MIN: CPT | Mod: S$GLB,,, | Performed by: FAMILY MEDICINE

## 2019-07-22 PROCEDURE — 3078F PR MOST RECENT DIASTOLIC BLOOD PRESSURE < 80 MM HG: ICD-10-PCS | Mod: CPTII,S$GLB,, | Performed by: FAMILY MEDICINE

## 2019-07-22 PROCEDURE — 99999 PR PBB SHADOW E&M-EST. PATIENT-LVL V: CPT | Mod: PBBFAC,,, | Performed by: FAMILY MEDICINE

## 2019-07-22 PROCEDURE — 1101F PT FALLS ASSESS-DOCD LE1/YR: CPT | Mod: CPTII,S$GLB,, | Performed by: FAMILY MEDICINE

## 2019-07-22 PROCEDURE — 99499 UNLISTED E&M SERVICE: CPT | Mod: S$GLB,,, | Performed by: FAMILY MEDICINE

## 2019-07-22 PROCEDURE — 3074F SYST BP LT 130 MM HG: CPT | Mod: CPTII,S$GLB,, | Performed by: FAMILY MEDICINE

## 2019-07-22 PROCEDURE — 99999 PR PBB SHADOW E&M-EST. PATIENT-LVL V: ICD-10-PCS | Mod: PBBFAC,,, | Performed by: FAMILY MEDICINE

## 2019-07-22 PROCEDURE — 3074F PR MOST RECENT SYSTOLIC BLOOD PRESSURE < 130 MM HG: ICD-10-PCS | Mod: CPTII,S$GLB,, | Performed by: FAMILY MEDICINE

## 2019-07-22 RX ORDER — POTASSIUM CHLORIDE 750 MG/1
CAPSULE, EXTENDED RELEASE ORAL
Qty: 30 CAPSULE | Refills: 5 | Status: SHIPPED | OUTPATIENT
Start: 2019-07-22 | End: 2020-01-18

## 2019-07-22 RX ORDER — HYDROCODONE BITARTRATE AND ACETAMINOPHEN 5; 325 MG/1; MG/1
1 TABLET ORAL EVERY 6 HOURS PRN
Qty: 30 TABLET | Refills: 0 | Status: SHIPPED | OUTPATIENT
Start: 2019-07-22 | End: 2019-08-15 | Stop reason: ALTCHOICE

## 2019-07-22 RX ORDER — FUROSEMIDE 40 MG/1
TABLET ORAL
Qty: 30 TABLET | Refills: 5 | Status: SHIPPED | OUTPATIENT
Start: 2019-07-22 | End: 2019-09-12

## 2019-07-22 RX ORDER — LORAZEPAM 1 MG/1
TABLET ORAL
Qty: 30 TABLET | Refills: 2 | Status: SHIPPED | OUTPATIENT
Start: 2019-07-22 | End: 2019-09-12

## 2019-07-22 RX ORDER — MUPIROCIN 20 MG/G
OINTMENT TOPICAL
Qty: 22 G | Refills: 1 | Status: SHIPPED | OUTPATIENT
Start: 2019-07-22 | End: 2019-08-07 | Stop reason: SDUPTHER

## 2019-07-22 RX ORDER — BUTALBITAL, ACETAMINOPHEN AND CAFFEINE 50; 325; 40 MG/1; MG/1; MG/1
TABLET ORAL
Qty: 30 TABLET | Refills: 0 | Status: SHIPPED | OUTPATIENT
Start: 2019-07-22 | End: 2019-09-12

## 2019-07-22 NOTE — PROGRESS NOTES
Subjective:       Patient ID: Josseline Stinson is a 73 y.o. female.    Chief Complaint: Hospital Follow Up; Swelling (in both legs and feet); and Medication Refill    HPI:  73-year-old female in for bilateral peripheral edema--started 1/2 weeks ago--states legs are swollen painful--up to the knee.  No history of edema in the past on fluid pills.   Needs medication refills         ROS:  Skin: no psoriasis, eczema, skin cancer   HEENT: + occas headache --frontal area to the occipital area--only twice this month no  ocular pain, blurred vision, diplopia, epistaxis,hoarseness change in voice, no  thyroid trouble-history cataract surgery  Lung: No pneumonia, asthma, Tb, wheezing, SOB, +COPD has inhalers albuterol, quit smoking  Heart:  No chest pain,+  ankle edema, palpitations, +MI, no rinku murmur, +hypertension /58,+ hyperlipidemia , +CAD with stents times 15 sees Dr Baltazar has appt today --back on Plavix  Abdomen: No nausea, vomiting, diarrhea, constipation, +ulcers, +GERD-on Protonix no hepatitis, gallbladder disease, melena, hematochezia, hematemesis + hx GI bleed 7-3-19 was in [facility] for 10 days--in x 10 days-- had EGD/Colon--strict diet and states on percocet for pain   : no UTI, renal disease, stones  GYN hyst last mammogram last month   MS: no fractures, O/A, lupus, rheumatoid, gout--history of a fracture right heel was in a cast boot--has a history of osteoporosis no history of a bone density--has pain lower back hands feet calves Appt orthopedist 19  Neuro:+ dizziness,+ LOC, seizures   No diabetes, no anemia, + anxiety, + depression--lives by herself--2 dogs--no money no food--doing better --patient keeping herself busy  , 1 daughter estranged-patient states is not seen on so long not sure which he looks like, 3 grandchildren one , lives alone--on disability  benefits    Objective:   Physical Exam:  General: Well nourished, well developed,  + thin slightly emaciated  Skin:   Superficial ulcerations x4 on right lower leg no erythema discharge possible early stasis changes secondary peripheral edema   HEENT: Eyes PERRLA, EOM intact, irregular pupil secondary to bilateral cataract surgery nose patent, throat non-erythematous upper dentures edentulous lower jaw ears TMs clear  NECK: Supple, no bruits, No JVD, no nodes  Lungs: Clear, no rales, rhonchi, wheezing decreased breath sounds bilaterally--left greater than right--recent pneumonia  Heart: Regular rate and rhythm, gallops, or rubs +heart murmur  Abdomen: flat, bowel sounds positive, no tenderness, or organomegaly  MS:  +kyphosis--arthritis several joints right hip left wrist left elbow--patient with recent injury to right ankle and heel--unable to wear cast boot due to edema   Neuro: Alert, CN intact, oriented X 3 Romberg negative heel-toe intact  Extremities: No cyanosis, clubbing, or edema         Assessment:       1. Peripheral edema    2. Venous stasis dermatitis, unspecified laterality    3. Skin rash    4. Chronic obstructive pulmonary disease, unspecified COPD type    5. Hypercholesteremia    6. Hx of heart artery stent    7. Chronic diastolic congestive heart failure    8. Anemia, unspecified type    9. Anxiety    10. Chronic pain syndrome    11. Hypothyroidism, unspecified type    12. Gastroesophageal reflux disease, esophagitis presence not specified    13. Osteoarthritis, unspecified osteoarthritis type, unspecified site        Plan:       Peripheral edema    Venous stasis dermatitis, unspecified laterality    Skin rash    Chronic obstructive pulmonary disease, unspecified COPD type    Hypercholesteremia    Hx of heart artery stent    Chronic diastolic congestive heart failure    Anemia, unspecified type    Anxiety    Chronic pain syndrome    Hypothyroidism, unspecified type    Gastroesophageal reflux disease, esophagitis presence not specified    Osteoarthritis, unspecified osteoarthritis type, unspecified site       peripheral edema--x1 half weeks--starting to get stasis changes--Lasix 40 1 p.o. q.a.m. Micro-K 10 1 p.o. q.a.m. with Lasix only blood pressure 100/58 need to monitor pressure may need echocardiogram on metoprolol 25 q.d.   Has appt Dr Baltazar--today-- patient with significant cardiac history--hypertension/hyperlipidemia/congestive heart failure/heart murmur/CAD with stents times 15/right carotid surgery --recently hospitalized at [facility] 07/03/2019 for 10 days strain sees 1 unit of the blood was taken off of Plavix recently restarted herself--Dr. Baltazar to evaluate Plavix and GI bleed  COPD--patient quit smoking 3 months ago was smoking 1 pack per day times 20 years--told should help her gain weight  Has appoint with orthopedist to evaluate right ankle and heel  Pt requesting Fioricet Norco ands Xanax--will give Ativan--Fioricet--Norco ---needs referral to chronic pain clinic that gives pain medication No NSAID's due recent GI bleed   Needs to do a headache diary---requiring Fioricet routinely will need to have a trial of Imitrex or Sonny Gore neurologist  Redo lab  CBCs CMP lipid now and in 3 mo then q 6 mo Monitar Hct and K+   Health maintenance no additional workup squat   oral

## 2019-08-07 RX ORDER — MUPIROCIN 20 MG/G
OINTMENT TOPICAL
Qty: 22 G | Refills: 1 | Status: SHIPPED | OUTPATIENT
Start: 2019-08-07 | End: 2019-09-03 | Stop reason: SDUPTHER

## 2019-08-15 ENCOUNTER — OFFICE VISIT (OUTPATIENT)
Dept: ORTHOPEDICS | Facility: CLINIC | Age: 74
End: 2019-08-15
Payer: MEDICARE

## 2019-08-15 VITALS
HEART RATE: 73 BPM | WEIGHT: 105.63 LBS | DIASTOLIC BLOOD PRESSURE: 55 MMHG | BODY MASS INDEX: 18.13 KG/M2 | SYSTOLIC BLOOD PRESSURE: 97 MMHG

## 2019-08-15 DIAGNOSIS — M79.604 LOWER LIMB PAIN, DIFFUSE, RIGHT: ICD-10-CM

## 2019-08-15 PROCEDURE — 3078F PR MOST RECENT DIASTOLIC BLOOD PRESSURE < 80 MM HG: ICD-10-PCS | Mod: CPTII,S$GLB,, | Performed by: ORTHOPAEDIC SURGERY

## 2019-08-15 PROCEDURE — 1101F PT FALLS ASSESS-DOCD LE1/YR: CPT | Mod: CPTII,S$GLB,, | Performed by: ORTHOPAEDIC SURGERY

## 2019-08-15 PROCEDURE — 3074F SYST BP LT 130 MM HG: CPT | Mod: CPTII,S$GLB,, | Performed by: ORTHOPAEDIC SURGERY

## 2019-08-15 PROCEDURE — 99204 PR OFFICE/OUTPT VISIT, NEW, LEVL IV, 45-59 MIN: ICD-10-PCS | Mod: S$GLB,,, | Performed by: ORTHOPAEDIC SURGERY

## 2019-08-15 PROCEDURE — 99999 PR PBB SHADOW E&M-EST. PATIENT-LVL III: ICD-10-PCS | Mod: PBBFAC,,, | Performed by: ORTHOPAEDIC SURGERY

## 2019-08-15 PROCEDURE — 3074F PR MOST RECENT SYSTOLIC BLOOD PRESSURE < 130 MM HG: ICD-10-PCS | Mod: CPTII,S$GLB,, | Performed by: ORTHOPAEDIC SURGERY

## 2019-08-15 PROCEDURE — 1101F PR PT FALLS ASSESS DOC 0-1 FALLS W/OUT INJ PAST YR: ICD-10-PCS | Mod: CPTII,S$GLB,, | Performed by: ORTHOPAEDIC SURGERY

## 2019-08-15 PROCEDURE — 99204 OFFICE O/P NEW MOD 45 MIN: CPT | Mod: S$GLB,,, | Performed by: ORTHOPAEDIC SURGERY

## 2019-08-15 PROCEDURE — 99999 PR PBB SHADOW E&M-EST. PATIENT-LVL III: CPT | Mod: PBBFAC,,, | Performed by: ORTHOPAEDIC SURGERY

## 2019-08-15 PROCEDURE — 3078F DIAST BP <80 MM HG: CPT | Mod: CPTII,S$GLB,, | Performed by: ORTHOPAEDIC SURGERY

## 2019-08-15 RX ORDER — TRAMADOL HYDROCHLORIDE 50 MG/1
50 TABLET ORAL NIGHTLY PRN
Qty: 31 TABLET | Refills: 0 | Status: SHIPPED | OUTPATIENT
Start: 2019-08-15 | End: 2019-08-25

## 2019-08-15 NOTE — LETTER
August 15, 2019      Dony Woo MD  [address] [place] [address]           [facility]  [address] [place], [facility] [address]  [place] [address]  Phone: [phone]  Fax: [phone]          Patient: Josseline Stinson   MR Number: [id number]   YOB: 1945   Date of Visit: 8/15/2019       Dear Dr. Dony Woo:    Thank you for referring Josseline Stinson to me for evaluation. Attached you will find relevant portions of my assessment and plan of care.    If you have questions, please do not hesitate to call me. I look forward to following Josseline Stinson along with you.    Sincerely,    Albino Hartman MD    Enclosure  CC:  No Recipients    If you would like to receive this communication electronically, please contact [email or web address] or [phone] to request more information on [employer] access.    For providers and/or their staff who would like to refer a patient to [facility], please contact us through our one-stop-shop provider referral line, [facility], at [phone].    If you feel you have received this communication in error or would no longer like to receive these types of communications, please e-mail [email or web address]

## 2019-08-15 NOTE — PROGRESS NOTES
Orthopaedic Surgery History and Physical     History of present illness:   Josseline Stinson is a 73 y.o. female who presents with right lower leg swelling. No trauma.  Presented to the emergency room for evaluation found to have no DVT.  No fever.  No chills.    Allergies:   Review of patient's allergies indicates:   Allergen Reactions    Cortisone Swelling     SWELLING    Darvocet a500 [propoxyphene n-acetaminophen] Nausea And Vomiting    Toradol [ketorolac] Nausea And Vomiting    Tramadol Nausea And Vomiting    Codeine Palpitations     dizzy       Past medical history:   Past Medical History:   Diagnosis Date    Anxiety     Arthritis     CHF (congestive heart failure)     Chronic pain syndrome 6/4/2019    COPD (chronic obstructive pulmonary disease)     Coronary artery disease involving native coronary artery of native heart without angina pectoris 3/21/2016    Encounter for blood transfusion     Essential hypertension 3/21/2016    GERD (gastroesophageal reflux disease)     History of gastric ulcer 5/14/2019    Hx of heart artery stent 5/14/2019    Hypercholesteremia 3/21/2016    MI (myocardial infarction)     x4    NSTEMI (non-ST elevated myocardial infarction) 1/2/2018    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 3/21/2016    Pneumonia of right lower lobe due to infectious organism 5/14/2019    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    Thyroid disease     Ulcer        Past surgical history:  Past Surgical History:   Procedure Laterality Date    carotid artery surgeriy      catheterization cardiac cath Left 01/03/2017    Angioplasty    COLONOSCOPY N/A 7/9/2019    [facility] at [facility]    ESOPHAGOGASTRODUODENOSCOPY (EGD) N/A 7/8/2019    Performed by Sandra Alicea [facility] at [facility] END[facility]    ESOPHAGOGASTRODUODENOSCOPY (EGD) N/A 7/5/2019    Performed by Dane Khoury MD at [facility]    FRACTURE SURGERY Left     elbow and wrist    HIP SURGERY Right     x  4    HYSTERECTOMY  1972    Left heart cath Left 1/3/2018    Performed by [facility]rishna [facility] at [facility]    Stent KAN coronary  1/3/2018    Performed by Ramakrishna [facility]zar MD at [facility]ATH [facility]       Social history:   Reviewed per EPIC history for tobacco or alcohol use     Medications:    Current Outpatient Medications:     albuterol (PROVENTIL/VENTOLIN HFA) 90 mcg/actuation inhaler, USE ONE PUFF EVERY FOUR TO SIX HOURS AS NEEDED, Disp: 18 g, Rfl: 5    alendronate (FOSAMAX) 70 MG tablet, Take 1 tablet (70 mg total) by mouth every 7 days., Disp: 4 tablet, Rfl: 5    escitalopram oxalate (LEXAPRO) 20 MG tablet, Take 1 tablet (20 mg total) by mouth once daily., Disp: 30 tablet, Rfl: 5    isosorbide mononitrate (ISMO,MONOKET) 20 MG Tab, once daily. , Disp: , Rfl:     metoprolol succinate (TOPROL-XL) 25 MG 24 hr tablet, Take 1 tablet (25 mg total) by mouth once daily., Disp: 30 tablet, Rfl: 5    nicotine (NICODERM CQ) 21 mg/24 hr, Place 1 patch onto the skin once daily., Disp: , Rfl: 0    pantoprazole (PROTONIX) 20 MG tablet, Take 2 tablets (40 mg total) by mouth 2 (two) times daily before meals., Disp: 180 tablet, Rfl: 1    potassium chloride (MICRO-K) 10 MEQ CpSR, One p.o. q.a.m. with Lasix only, Disp: 30 capsule, Rfl: 5    ARIPiprazole (ABILIFY) 2 MG Tab, Take 2 mg by mouth once daily., Disp: , Rfl: 12    atorvastatin (LIPITOR) 40 MG tablet, Take 1 tablet (40 mg total) by mouth once daily., Disp: 30 tablet, Rfl: 5    butalbital-acetaminophen-caffeine -40 mg (FIORICET, ESGIC) -40 mg per tablet, TAKE 1 TABLET BY MOUTH EVERYDAY, Disp: 30 tablet, Rfl: 0    furosemide (LASIX) 40 MG tablet, One p.o. q.a.m. wants edema resolved may be able to decrease to half p.o. q.a.m., Disp: 30 tablet, Rfl: 5    LORazepam (ATIVAN) 1 MG tablet, One p.o. q.d. p.r.n. anxiety use p.r.n. not q.d., Disp: 30 tablet, Rfl: 2    mupirocin (BACTROBAN) 2 % ointment, APPLY TO THE AFFECTED AREA THREE TIMES DAILY, Disp: 22 g,  Rfl: 1    traZODone (DESYREL) 100 MG tablet, Take 100 mg by mouth every evening. , Disp: , Rfl:     Review of systems:  Denies chest pain, palpitations, shortness of breath.   Denies excessive thirst, urination or heat or cold intolerance.   Denies nausea, vomiting, melena or hematochezia.    Denies fever, chills, night sweats, weight loss.    Denies dysuria or hematuria.   Denies history of anxiety or depression.   Denies any skin abnormalities or rash.   Denies upper or lower extremity paresthesias or lightheadedness.    Denies cough, shortness of breath or hemoptysis.     Physical Exam:   Vitals:    08/15/19 1009   BP: (!) 97/55   Pulse: 73   Weight: 47.9 kg (105 lb 9.6 oz)     Awake/alert/oriented x3, No acute distress, Afebrile, Vital signs stable  Normocephalic, Atraumatic  Heart is beating at normal rate  Good inspiratory effort with unlaboured breathing  Abdomen soft/nondistended/nontender    Right lower extremity  Motor intact L2-S1  Sensation intact L2-S2  2+ popliteal/dorsalis pedis/posterior tibial pulses  <2s CR refill to digits  3+ pitting edema right lower extremity from tibial tubercle to the great toe  Mild erythema    Assessment:   73 y.o. female with right lower limb pain    Plan:   Compression stockings  Activity as tolerated  Present to the ED if symptoms worsen  Spoke with patient regarding use of tramadol, patient desires prescription despite intolerance  Return to clinic p.rflorence.    Albino Hartman MD  [facility]

## 2019-09-07 RX ORDER — MUPIROCIN 20 MG/G
OINTMENT TOPICAL
Qty: 22 G | Refills: 1 | Status: SHIPPED | OUTPATIENT
Start: 2019-09-07 | End: 2019-09-12

## 2019-09-12 ENCOUNTER — OFFICE VISIT (OUTPATIENT)
Dept: PRIMARY CARE CLINIC | Facility: CLINIC | Age: 74
End: 2019-09-12
Payer: MEDICARE

## 2019-09-12 VITALS
DIASTOLIC BLOOD PRESSURE: 64 MMHG | WEIGHT: 105 LBS | OXYGEN SATURATION: 100 % | RESPIRATION RATE: 18 BRPM | TEMPERATURE: 98 F | HEART RATE: 79 BPM | BODY MASS INDEX: 17.93 KG/M2 | HEIGHT: 64 IN | SYSTOLIC BLOOD PRESSURE: 119 MMHG

## 2019-09-12 DIAGNOSIS — G89.4 CHRONIC PAIN SYNDROME: Primary | ICD-10-CM

## 2019-09-12 PROCEDURE — 3074F SYST BP LT 130 MM HG: CPT | Mod: CPTII,S$GLB,, | Performed by: FAMILY MEDICINE

## 2019-09-12 PROCEDURE — 99499 UNLISTED E&M SERVICE: CPT | Mod: S$GLB,,, | Performed by: FAMILY MEDICINE

## 2019-09-12 PROCEDURE — 99214 PR OFFICE/OUTPT VISIT, EST, LEVL IV, 30-39 MIN: ICD-10-PCS | Mod: S$GLB,,, | Performed by: FAMILY MEDICINE

## 2019-09-12 PROCEDURE — 1101F PT FALLS ASSESS-DOCD LE1/YR: CPT | Mod: CPTII,S$GLB,, | Performed by: FAMILY MEDICINE

## 2019-09-12 PROCEDURE — 99214 OFFICE O/P EST MOD 30 MIN: CPT | Mod: S$GLB,,, | Performed by: FAMILY MEDICINE

## 2019-09-12 PROCEDURE — 99499 RISK ADDL DX/OHS AUDIT: ICD-10-PCS | Mod: S$GLB,,, | Performed by: FAMILY MEDICINE

## 2019-09-12 PROCEDURE — 3074F PR MOST RECENT SYSTOLIC BLOOD PRESSURE < 130 MM HG: ICD-10-PCS | Mod: CPTII,S$GLB,, | Performed by: FAMILY MEDICINE

## 2019-09-12 PROCEDURE — 3078F DIAST BP <80 MM HG: CPT | Mod: CPTII,S$GLB,, | Performed by: FAMILY MEDICINE

## 2019-09-12 PROCEDURE — 3078F PR MOST RECENT DIASTOLIC BLOOD PRESSURE < 80 MM HG: ICD-10-PCS | Mod: CPTII,S$GLB,, | Performed by: FAMILY MEDICINE

## 2019-09-12 PROCEDURE — 99999 PR PBB SHADOW E&M-EST. PATIENT-LVL IV: ICD-10-PCS | Mod: PBBFAC,,, | Performed by: FAMILY MEDICINE

## 2019-09-12 PROCEDURE — 99999 PR PBB SHADOW E&M-EST. PATIENT-LVL IV: CPT | Mod: PBBFAC,,, | Performed by: FAMILY MEDICINE

## 2019-09-12 PROCEDURE — 1101F PR PT FALLS ASSESS DOC 0-1 FALLS W/OUT INJ PAST YR: ICD-10-PCS | Mod: CPTII,S$GLB,, | Performed by: FAMILY MEDICINE

## 2019-09-12 RX ORDER — CYCLOBENZAPRINE HCL 10 MG
10 TABLET ORAL 3 TIMES DAILY PRN
Qty: 30 TABLET | Refills: 5 | Status: SHIPPED | OUTPATIENT
Start: 2019-09-12 | End: 2020-07-07

## 2019-09-12 RX ORDER — HYDROCODONE BITARTRATE AND ACETAMINOPHEN 5; 325 MG/1; MG/1
1 TABLET ORAL EVERY 6 HOURS PRN
Qty: 30 TABLET | Refills: 0 | Status: SHIPPED | OUTPATIENT
Start: 2019-09-12 | End: 2019-09-27 | Stop reason: SDUPTHER

## 2019-09-12 RX ORDER — CLOPIDOGREL BISULFATE 75 MG/1
TABLET ORAL
COMMUNITY
End: 2019-11-13 | Stop reason: SDUPTHER

## 2019-09-12 NOTE — PROGRESS NOTES
Subjective:       Patient ID: Josseline Stinson is a 73 y.o. female.    Chief Complaint: Back Pain and Flank Pain    HPI:  73-year-old female -patient with history of bilateral peripheral edema--states has improved but still has some swelling--was told by orthopedist that she had a bacterial infection.  Treated with Keflex      Patient complaining pain in the right scapular area and shoulder to lower back and right flank--patient thinks pulled a muscle--brother moved into the town house next to patient--she was helping him move boxes up and down steps--patient thinks she over did.  Pain with any move--able to raise right arm up to shoulder level-very-painful tries to get overhead hurts to bring shoulders forward back--unable Ni touch toes--hard to lay down.  No lupus rheumatoid gout no fracture        ROS:  Skin: no psoriasis, eczema, skin cancer -easy bruising secondary being on blood thinners  HEENT: + occas headache no  ocular pain, blurred vision, diplopia, epistaxis,hoarseness change in voice, no  thyroid trouble-history cataract surgery  Lung: No pneumonia, asthma, Tb, wheezing, SOB, +COPD has inhalers albuterol, quit smoking  Heart:  No chest pain,+  ankle edema, palpitations, +MI, no rinku murmur, +hypertension /60,+ hyperlipidemia , +CAD with stents times 15 sees Dr Baltazar has appt today --back on Plavix  Abdomen: No nausea, vomiting, diarrhea, constipation, +ulcers, +GERD-on Protonix no hepatitis, gallbladder disease, melena, hematochezia, hematemesis + hx GI bleed 7-3-19 was in [facility] for 10 days--in x 10 days-- had EGD/Colon--strict diet and states on percocet for pain   : no UTI, renal disease, stones  GYN hyst last mammogram   MS: no fractures, O/A, lupus, rheumatoid, gout--history of a fracture right heel was in a cast boot--has a history of osteoporosis no history of a bone density--has pain lower back hands feet calves Appt orthopedist 8-6-19 history of right hip surgery x5  Neuro:no  dizziness,no  LOC, seizures   No diabetes, no anemia, + anxiety, + depression--lives by herself--2 dogs--no money no food--doing better --patient keeping herself busy  , 1 daughter estranged-patient states is not seen on so long not sure which he looks like, 3 grandchildren one , lives alone--on disability  benefits    Objective:   Physical Exam:  General: Well nourished, well developed,  + thin slightly emaciated  Skin:  Minimal ecchymosis forearms bilaterally due to blood thinners   HEENT: Eyes PERRLA, EOM intact, irregular pupil secondary to bilateral cataract surgery nose patent, throat non-erythematous upper dentures edentulous lower jaw ears TMs clear  NECK: Supple, no bruits, No JVD, no nodes  Lungs: Clear, no rales, rhonchi, wheezing decreased breath sounds bilaterally  Heart: Regular rate and rhythm, gallops, or rubs +heart murmur  Abdomen: flat, bowel sounds positive, no tenderness, or organomegaly  MS:  +kyphosis--arthritis several joints right hip left wrist left elbow--tenderness especially in the right cervical spine right upper trapezius right mid thoracic area lumbar spine--pain with abduction arm to 90° very painful raise arms overhead especially in the mid scapular area pain with anterior posterior flexion the shoulders pain with anterior flexion of the spine to 10° extension 10° lateral flexion rotation 10° straight leg lift pulling sensation back no radiculopathy Neuro: Alert, CN intact, oriented X 3 Romberg negative heel-toe intact  Extremities: No cyanosis, clubbing, or edema         Assessment:       1. Chronic pain syndrome        Plan:       Chronic pain syndrome  -     Ambulatory Referral to Pain Clinic    Other orders  -     HYDROcodone-acetaminophen (NORCO) 5-325 mg per tablet; Take 1 tablet by mouth every 6 (six) hours as needed.  Dispense: 30 tablet; Refill: 0  -     cyclobenzaprine (FLEXERIL) 10 MG tablet; Take 1 tablet (10 mg total) by mouth 3 (three) times daily as  needed.  Dispense: 30 tablet; Refill: 5       --right shoulder strain/cervical strain/upper trapezius strain/mid thoracic strain/lumbosacral strain--secondary help in brother move into his apartment--Pt asked for Norco 10's told I do not taylor these --OK flexeril 10 mg q 8 hrs prn No NSAID's due hx GI bleed and on plavik  Peripheral edema--improve--Lasix 40 1 p.o. q.a.m. Micro-K 10 1 p.o. Q.a.m.  Recently seen by orthopedist----needs to go pain clinic for pain medication or See orthopedist for pain medications or additional treatment so does not needNorco    Keep  appt Dr Baltazar-- significant cardiac history--hypertension/hyperlipidemia/congestive heart failure/heart murmur/CAD with stents times 15/right carotid surgery  COPD--patient quit smoking 3 months ago   Pt requesting Fioricet Norco ands Xanax--will give Ativan--Fioricet--Norco ---needs referral to chronic pain clinic that gives pain medication No NSAID's due recent GI bleed   Needs to do a headache diary---requiring Fioricet routinely will need to have a trial of Imitrex or Sonny Gore neurologist  Redo lab  CBCs CMP lipid now and in 3 mo then q 6 mo Monitar Hct and K+   Health maintenance no additional workup squat  Moist heat , theragesic or tiger balm, ROM exercises  If pain stays Chest Xray and Xray T spine , Cspine right shoulder --no xrays now

## 2019-09-26 ENCOUNTER — PATIENT OUTREACH (OUTPATIENT)
Dept: ADMINISTRATIVE | Facility: OTHER | Age: 74
End: 2019-09-26

## 2019-09-27 ENCOUNTER — OFFICE VISIT (OUTPATIENT)
Dept: PRIMARY CARE CLINIC | Facility: CLINIC | Age: 74
End: 2019-09-27
Payer: MEDICARE

## 2019-09-27 VITALS
DIASTOLIC BLOOD PRESSURE: 72 MMHG | SYSTOLIC BLOOD PRESSURE: 132 MMHG | HEIGHT: 64 IN | RESPIRATION RATE: 18 BRPM | BODY MASS INDEX: 16.93 KG/M2 | WEIGHT: 99.19 LBS | TEMPERATURE: 99 F | HEART RATE: 98 BPM | OXYGEN SATURATION: 99 %

## 2019-09-27 DIAGNOSIS — K21.9 GASTROESOPHAGEAL REFLUX DISEASE, ESOPHAGITIS PRESENCE NOT SPECIFIED: Chronic | ICD-10-CM

## 2019-09-27 DIAGNOSIS — F41.9 ANXIETY: ICD-10-CM

## 2019-09-27 DIAGNOSIS — Z95.5 HX OF HEART ARTERY STENT: ICD-10-CM

## 2019-09-27 DIAGNOSIS — J44.9 CHRONIC OBSTRUCTIVE PULMONARY DISEASE, UNSPECIFIED COPD TYPE: Chronic | ICD-10-CM

## 2019-09-27 DIAGNOSIS — E78.00 HYPERCHOLESTEREMIA: Chronic | ICD-10-CM

## 2019-09-27 DIAGNOSIS — I10 ESSENTIAL HYPERTENSION: Chronic | ICD-10-CM

## 2019-09-27 DIAGNOSIS — E44.1 MALNUTRITION OF MILD DEGREE: Chronic | ICD-10-CM

## 2019-09-27 DIAGNOSIS — M19.90 OSTEOARTHRITIS, UNSPECIFIED OSTEOARTHRITIS TYPE, UNSPECIFIED SITE: ICD-10-CM

## 2019-09-27 DIAGNOSIS — G89.4 CHRONIC PAIN SYNDROME: Primary | ICD-10-CM

## 2019-09-27 DIAGNOSIS — G43.511 INTRACTABLE PERSISTENT MIGRAINE AURA WITHOUT CEREBRAL INFARCTION AND WITH STATUS MIGRAINOSUS: ICD-10-CM

## 2019-09-27 DIAGNOSIS — I50.32 CHRONIC DIASTOLIC CONGESTIVE HEART FAILURE: Chronic | ICD-10-CM

## 2019-09-27 DIAGNOSIS — E03.9 HYPOTHYROIDISM, UNSPECIFIED TYPE: Chronic | ICD-10-CM

## 2019-09-27 DIAGNOSIS — K25.9 GASTRIC ULCER WITHOUT HEMORRHAGE OR PERFORATION, UNSPECIFIED CHRONICITY: ICD-10-CM

## 2019-09-27 PROCEDURE — 99499 RISK ADDL DX/OHS AUDIT: ICD-10-PCS | Mod: S$GLB,,, | Performed by: FAMILY MEDICINE

## 2019-09-27 PROCEDURE — 1101F PR PT FALLS ASSESS DOC 0-1 FALLS W/OUT INJ PAST YR: ICD-10-PCS | Mod: CPTII,S$GLB,, | Performed by: FAMILY MEDICINE

## 2019-09-27 PROCEDURE — 99999 PR PBB SHADOW E&M-EST. PATIENT-LVL III: CPT | Mod: PBBFAC,,, | Performed by: FAMILY MEDICINE

## 2019-09-27 PROCEDURE — 3078F PR MOST RECENT DIASTOLIC BLOOD PRESSURE < 80 MM HG: ICD-10-PCS | Mod: CPTII,S$GLB,, | Performed by: FAMILY MEDICINE

## 2019-09-27 PROCEDURE — 99214 PR OFFICE/OUTPT VISIT, EST, LEVL IV, 30-39 MIN: ICD-10-PCS | Mod: S$GLB,,, | Performed by: FAMILY MEDICINE

## 2019-09-27 PROCEDURE — 99499 UNLISTED E&M SERVICE: CPT | Mod: S$GLB,,, | Performed by: FAMILY MEDICINE

## 2019-09-27 PROCEDURE — 99999 PR PBB SHADOW E&M-EST. PATIENT-LVL III: ICD-10-PCS | Mod: PBBFAC,,, | Performed by: FAMILY MEDICINE

## 2019-09-27 PROCEDURE — 99214 OFFICE O/P EST MOD 30 MIN: CPT | Mod: S$GLB,,, | Performed by: FAMILY MEDICINE

## 2019-09-27 PROCEDURE — 3078F DIAST BP <80 MM HG: CPT | Mod: CPTII,S$GLB,, | Performed by: FAMILY MEDICINE

## 2019-09-27 PROCEDURE — 1101F PT FALLS ASSESS-DOCD LE1/YR: CPT | Mod: CPTII,S$GLB,, | Performed by: FAMILY MEDICINE

## 2019-09-27 PROCEDURE — 3075F SYST BP GE 130 - 139MM HG: CPT | Mod: CPTII,S$GLB,, | Performed by: FAMILY MEDICINE

## 2019-09-27 PROCEDURE — 3075F PR MOST RECENT SYSTOLIC BLOOD PRESS GE 130-139MM HG: ICD-10-PCS | Mod: CPTII,S$GLB,, | Performed by: FAMILY MEDICINE

## 2019-09-27 RX ORDER — HYDROCODONE BITARTRATE AND ACETAMINOPHEN 5; 325 MG/1; MG/1
1 TABLET ORAL EVERY 12 HOURS PRN
Qty: 30 TABLET | Refills: 0 | Status: SHIPPED | OUTPATIENT
Start: 2019-09-27 | End: 2019-10-18 | Stop reason: SDUPTHER

## 2019-09-27 RX ORDER — BUTALBITAL, ACETAMINOPHEN AND CAFFEINE 50; 325; 40 MG/1; MG/1; MG/1
1 TABLET ORAL DAILY PRN
COMMUNITY
End: 2019-09-27 | Stop reason: SDUPTHER

## 2019-09-27 RX ORDER — BUTALBITAL, ACETAMINOPHEN AND CAFFEINE 50; 325; 40 MG/1; MG/1; MG/1
1 TABLET ORAL DAILY PRN
Qty: 30 TABLET | Refills: 1 | Status: SHIPPED | OUTPATIENT
Start: 2019-09-27 | End: 2020-01-16 | Stop reason: SDUPTHER

## 2019-09-27 RX ORDER — LORAZEPAM 1 MG/1
TABLET ORAL
Qty: 30 TABLET | Refills: 2 | Status: SHIPPED | OUTPATIENT
Start: 2019-09-27 | End: 2020-05-12 | Stop reason: SDUPTHER

## 2019-09-27 NOTE — PROGRESS NOTES
Subjective:       Patient ID: Josseline Stnison is a 73 y.o. female.    Chief Complaint: Medication Refill    HPI:  73-year-old female in for refills of Ativan Fioricet--patient states has appointment with pain management October 31 2019--needs pain pills for next month.  On pain medication--for lower back--right leg--some pain in the right scapular area.  On Ativan--for anxiety--living alone, worried about bills food.  Fioricet has HA daily has appointment in October also with neurologist     Office visit 09/12/2019  73-year-old female -patient with history of bilateral peripheral edema--states has improved but still has some swelling--was told by orthopedist that she had a bacterial infection.  Treated with Keflex      Patient complaining pain in the right scapular area and shoulder to lower back and right flank--patient thinks pulled a muscle--brother moved into the town house next to patient--she was helping him move boxes up and down steps--patient thinks she over did.  Pain with any move--able to raise right arm up to shoulder level-very-painful tries to get overhead hurts to bring shoulders forward back--unable Ni touch toes--hard to lay down.  No lupus rheumatoid gout no fracture        ROS:  Skin: no psoriasis, eczema, skin cancer   HEENT: + headache-see history of present illness no  ocular pain, blurred vision, diplopia, epistaxis,hoarseness change in voice, no  thyroid trouble-history cataract surgery  Lung: No pneumonia, asthma, Tb, wheezing, SOB, +COPD has inhalers albuterol, quit smoking  Heart:  No chest pain,+  ankle edema--went down bilaterally,no  palpitations, +MI, no rinku murmur, +hypertension /60,+ hyperlipidemia , +CAD with stents times 15 sees Dr Baltazar has appt today --back on Plavix  Abdomen: No nausea, vomiting, diarrhea, constipation, +ulcers, +GERD-on Protonix no hepatitis, gallbladder disease, melena, hematochezia, hematemesis + hx GI bleed 7-3-19 was in [facility] for 10 days--in x  10 days-- had EGD/Colon--no more bleeding  : no UTI, renal disease, stones  GYN hyst last mammogram   MS: no fractures, O/A, lupus, rheumatoid, gout--pain mainly in the lumbar spine right leg and right scapular areas now  Neuro:no dizziness,no  LOC, seizures   No diabetes, no anemia, + anxiety, + depression--lives by herself--2 dogs--no money no food--doing better --patient keeping herself busy  , 1 daughter estranged-patient states is not seen on so long not sure which he looks like, 3 grandchildren one , lives alone--on disability  benefits    Objective:   Physical Exam:  General: Well nourished, well developed,  + thin slightly emaciated  Skin:  Minimal ecchymosis forearms bilaterally due to blood thinners   HEENT: Eyes PERRLA, EOM intact, irregular pupil secondary to bilateral cataract surgery nose patent, throat non-erythematous upper dentures edentulous lower jaw ears TMs clear  NECK: Supple, no bruits, No JVD, no nodes  Lungs: Clear, no rales, rhonchi, wheezing decreased breath sounds bilaterally  Heart: Regular rate and rhythm, gallops, or rubs +heart murmur  Abdomen: flat, bowel sounds positive, no tenderness, or organomegaly  MS:  +kyphosis--arthritis several joints right hip left wrist left elbow--tenderness especially in the right cervical spine right upper trapezius right mid thoracic area lumbar spine--pain with abduction arm to 90° very painful raise arms overhead especially in the mid scapular area pain with anterior posterior flexion the shoulders pain with anterior flexion of the spine to 10° extension 10° lateral flexion rotation 10° straight leg lift pulling sensation back no radiculopathy Neuro: Alert, CN intact, oriented X 3 Romberg negative heel-toe intact  Extremities: No cyanosis, clubbing, or edema         Assessment:       1. Chronic pain syndrome    2. 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 Anxiety    4.  Chronic obstructive pulmonary disease, unspecified COPD type    5. Chronic diastolic congestive heart failure    6. Essential hypertension    7. Hx of heart artery stent    8. Hypercholesteremia    9. Hypothyroidism, unspecified type    10. Malnutrition of mild degree    11. Gastroesophageal reflux disease, esophagitis presence not specified    12. Gastric ulcer without hemorrhage or perforation, unspecified chronicity    13. 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    Anxiety    Chronic obstructive pulmonary disease, unspecified COPD type    Chronic diastolic congestive heart failure    Essential hypertension    Hx of heart artery stent    Hypercholesteremia  -     CBC auto differential; Future; Expected date: 12/27/2019  -     Comprehensive metabolic panel; Future; Expected date: 12/27/2019  -     Lipid panel; Future; Expected date: 12/27/2019    Hypothyroidism, unspecified type    Malnutrition of mild degree    Gastroesophageal reflux disease, esophagitis presence not specified    Gastric ulcer without hemorrhage or perforation, unspecified chronicity    Osteoarthritis, unspecified osteoarthritis type, unspecified site    Other orders  -     butalbital-acetaminophen-caffeine -40 mg (FIORICET, ESGIC) -40 mg per tablet; Take 1 tablet by mouth daily as needed for Pain.  Dispense: 30 tablet; Refill: 1  -     HYDROcodone-acetaminophen (NORCO) 5-325 mg per tablet; Take 1 tablet by mouth every 12 (twelve) hours as needed.  Dispense: 30 tablet; Refill: 0  -     LORazepam (ATIVAN) 1 MG tablet; 1/2 po bid or 1 po qd prn anxiety  Dispense: 30 tablet; Refill: 2       History back pain, right leg pain, right scapular pain--has appointment with chronic pain clinic October 31, 2019--no NSAIDs due to history of GI bleed and Plavix--Norco 5 mg 1 p.o. Q.12  h  Peripheral edema--improve--Lasix 40 1 p.o. q.a.m. Micro-K 10 1 p.o. Q.a.m.  Daily headache--on Fioricet--needs to see neurologist states has appointment on October--needs to see about getting on Imitrex or tryptan or Inderal to decrease need for Fioricet  Appointment psychologist--on Ativan---patient basically homeless difficulty buying food for knee benefit from ambulatory referral for out patient care   Keep  appt Dr Baltazar-- significant cardiac history--hypertension/hyperlipidemia/congestive heart failure/heart murmur/CAD with stents times 15/right carotid surgery  COPD--patient quit smoking 3 months ago   Needs to do a headache diary---requiring Fioricet routinely will need to have a trial of Imitrex or Sonny Gore neurologist  Redo lab  CBCs CMP lipid in 3 mo then q 6 mo Monitar Hct and K+   Health maintenance no additional workup   Moist heat , theragesic or tiger balm, ROM exercises

## 2019-10-15 ENCOUNTER — TELEPHONE (OUTPATIENT)
Dept: PRIMARY CARE CLINIC | Facility: CLINIC | Age: 74
End: 2019-10-15

## 2019-10-15 NOTE — TELEPHONE ENCOUNTER
----- Message from Sosagay Vidalmons sent at 10/15/2019 12:27 PM CDT -----  Contact:   Type: Rx    Name of medication(s): isosorbide mononitrate tablet 20 mg,   LORazepam (ATIVAN) 1 MG tablet    Is this a refill? New rx? refill    Who prescribed medication? [facility]    Pharmacy Name, Phone, & Location: [facility], [place] - [address]    Comments:   Please advise, thank you

## 2019-10-17 RX ORDER — OMEPRAZOLE 40 MG/1
CAPSULE, DELAYED RELEASE ORAL
Qty: 30 CAPSULE | Refills: 5 | Status: SHIPPED | OUTPATIENT
Start: 2019-10-17 | End: 2020-07-20

## 2019-10-18 ENCOUNTER — OFFICE VISIT (OUTPATIENT)
Dept: PRIMARY CARE CLINIC | Facility: CLINIC | Age: 74
End: 2019-10-18
Payer: MEDICARE

## 2019-10-18 VITALS
TEMPERATURE: 98 F | HEART RATE: 77 BPM | OXYGEN SATURATION: 95 % | HEIGHT: 64 IN | DIASTOLIC BLOOD PRESSURE: 56 MMHG | SYSTOLIC BLOOD PRESSURE: 110 MMHG | BODY MASS INDEX: 18.1 KG/M2 | WEIGHT: 106 LBS | RESPIRATION RATE: 17 BRPM

## 2019-10-18 DIAGNOSIS — F41.9 ANXIETY: ICD-10-CM

## 2019-10-18 DIAGNOSIS — E03.9 HYPOTHYROIDISM, UNSPECIFIED TYPE: Chronic | ICD-10-CM

## 2019-10-18 DIAGNOSIS — E78.00 HYPERCHOLESTEREMIA: Chronic | ICD-10-CM

## 2019-10-18 DIAGNOSIS — D64.9 ANEMIA, UNSPECIFIED TYPE: ICD-10-CM

## 2019-10-18 DIAGNOSIS — I25.10 CORONARY ARTERY DISEASE INVOLVING NATIVE CORONARY ARTERY OF NATIVE HEART WITHOUT ANGINA PECTORIS: Chronic | ICD-10-CM

## 2019-10-18 DIAGNOSIS — Z95.5 HX OF HEART ARTERY STENT: ICD-10-CM

## 2019-10-18 DIAGNOSIS — I10 ESSENTIAL HYPERTENSION: Chronic | ICD-10-CM

## 2019-10-18 DIAGNOSIS — G43.511 INTRACTABLE PERSISTENT MIGRAINE AURA WITHOUT CEREBRAL INFARCTION AND WITH STATUS MIGRAINOSUS: ICD-10-CM

## 2019-10-18 DIAGNOSIS — J44.9 CHRONIC OBSTRUCTIVE PULMONARY DISEASE, UNSPECIFIED COPD TYPE: Chronic | ICD-10-CM

## 2019-10-18 DIAGNOSIS — M19.90 OSTEOARTHRITIS, UNSPECIFIED OSTEOARTHRITIS TYPE, UNSPECIFIED SITE: ICD-10-CM

## 2019-10-18 DIAGNOSIS — G89.4 CHRONIC PAIN SYNDROME: Primary | ICD-10-CM

## 2019-10-18 DIAGNOSIS — I50.32 CHRONIC DIASTOLIC CONGESTIVE HEART FAILURE: Chronic | ICD-10-CM

## 2019-10-18 DIAGNOSIS — J02.9 PHARYNGITIS, UNSPECIFIED ETIOLOGY: ICD-10-CM

## 2019-10-18 PROCEDURE — 3074F SYST BP LT 130 MM HG: CPT | Mod: CPTII,S$GLB,, | Performed by: FAMILY MEDICINE

## 2019-10-18 PROCEDURE — 3078F DIAST BP <80 MM HG: CPT | Mod: CPTII,S$GLB,, | Performed by: FAMILY MEDICINE

## 2019-10-18 PROCEDURE — 3074F PR MOST RECENT SYSTOLIC BLOOD PRESSURE < 130 MM HG: ICD-10-PCS | Mod: CPTII,S$GLB,, | Performed by: FAMILY MEDICINE

## 2019-10-18 PROCEDURE — 99499 RISK ADDL DX/OHS AUDIT: ICD-10-PCS | Mod: S$GLB,,, | Performed by: FAMILY MEDICINE

## 2019-10-18 PROCEDURE — 1101F PR PT FALLS ASSESS DOC 0-1 FALLS W/OUT INJ PAST YR: ICD-10-PCS | Mod: CPTII,S$GLB,, | Performed by: FAMILY MEDICINE

## 2019-10-18 PROCEDURE — 99213 OFFICE O/P EST LOW 20 MIN: CPT | Mod: S$GLB,,, | Performed by: FAMILY MEDICINE

## 2019-10-18 PROCEDURE — 99999 PR PBB SHADOW E&M-EST. PATIENT-LVL IV: CPT | Mod: PBBFAC,,, | Performed by: FAMILY MEDICINE

## 2019-10-18 PROCEDURE — 1101F PT FALLS ASSESS-DOCD LE1/YR: CPT | Mod: CPTII,S$GLB,, | Performed by: FAMILY MEDICINE

## 2019-10-18 PROCEDURE — 3078F PR MOST RECENT DIASTOLIC BLOOD PRESSURE < 80 MM HG: ICD-10-PCS | Mod: CPTII,S$GLB,, | Performed by: FAMILY MEDICINE

## 2019-10-18 PROCEDURE — 99499 UNLISTED E&M SERVICE: CPT | Mod: S$GLB,,, | Performed by: FAMILY MEDICINE

## 2019-10-18 PROCEDURE — 99213 PR OFFICE/OUTPT VISIT, EST, LEVL III, 20-29 MIN: ICD-10-PCS | Mod: S$GLB,,, | Performed by: FAMILY MEDICINE

## 2019-10-18 PROCEDURE — 99999 PR PBB SHADOW E&M-EST. PATIENT-LVL IV: ICD-10-PCS | Mod: PBBFAC,,, | Performed by: FAMILY MEDICINE

## 2019-10-18 RX ORDER — AZITHROMYCIN 250 MG/1
TABLET, FILM COATED ORAL
Qty: 6 TABLET | Refills: 0 | Status: SHIPPED | OUTPATIENT
Start: 2019-10-18 | End: 2019-10-22

## 2019-10-18 RX ORDER — HYDROCODONE BITARTRATE AND ACETAMINOPHEN 5; 325 MG/1; MG/1
1 TABLET ORAL EVERY 12 HOURS PRN
Qty: 30 TABLET | Refills: 0 | Status: SHIPPED | OUTPATIENT
Start: 2019-10-18 | End: 2019-11-13 | Stop reason: SDUPTHER

## 2019-10-18 NOTE — PROGRESS NOTES
Subjective:       Patient ID: Josseline Stinson is a 73 y.o. female.    Chief Complaint: Medication Refill and Sore Throat    HPI:  73-year-old female in for refills of Ativan Fioricet--patient states has appointment with pain management October 31 2019--needs pain pills for next month.  On pain medication--for lower back--right leg--some pain in the right scapular area.  On Ativan--for anxiety--living alone, worried about bills food.  Fioricet has HA daily has appointment in October also with neurologist     Office visit 09/12/2019  73-year-old female -patient with history of bilateral peripheral edema--states has improved but still has some swelling--was told by orthopedist that she had a bacterial infection.  Treated with Keflex      Patient complaining pain in the right scapular area and shoulder to lower back and right flank--patient thinks pulled a muscle--brother moved into the town house next to patient--she was helping him move boxes up and down steps--patient thinks she over did.  Pain with any move--able to raise right arm up to shoulder level-very-painful tries to get overhead hurts to bring shoulders forward back--unable Ni touch toes--hard to lay down.  No lupus rheumatoid gout no fracture        ROS:  Skin: no psoriasis, eczema, skin cancer   HEENT: + headache-see history of present illness no  ocular pain, blurred vision, diplopia, epistaxis,hoarseness change in voice, no  thyroid trouble-history cataract surgery  Lung: No pneumonia, asthma, Tb, wheezing, SOB, +COPD has inhalers albuterol, quit smoking  Heart:  No chest pain,+  ankle edema--went down bilaterally,no  palpitations, +MI, no rinku murmur, +hypertension /60,+ hyperlipidemia , +CAD with stents times 15 sees Dr Baltazar has appt today --back on Plavix  Abdomen: No nausea, vomiting, diarrhea, constipation, +ulcers, +GERD-on Protonix no hepatitis, gallbladder disease, melena, hematochezia, hematemesis + hx GI bleed 7-3-19 was in [facility] for  10 days--in x 10 days-- had EGD/Colon--no more bleeding  : no UTI, renal disease, stones  GYN hyst last mammogram   MS: no fractures, O/A, lupus, rheumatoid, gout--pain mainly in the lumbar spine right leg and right scapular areas now  Neuro:no dizziness,no  LOC, seizures   No diabetes, no anemia, + anxiety, + depression--lives by herself--2 dogs--no money no food--doing better --patient keeping herself busy  , 1 daughter estranged-patient states is not seen on so long not sure which he looks like, 3 grandchildren one , lives alone--on disability  benefits    Objective:   Physical Exam:  General: Well nourished, well developed,  + thin slightly emaciated  Skin:  Minimal ecchymosis forearms bilaterally due to blood thinners   HEENT: Eyes PERRLA, EOM intact, irregular pupil secondary to bilateral cataract surgery nose patent, throat non-erythematous upper dentures edentulous lower jaw ears TMs clear  NECK: Supple, no bruits, No JVD, no nodes  Lungs: Clear, no rales, rhonchi, wheezing decreased breath sounds bilaterally  Heart: Regular rate and rhythm, gallops, or rubs +heart murmur  Abdomen: flat, bowel sounds positive, no tenderness, or organomegaly  MS:  +kyphosis--arthritis several joints right hip left wrist left elbow--tenderness especially in the right cervical spine right upper trapezius right mid thoracic area lumbar spine--pain with abduction arm to 90° very painful raise arms overhead especially in the mid scapular area pain with anterior posterior flexion the shoulders pain with anterior flexion of the spine to 10° extension 10° lateral flexion rotation 10° straight leg lift pulling sensation back no radiculopathy Neuro: Alert, CN intact, oriented X 3 Romberg negative heel-toe intact  Extremities: No cyanosis, clubbing, or edema         Assessment:       1. Chronic pain syndrome    2. Pharyngitis, unspecified etiology    3. Intractable persistent migraine aura without cerebral infarction  and with status migrainosus    4. Anxiety    5. Chronic obstructive pulmonary disease, unspecified COPD type    6. Essential hypertension    7. Hx of heart artery stent    8. Hypercholesteremia    9. Coronary artery disease involving native coronary artery of native heart without angina pectoris    10. Chronic diastolic congestive heart failure    11. Anemia, unspecified type    12. Hypothyroidism, unspecified type    13. 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    Anxiety    Chronic obstructive pulmonary disease, unspecified COPD type    Essential hypertension    Hx of heart artery stent    Hypercholesteremia    Coronary artery disease involving native coronary artery of native heart without angina pectoris    Chronic diastolic congestive heart failure    Anemia, unspecified type    Hypothyroidism, unspecified type    Osteoarthritis, unspecified osteoarthritis type, unspecified site       History back pain, right leg pain, right scapular pain--has appointment with chronic pain clinic October 31, 2019--no NSAIDs due to history of GI bleed and Plavix--Norco 5 mg 1 p.o. Q.12 h  Peripheral edema--improve--Lasix 40 1 p.o. q.a.m. Micro-K 10 1 p.o. Q.a.m.  Daily headache--on Fioricet--needs to see neurologist states has appointment on October--needs to see about getting on Imitrex or tryptan or Inderal to decrease need for Fioricet  Appointment psychologist--on Ativan---patient basically homeless difficulty buying food for knee benefit from ambulatory referral for out patient care   Keep  appt Dr Baltazar-- significant cardiac history--hypertension/hyperlipidemia/congestive heart failure/heart murmur/CAD with stents times 15/right carotid surgery  COPD--patient quit smoking 3 months ago   Needs to do a headache diary---requiring  Fioricet routinely will need to have a trial of Imitrex or seeDr Sofía neurologist  Redo lab  CBCs CMP lipid in 3 mo then q 6 mo Monitar Hct and K+   Health maintenance no additional workup   Moist heat , theragesic or tiger balm, ROM exercises  Subjective:       Patient ID: Josseline Stinson is a 73 y.o. female.    Chief Complaint: Medication Refill and Sore Throat    HPI:  71-year-old female in for sore throat refill--subjective fever at night, no runny nose, +sore throat cough, +cough, +phlegm--[place].  Pneumonia asthma TB, no smoking      On narco for lower back --told has nerve damage --has appt 10-31-19--patient states unable to take epidural steroid injection--last time had injection was in the hospital for intractable vomiting. Pt has seen orhtopedist--sent to another MD because she did not want shots htye would not see her. Has arthritis --feet and hands.  No NSAIDs due to Plavix           ROS:  Skin: no psoriasis, eczema, skin cancer   HEENT: + headache-migraine  no  ocular pain, blurred vision, diplopia, epistaxis,hoarseness change in voice, no  thyroid trouble-history cataract surgery  Lung: No pneumonia, asthma, Tb, wheezing, SOB, +COPD has inhalers albuterol, quit smoking  Heart:  No chest pain,+  ankle edema--went down bilaterally,no  palpitations, +MI in jan , no rinku murmur, +hypertension /56 ,+ hyperlipidemia , +CAD with stents times 15 sees Dr Baltazar has appt today --back on Plavix  Abdomen: No nausea, vomiting, diarrhea, constipation, +ulcers, +GERD-on Protonix no hepatitis, gallbladder disease, melena, hematochezia, hematemesis + hx GI bleed 7-3-19 was in [facility] for 10 days--in x 10 days-- had EGD/Colon--no more bleeding-on Protonix  : no UTI, renal disease, stones  GYN hyst last mammogram   MS: no fractures, O/A, lupus, rheumatoid, gout--pain mainly in the lumbar spine right leg and right scapular areas now  Neuro:no dizziness,no  LOC, seizures   No diabetes, no anemia, + anxiety, +  depression--lives by herself--2 dogs--no money no food--doing better --patient keeping herself busy  , 1 daughter estranged-patient states is not seen on so long not sure which he looks like, 3 grandchildren one , lives alone--on disability  benefits    Objective:   Physical Exam:  General: Well nourished, well developed,  + thin slightly emaciated  Skin:  Minimal ecchymosis forearms bilaterally due to blood thinners   HEENT: Eyes PERRLA, EOM intact, irregular pupil secondary to bilateral cataract surgery nose patent, throat +1/4 erythematous upper dentures edentulous lower jaw ears TMs clear  NECK: Supple, no bruits, No JVD, no nodes  Lungs: Clear, no rales, rhonchi, wheezing decreased breath sounds bilaterally  Heart: Regular rate and rhythm, gallops, or rubs +heart murmur  Abdomen: flat, bowel sounds positive, no tenderness, or organomegaly  MS:  No significant change +kyphosis--arthritis several joints right hip left wrist left elbow--tenderness especially in the right cervical spine right upper trapezius right mid thoracic area lumbar spine--pain with abduction arm to 90° very painful raise arms overhead especially in the mid scapular area pain with anterior posterior flexion the shoulders pain with anterior flexion of the spine to 10° extension 10° lateral flexion rotation 10° straight leg lift pulling sensation back no radiculopathy   Neuro: Alert, CN intact, oriented X 3 Romberg negative heel-toe intact  Extremities: No cyanosis, clubbing, or edema         Assessment:       1. Chronic pain syndrome    2. Pharyngitis, unspecified etiology    3. 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 Anxiety    5. Chronic obstructive pulmonary disease, unspecified COPD type    6. Essential hypertension    7. Hx of heart artery stent    8. Hypercholesteremia    9. Coronary artery disease involving native coronary artery of native heart without angina  pectoris    10. Chronic diastolic congestive heart failure    11. Anemia, unspecified type    12. Hypothyroidism, unspecified type    13. 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    Anxiety    Chronic obstructive pulmonary disease, unspecified COPD type    Essential hypertension    Hx of heart artery stent    Hypercholesteremia    Coronary artery disease involving native coronary artery of native heart without angina pectoris    Chronic diastolic congestive heart failure    Anemia, unspecified type    Hypothyroidism, unspecified type    Osteoarthritis, unspecified osteoarthritis type, unspecified site       Pharyngitis--Zithromax/prednisone tapering dose/Mucinex DM   History back pain, right leg pain, right scapular pain--has appointment with chronic pain clinic October 31, 2019--no NSAIDs due to history of GI bleed and Plavix--Norco 5 mg 1 p.o. Q.12 h  Peripheral edema--improve--Lasix 40 1 p.o. q.a.m. Micro-K 10 1 p.o. Q.a.m.  Daily headache--on Fioricet--needs to see neurologist states has appointment on October--needs to see about getting on Imitrex or tryptan or Inderal to decrease need for Fioricet  Appointment psychologist--on Ativan---patient basically homeless difficulty buying food for knee benefit from ambulatory referral for out patient care   Keep  appt Dr Baltazar-- significant cardiac history--hypertension/hyperlipidemia/congestive heart failure/heart murmur/CAD with stents times 15/right carotid surgery  COPD--patient quit smoking 3 months ago --patient needs to increase weight--Ensure 1 can t.i.d./centrum dandre q.d./B12 and Periactin if fails to improve  Redo lab  CBCs CMP lipid T4 TSH in 3 mo then q 6 mo Monitar Hct and K+   Health maintenance no additional workup   Moist heat , theragesic or tiger balm,  ROM exercises

## 2019-10-30 ENCOUNTER — TELEPHONE (OUTPATIENT)
Dept: PAIN MEDICINE | Facility: CLINIC | Age: 74
End: 2019-10-30

## 2019-10-30 NOTE — TELEPHONE ENCOUNTER
----- Message from Sanjuanita Javier sent at 10/30/2019  8:46 AM CDT -----  Contact: pt  Reason:Returinig call from staff    Communication: [phone]

## 2019-10-30 NOTE — TELEPHONE ENCOUNTER
Spoke with patient about rescheduling her appointment with Dr Ford to 11/7/19. Appointment has been moved to time approved by the patient. She verbalized understanding of the information provided to her. No further issues discussed.

## 2019-10-30 NOTE — TELEPHONE ENCOUNTER
Could not leave message on patient's contact number, left call back message with Yan Rod. Discuss rescheduling her appointment.

## 2019-10-30 NOTE — TELEPHONE ENCOUNTER
----- Message from Edmar Panchal sent at 10/30/2019  1:26 PM CDT -----  Contact: Pt. [phone]  The patient would like to keep her appointment for now. If the weather is too bad she'll call to reschedule.

## 2019-11-07 ENCOUNTER — OFFICE VISIT (OUTPATIENT)
Dept: PAIN MEDICINE | Facility: CLINIC | Age: 74
End: 2019-11-07
Payer: MEDICARE

## 2019-11-07 VITALS — HEIGHT: 64 IN | BODY MASS INDEX: 18.18 KG/M2 | WEIGHT: 106.5 LBS

## 2019-11-07 DIAGNOSIS — G89.4 CHRONIC PAIN DISORDER: ICD-10-CM

## 2019-11-07 DIAGNOSIS — M51.36 DDD (DEGENERATIVE DISC DISEASE), LUMBAR: Primary | ICD-10-CM

## 2019-11-07 DIAGNOSIS — M47.816 LUMBAR SPONDYLOSIS: ICD-10-CM

## 2019-11-07 PROCEDURE — 1101F PT FALLS ASSESS-DOCD LE1/YR: CPT | Mod: CPTII,S$GLB,, | Performed by: PAIN MEDICINE

## 2019-11-07 PROCEDURE — 99499 RISK ADDL DX/OHS AUDIT: ICD-10-PCS | Mod: S$GLB,,, | Performed by: PAIN MEDICINE

## 2019-11-07 PROCEDURE — 99499 UNLISTED E&M SERVICE: CPT | Mod: S$GLB,,, | Performed by: PAIN MEDICINE

## 2019-11-07 PROCEDURE — 1101F PR PT FALLS ASSESS DOC 0-1 FALLS W/OUT INJ PAST YR: ICD-10-PCS | Mod: CPTII,S$GLB,, | Performed by: PAIN MEDICINE

## 2019-11-07 PROCEDURE — 99999 PR PBB SHADOW E&M-EST. PATIENT-LVL III: CPT | Mod: PBBFAC,,, | Performed by: PAIN MEDICINE

## 2019-11-07 PROCEDURE — 99214 PR OFFICE/OUTPT VISIT, EST, LEVL IV, 30-39 MIN: ICD-10-PCS | Mod: S$GLB,,, | Performed by: PAIN MEDICINE

## 2019-11-07 PROCEDURE — 99214 OFFICE O/P EST MOD 30 MIN: CPT | Mod: S$GLB,,, | Performed by: PAIN MEDICINE

## 2019-11-07 PROCEDURE — 99999 PR PBB SHADOW E&M-EST. PATIENT-LVL III: ICD-10-PCS | Mod: PBBFAC,,, | Performed by: PAIN MEDICINE

## 2019-11-07 RX ORDER — IBUPROFEN 800 MG/1
TABLET ORAL
Refills: 3 | COMMUNITY
Start: 2019-10-23 | End: 2020-06-11

## 2019-11-07 RX ORDER — ATORVASTATIN CALCIUM 40 MG/1
40 TABLET, FILM COATED ORAL DAILY
Refills: 5 | COMMUNITY
Start: 2019-10-22 | End: 2020-02-17

## 2019-11-07 RX ORDER — ALBUTEROL SULFATE 90 UG/1
AEROSOL, METERED RESPIRATORY (INHALATION)
Refills: 5 | COMMUNITY
Start: 2019-10-30 | End: 2020-01-07

## 2019-11-07 RX ORDER — LEVOCETIRIZINE DIHYDROCHLORIDE 5 MG/1
5 TABLET, FILM COATED ORAL NIGHTLY
Refills: 12 | COMMUNITY
Start: 2019-10-22 | End: 2020-05-07

## 2019-11-07 RX ORDER — ALENDRONATE SODIUM 70 MG/1
TABLET ORAL
Refills: 5 | COMMUNITY
Start: 2019-10-18 | End: 2020-01-13

## 2019-11-07 RX ORDER — ARIPIPRAZOLE 2 MG/1
2 TABLET ORAL DAILY
Refills: 12 | COMMUNITY
Start: 2019-10-22 | End: 2020-10-09

## 2019-11-07 RX ORDER — MELOXICAM 15 MG/1
15 TABLET ORAL DAILY
Refills: 5 | COMMUNITY
Start: 2019-10-14 | End: 2020-06-11

## 2019-11-07 RX ORDER — PANTOPRAZOLE SODIUM 40 MG/1
40 TABLET, DELAYED RELEASE ORAL 2 TIMES DAILY
Refills: 11 | COMMUNITY
Start: 2019-10-28 | End: 2020-02-17

## 2019-11-07 RX ORDER — TRAZODONE HYDROCHLORIDE 100 MG/1
TABLET ORAL
COMMUNITY
Start: 2019-11-04 | End: 2020-05-28 | Stop reason: SDUPTHER

## 2019-11-07 NOTE — PROGRESS NOTES
Subjective:     Patient ID: Josseline Stinson is a 73 y.o. female    Chief Complaint: Low-back Pain      Referred by: Dony Woo MD      HPI:    Initial Encounter (11/7/19):  Josseline Stinson is a 73 y.o. female who presents today with chronic bilateral low back pain. This pain has been present for over 10 years.  No specific inciting event or injury noted.  Patient localizes the pain across the lower lumbar region.  Patient states the pain radiates to the bilateral lower extremities in a nondermatomal pattern.  She reports nonspecific numbness in the right lower extremity at times.  She reports diffuse weakness of the bilateral lower extremities. She denies any bowel or bladder dysfunction.  She has been treated in the past with chronic opioid pain medications.  Most recently she has been taking Norco 5-325 mg q.12 hours p.r.n. provided by her primary care physician.  Patient states that 3 years ago she did undergo a nerve block procedure done by Dr. Ibarra.  She did not feel that these procedures very effective..   This pain is described in detail below.    Physical Therapy:  Yes.    Non-pharmacologic Treatment:  Rest helps         · TENS?  No    Pain Medications:         · Currently taking:  Nothing    · Has tried in the past:  Norco, meloxicam, Aleve, Advil, Tylenol, muscle relaxers    · Has not tried: TCAs, SNRIs, anticonvulsants, topical creams    Blood thinners:  Plavix    Interventional Therapies:  Previous injections done roughly 3 years ago without any relief.    Relevant Surgeries:  None    Affecting sleep?  Yes    Affecting daily activities? yes    Depressive symptoms? yes          · SI/HI? No    Work status: Disabled    Pain Scores:    Best:       6/10  Worst:     10/10  Usually:   10/10  Today:    10/10    Review of Systems   Constitutional: Negative for activity change, appetite change, chills, fatigue, fever and unexpected weight change.   HENT: Negative for hearing loss.    Eyes: Negative for visual  disturbance.   Respiratory: Negative for chest tightness and shortness of breath.    Cardiovascular: Negative for chest pain.   Gastrointestinal: Negative for abdominal pain, constipation, diarrhea, nausea and vomiting.   Genitourinary: Negative for difficulty urinating.   Musculoskeletal: Positive for arthralgias, back pain, gait problem and myalgias. Negative for neck pain.   Skin: Negative for rash.   Neurological: Positive for weakness and numbness. Negative for dizziness, light-headedness and headaches.   Psychiatric/Behavioral: Positive for sleep disturbance. Negative for hallucinations and suicidal ideas. The patient is not nervous/anxious.        Past Medical History:   Diagnosis Date    Anxiety     Arthritis     CHF (congestive heart failure)     Chronic pain syndrome 6/4/2019    COPD (chronic obstructive pulmonary disease)     Coronary artery disease involving native coronary artery of native heart without angina pectoris 3/21/2016    Encounter for blood transfusion     Essential hypertension 3/21/2016    GERD (gastroesophageal reflux disease)     History of gastric ulcer 5/14/2019    Hx of heart artery stent 5/14/2019    Hypercholesteremia 3/21/2016    MI (myocardial infarction)     x4    NSTEMI (non-ST elevated myocardial infarction) 1/2/2018    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 3/21/2016    Pneumonia of right lower lobe due to infectious organism 5/14/2019    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    Thyroid disease     Ulcer        Past Surgical History:   Procedure Laterality Date    carotid artery surgeriy      catheterization cardiac cath Left 01/03/2017    Angioplasty    COLONOSCOPY N/A 7/9/2019    Procedure: COLONOSCOPY;  Surgeon: Lorna Paula MD;  Location: [facility];  Service: Endoscopy;  Laterality: N/A;    ESOPHAGOGASTRODUODENOSCOPY N/A 7/5/2019    Procedure: ESOPHAGOGASTRODUODENOSCOPY (EGD);  Surgeon: Dane Khoury MD;  Location: [facility];   Service: Endoscopy;  Laterality: N/A;    ESOPHAGOGASTRODUODENOSCOPY N/A 2019    Procedure: ESOPHAGOGASTRODUODENOSCOPY (EGD);  Surgeon: Sandra Alicea MD;  Location: [facility];  Service: Endoscopy;  Laterality: N/A;    FRACTURE SURGERY Left     elbow and wrist    HIP SURGERY Right     x 4    HYSTERECTOMY  1972       Social History     Socioeconomic History    Marital status:      Spouse name: Not on file    Number of children: Not on file    Years of education: Not on file    Highest education level: Not on file   Occupational History    Occupation: disabled    Social Needs    Financial resource strain: Not on file    Food insecurity:     Worry: Not on file     Inability: Not on file    Transportation needs:     Medical: Not on file     Non-medical: Not on file   Tobacco Use    Smoking status: Former Smoker     Packs/day: 1.00     Years: 51.00     Pack years: 51.00     Types: Cigarettes     Start date:      Last attempt to quit:      Years since quittin.8    Smokeless tobacco: Never Used    Tobacco comment: Patient enrolled in tobacco trust and ambulatory referral  to cessation   Substance and Sexual Activity    Alcohol use: No     Alcohol/week: 0.0 standard drinks    Drug use: No    Sexual activity: Yes     Partners: Male   Lifestyle    Physical activity:     Days per week: Not on file     Minutes per session: Not on file    Stress: Not on file   Relationships    Social connections:     Talks on phone: Not on file     Gets together: Not on file     Attends Sikh service: Not on file     Active member of club or organization: Not on file     Attends meetings of clubs or organizations: Not on file     Relationship status: Not on file   Other Topics Concern    Not on file   Social History Narrative    Not on file       Review of patient's allergies indicates:   Allergen Reactions    Cortisone Swelling     SWELLING    Darvocet a500 [propoxyphene n-acetaminophen]  Nausea And Vomiting    Toradol [ketorolac] Nausea And Vomiting    Tramadol Nausea And Vomiting    Codeine Palpitations     dizzy       Current Outpatient Medications on File Prior to Visit   Medication Sig Dispense Refill    albuterol (PROVENTIL/VENTOLIN HFA) 90 mcg/actuation inhaler INHALE one PUFF BY MOUTH EVERY 4 TO 6 HOURS AS NEEDED  5    alendronate (FOSAMAX) 70 MG tablet TAKE ONE TABLET BY MOUTH every SEVEN DAYS  5    ARIPiprazole (ABILIFY) 2 MG Tab Take 2 mg by mouth once daily.  12    atorvastatin (LIPITOR) 40 MG tablet Take 40 mg by mouth once daily.  5    butalbital-acetaminophen-caffeine -40 mg (FIORICET, ESGIC) -40 mg per tablet Take 1 tablet by mouth daily as needed for Pain. 30 tablet 1    clopidogrel (PLAVIX) 75 mg tablet clopidogrel 75 mg tablet daily      escitalopram oxalate (LEXAPRO) 20 MG tablet Take 1 tablet (20 mg total) by mouth once daily. 30 tablet 5    HYDROcodone-acetaminophen (NORCO) 5-325 mg per tablet Take 1 tablet by mouth every 12 (twelve) hours as needed. 30 tablet 0    ibuprofen (ADVIL,MOTRIN) 800 MG tablet TAKE ONE TABLET BY MOUTH TWICE DAILY with food AND ranitidine  3    levocetirizine (XYZAL) 5 MG tablet Take 5 mg by mouth nightly.  12    LORazepam (ATIVAN) 1 MG tablet 1/2 po bid or 1 po qd prn anxiety 30 tablet 2    meloxicam (MOBIC) 15 MG tablet Take 15 mg by mouth once daily.  5    metoprolol succinate (TOPROL-XL) 25 MG 24 hr tablet Take 1 tablet (25 mg total) by mouth once daily. 30 tablet 5    omeprazole (PRILOSEC) 40 MG capsule TAKE ONE CAPSULE BY MOUTH ONCE DAILY 30 capsule 5    pantoprazole (PROTONIX) 40 MG tablet Take 40 mg by mouth 2 (two) times daily.  11    potassium chloride (MICRO-K) 10 MEQ CpSR One p.o. q.a.m. with Lasix only 30 capsule 5    traZODone (DESYREL) 100 MG tablet       [DISCONTINUED] pantoprazole (PROTONIX) 20 MG tablet Take 2 tablets (40 mg total) by mouth 2 (two) times daily before meals. (Patient taking differently:  "Take 20 mg by mouth once daily. ) 180 tablet 1     No current facility-administered medications on file prior to visit.        Objective:      Ht 5' 4" (1.626 m)   Wt 48.3 kg (106 lb 7.7 oz)   BMI 18.28 kg/m²     Exam:  GEN:  Well developed, well nourished.  No acute distress.  Normal pain behavior.  HEENT:  No trauma.  Mucous membranes moist.  Nares patent bilaterally.  PSYCH: Normal affect. Thought content appropriate.  CHEST:  Breathing symmetric.  No audible wheezing.  ABD: Soft, non-distended.  SKIN:  Warm, pink, dry.  No rash on exposed areas.    EXT:  No cyanosis, clubbing, or edema.  No color change or changes in nail or hair growth.  NEURO/MUSCULOSKELETAL:  Fully alert, oriented, and appropriate. Speech normal tiffany. No cranial nerve deficits.   Gait:  Antalgic.  No trendelenburg sign bilaterally.   Motor Strength: 5/5 motor strength throughout lower extremities.   Sensory:  No sensory deficit in the lower extremities.   Reflexes:  2 + and symmetric throughout.  Downgoing Babinski's bilaterally.  No clonus or spasticity.  L-Spine:  Very limited ROM with pain on all movements.  Positive pain with axial/facet loading bilaterally.  Negative SLR bilaterally.    SI Joint/Hip:  Unable to perform KARINA bilaterally.  Unable to perform FADIR bilaterally.  Diffusely TTP over lumbar paraspinals, bilateral SI joints, hips         Imaging:  Narrative     Comparison: None available    Technique: AP, lateral and coned down lateral radiographs of the lumbar spine.    Findings: There is normal lumbar spinal alignment. Generalized loss of bone mineral density is seen. Multilevel anterior osteophytosis and mild degenerative disc disease is identified. No acute fracture or subluxation is seen. Diffuse atherosclerosis is seen. Lower lumbar facet arthrosis is noted, most pronounced at L4-5 and L5-S1. Right hip arthroplasty partially visualized. No dilated bowel loops are noted.   Impression       1.  Multilevel, mild " degenerative disc disease and lower lumbar facet arthrosis. No acute abnormality is seen. Generalized loss of bone mineral density.      In agreement with the preliminary [facility] report.       Electronically signed by: Thania Cespedes MD  Date: 07/31/17  Time: 08:16          Assessment:       Encounter Diagnoses   Name Primary?    DDD (degenerative disc disease), lumbar Yes    Lumbar spondylosis     Chronic pain disorder          Plan:       Josseline was seen today for low-back pain.    Diagnoses and all orders for this visit:    DDD (degenerative disc disease), lumbar  -     X-Ray Lumbar Spine Ap Lateral w/Flex Ext; Future    Lumbar spondylosis  -     X-Ray Lumbar Spine Ap Lateral w/Flex Ext; Future    Chronic pain disorder  -     X-Ray Lumbar Spine Ap Lateral w/Flex Ext; Future        Josseline Rodney is a 73 y.o. female with chronic bilateral low back pain.  Pain does radiate to lower extremities in a nonspecific pattern.  Pain appears to be mostly axial and likely related to lower lumbar facet joints.  This is consistent with physical examination and lumbar x-rays from 2017.  Lower suspicion for radiculopathy..    1.  Pertinent imaging studies reviewed by me. Imaging results were discussed with patient.  2.  Lumbar x-rays with flexion and extension to evaluate for spondylosis and to rule out instability.  3.  Schedule for bilateral L3, L4 and L5 medial branch blocks x2.  If diagnostic can proceed with radiofrequency ablations.  4.  We discussed that I do not feel chronic opioid medicines are appropriate for the treatment of her chronic low back pain. She has not exhibited any overt signs of abuse or misuse.  Long-term opioid pain medicines have not been shown to improve outcomes with chronic back pain and may actually to worsening of pain over time. Patient expressed understanding and agreement.  5.  Return to clinic 2 weeks after procedures to discuss results.  May consider physical therapy referral versus lumbar MRI to  evaluate for intraspinal process.

## 2019-11-08 ENCOUNTER — TELEPHONE (OUTPATIENT)
Dept: PRIMARY CARE CLINIC | Facility: CLINIC | Age: 74
End: 2019-11-08

## 2019-11-08 NOTE — TELEPHONE ENCOUNTER
----- Message from Kaitlyn Ann sent at 11/8/2019 10:36 AM CST -----  Contact: [phone]  Patient would like to get medical advice.  Symptoms (please be specific):  Pain all over   Pharmacy name and phone # (copy/paste from chart):[facility] - [place], [place] - [address]   [phone] (Phone)  [phone] (Fax)        Would the patient rather a call back or a response via [facility]?: call back   Comments:  Please advise, thanks

## 2019-11-08 NOTE — TELEPHONE ENCOUNTER
Called patient notified Dr Woo last note states will not give any more pain medicine and since patient is now seeing pain management will not receive any pain medicine patient states will suffer until next appointment

## 2019-11-11 DIAGNOSIS — M47.816 LUMBAR SPONDYLOSIS: Primary | ICD-10-CM

## 2019-11-13 ENCOUNTER — OFFICE VISIT (OUTPATIENT)
Dept: PRIMARY CARE CLINIC | Facility: CLINIC | Age: 74
End: 2019-11-13
Payer: MEDICARE

## 2019-11-13 VITALS
HEART RATE: 82 BPM | WEIGHT: 107 LBS | RESPIRATION RATE: 17 BRPM | DIASTOLIC BLOOD PRESSURE: 76 MMHG | TEMPERATURE: 98 F | OXYGEN SATURATION: 97 % | BODY MASS INDEX: 18.27 KG/M2 | HEIGHT: 64 IN | SYSTOLIC BLOOD PRESSURE: 140 MMHG

## 2019-11-13 DIAGNOSIS — Z95.5 HX OF HEART ARTERY STENT: ICD-10-CM

## 2019-11-13 DIAGNOSIS — Z87.891 HISTORY OF TOBACCO ABUSE: ICD-10-CM

## 2019-11-13 DIAGNOSIS — F41.9 ANXIETY: ICD-10-CM

## 2019-11-13 DIAGNOSIS — G89.4 CHRONIC PAIN SYNDROME: ICD-10-CM

## 2019-11-13 DIAGNOSIS — D64.9 ANEMIA, UNSPECIFIED TYPE: ICD-10-CM

## 2019-11-13 DIAGNOSIS — E03.9 HYPOTHYROIDISM, UNSPECIFIED TYPE: Chronic | ICD-10-CM

## 2019-11-13 DIAGNOSIS — M47.816 LUMBAR SPONDYLOSIS: ICD-10-CM

## 2019-11-13 DIAGNOSIS — I10 ESSENTIAL HYPERTENSION: Chronic | ICD-10-CM

## 2019-11-13 DIAGNOSIS — M51.36 DDD (DEGENERATIVE DISC DISEASE), LUMBAR: Primary | ICD-10-CM

## 2019-11-13 DIAGNOSIS — J44.9 CHRONIC OBSTRUCTIVE PULMONARY DISEASE, UNSPECIFIED COPD TYPE: Chronic | ICD-10-CM

## 2019-11-13 DIAGNOSIS — E78.00 HYPERCHOLESTEREMIA: Chronic | ICD-10-CM

## 2019-11-13 PROCEDURE — 3077F SYST BP >= 140 MM HG: CPT | Mod: CPTII,S$GLB,, | Performed by: FAMILY MEDICINE

## 2019-11-13 PROCEDURE — 3078F PR MOST RECENT DIASTOLIC BLOOD PRESSURE < 80 MM HG: ICD-10-PCS | Mod: CPTII,S$GLB,, | Performed by: FAMILY MEDICINE

## 2019-11-13 PROCEDURE — 99214 OFFICE O/P EST MOD 30 MIN: CPT | Mod: S$GLB,,, | Performed by: FAMILY MEDICINE

## 2019-11-13 PROCEDURE — 3077F PR MOST RECENT SYSTOLIC BLOOD PRESSURE >= 140 MM HG: ICD-10-PCS | Mod: CPTII,S$GLB,, | Performed by: FAMILY MEDICINE

## 2019-11-13 PROCEDURE — 99214 PR OFFICE/OUTPT VISIT, EST, LEVL IV, 30-39 MIN: ICD-10-PCS | Mod: S$GLB,,, | Performed by: FAMILY MEDICINE

## 2019-11-13 PROCEDURE — 99999 PR PBB SHADOW E&M-EST. PATIENT-LVL V: CPT | Mod: PBBFAC,,, | Performed by: FAMILY MEDICINE

## 2019-11-13 PROCEDURE — 99999 PR PBB SHADOW E&M-EST. PATIENT-LVL V: ICD-10-PCS | Mod: PBBFAC,,, | Performed by: FAMILY MEDICINE

## 2019-11-13 PROCEDURE — 1101F PR PT FALLS ASSESS DOC 0-1 FALLS W/OUT INJ PAST YR: ICD-10-PCS | Mod: CPTII,S$GLB,, | Performed by: FAMILY MEDICINE

## 2019-11-13 PROCEDURE — 3078F DIAST BP <80 MM HG: CPT | Mod: CPTII,S$GLB,, | Performed by: FAMILY MEDICINE

## 2019-11-13 PROCEDURE — 1101F PT FALLS ASSESS-DOCD LE1/YR: CPT | Mod: CPTII,S$GLB,, | Performed by: FAMILY MEDICINE

## 2019-11-13 PROCEDURE — 99499 RISK ADDL DX/OHS AUDIT: ICD-10-PCS | Mod: S$GLB,,, | Performed by: FAMILY MEDICINE

## 2019-11-13 PROCEDURE — 99499 UNLISTED E&M SERVICE: CPT | Mod: S$GLB,,, | Performed by: FAMILY MEDICINE

## 2019-11-13 RX ORDER — HYDROCODONE BITARTRATE AND ACETAMINOPHEN 5; 325 MG/1; MG/1
1 TABLET ORAL EVERY 12 HOURS PRN
Qty: 30 TABLET | Refills: 0 | Status: SHIPPED | OUTPATIENT
Start: 2019-11-13 | End: 2020-01-06 | Stop reason: SDUPTHER

## 2019-11-13 RX ORDER — CEFDINIR 250 MG/5ML
POWDER, FOR SUSPENSION ORAL
Qty: 100 ML | Refills: 0 | Status: SHIPPED | OUTPATIENT
Start: 2019-11-13 | End: 2020-02-17

## 2019-11-13 RX ORDER — CLOPIDOGREL BISULFATE 75 MG/1
TABLET ORAL
Qty: 30 TABLET | Refills: 5 | Status: SHIPPED | OUTPATIENT
Start: 2019-11-13 | End: 2020-06-02

## 2019-11-13 NOTE — PROGRESS NOTES
Subjective:       Patient ID: Josseline Stinson is a 73 y.o. female.    Chief Complaint: Medication Refill and Shortness of Breath    HPI:  73-year-old female in for refills of Ativan Fioricet--patient states has appointment with pain management October 31 2019--needs pain pills for next month.  On pain medication--for lower back--right leg--some pain in the right scapular area.  On Ativan--for anxiety--living alone, worried about bills food.  Fioricet has HA daily has appointment in October also with neurologist     Office visit 09/12/2019  73-year-old female -patient with history of bilateral peripheral edema--states has improved but still has some swelling--was told by orthopedist that she had a bacterial infection.  Treated with Keflex      Patient complaining pain in the right scapular area and shoulder to lower back and right flank--patient thinks pulled a muscle--brother moved into the town house next to patient--she was helping him move boxes up and down steps--patient thinks she over did.  Pain with any move--able to raise right arm up to shoulder level-very-painful tries to get overhead hurts to bring shoulders forward back--unable Ni touch toes--hard to lay down.  No lupus rheumatoid gout no fracture        ROS:  Skin: no psoriasis, eczema, skin cancer   HEENT: + headache-see history of present illness no  ocular pain, blurred vision, diplopia, epistaxis,hoarseness change in voice, no  thyroid trouble-history cataract surgery  Lung: No pneumonia, asthma, Tb, wheezing, SOB, +COPD has inhalers albuterol, quit smoking  Heart:  No chest pain,+  ankle edema--went down bilaterally,no  palpitations, +MI, no rinku murmur, +hypertension /60,+ hyperlipidemia , +CAD with stents times 15 sees Dr Baltazar has appt today --back on Plavix  Abdomen: No nausea, vomiting, diarrhea, constipation, +ulcers, +GERD-on Protonix no hepatitis, gallbladder disease, melena, hematochezia, hematemesis + hx GI bleed 7-3-19 was in  [facility] for 10 days--in x 10 days-- had EGD/Colon--no more bleeding  : no UTI, renal disease, stones  GYN hyst last mammogram   MS: no fractures, O/A, lupus, rheumatoid, gout--pain mainly in the lumbar spine right leg and right scapular areas now  Neuro:no dizziness,no  LOC, seizures   No diabetes, no anemia, + anxiety, + depression--lives by herself--2 dogs--no money no food--doing better --patient keeping herself busy  , 1 daughter estranged-patient states is not seen on so long not sure which he looks like, 3 grandchildren one , lives alone--on disability  benefits    Objective:   Physical Exam:  General: Well nourished, well developed,  + thin slightly emaciated  Skin:  Minimal ecchymosis forearms bilaterally due to blood thinners   HEENT: Eyes PERRLA, EOM intact, irregular pupil secondary to bilateral cataract surgery nose patent, throat non-erythematous upper dentures edentulous lower jaw ears TMs clear  NECK: Supple, no bruits, No JVD, no nodes  Lungs: Clear, no rales, rhonchi, wheezing decreased breath sounds bilaterally  Heart: Regular rate and rhythm, gallops, or rubs +heart murmur  Abdomen: flat, bowel sounds positive, no tenderness, or organomegaly  MS:  +kyphosis--arthritis several joints right hip left wrist left elbow--tenderness especially in the right cervical spine right upper trapezius right mid thoracic area lumbar spine--pain with abduction arm to 90° very painful raise arms overhead especially in the mid scapular area pain with anterior posterior flexion the shoulders pain with anterior flexion of the spine to 10° extension 10° lateral flexion rotation 10° straight leg lift pulling sensation back no radiculopathy Neuro: Alert, CN intact, oriented X 3 Romberg negative heel-toe intact  Extremities: No cyanosis, clubbing, or edema         Assessment:       1. DDD (degenerative disc disease), lumbar    2. Chronic pain syndrome    3. Lumbar spondylosis    4. Anxiety    5. Chronic  obstructive pulmonary disease, unspecified COPD type    6. Essential hypertension    7. Hx of heart artery stent    8. Hypercholesteremia    9. Anemia, unspecified type    10. Hypothyroidism, unspecified type    11. History of tobacco abuse        Plan:       DDD (degenerative disc disease), lumbar    Chronic pain syndrome    Lumbar spondylosis    Anxiety    Chronic obstructive pulmonary disease, unspecified COPD type    Essential hypertension    Hx of heart artery stent    Hypercholesteremia    Anemia, unspecified type    Hypothyroidism, unspecified type    History of tobacco abuse       History back pain, right leg pain, right scapular pain--has appointment with chronic pain clinic October 31, 2019--no NSAIDs due to history of GI bleed and Plavix--Norco 5 mg 1 p.o. Q.12 h  Peripheral edema--improve--Lasix 40 1 p.o. q.a.m. Micro-K 10 1 p.o. Q.a.m.  Daily headache--on Fioricet--needs to see neurologist states has appointment on October--needs to see about getting on Imitrex or tryptan or Inderal to decrease need for Fioricet  Appointment psychologist--on Ativan---patient basically homeless difficulty buying food for knee benefit from ambulatory referral for out patient care   Keep  appt Dr Baltazar-- significant cardiac history--hypertension/hyperlipidemia/congestive heart failure/heart murmur/CAD with stents times 15/right carotid surgery  COPD--patient quit smoking 3 months ago   Needs to do a headache diary---requiring Fioricet routinely will need to have a trial of Imitrex or seeDr Reinaldoillas neurologist  Redo lab  CBCs CMP lipid in 3 mo then q 6 mo Monitar Hct and K+   Health maintenance no additional workup   Moist heat , theragesic or tiger balm, ROM exercises  Subjective:       Patient ID: Josseline Stinsno is a 73 y.o. female.    Chief Complaint: Medication Refill and Shortness of Breath    HPI:  72 yo WF -- patient has appointment for epidural steroid injection--in January--was seen 11/07/2019.  Patient told to see  PCP for pain medications until then.  Told 99% sure that it would help      History of COPD history of coronary artery disease-- Shortness breath in morning as gets up. Has one.  History of fever blister last night--subjective fever--, +runny nose +sore throat, +cough, +phlegm-- grey             ROS:  Skin: no psoriasis, eczema, skin cancer   HEENT: + headache-migraine  no  ocular pain, blurred vision, diplopia, epistaxis,hoarseness change in voice, no  thyroid trouble-history cataract surgery  Lung: No pneumonia, asthma, Tb, wheezing, SOB, +COPD has inhalers albuterol, quit smoking  Heart:  No chest pain,+  ankle edema--went down bilaterally,no  palpitations, +MI in  , no rinku murmur, +hypertension /56 ,+ hyperlipidemia , +CAD with stents times 15 sees Dr Baltazar has appt today --back on Plavix  Abdomen: No nausea, vomiting, diarrhea, constipation, +ulcers, +GERD-on Protonix no hepatitis, gallbladder disease, melena, hematochezia, hematemesis + h[facility]in [facility] 10 days-- had EGD/Colon--no more bleeding-on Protonix  : no UTI, renal disease, stones  GYN hyst last mammogram   MS: no fractures, O/A, lupus, rheumatoid, gout--pain mainly in the lumbar spine right leg and right scapular areas now  Neuro:no dizziness,no  LOC, seizures   No diabetes, no anemia, + anxiety, + depression--lives by herself--2 dogs--no money no food--doing better --patient keeping herself busy  , 1 daughter estranged-patient states is not seen on so long not sure which he looks like, 3 grandchildren one , lives alone--on disability  benefits    Objective:   Physical Exam:  General: Well nourished, well developed,  + thin slightly emaciated  Skin:  Minimal ecchymosis forearms bilaterally due to blood thinners   HEENT: Eyes PERRLA, EOM intact, irregular pupil secondary to bilateral cataract surgery nose patent, throat +1/4 erythematous upper dentures edentulous lower jaw ears TMs  clear  NECK: Supple, no bruits, No JVD, no nodes  Lungs: Clear, no rales, rhonchi, wheezing decreased breath sounds bilaterally  Heart: Regular rate and rhythm, gallops, or rubs +heart murmur  Abdomen: flat, bowel sounds positive, no tenderness, or organomegaly  MS:  No significant change +kyphosis--arthritis several joints right hip left wrist left elbow--tenderness especially in the right cervical spine right upper trapezius right mid thoracic area lumbar spine--pain with abduction arm to 90° very painful raise arms overhead especially in the mid scapular area pain with anterior posterior flexion the shoulders pain with anterior flexion of the spine to 10° extension 10° lateral flexion rotation 10° straight leg lift pulling sensation back no radiculopathy   Neuro: Alert, CN intact, oriented X 3 Romberg negative heel-toe intact  Extremities: No cyanosis, clubbing, or edema         Assessment:       1. DDD (degenerative disc disease), lumbar    2. Chronic pain syndrome    3. Lumbar spondylosis    4. Anxiety    5. Chronic obstructive pulmonary disease, unspecified COPD type    6. Essential hypertension    7. Hx of heart artery stent    8. Hypercholesteremia    9. Anemia, unspecified type    10. Hypothyroidism, unspecified type    11. History of tobacco abuse        Plan:       DDD (degenerative disc disease), lumbar    Chronic pain syndrome    Lumbar spondylosis    Anxiety    Chronic obstructive pulmonary disease, unspecified COPD type    Essential hypertension    Hx of heart artery stent    Hypercholesteremia    Anemia, unspecified type    Hypothyroidism, unspecified type    History of tobacco abuse       Cold----Omnicef 301 p.o. b.i.d. Mucinex DM   History back pain, right leg pain, right scapular pain--has appointment with chronic pain clinic October 31, 2019--no NSAIDs due to history of GI bleed and Plavix--Pt seen by pain MD told will inject back Jan --OK Norco 5 mg 1 p.o. Q.12 h--last refill of pain meds by me  Suggest different pain MD if needs pain meds and one ptseeing unwilling to write meds for her or can see neurosurgeon see if needs surgery   Peripheral edema--improve--Lasix 40 1 p.o. q.a.m. Micro-K 10 1 p.o. Q.a.m.  Appointment psychologist--on Ativan---patient basically homeless difficulty buying food for knee benefit from ambulatory referral for out patient care   Keep  appt Dr Baltazar-- significant cardiac history--hypertension/hyperlipidemia/congestive heart failure/heart murmur/CAD with stents times 15/right carotid surgery  COPD--patient quit smoking 3 months ago --patient needs to increase weight--Ensure 1 can t.i.d./centrum dandre q.d./B12 and Periactin if fails to improve  Redo lab  CBCs CMP lipid T4 TSH in 3 mo then q 6 mo Monitar Hct and K+   Health maintenance no additional workup   Moist heat , theragesic or tiger balm, ROM exercises

## 2019-11-17 RX ORDER — ATORVASTATIN CALCIUM 40 MG/1
TABLET, FILM COATED ORAL
Qty: 30 TABLET | Refills: 5 | Status: SHIPPED | OUTPATIENT
Start: 2019-11-17 | End: 2020-07-16

## 2019-11-18 ENCOUNTER — TELEPHONE (OUTPATIENT)
Dept: PRIMARY CARE CLINIC | Facility: CLINIC | Age: 74
End: 2019-11-18

## 2019-11-18 ENCOUNTER — TELEPHONE (OUTPATIENT)
Dept: PAIN MEDICINE | Facility: CLINIC | Age: 74
End: 2019-11-18

## 2019-11-18 NOTE — TELEPHONE ENCOUNTER
Unable to leave message for patient. Calling to confirm that the patient is wanting to cancel the procedures.

## 2019-11-18 NOTE — TELEPHONE ENCOUNTER
----- Message from Susan Santiago sent at 11/18/2019  9:17 AM CST -----  Contact: pt  Pt said she had a miss call to 11/18    No message   No phone note in chart   Please call [phone]       Thanks

## 2019-11-18 NOTE — TELEPHONE ENCOUNTER
----- Message from Patricia Juárez sent at 11/18/2019  2:05 PM CST -----  Contact: Self  Pt is returning the call to Mr. Neville regarding her stopping Plavix.  Pt says she is not having the surgery.      She can be reached at [phone].    Thank you.

## 2019-11-18 NOTE — TELEPHONE ENCOUNTER
Left call back message. Discuss if she is still taking her plavix or has stopped until after the procedure.

## 2019-11-19 NOTE — TELEPHONE ENCOUNTER
Spoke with an unidentified female who stated the patient was not available. She was asked to inform the patient that we are trying to contact her to discuss a matter. Stated she would make the patient aware. No further issues were discussed.

## 2019-11-20 NOTE — TELEPHONE ENCOUNTER
Could not contact or leave message for patient on her number but a message was left on contact number for Yan Rod. Per original message procedures will be removed from the schedule.

## 2019-12-11 RX ORDER — DICLOFENAC SODIUM 50 MG/1
TABLET, DELAYED RELEASE ORAL
Qty: 60 TABLET | Refills: 2 | Status: SHIPPED | OUTPATIENT
Start: 2019-12-11 | End: 2020-02-17

## 2019-12-11 NOTE — TELEPHONE ENCOUNTER
Patient with multiple medical issues including hyperlipidemia and GI bleed--Needs lab q.6 months lab due in January--needs CBCs CMP lipid see me 2 weeks later OK refills of diclofenac stop if develops gastritis can cause GI bleed

## 2019-12-15 RX ORDER — METOPROLOL SUCCINATE 25 MG/1
TABLET, EXTENDED RELEASE ORAL
Qty: 30 TABLET | Refills: 2 | Status: SHIPPED | OUTPATIENT
Start: 2019-12-14 | End: 2020-03-02

## 2019-12-15 NOTE — TELEPHONE ENCOUNTER
Call tell pt with HLP needs lab q 6 mo last lab May so was due Nov Ok 3 mo refills give time get lab get seen get additional refills CBC,CMP,lipid see m,e 2 weeks later

## 2019-12-19 ENCOUNTER — OFFICE VISIT (OUTPATIENT)
Dept: PRIMARY CARE CLINIC | Facility: CLINIC | Age: 74
End: 2019-12-19
Payer: MEDICARE

## 2019-12-19 VITALS
HEIGHT: 64 IN | BODY MASS INDEX: 19.12 KG/M2 | WEIGHT: 112 LBS | SYSTOLIC BLOOD PRESSURE: 130 MMHG | RESPIRATION RATE: 18 BRPM | TEMPERATURE: 98 F | HEART RATE: 66 BPM | OXYGEN SATURATION: 98 % | DIASTOLIC BLOOD PRESSURE: 60 MMHG

## 2019-12-19 DIAGNOSIS — K25.9 GASTRIC ULCER WITHOUT HEMORRHAGE OR PERFORATION, UNSPECIFIED CHRONICITY: ICD-10-CM

## 2019-12-19 DIAGNOSIS — J02.9 PHARYNGITIS, UNSPECIFIED ETIOLOGY: Primary | ICD-10-CM

## 2019-12-19 DIAGNOSIS — E44.1 MALNUTRITION OF MILD DEGREE: Chronic | ICD-10-CM

## 2019-12-19 DIAGNOSIS — F41.9 ANXIETY: ICD-10-CM

## 2019-12-19 DIAGNOSIS — M51.36 DDD (DEGENERATIVE DISC DISEASE), LUMBAR: ICD-10-CM

## 2019-12-19 DIAGNOSIS — Z95.5 HX OF HEART ARTERY STENT: ICD-10-CM

## 2019-12-19 DIAGNOSIS — I10 ESSENTIAL HYPERTENSION: Chronic | ICD-10-CM

## 2019-12-19 DIAGNOSIS — G43.511 INTRACTABLE PERSISTENT MIGRAINE AURA WITHOUT CEREBRAL INFARCTION AND WITH STATUS MIGRAINOSUS: ICD-10-CM

## 2019-12-19 DIAGNOSIS — G89.4 CHRONIC PAIN SYNDROME: ICD-10-CM

## 2019-12-19 DIAGNOSIS — F32.A DEPRESSION, UNSPECIFIED DEPRESSION TYPE: ICD-10-CM

## 2019-12-19 DIAGNOSIS — E03.9 HYPOTHYROIDISM, UNSPECIFIED TYPE: Chronic | ICD-10-CM

## 2019-12-19 DIAGNOSIS — D64.9 ANEMIA, UNSPECIFIED TYPE: ICD-10-CM

## 2019-12-19 DIAGNOSIS — K21.9 GASTROESOPHAGEAL REFLUX DISEASE, ESOPHAGITIS PRESENCE NOT SPECIFIED: Chronic | ICD-10-CM

## 2019-12-19 DIAGNOSIS — J44.9 CHRONIC OBSTRUCTIVE PULMONARY DISEASE, UNSPECIFIED COPD TYPE: Chronic | ICD-10-CM

## 2019-12-19 DIAGNOSIS — E78.00 HYPERCHOLESTEREMIA: Chronic | ICD-10-CM

## 2019-12-19 DIAGNOSIS — M47.816 LUMBAR SPONDYLOSIS: ICD-10-CM

## 2019-12-19 DIAGNOSIS — I25.10 CORONARY ARTERY DISEASE INVOLVING NATIVE CORONARY ARTERY OF NATIVE HEART WITHOUT ANGINA PECTORIS: Chronic | ICD-10-CM

## 2019-12-19 DIAGNOSIS — I50.32 CHRONIC DIASTOLIC CONGESTIVE HEART FAILURE: Chronic | ICD-10-CM

## 2019-12-19 PROCEDURE — 99214 OFFICE O/P EST MOD 30 MIN: CPT | Mod: S$GLB,,, | Performed by: FAMILY MEDICINE

## 2019-12-19 PROCEDURE — 99214 PR OFFICE/OUTPT VISIT, EST, LEVL IV, 30-39 MIN: ICD-10-PCS | Mod: S$GLB,,, | Performed by: FAMILY MEDICINE

## 2019-12-19 PROCEDURE — 3078F DIAST BP <80 MM HG: CPT | Mod: CPTII,S$GLB,, | Performed by: FAMILY MEDICINE

## 2019-12-19 PROCEDURE — 3078F PR MOST RECENT DIASTOLIC BLOOD PRESSURE < 80 MM HG: ICD-10-PCS | Mod: CPTII,S$GLB,, | Performed by: FAMILY MEDICINE

## 2019-12-19 PROCEDURE — 1125F AMNT PAIN NOTED PAIN PRSNT: CPT | Mod: S$GLB,,, | Performed by: FAMILY MEDICINE

## 2019-12-19 PROCEDURE — 1125F PR PAIN SEVERITY QUANTIFIED, PAIN PRESENT: ICD-10-PCS | Mod: S$GLB,,, | Performed by: FAMILY MEDICINE

## 2019-12-19 PROCEDURE — 99999 PR PBB SHADOW E&M-EST. PATIENT-LVL V: ICD-10-PCS | Mod: PBBFAC,,, | Performed by: FAMILY MEDICINE

## 2019-12-19 PROCEDURE — 1101F PR PT FALLS ASSESS DOC 0-1 FALLS W/OUT INJ PAST YR: ICD-10-PCS | Mod: CPTII,S$GLB,, | Performed by: FAMILY MEDICINE

## 2019-12-19 PROCEDURE — 3075F SYST BP GE 130 - 139MM HG: CPT | Mod: CPTII,S$GLB,, | Performed by: FAMILY MEDICINE

## 2019-12-19 PROCEDURE — 1101F PT FALLS ASSESS-DOCD LE1/YR: CPT | Mod: CPTII,S$GLB,, | Performed by: FAMILY MEDICINE

## 2019-12-19 PROCEDURE — 1159F MED LIST DOCD IN RCRD: CPT | Mod: S$GLB,,, | Performed by: FAMILY MEDICINE

## 2019-12-19 PROCEDURE — 99999 PR PBB SHADOW E&M-EST. PATIENT-LVL V: CPT | Mod: PBBFAC,,, | Performed by: FAMILY MEDICINE

## 2019-12-19 PROCEDURE — 1159F PR MEDICATION LIST DOCUMENTED IN MEDICAL RECORD: ICD-10-PCS | Mod: S$GLB,,, | Performed by: FAMILY MEDICINE

## 2019-12-19 PROCEDURE — 3075F PR MOST RECENT SYSTOLIC BLOOD PRESS GE 130-139MM HG: ICD-10-PCS | Mod: CPTII,S$GLB,, | Performed by: FAMILY MEDICINE

## 2019-12-19 RX ORDER — GABAPENTIN 100 MG/1
100 CAPSULE ORAL 3 TIMES DAILY
Qty: 90 CAPSULE | Refills: 11 | Status: SHIPPED | OUTPATIENT
Start: 2019-12-19 | End: 2020-05-12

## 2019-12-19 RX ORDER — AZITHROMYCIN 250 MG/1
TABLET, FILM COATED ORAL
Qty: 6 TABLET | Refills: 0 | Status: SHIPPED | OUTPATIENT
Start: 2019-12-19 | End: 2019-12-23

## 2019-12-19 NOTE — PROGRESS NOTES
Subjective:       Patient ID: Josselnie Stinson is a 73 y.o. female.    Chief Complaint: Medication Refill (fioricet and norco) and Nasal Congestion    HPI:  73-year-old female in for refills Erwin /Fioricet--and sinus infection.  Patient went to appoint with pain clinic October 31, 2019--they did nothing after the holidays going to inject her back--lumbar spine. DDD Lumbar--patient on Plavix so should not be on anyNSAID'a also had GI bleed secondary to an ulcer       Sinuses--sick x2 days--+fever, +runny nose , +sore throat, +cough, +phlegm-green--no pneumonia asthma TB-no smoking       Headache--cross frontal area and occipital area--cross the neck--neck cracks--having headaches almost daily [place] with the weather change--quality--stabbing, severity 20/10, frequency--once a day--duration 3-4 hours--patient seen by neurologist had an MRI the brain told that the headaches were due to anxiety    ROS:  Skin: no psoriasis, eczema, skin cancer  HEENT: + migraine headache,no  ocular pain, blurred vision, diplopia, epistaxis, hoarseness +change in voice, + hypothyroidism   Lung: No pneumonia, asthma, Tb, wheezing, SOB, no smoking-history COPD  Heart: No chest pain, ankle edema, palpitations,  rinku murmur,+hypertension,+ hyperlipidemia + old NSTEMI, CAD with stents times 15 total no bypass--told not a candidate--sees DR Baltazar but has be in hospital to see him.   Abdomen: No nausea, vomiting, diarrhea, constipation, ulcers, hepatitis, gallbladder disease, melena, hematochezia, hematemesis Hx GERD, Gastric ulcer --seen this year given blood    : no UTI, renal disease, stones  MS: no fractures, +O/A, no lupus, rheumatoid, gout --problems DDD lumbar spine problems with both feet osteoarthritis right foot chronic pain syndrome lumbar spondylosis  Neuro: No dizziness, LOC, seizures   No diabetes, no anemia, + anxiety, no depression   , one child never sees her, Disabled due 5 hip surg Stents X 15 heart , lives with 2 dogs      Objective:   Physical Exam:  General: Well nourished, well developed, no acute distress  Skin: No lesions  HEENT: Eyes PERRLA, EOM intact, nose patent, throat non-erythematous   NECK: Supple, no bruits, No JVD, no nodes  Lungs: Clear, no rales, rhonchi, wheezing  Heart: Regular rate and rhythm, no murmurs, gallops, or rubs  Abdomen: flat, bowel sounds positive, no tenderness, or organomegaly  MS: Range of motion and muscle strength intact  Neuro: Alert, CN intact, oriented X 3  Extremities: No cyanosis, clubbing, or edema         Assessment:       1. Pharyngitis, unspecified etiology    2. Chronic obstructive pulmonary disease, unspecified COPD type    3. Essential hypertension    4. Hypercholesteremia    5. Chronic diastolic congestive heart failure    6. Coronary artery disease involving native coronary artery of native heart without angina pectoris    7. Hx of heart artery stent    8. Anemia, unspecified type    9. Hypothyroidism, unspecified type    10. Malnutrition of mild degree    11. Gastroesophageal reflux disease, esophagitis presence not specified    12. Gastric ulcer without hemorrhage or perforation, unspecified chronicity    13. 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 Lumbar spondylosis    15. DDD (degenerative disc disease), lumbar    16. Chronic pain syndrome        Plan:       Pharyngitis, unspecified etiology    Chronic obstructive pulmonary disease, unspecified COPD type    Essential hypertension    Hypercholesteremia    Chronic diastolic congestive heart failure    Coronary artery disease involving native coronary artery of native heart without angina pectoris    Hx of heart artery stent    Anemia, unspecified type    Hypothyroidism, unspecified type    Malnutrition of mild degree    Gastroesophageal reflux disease, esophagitis presence not specified    Gastric ulcer without hemorrhage or perforation, unspecified  chronicity    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    Lumbar spondylosis    DDD (degenerative disc disease), lumbar    Chronic pain syndrome        --sinusitis--Zithromax-Robitussin DM with antihistamine decongestant OTC  Chronic back pain DDD lumbar spine stool lumbar spondylosis chronic pain syndrome DDD cervical spine osteoarthritis history hip replacement--has appoint with pain clinic for epidural steroid injections in January--told I will not give any more pain medication--if unable to get from pain clinic needs to get a new pain clinic  Headache--history migraine--seen by neurologist in the past had MRI--told headaches were due to anxiety---okay Fioricet 1 p.o. q.d. p.r.n. Headache---may try Inderal or Imitrex in the future  History GERD//gastric ulcer recent GI bleed--no NSAIDs  Hypertension hyperlipidemia CHF coronary artery disease with stents times 15--see Dr Phillip-- was seeing Dr Baltazar patient told can only see him while in she is in the hospital due to insurance  Hypothyroidism--mildly failure to thrive  History COPD no smoking  [facility] of [facility]  Several social issues--has a daughter does not speak patient--would benefit from able toward referral for outpatient care with [facility] maintenance needs pain contract opioid with a pain clinic of her choice--will give gabapentin 100 mg t.i.d. if some relief will give 300 mg t.i.d.  Lab due in January CBCs CMP lipid stool guaiac UA T4 TSH due to failure to thrive

## 2020-01-03 NOTE — LETTER
November 7, 2019      Dony Woo MD  [address] [place] [address]           [facility]  [address], [facility] [address]  [place] [address]  Phone: [phone]  Fax: [phone]          Patient: Josseline Stinson   MR Number: [id number]   YOB: 1945   Date of Visit: 11/7/2019       Dear Dr. Dony Woo:    Thank you for referring Josseline Stinson to me for evaluation. Attached you will find relevant portions of my assessment and plan of care.    If you have questions, please do not hesitate to call me. I look forward to following Josseline Stinson along with you.    Sincerely,    Nima Ford Jr., MD    Enclosure  CC:  No Recipients    If you would like to receive this communication electronically, please contact [email or web address] or [phone] to request more information on [employer] access.    For providers and/or their staff who would like to refer a patient to [facility], please contact us through our one-stop-shop provider referral line, [facility], at [phone].    If you feel you have received this communication in error or would no longer like to receive these types of communications, please e-mail [email or web address]          Rifampin Pregnancy And Lactation Text: This medication is Pregnancy Category C and it isn't know if it is safe during pregnancy. It is also excreted in breast milk and should not be used if you are breast feeding.

## 2020-01-07 RX ORDER — ALBUTEROL SULFATE 90 UG/1
AEROSOL, METERED RESPIRATORY (INHALATION)
Qty: 18 G | Refills: 5 | Status: SHIPPED | OUTPATIENT
Start: 2020-01-07 | End: 2020-10-09 | Stop reason: SDUPTHER

## 2020-01-13 RX ORDER — ALENDRONATE SODIUM 70 MG/1
TABLET ORAL
Qty: 4 TABLET | Refills: 3 | Status: SHIPPED | OUTPATIENT
Start: 2020-01-13 | End: 2020-06-02

## 2020-01-18 RX ORDER — FUROSEMIDE 40 MG/1
TABLET ORAL
Qty: 30 TABLET | Refills: 5 | Status: SHIPPED | OUTPATIENT
Start: 2020-01-18 | End: 2020-02-17

## 2020-01-18 RX ORDER — POTASSIUM CHLORIDE 750 MG/1
CAPSULE, EXTENDED RELEASE ORAL
Qty: 30 CAPSULE | Refills: 5 | Status: SHIPPED | OUTPATIENT
Start: 2020-01-18 | End: 2020-06-11

## 2020-01-28 ENCOUNTER — OFFICE VISIT (OUTPATIENT)
Dept: PRIMARY CARE CLINIC | Facility: CLINIC | Age: 75
End: 2020-01-28
Payer: MEDICARE

## 2020-01-28 ENCOUNTER — TELEPHONE (OUTPATIENT)
Dept: PRIMARY CARE CLINIC | Facility: CLINIC | Age: 75
End: 2020-01-28

## 2020-01-28 VITALS
SYSTOLIC BLOOD PRESSURE: 138 MMHG | DIASTOLIC BLOOD PRESSURE: 64 MMHG | TEMPERATURE: 99 F | BODY MASS INDEX: 18.06 KG/M2 | OXYGEN SATURATION: 98 % | WEIGHT: 105.81 LBS | RESPIRATION RATE: 16 BRPM | HEIGHT: 64 IN | HEART RATE: 88 BPM

## 2020-01-28 DIAGNOSIS — J44.9 CHRONIC OBSTRUCTIVE PULMONARY DISEASE, UNSPECIFIED COPD TYPE: Primary | Chronic | ICD-10-CM

## 2020-01-28 DIAGNOSIS — J32.9 SINUSITIS, UNSPECIFIED CHRONICITY, UNSPECIFIED LOCATION: ICD-10-CM

## 2020-01-28 DIAGNOSIS — K21.9 GASTROESOPHAGEAL REFLUX DISEASE, ESOPHAGITIS PRESENCE NOT SPECIFIED: Chronic | ICD-10-CM

## 2020-01-28 DIAGNOSIS — Z95.5 HX OF HEART ARTERY STENT: ICD-10-CM

## 2020-01-28 DIAGNOSIS — D64.9 ANEMIA, UNSPECIFIED TYPE: ICD-10-CM

## 2020-01-28 DIAGNOSIS — E78.00 HYPERCHOLESTEREMIA: Chronic | ICD-10-CM

## 2020-01-28 DIAGNOSIS — J40 BRONCHITIS: ICD-10-CM

## 2020-01-28 DIAGNOSIS — E03.9 HYPOTHYROIDISM, UNSPECIFIED TYPE: Chronic | ICD-10-CM

## 2020-01-28 DIAGNOSIS — I10 ESSENTIAL HYPERTENSION: Chronic | ICD-10-CM

## 2020-01-28 DIAGNOSIS — I50.32 CHRONIC DIASTOLIC CONGESTIVE HEART FAILURE: Chronic | ICD-10-CM

## 2020-01-28 DIAGNOSIS — I25.10 CORONARY ARTERY DISEASE INVOLVING NATIVE CORONARY ARTERY OF NATIVE HEART WITHOUT ANGINA PECTORIS: Chronic | ICD-10-CM

## 2020-01-28 PROCEDURE — 99213 PR OFFICE/OUTPT VISIT, EST, LEVL III, 20-29 MIN: ICD-10-PCS | Mod: S$GLB,,, | Performed by: FAMILY MEDICINE

## 2020-01-28 PROCEDURE — 3075F SYST BP GE 130 - 139MM HG: CPT | Mod: CPTII,S$GLB,, | Performed by: FAMILY MEDICINE

## 2020-01-28 PROCEDURE — 1101F PR PT FALLS ASSESS DOC 0-1 FALLS W/OUT INJ PAST YR: ICD-10-PCS | Mod: CPTII,S$GLB,, | Performed by: FAMILY MEDICINE

## 2020-01-28 PROCEDURE — 1125F PR PAIN SEVERITY QUANTIFIED, PAIN PRESENT: ICD-10-PCS | Mod: S$GLB,,, | Performed by: FAMILY MEDICINE

## 2020-01-28 PROCEDURE — 1159F PR MEDICATION LIST DOCUMENTED IN MEDICAL RECORD: ICD-10-PCS | Mod: S$GLB,,, | Performed by: FAMILY MEDICINE

## 2020-01-28 PROCEDURE — 1101F PT FALLS ASSESS-DOCD LE1/YR: CPT | Mod: CPTII,S$GLB,, | Performed by: FAMILY MEDICINE

## 2020-01-28 PROCEDURE — 3078F DIAST BP <80 MM HG: CPT | Mod: CPTII,S$GLB,, | Performed by: FAMILY MEDICINE

## 2020-01-28 PROCEDURE — 1159F MED LIST DOCD IN RCRD: CPT | Mod: S$GLB,,, | Performed by: FAMILY MEDICINE

## 2020-01-28 PROCEDURE — 1125F AMNT PAIN NOTED PAIN PRSNT: CPT | Mod: S$GLB,,, | Performed by: FAMILY MEDICINE

## 2020-01-28 PROCEDURE — 99999 PR PBB SHADOW E&M-EST. PATIENT-LVL V: CPT | Mod: PBBFAC,,, | Performed by: FAMILY MEDICINE

## 2020-01-28 PROCEDURE — 3078F PR MOST RECENT DIASTOLIC BLOOD PRESSURE < 80 MM HG: ICD-10-PCS | Mod: CPTII,S$GLB,, | Performed by: FAMILY MEDICINE

## 2020-01-28 PROCEDURE — 99999 PR PBB SHADOW E&M-EST. PATIENT-LVL V: ICD-10-PCS | Mod: PBBFAC,,, | Performed by: FAMILY MEDICINE

## 2020-01-28 PROCEDURE — 99213 OFFICE O/P EST LOW 20 MIN: CPT | Mod: S$GLB,,, | Performed by: FAMILY MEDICINE

## 2020-01-28 PROCEDURE — 3075F PR MOST RECENT SYSTOLIC BLOOD PRESS GE 130-139MM HG: ICD-10-PCS | Mod: CPTII,S$GLB,, | Performed by: FAMILY MEDICINE

## 2020-01-28 RX ORDER — AZITHROMYCIN 250 MG/1
TABLET, FILM COATED ORAL
Qty: 6 TABLET | Refills: 0 | Status: SHIPPED | OUTPATIENT
Start: 2020-01-28 | End: 2020-02-01

## 2020-01-28 RX ORDER — PROMETHAZINE HYDROCHLORIDE AND CODEINE PHOSPHATE 6.25; 1 MG/5ML; MG/5ML
5 SOLUTION ORAL EVERY 6 HOURS PRN
Qty: 180 ML | Refills: 0 | Status: SHIPPED | OUTPATIENT
Start: 2020-01-28 | End: 2020-02-17

## 2020-01-28 NOTE — TELEPHONE ENCOUNTER
----- Message from Kaitlyn Ann sent at 1/28/2020  4:37 PM CST -----  Contact: [employer] [phone]  Type: Rx Clarification/ Additional Information/ Questions    Medication:promethazine-codeine 6.25-10 mg/5 ml (PHENERGAN WITH CODEINE) 6.25-10 mg/5 mL syrup    What questions do you have about the medication, if any? Medication is on back order    Pharmacy Contact Name:[employer] [phone]    Comments:please advise,thanks

## 2020-01-28 NOTE — TELEPHONE ENCOUNTER
Spoke with pharmacy and let them know to instruct patient to get an OTC until medication is available

## 2020-01-28 NOTE — PROGRESS NOTES
Subjective:       Patient ID: Josseline Stinson is a 74 y.o. female.    Chief Complaint: Nasal Congestion; Headache; and Emesis    HPI:  74-year-old female in for cold sick x1 week --+fever, +runny nose +watery eyes +sore throat +cough +phlegm--green.  No pneumonia asthma TB no smoking +COPD    ROS:  Skin: no psoriasis, eczema, skin cancer  HEENT: +headache--with cold,no  ocular pain, blurred vision, diplopia, epistaxis, hoarseness change in voice, thyroid trouble  Lung: No pneumonia, asthma, Tb, wheezing, SOB, no smoking +COPD  Heart: No chest pain, ankle edema, palpitations,+ old MI,no  rinku murmur, +hypertension,+ hyperlipidemia, + CAD with stent times 17 last stent placed in January 2019 no CABG +CHF  Abdomen:  +GERD and history of a gastric ulcer-both doing well No nausea, vomiting, diarrhea, constipation, ulcers, hepatitis, gallbladder disease, melena, hematochezia, hematemesis  : no UTI, renal disease, stones  GYN hyst   MS: no fractures, O/A, lupus, rheumatoid, gout --history fractured right femur--history fracture left wrist--history fractured elbow history of a hip surgery dana and screws placed in hip  Neuro: No dizziness, LOC, seizures   No diabetes, no anemia, no anxiety, no depression   One daughter but does not talk to pt has not spoken to pt in 2 yrs, patient on disability due to heart, lives alone    Objective:   Physical Exam:  General: Well nourished, well developed, no acute distress +thin  Skin: No lesions  HEENT: Eyes PERRLA, EOM intact, nose clear discharge throat 1/4 erythematous ears TMs clear  NECK: Supple, no bruits, No JVD, no nodes  Lungs: Clear, no rales, rhonchi, wheezing  Heart: Regular rate and rhythm, no murmurs, gallops, or rubs +coarse cough  Abdomen: flat, bowel sounds positive, no tenderness, or organomegaly  MS: Range of motion and muscle strength intact  Neuro: Alert, CN intact, oriented X 3  Extremities: No cyanosis, clubbing, or edema         Assessment:       1. Chronic  obstructive pulmonary disease, unspecified COPD type    2. Sinusitis, unspecified chronicity, unspecified location    3. Bronchitis    4. Essential hypertension    5. Hypercholesteremia    6. Hx of heart artery stent    7. Chronic diastolic congestive heart failure    8. Coronary artery disease involving native coronary artery of native heart without angina pectoris    9. Anemia, unspecified type    10. Hypothyroidism, unspecified type    11. Gastroesophageal reflux disease, esophagitis presence not specified        Plan:       Chronic obstructive pulmonary disease, unspecified COPD type    Sinusitis, unspecified chronicity, unspecified location    Bronchitis    Essential hypertension    Hypercholesteremia    Hx of heart artery stent    Chronic diastolic congestive heart failure    Coronary artery disease involving native coronary artery of native heart without angina pectoris    Anemia, unspecified type    Hypothyroidism, unspecified type    Gastroesophageal reflux disease, esophagitis presence not specified      cold-Medrol/Zithromax/Phenergan with codeine/prednisone tapering dose--patient states able take codeine--states not really allergic to steroids but if takes a lot of them gains flu  Patient with multiple medical issues--recently had lab with another physician--should consider routine labs in 6 months CBCs CMP lipids T4 TSH stool guaiac UA  Patient sees cardiologist get EKG and cardiac workup with M--multiple cardiac issues HTN HLP CHF CAD with stents times 17 old MI  GERD/history ulcer disease--doing well on omeprazole  Health maintenance up-to-date except for pain contract  History of chronic pain

## 2020-01-29 ENCOUNTER — TELEPHONE (OUTPATIENT)
Dept: PRIMARY CARE CLINIC | Facility: CLINIC | Age: 75
End: 2020-01-29

## 2020-01-29 NOTE — TELEPHONE ENCOUNTER
----- Message from Jasmyn Valdes sent at 1/29/2020  2:54 PM CST -----  Contact: pt @ [phone]  Returning a missed call from Dr. Woo's office, please call.

## 2020-01-29 NOTE — TELEPHONE ENCOUNTER
----- Message from Ade Linda sent at 1/29/2020  1:07 PM CST -----  Contact: Patient  Type: Needs Medical Advice    Who Called:  Josseline, patient  Symptoms (please be specific):  N/A  How long has patient had these symptoms:  N/A  Pharmacy name and phone #:    [facility] - [place], [place] - [address]  [address] [place] [address]  Phone: [phone] Fax: [phone]  Best Call Back Number: [phone]  Additional Information: Calling because Rx promethazine-codeine 6.25-10 mg/5 ml (PHENERGAN WITH CODEINE) 6.25-10 mg/5 mL syrup is on back order at all the pharmacies. Please advise.thanks.

## 2020-01-29 NOTE — TELEPHONE ENCOUNTER
"Verbalized to patient that she can try Robitussin DM OTC for cough. Patient requesting Tylenol #3 or Fioricet for headaches. Verbalized to patient that when she was seen yesterday she should have asked the physician. Patient states "I forgot to ask because I was in so much pain." Verbalized to patient that I will ask Dr. Woo and give her a call back. Patient verbalized understanding.   "

## 2020-02-17 ENCOUNTER — OFFICE VISIT (OUTPATIENT)
Dept: PRIMARY CARE CLINIC | Facility: CLINIC | Age: 75
End: 2020-02-17
Payer: MEDICARE

## 2020-02-17 VITALS
HEIGHT: 64 IN | HEART RATE: 88 BPM | SYSTOLIC BLOOD PRESSURE: 154 MMHG | DIASTOLIC BLOOD PRESSURE: 68 MMHG | WEIGHT: 110.25 LBS | OXYGEN SATURATION: 100 % | TEMPERATURE: 98 F | BODY MASS INDEX: 18.82 KG/M2 | RESPIRATION RATE: 16 BRPM

## 2020-02-17 DIAGNOSIS — J11.1 INFLUENZA: Primary | ICD-10-CM

## 2020-02-17 DIAGNOSIS — R68.89 FLU-LIKE SYMPTOMS: ICD-10-CM

## 2020-02-17 DIAGNOSIS — F41.9 ANXIETY: ICD-10-CM

## 2020-02-17 DIAGNOSIS — G89.29 CHRONIC NONINTRACTABLE HEADACHE, UNSPECIFIED HEADACHE TYPE: ICD-10-CM

## 2020-02-17 DIAGNOSIS — R51.9 CHRONIC NONINTRACTABLE HEADACHE, UNSPECIFIED HEADACHE TYPE: ICD-10-CM

## 2020-02-17 DIAGNOSIS — Z87.891 HISTORY OF TOBACCO ABUSE: ICD-10-CM

## 2020-02-17 LAB
CTP QC/QA: YES
POC MOLECULAR INFLUENZA A AGN: NEGATIVE
POC MOLECULAR INFLUENZA B AGN: NEGATIVE

## 2020-02-17 PROCEDURE — 99214 OFFICE O/P EST MOD 30 MIN: CPT | Mod: S$GLB,,, | Performed by: FAMILY MEDICINE

## 2020-02-17 PROCEDURE — 1101F PT FALLS ASSESS-DOCD LE1/YR: CPT | Mod: CPTII,S$GLB,, | Performed by: FAMILY MEDICINE

## 2020-02-17 PROCEDURE — 3077F SYST BP >= 140 MM HG: CPT | Mod: CPTII,S$GLB,, | Performed by: FAMILY MEDICINE

## 2020-02-17 PROCEDURE — 87502 INFLUENZA DNA AMP PROBE: CPT | Mod: QW,S$GLB,, | Performed by: FAMILY MEDICINE

## 2020-02-17 PROCEDURE — 87502 POCT INFLUENZA A/B MOLECULAR: ICD-10-PCS | Mod: QW,S$GLB,, | Performed by: FAMILY MEDICINE

## 2020-02-17 PROCEDURE — 1101F PR PT FALLS ASSESS DOC 0-1 FALLS W/OUT INJ PAST YR: ICD-10-PCS | Mod: CPTII,S$GLB,, | Performed by: FAMILY MEDICINE

## 2020-02-17 PROCEDURE — 99214 PR OFFICE/OUTPT VISIT, EST, LEVL IV, 30-39 MIN: ICD-10-PCS | Mod: S$GLB,,, | Performed by: FAMILY MEDICINE

## 2020-02-17 PROCEDURE — 1159F PR MEDICATION LIST DOCUMENTED IN MEDICAL RECORD: ICD-10-PCS | Mod: S$GLB,,, | Performed by: FAMILY MEDICINE

## 2020-02-17 PROCEDURE — 99999 PR PBB SHADOW E&M-EST. PATIENT-LVL IV: CPT | Mod: PBBFAC,,, | Performed by: FAMILY MEDICINE

## 2020-02-17 PROCEDURE — 3078F DIAST BP <80 MM HG: CPT | Mod: CPTII,S$GLB,, | Performed by: FAMILY MEDICINE

## 2020-02-17 PROCEDURE — 3078F PR MOST RECENT DIASTOLIC BLOOD PRESSURE < 80 MM HG: ICD-10-PCS | Mod: CPTII,S$GLB,, | Performed by: FAMILY MEDICINE

## 2020-02-17 PROCEDURE — 3077F PR MOST RECENT SYSTOLIC BLOOD PRESSURE >= 140 MM HG: ICD-10-PCS | Mod: CPTII,S$GLB,, | Performed by: FAMILY MEDICINE

## 2020-02-17 PROCEDURE — 99999 PR PBB SHADOW E&M-EST. PATIENT-LVL IV: ICD-10-PCS | Mod: PBBFAC,,, | Performed by: FAMILY MEDICINE

## 2020-02-17 PROCEDURE — 1159F MED LIST DOCD IN RCRD: CPT | Mod: S$GLB,,, | Performed by: FAMILY MEDICINE

## 2020-02-17 PROCEDURE — 1125F PR PAIN SEVERITY QUANTIFIED, PAIN PRESENT: ICD-10-PCS | Mod: S$GLB,,, | Performed by: FAMILY MEDICINE

## 2020-02-17 PROCEDURE — 1125F AMNT PAIN NOTED PAIN PRSNT: CPT | Mod: S$GLB,,, | Performed by: FAMILY MEDICINE

## 2020-02-17 RX ORDER — DULOXETIN HYDROCHLORIDE 20 MG/1
20 CAPSULE, DELAYED RELEASE ORAL DAILY
Qty: 30 CAPSULE | Refills: 2 | Status: SHIPPED | OUTPATIENT
Start: 2020-02-17 | End: 2020-05-04

## 2020-02-17 RX ORDER — OSELTAMIVIR PHOSPHATE 75 MG/1
75 CAPSULE ORAL 2 TIMES DAILY
Qty: 10 CAPSULE | Refills: 0 | Status: SHIPPED | OUTPATIENT
Start: 2020-02-17 | End: 2020-02-22

## 2020-02-17 RX ORDER — PROMETHAZINE HYDROCHLORIDE AND DEXTROMETHORPHAN HYDROBROMIDE 6.25; 15 MG/5ML; MG/5ML
5 SYRUP ORAL EVERY 4 HOURS PRN
Qty: 180 BOTTLE | Refills: 1 | Status: SHIPPED | OUTPATIENT
Start: 2020-02-17 | End: 2020-02-27

## 2020-02-17 NOTE — PROGRESS NOTES
"Subjective:       Patient ID: Josseline Stinson is a 74 y.o. female.    Chief Complaint: Fever (sneezing, coughing, diarrhea, bodyaches)    74 yr old here for urgent visit complaining of flu contact and flu like symptoms. Cough, congestion, subjective fever. N/V. Desiring refill on her cough syrup with codiene as well as fiorecet for chronic headaches.    Review of Systems   Constitutional: Negative for activity change, chills and fever.   Respiratory: Negative for cough, chest tightness, shortness of breath and wheezing.    Cardiovascular: Negative for chest pain, palpitations and leg swelling.   Gastrointestinal: Negative for abdominal distention, abdominal pain, constipation, diarrhea, nausea and vomiting.   Genitourinary: Negative for difficulty urinating and dysuria.   Skin: Negative for rash.   Neurological: Negative for weakness.   Hematological: Does not bruise/bleed easily.   Psychiatric/Behavioral: Negative for agitation. The patient is not nervous/anxious.        Objective:      Vitals:    02/17/20 1411   BP: (!) 154/68   BP Location: Right arm   Patient Position: Sitting   BP Method: Medium (Manual)   Pulse: 88   Resp: 16   Temp: 97.9 °F (36.6 °C)   TempSrc: Oral   SpO2: 100%   Weight: 50 kg (110 lb 3.7 oz)   Height: 5' 4" (1.626 m)     Physical Exam   Constitutional: She appears well-developed and well-nourished. She appears ill.   HENT:   Head: Normocephalic and atraumatic.   Nose: Nose normal.   Mouth/Throat: Oropharynx is clear and moist.   Eyes: Conjunctivae and EOM are normal.   Cardiovascular: Normal rate, regular rhythm and normal heart sounds.   Pulmonary/Chest: Effort normal and breath sounds normal. No respiratory distress. She has no wheezes. She has no rales.   Abdominal: Soft. She exhibits no distension. There is no tenderness.   Lymphadenopathy:     She has no cervical adenopathy.   Neurological: She is alert. No cranial nerve deficit.   Psychiatric: She has a normal mood and affect.   Nursing " note and vitals reviewed.          Lab Results   Component Value Date     01/16/2020    K 4.4 01/16/2020     01/16/2020    CO2 24 01/16/2020    BUN 15 01/16/2020    CREATININE 0.8 01/16/2020    ANIONGAP 10 01/16/2020     Lab Results   Component Value Date    HGBA1C 5.6 05/05/2019     Lab Results   Component Value Date     (H) 05/13/2019     (H) 05/11/2019     (H) 05/04/2019       Lab Results   Component Value Date    WBC 6.80 01/16/2020    HGB 11.2 (L) 01/16/2020    HCT 39 01/16/2020     01/16/2020    GRAN 5.0 01/16/2020    GRAN 73.1 (H) 01/16/2020     Lab Results   Component Value Date    CHOL 168 05/21/2019    HDL 49 05/21/2019    LDLCALC 84 05/21/2019    TRIG 177 (H) 05/21/2019          Current Outpatient Medications:     albuterol (PROVENTIL/VENTOLIN HFA) 90 mcg/actuation inhaler, INHALE one PUFF BY MOUTH EVERY 4 TO 6 HOURS AS NEEDED, Disp: 18 g, Rfl: 5    alendronate (FOSAMAX) 70 MG tablet, TAKE ONE TABLET BY MOUTH every SEVEN DAYS, Disp: 4 tablet, Rfl: 3    ARIPiprazole (ABILIFY) 2 MG Tab, Take 2 mg by mouth once daily., Disp: , Rfl: 12    atorvastatin (LIPITOR) 40 MG tablet, TAKE ONE TABLET BY MOUTH ONCE DAILY, Disp: 30 tablet, Rfl: 5    clopidogrel (PLAVIX) 75 mg tablet, clopidogrel 75 mg tablet daily, Disp: 30 tablet, Rfl: 5    LORazepam (ATIVAN) 1 MG tablet, 1/2 po bid or 1 po qd prn anxiety, Disp: 30 tablet, Rfl: 2    metoprolol succinate (TOPROL-XL) 25 MG 24 hr tablet, TAKE ONE TABLET BY MOUTH ONCE DAILY, Disp: 30 tablet, Rfl: 2    omeprazole (PRILOSEC) 40 MG capsule, TAKE ONE CAPSULE BY MOUTH ONCE DAILY, Disp: 30 capsule, Rfl: 5    DULoxetine (CYMBALTA) 20 MG capsule, Take 1 capsule (20 mg total) by mouth once daily., Disp: 30 capsule, Rfl: 2    gabapentin (NEURONTIN) 100 MG capsule, Take 1 capsule (100 mg total) by mouth 3 (three) times daily. (Patient not taking: Reported on 2/17/2020), Disp: 90 capsule, Rfl: 11    ibuprofen (ADVIL,MOTRIN) 800 MG  tablet, TAKE ONE TABLET BY MOUTH TWICE DAILY with food AND ranitidine, Disp: , Rfl: 3    levocetirizine (XYZAL) 5 MG tablet, Take 5 mg by mouth nightly., Disp: , Rfl: 12    meloxicam (MOBIC) 15 MG tablet, Take 15 mg by mouth once daily., Disp: , Rfl: 5    oseltamivir (TAMIFLU) 75 MG capsule, Take 1 capsule (75 mg total) by mouth 2 (two) times daily. for 5 days, Disp: 10 capsule, Rfl: 0    potassium chloride (MICRO-K) 10 MEQ CpSR, TAKE ONE CAPSULE BY MOUTH EVERY MORNING WITH LASIX (Patient not taking: Reported on 2/17/2020), Disp: 30 capsule, Rfl: 5    promethazine-dextromethorphan (PROMETHAZINE-DM) 6.25-15 mg/5 mL Syrp, Take 5 mLs by mouth every 4 (four) hours as needed., Disp: 180 Bottle, Rfl: 1    traZODone (DESYREL) 100 MG tablet, , Disp: , Rfl:         Assessment:       1. Influenza    2. Flu-like symptoms    3. Chronic nonintractable headache, unspecified headache type    4. Anxiety    5. History of tobacco abuse           Plan:       Influenza  -     oseltamivir (TAMIFLU) 75 MG capsule; Take 1 capsule (75 mg total) by mouth 2 (two) times daily. for 5 days  Dispense: 10 capsule; Refill: 0  -     promethazine-dextromethorphan (PROMETHAZINE-DM) 6.25-15 mg/5 mL Syrp; Take 5 mLs by mouth every 4 (four) hours as needed.  Dispense: 180 Bottle; Refill: 1  Multiple flu contacts with flu like symptoms. Tamiflu prescribed RTC if worsening or no improvement over the next week. Encouraged rest and hydration.     Flu-like symptoms  -     POCT Influenza A/B Molecular    Chronic nonintractable headache, unspecified headache type  Chronic. Awaiting to see headache specialist next month. Desiring fiorecet but due to multiple controlled substances and no clear etiology for her headaches per hx this provider not prescribing. Prescribing duloxetine for chronic pain. Avoiding triptans due to her coronary disease.     Anxiety  -     DULoxetine (CYMBALTA) 20 MG capsule; Take 1 capsule (20 mg total) by mouth once daily.   Dispense: 30 capsule; Refill: 2  Did not like lexapro so stopped taking. Trial of cymbalta    History of tobacco abuse  Not interested in stopping currently. Encouraged cessation and reviewed risks. Hopefully once mental health improves this will be probability in the future.

## 2020-03-02 RX ORDER — DICLOFENAC SODIUM 50 MG/1
TABLET, DELAYED RELEASE ORAL
Qty: 60 TABLET | Refills: 2 | Status: SHIPPED | OUTPATIENT
Start: 2020-03-02 | End: 2020-10-12

## 2020-03-02 RX ORDER — METOPROLOL SUCCINATE 25 MG/1
TABLET, EXTENDED RELEASE ORAL
Qty: 30 TABLET | Refills: 2 | Status: SHIPPED | OUTPATIENT
Start: 2020-03-02 | End: 2020-06-02

## 2020-03-13 ENCOUNTER — OFFICE VISIT (OUTPATIENT)
Dept: PRIMARY CARE CLINIC | Facility: CLINIC | Age: 75
End: 2020-03-13
Payer: MEDICARE

## 2020-03-13 VITALS
HEIGHT: 64 IN | SYSTOLIC BLOOD PRESSURE: 160 MMHG | DIASTOLIC BLOOD PRESSURE: 76 MMHG | HEART RATE: 86 BPM | RESPIRATION RATE: 16 BRPM | OXYGEN SATURATION: 95 % | TEMPERATURE: 98 F | BODY MASS INDEX: 18.44 KG/M2 | WEIGHT: 108 LBS

## 2020-03-13 DIAGNOSIS — I25.10 CORONARY ARTERY DISEASE INVOLVING NATIVE CORONARY ARTERY OF NATIVE HEART WITHOUT ANGINA PECTORIS: Chronic | ICD-10-CM

## 2020-03-13 DIAGNOSIS — D64.9 ANEMIA, UNSPECIFIED TYPE: ICD-10-CM

## 2020-03-13 DIAGNOSIS — E03.9 HYPOTHYROIDISM, UNSPECIFIED TYPE: Chronic | ICD-10-CM

## 2020-03-13 DIAGNOSIS — M51.36 DDD (DEGENERATIVE DISC DISEASE), LUMBAR: ICD-10-CM

## 2020-03-13 DIAGNOSIS — J40 BRONCHITIS: Primary | ICD-10-CM

## 2020-03-13 DIAGNOSIS — Z95.5 HX OF HEART ARTERY STENT: ICD-10-CM

## 2020-03-13 DIAGNOSIS — I50.32 CHRONIC DIASTOLIC CONGESTIVE HEART FAILURE: Chronic | ICD-10-CM

## 2020-03-13 DIAGNOSIS — F41.9 ANXIETY: ICD-10-CM

## 2020-03-13 DIAGNOSIS — I10 ESSENTIAL HYPERTENSION: Chronic | ICD-10-CM

## 2020-03-13 DIAGNOSIS — E78.00 HYPERCHOLESTEREMIA: Chronic | ICD-10-CM

## 2020-03-13 PROCEDURE — 3077F SYST BP >= 140 MM HG: CPT | Mod: CPTII,S$GLB,, | Performed by: FAMILY MEDICINE

## 2020-03-13 PROCEDURE — 1101F PT FALLS ASSESS-DOCD LE1/YR: CPT | Mod: CPTII,S$GLB,, | Performed by: FAMILY MEDICINE

## 2020-03-13 PROCEDURE — 3078F PR MOST RECENT DIASTOLIC BLOOD PRESSURE < 80 MM HG: ICD-10-PCS | Mod: CPTII,S$GLB,, | Performed by: FAMILY MEDICINE

## 2020-03-13 PROCEDURE — 99214 PR OFFICE/OUTPT VISIT, EST, LEVL IV, 30-39 MIN: ICD-10-PCS | Mod: S$GLB,,, | Performed by: FAMILY MEDICINE

## 2020-03-13 PROCEDURE — 99999 PR PBB SHADOW E&M-EST. PATIENT-LVL V: CPT | Mod: PBBFAC,,, | Performed by: FAMILY MEDICINE

## 2020-03-13 PROCEDURE — 3077F PR MOST RECENT SYSTOLIC BLOOD PRESSURE >= 140 MM HG: ICD-10-PCS | Mod: CPTII,S$GLB,, | Performed by: FAMILY MEDICINE

## 2020-03-13 PROCEDURE — 1125F AMNT PAIN NOTED PAIN PRSNT: CPT | Mod: S$GLB,,, | Performed by: FAMILY MEDICINE

## 2020-03-13 PROCEDURE — 99999 PR PBB SHADOW E&M-EST. PATIENT-LVL V: ICD-10-PCS | Mod: PBBFAC,,, | Performed by: FAMILY MEDICINE

## 2020-03-13 PROCEDURE — 3078F DIAST BP <80 MM HG: CPT | Mod: CPTII,S$GLB,, | Performed by: FAMILY MEDICINE

## 2020-03-13 PROCEDURE — 1125F PR PAIN SEVERITY QUANTIFIED, PAIN PRESENT: ICD-10-PCS | Mod: S$GLB,,, | Performed by: FAMILY MEDICINE

## 2020-03-13 PROCEDURE — 1159F MED LIST DOCD IN RCRD: CPT | Mod: S$GLB,,, | Performed by: FAMILY MEDICINE

## 2020-03-13 PROCEDURE — 99214 OFFICE O/P EST MOD 30 MIN: CPT | Mod: S$GLB,,, | Performed by: FAMILY MEDICINE

## 2020-03-13 PROCEDURE — 1101F PR PT FALLS ASSESS DOC 0-1 FALLS W/OUT INJ PAST YR: ICD-10-PCS | Mod: CPTII,S$GLB,, | Performed by: FAMILY MEDICINE

## 2020-03-13 PROCEDURE — 1159F PR MEDICATION LIST DOCUMENTED IN MEDICAL RECORD: ICD-10-PCS | Mod: S$GLB,,, | Performed by: FAMILY MEDICINE

## 2020-03-13 RX ORDER — CEFDINIR 300 MG/1
300 CAPSULE ORAL 2 TIMES DAILY
Qty: 20 CAPSULE | Refills: 0 | Status: SHIPPED | OUTPATIENT
Start: 2020-03-13 | End: 2020-03-23

## 2020-03-13 RX ORDER — BUTALBITAL, ACETAMINOPHEN AND CAFFEINE 50; 325; 40 MG/1; MG/1; MG/1
TABLET ORAL
Qty: 30 TABLET | Refills: 1 | Status: SHIPPED | OUTPATIENT
Start: 2020-03-13 | End: 2020-04-07 | Stop reason: SDUPTHER

## 2020-03-13 RX ORDER — PROMETHAZINE HYDROCHLORIDE AND CODEINE PHOSPHATE 6.25; 1 MG/5ML; MG/5ML
5 SOLUTION ORAL EVERY 6 HOURS PRN
Qty: 180 ML | Refills: 0 | Status: SHIPPED | OUTPATIENT
Start: 2020-03-13 | End: 2020-04-27 | Stop reason: SDUPTHER

## 2020-03-13 NOTE — PROGRESS NOTES
Subjective:       Patient ID: Josseline Stinson is a 74 y.o. female.    Chief Complaint: Cough; Sore Throat; and Headache    HPI:  74-year-old female patient was treated for bronchitis 01/28/2020--Medrol/Zithromax/prednisone taper/Phenergan with codeine.        Sick x4 days=--no fever---+runny stuffy nose--+sore throat--+cough--+phlegm--green---no pneumonia asthma TB +COPD no smoking  Headaches entire head neck--wants fioricet headaches-off and on x4 days-body aches  +nausea +loose bowel movement no vomit    ROS:  Skin: no psoriasis, eczema, skin cancer  HEENT: +headache--with cold-all over the head and neck area,no  ocular pain, blurred vision, diplopia, epistaxis, hoarseness change in voice, thyroid trouble  Lung: No pneumonia, asthma, Tb, wheezing, SOB, no smoking +COPD  Heart: No chest pain, ankle edema, palpitations,+ old MI,no  rinku murmur, +hypertension,+ hyperlipidemia, + CAD with stent times 17 last stent placed in January 2019 no CABG +CHF  Abdomen:  +GERD and history of a gastric ulcer-both doing well No nausea, vomiting, diarrhea, constipation, ulcers, hepatitis, gallbladder disease, melena, hematochezia, hematemesis  : no UTI, renal disease, stones  GYN hyst   MS: no fractures, O/A, lupus, rheumatoid, gout --history fractured right femur--history fracture left wrist--history fractured elbow history of a hip surgery dana and screws placed in hip  Neuro: No dizziness, LOC, seizures   No diabetes, no anemia, no anxiety, no depression   One daughter but does not talk to pt has not spoken to pt in 2 yrs, patient on disability due to heart, lives alone    Objective:   Physical Exam:  General: Well nourished, well developed, no acute distress +thin  Skin: No lesions  HEENT: Eyes PERRLA, EOM intact, nose clear discharge throat 1/4 erythematous ears TMs clear  NECK: Supple, no bruits, No JVD, no nodes  Lungs: Clear, no rales, rhonchi, wheezing  Heart: Regular rate and rhythm, no murmurs, gallops, or rubs  +coarse cough  Abdomen: flat, bowel sounds positive, no tenderness, or organomegaly  MS: Range of motion and muscle strength intact  Neuro: Alert, CN intact, oriented X 3  Extremities: No cyanosis, clubbing, or edema         Assessment:       1. Bronchitis    2. Chronic diastolic congestive heart failure    3. Coronary artery disease involving native coronary artery of native heart without angina pectoris    4. Essential hypertension    5. Hx of heart artery stent    6. Hypercholesteremia    7. Anemia, unspecified type    8. Hypothyroidism, unspecified type    9. Anxiety    10. DDD (degenerative disc disease), lumbar        Plan:       Bronchitis    Chronic diastolic congestive heart failure    Coronary artery disease involving native coronary artery of native heart without angina pectoris    Essential hypertension  -     CBC auto differential; Future; Expected date: 06/13/2020  -     Comprehensive metabolic panel; Future; Expected date: 06/13/2020  -     Lipid panel; Future; Expected date: 06/13/2020  -     TSH; Future; Expected date: 06/13/2020  -     T4, free; Future; Expected date: 06/13/2020    Hx of heart artery stent    Hypercholesteremia    Anemia, unspecified type    Hypothyroidism, unspecified type    Anxiety    DDD (degenerative disc disease), lumbar  -     Ambulatory referral/consult to Pain Clinic; Future; Expected date: 03/20/2020    Other orders  -     cefdinir (OMNICEF) 300 MG capsule; Take 1 capsule (300 mg total) by mouth 2 (two) times daily. for 10 days  Dispense: 20 capsule; Refill: 0  -     promethazine-codeine 6.25-10 mg/5 ml (PHENERGAN WITH CODEINE) 6.25-10 mg/5 mL syrup; Take 5 mLs by mouth every 6 (six) hours as needed for Cough.  Dispense: 180 mL; Refill: 0  -     butalbital-acetaminophen-caffeine -40 mg (FIORICET, ESGIC) -40 mg per tablet; 1 po qd prn pain  Dispense: 30 tablet; Refill: 1      01/28/2020 treated cold-with-- Medrol/Zithromax/Phenergan with  codeine/prednisone---flu swab--if flu swab negative Omnicef 300 b.i.d.--prednisone tapering dose--with codeine  HA Fioricet  Patient with multiple medical issues--recently had lab with another physician--should consider routine labs in 3 months CBCs CMP lipids T4 TSH stool guaiac UA  Patient sees cardiologist get EKG and cardiac workup with M--multiple cardiac issues HTN HLP CHF NSTEMICAD with stents times 15 old MI CABG  GERD/history ulcer disease--doing well on omeprazole--zofran   Health maintenance needs pain contract Needs new pain  Clinic   History of chronic pain

## 2020-04-07 ENCOUNTER — TELEPHONE (OUTPATIENT)
Dept: PRIMARY CARE CLINIC | Facility: CLINIC | Age: 75
End: 2020-04-07

## 2020-04-07 ENCOUNTER — OFFICE VISIT (OUTPATIENT)
Dept: PRIMARY CARE CLINIC | Facility: CLINIC | Age: 75
End: 2020-04-07
Payer: MEDICARE

## 2020-04-07 DIAGNOSIS — F41.9 ANXIETY: Primary | ICD-10-CM

## 2020-04-07 DIAGNOSIS — M79.605 PAIN IN BOTH LOWER EXTREMITIES: ICD-10-CM

## 2020-04-07 DIAGNOSIS — I10 ESSENTIAL HYPERTENSION: Chronic | ICD-10-CM

## 2020-04-07 DIAGNOSIS — E03.9 HYPOTHYROIDISM, UNSPECIFIED TYPE: Chronic | ICD-10-CM

## 2020-04-07 DIAGNOSIS — M79.604 PAIN IN BOTH LOWER EXTREMITIES: ICD-10-CM

## 2020-04-07 DIAGNOSIS — G89.4 CHRONIC PAIN SYNDROME: ICD-10-CM

## 2020-04-07 DIAGNOSIS — I25.10 CORONARY ARTERY DISEASE INVOLVING NATIVE CORONARY ARTERY OF NATIVE HEART WITHOUT ANGINA PECTORIS: Chronic | ICD-10-CM

## 2020-04-07 DIAGNOSIS — R51.9 INTRACTABLE EPISODIC HEADACHE, UNSPECIFIED HEADACHE TYPE: ICD-10-CM

## 2020-04-07 DIAGNOSIS — Z87.891 HISTORY OF TOBACCO ABUSE: ICD-10-CM

## 2020-04-07 DIAGNOSIS — E78.00 HYPERCHOLESTEREMIA: Chronic | ICD-10-CM

## 2020-04-07 DIAGNOSIS — J44.9 CHRONIC OBSTRUCTIVE PULMONARY DISEASE, UNSPECIFIED COPD TYPE: Chronic | ICD-10-CM

## 2020-04-07 DIAGNOSIS — K21.9 GASTROESOPHAGEAL REFLUX DISEASE, ESOPHAGITIS PRESENCE NOT SPECIFIED: Chronic | ICD-10-CM

## 2020-04-07 DIAGNOSIS — I50.32 CHRONIC DIASTOLIC CONGESTIVE HEART FAILURE: Chronic | ICD-10-CM

## 2020-04-07 PROCEDURE — 99499 UNLISTED E&M SERVICE: CPT | Mod: 95,,, | Performed by: FAMILY MEDICINE

## 2020-04-07 PROCEDURE — 99499 RISK ADDL DX/OHS AUDIT: ICD-10-PCS | Mod: 95,,, | Performed by: FAMILY MEDICINE

## 2020-04-07 PROCEDURE — 99214 OFFICE O/P EST MOD 30 MIN: CPT | Mod: 95,,, | Performed by: FAMILY MEDICINE

## 2020-04-07 PROCEDURE — 1101F PR PT FALLS ASSESS DOC 0-1 FALLS W/OUT INJ PAST YR: ICD-10-PCS | Mod: CPTII,95,, | Performed by: FAMILY MEDICINE

## 2020-04-07 PROCEDURE — 1159F PR MEDICATION LIST DOCUMENTED IN MEDICAL RECORD: ICD-10-PCS | Mod: 95,,, | Performed by: FAMILY MEDICINE

## 2020-04-07 PROCEDURE — 99214 PR OFFICE/OUTPT VISIT, EST, LEVL IV, 30-39 MIN: ICD-10-PCS | Mod: 95,,, | Performed by: FAMILY MEDICINE

## 2020-04-07 PROCEDURE — 1101F PT FALLS ASSESS-DOCD LE1/YR: CPT | Mod: CPTII,95,, | Performed by: FAMILY MEDICINE

## 2020-04-07 PROCEDURE — 1159F MED LIST DOCD IN RCRD: CPT | Mod: 95,,, | Performed by: FAMILY MEDICINE

## 2020-04-07 RX ORDER — HYDROXYZINE PAMOATE 25 MG/1
CAPSULE ORAL
Qty: 30 CAPSULE | Refills: 5 | Status: SHIPPED | OUTPATIENT
Start: 2020-04-07 | End: 2020-06-11

## 2020-04-07 RX ORDER — BUTALBITAL, ACETAMINOPHEN AND CAFFEINE 50; 325; 40 MG/1; MG/1; MG/1
TABLET ORAL
Qty: 30 TABLET | Refills: 1 | Status: SHIPPED | OUTPATIENT
Start: 2020-04-07 | End: 2020-04-27 | Stop reason: SDUPTHER

## 2020-04-07 RX ORDER — TRAMADOL HYDROCHLORIDE 50 MG/1
50 TABLET ORAL EVERY 6 HOURS PRN
Qty: 30 TABLET | Refills: 0 | Status: SHIPPED | OUTPATIENT
Start: 2020-04-07 | End: 2020-05-28

## 2020-04-07 NOTE — PROGRESS NOTES
Subjective:       Patient ID: Josseline Stinson is a 74 y.o. female.    Chief Complaint: No chief complaint on file.    HPI: The patient location is:  home  The chief complaint leading to consultation is:  Headache/anxiety/leg pain  Visit type: Virtual visit with synchronous audio and video  Total time spent with patient:  20 min  Each patient to whom he or she provides medical services by telemedicine is:  (1) informed of the relationship between the physician and patient and the respective role of any other health care provider with respect to management of the patient; and (2) notified that he or she may decline to receive medical services by telemedicine and may withdraw from such care at any time.    Notes:  History of present illness 74-year-old white female-- bad headaches--- bad nerves--- bad pains in her legs -bilateral burning sensation.  Headaches--x1 week---across the eyes forehead and occipital area---quality---stabbing sensation---quality 10/10---frequency constant for 1 week. Sacred to go ER  Bad nerves--being locked up for the corona virus lots of stress--in the house with her 2 dogs  Burning sensation in the legs --legs surg constantly--going on for over 2 weeks just pains.  On gabapentin with within minimal relief--on Plavix so no NSAIDs.  Does have some lower back problems head 5 hip surgeries       Office visit 03/13/2020  74-year-old female patient was treated for bronchitis 01/28/2020--Medrol/Zithromax/prednisone taper/Phenergan with codeine.        Sick x4 days=--no fever---+runny stuffy nose--+sore throat--+cough--+phlegm--green---no pneumonia asthma TB +COPD no smoking  Headaches entire head neck--wants fioricet headaches-off and on x4 days-body aches  +nausea +loose bowel movement no vomit    ROS:  Skin: no psoriasis, eczema, skin cancer--no ulcers on the legs  HEENT: +headache--see history of present illness no  ocular pain, blurred vision, diplopia, epistaxis, hoarseness change in voice,  thyroid trouble does have some ocular pain wears glasses  Lung: No pneumonia, asthma, Tb, wheezing, SOB, no smoking +COPD  Heart: No chest pain, ankle edema, palpitations,+ old MI,no  rinku murmur, +hypertension,+ hyperlipidemia, + CAD with stent times 17 last stent placed in January 2019 no CABG +CHF  Abdomen:  +GERD omeprazole and history of a gastric ulcer-both doing well No nausea, vomiting, diarrhea, constipation, ulcers, hepatitis, gallbladder disease, melena, hematochezia, hematemesis  : no UTI, renal disease, stones  GYN hyst   MS: no fractures, O/A, lupus, rheumatoid, gout --history fractured right femur--history fracture left wrist--history fractured elbow history of a hip surgery dana and screws placed in hip  Neuro: No dizziness, LOC, seizures   No diabetes, no anemia, no anxiety, no depression   One daughter but does not talk to pt has not spoken to pt in 2 yrs, patient on disability due to heart, lives alone    Objective:   Physical Exam:  Patient was not seen had a telemedicine visit so no physical was done note below was from prior office visit  General: Well nourished, well developed, no acute distress +thin  Skin: No lesions  HEENT: Eyes PERRLA, EOM intact, nose clear discharge throat 1/4 erythematous ears TMs clear  NECK: Supple, no bruits, No JVD, no nodes  Lungs: Clear, no rales, rhonchi, wheezing  Heart: Regular rate and rhythm, no murmurs, gallops, or rubs +coarse cough  Abdomen: flat, bowel sounds positive, no tenderness, or organomegaly  MS: Range of motion and muscle strength intact  Neuro: Alert, CN intact, oriented X 3  Extremities: No cyanosis, clubbing, or edema         Assessment:       1. Anxiety    2. Intractable episodic headache, unspecified headache type    3. Pain in both lower extremities    4. Chronic diastolic congestive heart failure    5. Coronary artery disease involving native coronary artery of native heart without angina pectoris    6. Essential hypertension    7.  Hypercholesteremia    8. Chronic obstructive pulmonary disease, unspecified COPD type    9. Chronic pain syndrome    10. Hypothyroidism, unspecified type    11. Gastroesophageal reflux disease, esophagitis presence not specified    12. History of tobacco abuse        Plan:       Anxiety  -     Ambulatory referral/consult to Psychiatry; Future; Expected date: 04/14/2020    Intractable episodic headache, unspecified headache type  -     Ambulatory referral/consult to Neurology; Future; Expected date: 04/14/2020  -     MRI Brain W WO Contrast; Future; Expected date: 04/07/2020  -     Creatinine, serum; Future; Expected date: 04/07/2020    Pain in both lower extremities    Chronic diastolic congestive heart failure    Coronary artery disease involving native coronary artery of native heart without angina pectoris    Essential hypertension    Hypercholesteremia    Chronic obstructive pulmonary disease, unspecified COPD type    Chronic pain syndrome    Hypothyroidism, unspecified type    Gastroesophageal reflux disease, esophagitis presence not specified    History of tobacco abuse    Other orders  -     butalbital-acetaminophen-caffeine -40 mg (FIORICET, ESGIC) -40 mg per tablet; 1 p.o. q.8 hours p.r.n. headache  Dispense: 30 tablet; Refill: 1  -     traMADoL (ULTRAM) 50 mg tablet; Take 1 tablet (50 mg total) by mouth every 6 (six) hours as needed.  Dispense: 30 tablet; Refill: 0  -     hydrOXYzine pamoate (VISTARIL) 25 MG Cap; One p.o. q.8 hours p.r.n. anxiety  Dispense: 30 capsule; Refill: 5      01/28/2020 treated cold-with-- Medrol/Zithromax/Phenergan with codeine/prednisone---flu swab--if flu swab negative Omnicef 300 b.i.d.--prednisone tapering dose--with codeine  HA Fioricet  Patient with multiple medical issues--recently had lab with another physician--should consider routine labs in 3 months CBCs CMP lipids T4 TSH stool guaiac UA  Patient sees cardiologist get EKG and cardiac workup with M--multiple  cardiac issues HTN HLP CHF NSTEMICAD with stents times 15 old MI CABG  GERD/history ulcer disease--doing well on omeprazole--zofran   Health maintenance needs pain contract Needs new pain  Clinic   History of chronic pain

## 2020-04-07 NOTE — TELEPHONE ENCOUNTER
----- Message from Christine Paulino sent at 4/7/2020  9:06 AM CDT -----  Contact: Self [phone]  Patient would like to increase her FIORICET from once a day to three to four a day for her head aches. One is not helping.   Please call in  New Rx for this to;      [facility]   [phone] (Phone) [phone] (Fax)    Patient out of this and need it right away.

## 2020-04-07 NOTE — TELEPHONE ENCOUNTER
----- Message from Nakia Scherer sent at 4/7/2020 10:36 AM CDT -----  Contact: Pt [phone]  She said no one called her for her 10 am virtual audio visit.    Thank you

## 2020-04-16 RX ORDER — BUTALBITAL, ACETAMINOPHEN AND CAFFEINE 50; 325; 40 MG/1; MG/1; MG/1
TABLET ORAL
Qty: 30 TABLET | Refills: 1 | OUTPATIENT
Start: 2020-04-16

## 2020-04-17 ENCOUNTER — OFFICE VISIT (OUTPATIENT)
Dept: PRIMARY CARE CLINIC | Facility: CLINIC | Age: 75
End: 2020-04-17
Payer: MEDICARE

## 2020-04-17 ENCOUNTER — TELEPHONE (OUTPATIENT)
Dept: PRIMARY CARE CLINIC | Facility: CLINIC | Age: 75
End: 2020-04-17

## 2020-04-17 DIAGNOSIS — E03.9 HYPOTHYROIDISM, UNSPECIFIED TYPE: Chronic | ICD-10-CM

## 2020-04-17 DIAGNOSIS — Z87.891 HISTORY OF TOBACCO ABUSE: ICD-10-CM

## 2020-04-17 DIAGNOSIS — M79.671 RIGHT FOOT PAIN: ICD-10-CM

## 2020-04-17 DIAGNOSIS — D64.9 ANEMIA, UNSPECIFIED TYPE: Primary | ICD-10-CM

## 2020-04-17 DIAGNOSIS — K25.9 GASTRIC ULCER WITHOUT HEMORRHAGE OR PERFORATION, UNSPECIFIED CHRONICITY: ICD-10-CM

## 2020-04-17 DIAGNOSIS — S93.401A SPRAIN OF RIGHT ANKLE, UNSPECIFIED LIGAMENT, INITIAL ENCOUNTER: ICD-10-CM

## 2020-04-17 PROCEDURE — 1101F PR PT FALLS ASSESS DOC 0-1 FALLS W/OUT INJ PAST YR: ICD-10-PCS | Mod: CPTII,95,, | Performed by: FAMILY MEDICINE

## 2020-04-17 PROCEDURE — 1101F PT FALLS ASSESS-DOCD LE1/YR: CPT | Mod: CPTII,95,, | Performed by: FAMILY MEDICINE

## 2020-04-17 PROCEDURE — 1159F PR MEDICATION LIST DOCUMENTED IN MEDICAL RECORD: ICD-10-PCS | Mod: 95,,, | Performed by: FAMILY MEDICINE

## 2020-04-17 PROCEDURE — 99442 PR PHYSICIAN TELEPHONE EVALUATION 11-20 MIN: CPT | Mod: 95,,, | Performed by: FAMILY MEDICINE

## 2020-04-17 PROCEDURE — 99442 PR PHYSICIAN TELEPHONE EVALUATION 11-20 MIN: ICD-10-PCS | Mod: 95,,, | Performed by: FAMILY MEDICINE

## 2020-04-17 PROCEDURE — 1159F MED LIST DOCD IN RCRD: CPT | Mod: 95,,, | Performed by: FAMILY MEDICINE

## 2020-04-17 RX ORDER — CYCLOBENZAPRINE HCL 10 MG
10 TABLET ORAL 3 TIMES DAILY PRN
Qty: 21 TABLET | Refills: 0 | Status: SHIPPED | OUTPATIENT
Start: 2020-04-17 | End: 2020-04-27

## 2020-04-17 RX ORDER — HYDROCODONE BITARTRATE AND ACETAMINOPHEN 5; 325 MG/1; MG/1
1 TABLET ORAL EVERY 6 HOURS PRN
Qty: 30 TABLET | Refills: 0 | Status: SHIPPED | OUTPATIENT
Start: 2020-04-17 | End: 2020-05-12 | Stop reason: SDUPTHER

## 2020-04-17 NOTE — PROGRESS NOTES
Subjective:       Patient ID: Josseline Stinson is a 74 y.o. female.    Chief Complaint: No chief complaint on file.    HPI:  The patient location is:  home  The chief complaint leading to consultation is:  Right foot and ankle swelling  Visit type: audio only  Total time spent with patient:  20 min  Each patient to whom he or she provides medical services by telemedicine is:  (1) informed of the relationship between the physician and patient and the respective role of any other health care provider with respect to management of the patient; and (2) notified that he or she may decline to receive medical services by telemedicine and may withdraw from such care at any time.    Notes:  History of present illness 74-year-old white female  -- seen ER --right foot --hairline fracture--and gout.  A while back patient was walking and stepped on a rock most the twisted it and had a hairline fracture.  Patient seen in the emergency room 4-8-2020 for back pain in 04/14/2020 for foot--patient states that her foot was wrapped and then told to go home no medications were dispense  Patient still with pain ankle medial and lateral---her stool walker lays on the right side--unable to touch the ankle at all.      ROS:  No significant change since visit 04/07/2020 except for right foot  Skin: no psoriasis, eczema, skin cancer--no ulcers on the legs --states has some areas of ecchymosis of the ankle medial lateral aspects painful  HEENT: +headache--see history of present illness no  ocular pain, blurred vision, diplopia, epistaxis, hoarseness change in voice, thyroid trouble does have some ocular pain wears glasses  Lung: No pneumonia, asthma, Tb, wheezing, SOB, no smoking +COPD  Heart: No chest pain, ankle edema, palpitations,+ old MI,no  rinku murmur, +hypertension,+ hyperlipidemia, + CAD with stent times 17 last stent placed in January 2019 no CABG +CHF  Abdomen:  +GERD omeprazole and history of a gastric ulcer-both doing well No nausea,  vomiting, diarrhea, constipation, ulcers, hepatitis, gallbladder disease, melena, hematochezia, hematemesis  : no UTI, renal disease, stones  GYN hyst   MS: no fractures, O/A, lupus, rheumatoid, gout --seen in emergency room told had  gout and hairline fracture medial lateral aspect of the ankle or black purple and red  Neuro: No dizziness, LOC, seizures   No diabetes, no anemia, no anxiety, no depression   One daughter but does not talk to pt has not spoken to pt in 2 yrs, patient on disability due to heart, lives alone    Objective:   Physical Exam:  Patient was not seen had a telemedicine visit so no physical was done note below was from prior office visit  General: Well nourished, well developed, no acute distress +thin  Skin: No lesions  HEENT: Eyes PERRLA, EOM intact, nose clear discharge throat 1/4 erythematous ears TMs clear  NECK: Supple, no bruits, No JVD, no nodes  Lungs: Clear, no rales, rhonchi, wheezing  Heart: Regular rate and rhythm, no murmurs, gallops, or rubs +coarse cough  Abdomen: flat, bowel sounds positive, no tenderness, or organomegaly  MS: Range of motion and muscle strength intact  Neuro: Alert, CN intact, oriented X 3  Extremities: No cyanosis, clubbing, or edema         Assessment:       1. Anemia, unspecified type    2. Sprain of right ankle, unspecified ligament, initial encounter    3. Right foot pain    4. Hypothyroidism, unspecified type    5. Gastric ulcer without hemorrhage or perforation, unspecified chronicity    6. History of tobacco abuse        Plan:       Anemia, unspecified type    Sprain of right ankle, unspecified ligament, initial encounter    Right foot pain    Hypothyroidism, unspecified type    Gastric ulcer without hemorrhage or perforation, unspecified chronicity    History of tobacco abuse    Other orders  -     HYDROcodone-acetaminophen (NORCO) 5-325 mg per tablet; Take 1 tablet by mouth every 6 (six) hours as needed.  Dispense: 30 tablet; Refill: 0  -      cyclobenzaprine (FLEXERIL) 10 MG tablet; Take 1 tablet (10 mg total) by mouth 3 (three) times daily as needed for Muscle spasms.  Dispense: 21 tablet; Refill: 0        Ankle swelling--Norco/Flexeril--too soon for steroid---on blood thinner so no NSAIDs---ice for the 1st 48 hr and heat---may benefit from a ankle cast boot---re-x-ray 2 weeks later see if hairline fracture  Patient was seen recently 04/07/2020--patient should follow the workup regimens below  HA Fioricet  Patient with multiple medical issues--recently had lab with another physician--should consider routine labs in 3 months CBCs CMP lipids T4 TSH stool guaiac UA  Patient sees cardiologist get EKG and cardiac workup with M--multiple cardiac issues HTN HLP CHF NSTEMICAD with stents times 15 old MI CABG  GERD/history ulcer disease--doing well on omeprazole--zofran   Health maintenance needs pain contract Needs new pain  Clinic   History of chronic pain

## 2020-04-17 NOTE — TELEPHONE ENCOUNTER
----- Message from Sosa Benites sent at 4/17/2020 11:59 AM CDT -----  Contact: [phone]  Patient is requesting if the doctor can call in pain medication for her hair line fracture in her ankle and her flare up with gout.    Please advise, thank you.

## 2020-04-27 ENCOUNTER — OFFICE VISIT (OUTPATIENT)
Dept: PRIMARY CARE CLINIC | Facility: CLINIC | Age: 75
End: 2020-04-27
Payer: MEDICARE

## 2020-04-27 DIAGNOSIS — E78.00 HYPERCHOLESTEREMIA: Chronic | ICD-10-CM

## 2020-04-27 DIAGNOSIS — D64.9 ANEMIA, UNSPECIFIED TYPE: ICD-10-CM

## 2020-04-27 DIAGNOSIS — J44.9 CHRONIC OBSTRUCTIVE PULMONARY DISEASE, UNSPECIFIED COPD TYPE: Chronic | ICD-10-CM

## 2020-04-27 DIAGNOSIS — M19.90 OSTEOARTHRITIS, UNSPECIFIED OSTEOARTHRITIS TYPE, UNSPECIFIED SITE: ICD-10-CM

## 2020-04-27 DIAGNOSIS — K25.9 GASTRIC ULCER WITHOUT HEMORRHAGE OR PERFORATION, UNSPECIFIED CHRONICITY: ICD-10-CM

## 2020-04-27 DIAGNOSIS — Z95.5 HX OF HEART ARTERY STENT: ICD-10-CM

## 2020-04-27 DIAGNOSIS — K21.9 GASTROESOPHAGEAL REFLUX DISEASE, ESOPHAGITIS PRESENCE NOT SPECIFIED: Chronic | ICD-10-CM

## 2020-04-27 DIAGNOSIS — I50.32 CHRONIC DIASTOLIC CONGESTIVE HEART FAILURE: Chronic | ICD-10-CM

## 2020-04-27 DIAGNOSIS — J40 BRONCHITIS: Primary | ICD-10-CM

## 2020-04-27 DIAGNOSIS — I10 ESSENTIAL HYPERTENSION: Chronic | ICD-10-CM

## 2020-04-27 DIAGNOSIS — I25.10 CORONARY ARTERY DISEASE INVOLVING NATIVE CORONARY ARTERY OF NATIVE HEART WITHOUT ANGINA PECTORIS: Chronic | ICD-10-CM

## 2020-04-27 DIAGNOSIS — Z87.891 HISTORY OF TOBACCO ABUSE: ICD-10-CM

## 2020-04-27 DIAGNOSIS — E03.9 HYPOTHYROIDISM, UNSPECIFIED TYPE: Chronic | ICD-10-CM

## 2020-04-27 PROCEDURE — 1159F MED LIST DOCD IN RCRD: CPT | Mod: 95,,, | Performed by: FAMILY MEDICINE

## 2020-04-27 PROCEDURE — 1101F PR PT FALLS ASSESS DOC 0-1 FALLS W/OUT INJ PAST YR: ICD-10-PCS | Mod: CPTII,95,, | Performed by: FAMILY MEDICINE

## 2020-04-27 PROCEDURE — 99442 PR PHYSICIAN TELEPHONE EVALUATION 11-20 MIN: CPT | Mod: 95,,, | Performed by: FAMILY MEDICINE

## 2020-04-27 PROCEDURE — 1101F PT FALLS ASSESS-DOCD LE1/YR: CPT | Mod: CPTII,95,, | Performed by: FAMILY MEDICINE

## 2020-04-27 PROCEDURE — 1159F PR MEDICATION LIST DOCUMENTED IN MEDICAL RECORD: ICD-10-PCS | Mod: 95,,, | Performed by: FAMILY MEDICINE

## 2020-04-27 PROCEDURE — 99442 PR PHYSICIAN TELEPHONE EVALUATION 11-20 MIN: ICD-10-PCS | Mod: 95,,, | Performed by: FAMILY MEDICINE

## 2020-04-27 RX ORDER — BUTALBITAL, ACETAMINOPHEN AND CAFFEINE 50; 325; 40 MG/1; MG/1; MG/1
TABLET ORAL
Qty: 30 TABLET | Refills: 1 | Status: SHIPPED | OUTPATIENT
Start: 2020-04-27 | End: 2020-05-12 | Stop reason: SDUPTHER

## 2020-04-27 RX ORDER — AZITHROMYCIN 250 MG/1
TABLET, FILM COATED ORAL
Qty: 6 TABLET | Refills: 0 | Status: SHIPPED | OUTPATIENT
Start: 2020-04-27 | End: 2020-05-01

## 2020-04-27 RX ORDER — PROMETHAZINE HYDROCHLORIDE AND CODEINE PHOSPHATE 6.25; 1 MG/5ML; MG/5ML
5 SOLUTION ORAL EVERY 6 HOURS PRN
Qty: 180 ML | Refills: 0 | Status: SHIPPED | OUTPATIENT
Start: 2020-04-27 | End: 2020-05-12

## 2020-04-27 NOTE — PROGRESS NOTES
The reason for the audio only service rather than synchronous audio and video virtual visit was related to technical difficulties or patient preference/necessity.     Each patient to whom I provide medical services by telemedicine is:  (1) informed of the relationship between the physician and patient and the respective role of any other health care provider with respect to management of the patient; and (2) notified that they may decline to receive medical services by telemedicine and may withdraw from such care at any time. Patient verbally consented to receive this service via voice-only telephone call.         Subjective:       Patient ID: Josseline Stinson is a 74 y.o. female.    Chief Complaint: No chief complaint on file.    HPI:  The patient location is:  home  The chief complaint leading to consultation is:  Bronchitis  Visit type: audio only  Total time spent with patient:  15 min  Each patient to whom he or she provides medical services by telemedicine is:  (1) informed of the relationship between the physician and patient and the respective role of any other health care provider with respect to management of the patient; and (2) notified that he or she may decline to receive medical services by telemedicine and may withdraw from such care at any time.    Notes:  History of present 74-year-old female --bad headache--no fever---no runny nose ---dry throat in the morning--+cough---+phlegm-green---no pneumonia asthma TB--no smoking.  History COPD with chronic bronchitis.  No COVID exposure---no achy joint--diarrhea--.  Chest pain.        Patient had injury to right ankle--still having difficulty ambulating--patient has to walk when going to the grocery store has no transportation    ROS:  No significant change since visit 04/07/2020 except for right foot  Skin: no psoriasis, eczema, skin cancer--no ulcers on the legs --states has some areas of ecchymosis of the ankle medial lateral aspects painful  HEENT: + HA  no  ocular pain, blurred vision, diplopia, epistaxis, hoarsene ss change in voice, thyroid trouble does have some ocular pain wears glasses  Lung: No pneumonia, asthma, Tb, wheezing, SOB, no smoking +COPD +chronic bronchitis  Heart: No chest pain, ankle edema, palpitations,+ old MI,no  rinku murmur, +hypertension,+ hyperlipidemia, + CAD with stent times 17 last stent placed in January 2019 no CABG +CHF  Abdomen:  +GERD omeprazole and history of a gastric ulcer-both doing well No nausea, vomiting, diarrhea, constipation, ulcers, hepatitis, gallbladder disease, melena, hematochezia, hematemesis  : no UTI, renal disease, stones  GYN hyst   MS: no fractures, O/A, lupus, rheumatoid, gout --seen in emergency room told had  gout and hairline fracture medial lateral aspect of the ankle or black purple and red  Neuro: No dizziness, LOC, seizures   No diabetes, no anemia, no anxiety, no depression   One daughter but does not talk to pt has not spoken to pt in 2 yrs, patient on disability due to heart, lives alone    Objective:   Physical Exam:  Patient was not seen had a telemedicine visit so no physical was done note below was from prior office visit  General: Well nourished, well developed, no acute distress +thin  Skin: No lesions  HEENT: Eyes PERRLA, EOM intact, nose clear discharge throat 1/4 erythematous ears TMs clear  NECK: Supple, no bruits, No JVD, no nodes  Lungs: Clear, no rales, rhonchi, wheezing  Heart: Regular rate and rhythm, no murmurs, gallops, or rubs +coarse cough  Abdomen: flat, bowel sounds positive, no tenderness, or organomegaly  MS: Range of motion and muscle strength intact  Neuro: Alert, CN intact, oriented X 3  Extremities: No cyanosis, clubbing, or edema         Assessment:       1. Bronchitis    2. Chronic obstructive pulmonary disease, unspecified COPD type    3. Chronic diastolic congestive heart failure    4. Coronary artery disease involving native coronary artery of native heart  without angina pectoris    5. Essential hypertension    6. Hx of heart artery stent    7. Hypercholesteremia    8. Anemia, unspecified type    9. Hypothyroidism, unspecified type    10. Gastric ulcer without hemorrhage or perforation, unspecified chronicity    11. Gastroesophageal reflux disease, esophagitis presence not specified    12. Osteoarthritis, unspecified osteoarthritis type, unspecified site    13. History of tobacco abuse        Plan:       Bronchitis    Chronic obstructive pulmonary disease, unspecified COPD type    Chronic diastolic congestive heart failure    Coronary artery disease involving native coronary artery of native heart without angina pectoris    Essential hypertension    Hx of heart artery stent    Hypercholesteremia    Anemia, unspecified type    Hypothyroidism, unspecified type    Gastric ulcer without hemorrhage or perforation, unspecified chronicity    Gastroesophageal reflux disease, esophagitis presence not specified    Osteoarthritis, unspecified osteoarthritis type, unspecified site    History of tobacco abuse    Other orders  -     azithromycin (Z-NICHOLE) 250 MG tablet; 2 tabs by mouth day 1, then 1 tab by mouth daily x 4 days  Dispense: 6 tablet; Refill: 0  -     promethazine-codeine 6.25-10 mg/5 ml (PHENERGAN WITH CODEINE) 6.25-10 mg/5 mL syrup; Take 5 mLs by mouth every 6 (six) hours as needed for Cough.  Dispense: 180 mL; Refill: 0  -     butalbital-acetaminophen-caffeine -40 mg (FIORICET, ESGIC) -40 mg per tablet; 1 p.o. q.8 hours p.r.n. headache  Dispense: 30 tablet; Refill: 1        Sinusitis/bronchitis--Zithromax Phenergan with codeine  Headache Fioricet  Ankle swelling--has Norco/Flexeril--too soon for steroid---on blood thinner so no NSAIDs---if pains stays 2 weeks post injury can get another x-ray to be sure healing properly  Patient with multiple medical issues--recently had lab with another physician--should consider routine labs in 3 months CBCs CMP lipids  T4 TSH stool guaiac UA  Patient sees cardiologist get EKG and cardiac workup with M--multiple cardiac issues HTN HLP CHF NSTEMICAD with stents times 15 old MI CABG  GERD/history ulcer disease--doing well on omeprazole--zofran   Health maintenance needs pain contract Needs new pain  Clinic   History of chronic painThis service was not originating from a related E/M service provided within the previous 7 days nor will  to an E/M service or procedure within the next 24 hours or my soonest available appointment.  Prevailing standard of care was able to be met in this audio-only visit.

## 2020-05-04 DIAGNOSIS — F41.9 ANXIETY: ICD-10-CM

## 2020-05-04 RX ORDER — DULOXETIN HYDROCHLORIDE 20 MG/1
CAPSULE, DELAYED RELEASE ORAL
Qty: 90 CAPSULE | Refills: 1 | Status: SHIPPED | OUTPATIENT
Start: 2020-05-04 | End: 2020-10-09

## 2020-05-07 RX ORDER — LEVOCETIRIZINE DIHYDROCHLORIDE 5 MG/1
TABLET, FILM COATED ORAL
Qty: 30 TABLET | Refills: 5 | Status: SHIPPED | OUTPATIENT
Start: 2020-05-07 | End: 2020-06-11

## 2020-05-11 ENCOUNTER — TELEPHONE (OUTPATIENT)
Dept: PRIMARY CARE CLINIC | Facility: CLINIC | Age: 75
End: 2020-05-11

## 2020-05-11 NOTE — TELEPHONE ENCOUNTER
----- Message from Elinor Zacarias sent at 5/11/2020  4:50 PM CDT -----  Contact: Patient [phone]  Requesting an RX refill or new RX.  Is this a refill or new RX:  Refill  RX name and strength: butalbital-acetaminophen-caffeine -40 mg (FIORICET, ESGIC) -40 mg per tablet   Directions (copy/paste from chart):    Is this a 30 day or 90 day RX:    Local pharmacy or mail order pharmacy:  Local  Pharmacy name and phone # [facility], [place] - [address]   Comments:

## 2020-05-12 ENCOUNTER — OFFICE VISIT (OUTPATIENT)
Dept: PRIMARY CARE CLINIC | Facility: CLINIC | Age: 75
End: 2020-05-12
Payer: MEDICARE

## 2020-05-12 VITALS
SYSTOLIC BLOOD PRESSURE: 160 MMHG | DIASTOLIC BLOOD PRESSURE: 90 MMHG | BODY MASS INDEX: 18.95 KG/M2 | HEIGHT: 64 IN | WEIGHT: 111 LBS | OXYGEN SATURATION: 99 % | RESPIRATION RATE: 18 BRPM | HEART RATE: 87 BPM | TEMPERATURE: 97 F

## 2020-05-12 DIAGNOSIS — M19.90 OSTEOARTHRITIS, UNSPECIFIED OSTEOARTHRITIS TYPE, UNSPECIFIED SITE: ICD-10-CM

## 2020-05-12 DIAGNOSIS — E78.00 HYPERCHOLESTEREMIA: Chronic | ICD-10-CM

## 2020-05-12 DIAGNOSIS — Z95.5 HX OF HEART ARTERY STENT: ICD-10-CM

## 2020-05-12 DIAGNOSIS — Z87.891 HISTORY OF TOBACCO ABUSE: ICD-10-CM

## 2020-05-12 DIAGNOSIS — F41.9 ANXIETY: ICD-10-CM

## 2020-05-12 DIAGNOSIS — D64.9 ANEMIA, UNSPECIFIED TYPE: ICD-10-CM

## 2020-05-12 DIAGNOSIS — G89.4 CHRONIC PAIN SYNDROME: Primary | ICD-10-CM

## 2020-05-12 DIAGNOSIS — M51.36 DDD (DEGENERATIVE DISC DISEASE), LUMBAR: ICD-10-CM

## 2020-05-12 DIAGNOSIS — E03.9 HYPOTHYROIDISM, UNSPECIFIED TYPE: Chronic | ICD-10-CM

## 2020-05-12 DIAGNOSIS — I50.32 CHRONIC DIASTOLIC CONGESTIVE HEART FAILURE: Chronic | ICD-10-CM

## 2020-05-12 DIAGNOSIS — I10 ESSENTIAL HYPERTENSION: Chronic | ICD-10-CM

## 2020-05-12 PROBLEM — K92.2 GI BLEED: Status: RESOLVED | Noted: 2019-07-04 | Resolved: 2020-05-12

## 2020-05-12 PROBLEM — S96.911A STRAIN OF RIGHT ANKLE: Status: ACTIVE | Noted: 2020-05-12

## 2020-05-12 PROCEDURE — 1101F PT FALLS ASSESS-DOCD LE1/YR: CPT | Mod: CPTII,S$GLB,, | Performed by: FAMILY MEDICINE

## 2020-05-12 PROCEDURE — 99213 PR OFFICE/OUTPT VISIT, EST, LEVL III, 20-29 MIN: ICD-10-PCS | Mod: S$GLB,,, | Performed by: FAMILY MEDICINE

## 2020-05-12 PROCEDURE — 99999 PR PBB SHADOW E&M-EST. PATIENT-LVL V: CPT | Mod: PBBFAC,,, | Performed by: FAMILY MEDICINE

## 2020-05-12 PROCEDURE — 3077F SYST BP >= 140 MM HG: CPT | Mod: CPTII,S$GLB,, | Performed by: FAMILY MEDICINE

## 2020-05-12 PROCEDURE — 99999 PR PBB SHADOW E&M-EST. PATIENT-LVL V: ICD-10-PCS | Mod: PBBFAC,,, | Performed by: FAMILY MEDICINE

## 2020-05-12 PROCEDURE — 1101F PR PT FALLS ASSESS DOC 0-1 FALLS W/OUT INJ PAST YR: ICD-10-PCS | Mod: CPTII,S$GLB,, | Performed by: FAMILY MEDICINE

## 2020-05-12 PROCEDURE — 3080F PR MOST RECENT DIASTOLIC BLOOD PRESSURE >= 90 MM HG: ICD-10-PCS | Mod: CPTII,S$GLB,, | Performed by: FAMILY MEDICINE

## 2020-05-12 PROCEDURE — 3080F DIAST BP >= 90 MM HG: CPT | Mod: CPTII,S$GLB,, | Performed by: FAMILY MEDICINE

## 2020-05-12 PROCEDURE — 1159F PR MEDICATION LIST DOCUMENTED IN MEDICAL RECORD: ICD-10-PCS | Mod: S$GLB,,, | Performed by: FAMILY MEDICINE

## 2020-05-12 PROCEDURE — 3077F PR MOST RECENT SYSTOLIC BLOOD PRESSURE >= 140 MM HG: ICD-10-PCS | Mod: CPTII,S$GLB,, | Performed by: FAMILY MEDICINE

## 2020-05-12 PROCEDURE — 99213 OFFICE O/P EST LOW 20 MIN: CPT | Mod: S$GLB,,, | Performed by: FAMILY MEDICINE

## 2020-05-12 PROCEDURE — 1159F MED LIST DOCD IN RCRD: CPT | Mod: S$GLB,,, | Performed by: FAMILY MEDICINE

## 2020-05-12 RX ORDER — BUTALBITAL, ACETAMINOPHEN AND CAFFEINE 50; 325; 40 MG/1; MG/1; MG/1
TABLET ORAL
Qty: 30 TABLET | Refills: 1 | Status: SHIPPED | OUTPATIENT
Start: 2020-05-12 | End: 2020-06-11 | Stop reason: SDUPTHER

## 2020-05-12 RX ORDER — HYDROCODONE BITARTRATE AND ACETAMINOPHEN 5; 325 MG/1; MG/1
1 TABLET ORAL EVERY 6 HOURS PRN
Qty: 30 TABLET | Refills: 0 | Status: SHIPPED | OUTPATIENT
Start: 2020-05-12 | End: 2020-06-11

## 2020-05-12 RX ORDER — LORAZEPAM 1 MG/1
TABLET ORAL
Qty: 30 TABLET | Refills: 2 | Status: SHIPPED | OUTPATIENT
Start: 2020-05-12 | End: 2020-06-11

## 2020-05-12 NOTE — PROGRESS NOTES
The reason for the audio only service rather than synchronous audio and video virtual visit was related to technical difficulties or patient preference/necessity.     Each patient to whom I provide medical services by telemedicine is:  (1) informed of the relationship between the physician and patient and the respective role of any other health care provider with respect to management of the patient; and (2) notified that they may decline to receive medical services by telemedicine and may withdraw from such care at any time. Patient verbally consented to receive this service via voice-only telephone call.         Subjective:       Patient ID: Josseline Stinson is a 74 y.o. female.    Chief Complaint: Medication Refill    HPI:  75 yo WF -- patient had an 8-year-old die of an intestinal virus--states patient bled out --was granddaughter.  Monday night. Lots stress Hx anxiety depression --stressed out with the pandemic and recent death and chronic pain   Patient comes right ankle had hairline fx --went ER here given ASA told go home --x-ray shows spur formation near the Achilles tendon in the plantar fascia, some severe arthritis with narrowing of the 1st MTP of the right foot.  Patient states hard to bare weight --unable squat--up and down steps--hard to walk.        ROS:   Skin: no psoriasis, eczema, skin cancer--no ulcers on the legs --states has some areas of ecchymosis of the ankle medial lateral aspects painful  HEENT: + HA due to nerves no  ocular pain, blurred vision, diplopia, epistaxis, hoarsene ss change in voice, thyroid trouble does have some ocular pain wears glasses  Lung: No pneumonia, asthma, Tb, wheezing, SOB, no smoking +COPD +chronic bronchitis  Heart: No chest pain, ankle edema, palpitations,+ old MI,no  rinku murmur, +hypertension,+ hyperlipidemia, + CAD with stent times 17 last stent placed in 2019 no CABG +CHF  Abdomen:  +GERD omeprazole and history of a gastric ulcer-both doing well  No nausea, vomiting, diarrhea, constipation, ulcers, hepatitis, gallbladder disease, melena, hematochezia, hematemesis  : no UTI, renal disease, stones  GYN hyst   MS: no fractures, O/A, lupus, rheumatoid, gout --seen in emergency room told had  gout and hairline fracture medial lateral aspect of the ankle or black purple and red--showed excess ptosis of the heel area near the Achilles tendon and plantar surface but no fracture on the x-ray and x-ray of the foot shows significant narrowing of the 1st MTP suggesting or throat  Neuro: No dizziness, LOC, seizures   No diabetes, no anemia, no anxiety, no depression   One daughter but does not talk to pt has not spoken to pt in 2 yrs, patient on disability due to heart, lives alone    Objective:   Physical Exam:   General: Well nourished, well developed, no acute distress +thin somewhat he may see  Skin: No lesions  HEENT: Eyes PERRLA, EOM intact, nose clear discharge throat noneythematous ears TMs clear  NECK: Supple, no bruits, No JVD, no nodes  Lungs: Clear, no rales, rhonchi, wheezinge decreased breath sounds due to history of severe COPD  Heart: Regular rate and rhythm, no murmurs, gallops, or rubs +coarse cough distant heart sounds  Abdomen: flat, bowel sounds positive, no tenderness, or organomegaly  MS:  Swelling in the right ankle medial and lateral aspect with some swelling of the right lateral  to palpation pain with flexion extension inversion eversion weight-bearing unable to stand on toes or heels   Neuro: Alert, CN intact, oriented X 3  Extremities: No cyanosis, clubbing, or edema         Assessment:       1. Chronic pain syndrome    2. DDD (degenerative disc disease), lumbar    3. Anxiety    4. Chronic diastolic congestive heart failure    5. Essential hypertension    6. Hypercholesteremia    7. Hx of heart artery stent    8. Anemia, unspecified type    9. Hypothyroidism, unspecified type    10. History of tobacco abuse    11.  Osteoarthritis, unspecified osteoarthritis type, unspecified site        Plan:       Chronic pain syndrome  -     Ambulatory referral/consult to Orthopedics; Future; Expected date: 05/19/2020  -     Ambulatory referral/consult to Pain Clinic; Future; Expected date: 05/19/2020  -     X-Ray Ankle Complete 3 View Right; Future; Expected date: 05/12/2020  -     X-Ray Foot Complete 3 view Right; Future; Expected date: 05/12/2020    DDD (degenerative disc disease), lumbar    Anxiety    Chronic diastolic congestive heart failure    Essential hypertension  -     CBC auto differential; Future; Expected date: 05/12/2020  -     Comprehensive metabolic panel; Future; Expected date: 05/12/2020  -     Fecal Immunochemical Test (iFOBT); Future; Expected date: 05/12/2020  -     POCT urine dipstick without microscope  -     T4, free; Future; Expected date: 05/12/2020  -     TSH; Future; Expected date: 05/12/2020    Hypercholesteremia  -     Lipid Panel; Future; Expected date: 05/12/2020    Hx of heart artery stent    Anemia, unspecified type    Hypothyroidism, unspecified type    History of tobacco abuse    Osteoarthritis, unspecified osteoarthritis type, unspecified site  -     Ambulatory referral/consult to Orthopedics; Future; Expected date: 05/19/2020  -     Ambulatory referral/consult to Pain Clinic; Future; Expected date: 05/19/2020  -     X-Ray Ankle Complete 3 View Right; Future; Expected date: 05/12/2020  -     X-Ray Foot Complete 3 view Right; Future; Expected date: 05/12/2020    Other orders  -     LORazepam (ATIVAN) 1 MG tablet; 1/2 po bid or 1 po qd prn anxiety  Dispense: 30 tablet; Refill: 2  -     butalbital-acetaminophen-caffeine -40 mg (FIORICET, ESGIC) -40 mg per tablet; 1 p.o. q.8 hours p.r.n. headache  Dispense: 30 tablet; Refill: 1  -     HYDROcodone-acetaminophen (NORCO) 5-325 mg per tablet; Take 1 tablet by mouth every 6 (six) hours as needed.  Dispense: 30 tablet; Refill: 0        Right ankle and  foot pain--patient seen emergency room told had a hairline fracture in gout--x-rays only show exostosis are calcium of the Achilles tendon plantar fashion---and severe narrowing of the 1st MTP the foot probably from arthritis--injury occurred 2-3 weeks ago will re-x-ray ankle due to persistent pain--if pain stays can get cast boot---referral to orthopedist---nilda Pelkie for now will not treat with pain medication more than 6 weeks unable take NSAIDs due to blood thinners  Headache Fioricet  Patient with multiple medical issues--recently had lab with another physician--should consider routine labs in 3 months CBCs CMP lipids T4 TSH stool guaiac UA  Patient sees cardiologist get EKG and cardiac workup with M--multiple cardiac issues HTN HLP CHF NSTEMICAD with stents times 15 old MI CABG  GERD/history ulcer disease--doing well on omeprazole--zofran   Health maintenance needs pain contract Needs new pain  Clinic   History of chronic painThis service was not originating from a related E/M service provided within the previous 7 days nor will  to an E/M service or procedure within the next 24 hours or my soonest available appointment.  Prevailing standard of care was able to be met in this audio-only visit.

## 2020-05-18 ENCOUNTER — TELEPHONE (OUTPATIENT)
Dept: PAIN MEDICINE | Facility: CLINIC | Age: 75
End: 2020-05-18

## 2020-05-18 NOTE — TELEPHONE ENCOUNTER
----- Message from Rosa Isela Vergara sent at 5/18/2020  2:02 PM CDT -----  Needs appt with DR Ford

## 2020-05-20 NOTE — TELEPHONE ENCOUNTER
----- Message from Rosa Isela Vergara sent at 5/20/2020 10:28 AM CDT -----  Did refill  come through for her sleep meds.

## 2020-05-27 NOTE — TELEPHONE ENCOUNTER
----- Message from Ayla Hitchcock sent at 5/27/2020 12:18 PM CDT -----  Contact: self/ [phone]  Requesting an RX refill or new RX.  Is this a refill or new RX:    RX name and strength: traZODone (DESYREL) 100 MG tabletDirections (copy/paste from chart):    Is this a 30 day or 90 day RX:    Local pharmacy or mail order pharmacy:  [facility] [place] [address] [phone] (Phone)  [phone] (Fax)  Pharmacy name and phone # (copy/paste from chart):     Comments:  Please call patient to confirm, patient need this to sleep at night.

## 2020-05-27 NOTE — TELEPHONE ENCOUNTER
----- Message from Ayla Hitchcock sent at 5/27/2020 12:18 PM CDT -----  Contact: self/ [phone]  Requesting an RX refill or new RX.  Is this a refill or new RX:    RX name and strength: traZODone (DESYREL) 100 MG tabletDirections (copy/paste from chart):    Is this a 30 day or 90 day RX:    Local pharmacy or mail order pharmacy:  [facility] [place] [address] [phone] (Phone)  [phone] (Fax)  Pharmacy name and phone # (copy/paste from chart):     Comments:  Please call patient to confirm, patient need this to sleep at night.

## 2020-05-28 RX ORDER — TRAZODONE HYDROCHLORIDE 100 MG/1
TABLET ORAL
Qty: 90 TABLET | Refills: 5 | Status: SHIPPED | OUTPATIENT
Start: 2020-05-28 | End: 2020-06-11

## 2020-05-28 RX ORDER — BUTALBITAL, ACETAMINOPHEN AND CAFFEINE 50; 325; 40 MG/1; MG/1; MG/1
TABLET ORAL
Qty: 30 TABLET | Refills: 1 | OUTPATIENT
Start: 2020-05-28

## 2020-05-28 RX ORDER — TRAMADOL HYDROCHLORIDE 50 MG/1
TABLET ORAL
Qty: 30 TABLET | Refills: 0 | Status: SHIPPED | OUTPATIENT
Start: 2020-05-28 | End: 2020-06-11

## 2020-05-28 RX ORDER — TRAZODONE HYDROCHLORIDE 100 MG/1
100 TABLET ORAL NIGHTLY PRN
Qty: 30 TABLET | Refills: 5 | Status: SHIPPED | OUTPATIENT
Start: 2020-05-28 | End: 2020-06-11

## 2020-05-28 NOTE — TELEPHONE ENCOUNTER
----- Message from Isabel Hobson sent at 5/28/2020  9:16 AM CDT -----  Contact: Self   Pt is calling to check status on traZODone (DESYREL) 100 MG tablet [Pharmacy Med Name: trazodone 100 mg tablet] refill. Please call and advise.

## 2020-06-01 RX ORDER — TRAZODONE HYDROCHLORIDE 100 MG/1
100 TABLET ORAL NIGHTLY PRN
Qty: 30 TABLET | Refills: 5 | Status: SHIPPED | OUTPATIENT
Start: 2020-06-01 | End: 2020-06-11

## 2020-06-02 ENCOUNTER — TELEPHONE (OUTPATIENT)
Dept: ORTHOPEDICS | Facility: CLINIC | Age: 75
End: 2020-06-02

## 2020-06-05 NOTE — TELEPHONE ENCOUNTER
----- Message from Ade Linda sent at 6/5/2020 10:34 AM CDT -----  Contact: Patient  Type: Needs Medical Advice    Who Called:  Josseline, patient  Symptoms (please be specific):  N/A  How long has patient had these symptoms:  N/A  Pharmacy name and phone #:    [facility] - [place], [place] - [address]  [address] [place] [address]  Phone: [phone] Fax: [phone]  Best Call Back Number: [phone]  Additional Information: Please send Rx LORazepam (ATIVAN) 1 MG tablet to the pharmacy, it was 'print'. Thanks.

## 2020-06-10 ENCOUNTER — TELEPHONE (OUTPATIENT)
Dept: PRIMARY CARE CLINIC | Facility: CLINIC | Age: 75
End: 2020-06-10

## 2020-06-10 NOTE — TELEPHONE ENCOUNTER
----- Message from Estelle Dill sent at 6/10/2020  3:19 PM CDT -----  Contact: Patient [phone]  Prescription Request:     Name of medication: Tylenol #4    Reason for request: pain in legs and hips    Pharmacy: [facility] - [place], [place] - [address]    Please contact the patient with the status of his request.    Thank You

## 2020-06-11 ENCOUNTER — OFFICE VISIT (OUTPATIENT)
Dept: ORTHOPEDICS | Facility: CLINIC | Age: 75
End: 2020-06-11
Payer: MEDICARE

## 2020-06-11 VITALS
SYSTOLIC BLOOD PRESSURE: 142 MMHG | RESPIRATION RATE: 20 BRPM | HEIGHT: 64 IN | BODY MASS INDEX: 18.85 KG/M2 | DIASTOLIC BLOOD PRESSURE: 82 MMHG | OXYGEN SATURATION: 99 % | HEART RATE: 96 BPM | WEIGHT: 110.44 LBS

## 2020-06-11 DIAGNOSIS — M47.816 LUMBAR SPONDYLOSIS: ICD-10-CM

## 2020-06-11 DIAGNOSIS — G89.4 CHRONIC PAIN SYNDROME: ICD-10-CM

## 2020-06-11 PROCEDURE — 99999 PR PBB SHADOW E&M-EST. PATIENT-LVL IV: CPT | Mod: PBBFAC,,, | Performed by: ORTHOPAEDIC SURGERY

## 2020-06-11 PROCEDURE — 1125F PR PAIN SEVERITY QUANTIFIED, PAIN PRESENT: ICD-10-PCS | Mod: S$GLB,,, | Performed by: ORTHOPAEDIC SURGERY

## 2020-06-11 PROCEDURE — 3077F PR MOST RECENT SYSTOLIC BLOOD PRESSURE >= 140 MM HG: ICD-10-PCS | Mod: CPTII,S$GLB,, | Performed by: ORTHOPAEDIC SURGERY

## 2020-06-11 PROCEDURE — 99213 OFFICE O/P EST LOW 20 MIN: CPT | Mod: S$GLB,,, | Performed by: ORTHOPAEDIC SURGERY

## 2020-06-11 PROCEDURE — 3079F DIAST BP 80-89 MM HG: CPT | Mod: CPTII,S$GLB,, | Performed by: ORTHOPAEDIC SURGERY

## 2020-06-11 PROCEDURE — 99999 PR PBB SHADOW E&M-EST. PATIENT-LVL IV: ICD-10-PCS | Mod: PBBFAC,,, | Performed by: ORTHOPAEDIC SURGERY

## 2020-06-11 PROCEDURE — 1159F PR MEDICATION LIST DOCUMENTED IN MEDICAL RECORD: ICD-10-PCS | Mod: S$GLB,,, | Performed by: ORTHOPAEDIC SURGERY

## 2020-06-11 PROCEDURE — 99213 PR OFFICE/OUTPT VISIT, EST, LEVL III, 20-29 MIN: ICD-10-PCS | Mod: S$GLB,,, | Performed by: ORTHOPAEDIC SURGERY

## 2020-06-11 PROCEDURE — 1125F AMNT PAIN NOTED PAIN PRSNT: CPT | Mod: S$GLB,,, | Performed by: ORTHOPAEDIC SURGERY

## 2020-06-11 PROCEDURE — 3079F PR MOST RECENT DIASTOLIC BLOOD PRESSURE 80-89 MM HG: ICD-10-PCS | Mod: CPTII,S$GLB,, | Performed by: ORTHOPAEDIC SURGERY

## 2020-06-11 PROCEDURE — 1101F PT FALLS ASSESS-DOCD LE1/YR: CPT | Mod: CPTII,S$GLB,, | Performed by: ORTHOPAEDIC SURGERY

## 2020-06-11 PROCEDURE — 1159F MED LIST DOCD IN RCRD: CPT | Mod: S$GLB,,, | Performed by: ORTHOPAEDIC SURGERY

## 2020-06-11 PROCEDURE — 1101F PR PT FALLS ASSESS DOC 0-1 FALLS W/OUT INJ PAST YR: ICD-10-PCS | Mod: CPTII,S$GLB,, | Performed by: ORTHOPAEDIC SURGERY

## 2020-06-11 PROCEDURE — 3077F SYST BP >= 140 MM HG: CPT | Mod: CPTII,S$GLB,, | Performed by: ORTHOPAEDIC SURGERY

## 2020-06-11 NOTE — PROGRESS NOTES
"Subjective:    Patient ID:  Josseline Stinson is a 74 y.o. y.o. female who presents for f/u visit for Pain of the Right Hip      Patient with longstanding LBP with radiation into right leg/foot with associated numbness/tingling. Pain described as constant and aggravated with activity. Walks with cane. Seen by Dr. Ford in 11/2019 and had been scheduled for lumbar medial branch blocks, however patient cancelled procedure x 3. Requesting pain medication today.         Objective:     BP (!) 142/82   Pulse 96   Resp 20   Ht 5' 4" (1.626 m)   Wt 50.1 kg (110 lb 7.2 oz)   SpO2 99%   BMI 18.96 kg/m²     Ortho Exam     Short leg gait right     L/S spine: tender right L5-S1 region/SI joint; decreased ROM all planes    BLE: N/V intact; no nerve root tension signs    Right hip: well-healed surgical scar; NT; ROM: 105 flexion; 30 abduction/ER, 15 adduction/IR; positive KARINA; negative Stinchfield    Imaging:           Assessment & Plan:     1. Chronic pain syndrome    2. Lumbar spondylosis      1.  Findings, diagnosis, treatment options/risks/benefits were reviewed  2.  Follow-up with pain management and PCP  "

## 2020-06-11 NOTE — LETTER
June 11, 2020      Dony Woo MD  [address] [place] [address]           [facility]  [address], [facility] [address]  [place] [address]  Phone: [phone]  Fax: [phone]          Patient: Josseline Stinson   MR Number: [id number]   YOB: 1945   Date of Visit: 6/11/2020       Dear Dr. Dony Woo:    Thank you for referring Josseline Stinson to me for evaluation. Attached you will find relevant portions of my assessment and plan of care.    If you have questions, please do not hesitate to call me. I look forward to following Josseline Stinson along with you.    Sincerely,    Michele Black MD    Enclosure  CC:  No Recipients    If you would like to receive this communication electronically, please contact [email or web address] or [phone] to request more information on [facility] access.    For providers and/or their staff who would like to refer a patient to [facility], please contact us through our one-stop-shop provider referral line, [facility], at [phone].    If you feel you have received this communication in error or would no longer like to receive these types of communications, please e-mail [email or web address]

## 2020-06-12 ENCOUNTER — TELEPHONE (OUTPATIENT)
Dept: PRIMARY CARE CLINIC | Facility: CLINIC | Age: 75
End: 2020-06-12

## 2020-06-12 ENCOUNTER — OFFICE VISIT (OUTPATIENT)
Dept: PRIMARY CARE CLINIC | Facility: CLINIC | Age: 75
End: 2020-06-12
Payer: MEDICARE

## 2020-06-12 DIAGNOSIS — F41.9 ANXIETY: Primary | ICD-10-CM

## 2020-06-12 DIAGNOSIS — J40 BRONCHITIS: ICD-10-CM

## 2020-06-12 DIAGNOSIS — M79.605 PAIN IN BOTH LOWER EXTREMITIES: ICD-10-CM

## 2020-06-12 DIAGNOSIS — D64.9 ANEMIA, UNSPECIFIED TYPE: ICD-10-CM

## 2020-06-12 DIAGNOSIS — I50.32 CHRONIC DIASTOLIC CONGESTIVE HEART FAILURE: Chronic | ICD-10-CM

## 2020-06-12 DIAGNOSIS — F51.01 PRIMARY INSOMNIA: Chronic | ICD-10-CM

## 2020-06-12 DIAGNOSIS — F32.A DEPRESSION, UNSPECIFIED DEPRESSION TYPE: ICD-10-CM

## 2020-06-12 DIAGNOSIS — Z87.891 HISTORY OF TOBACCO ABUSE: ICD-10-CM

## 2020-06-12 DIAGNOSIS — M51.36 DDD (DEGENERATIVE DISC DISEASE), LUMBAR: ICD-10-CM

## 2020-06-12 DIAGNOSIS — J44.9 CHRONIC OBSTRUCTIVE PULMONARY DISEASE, UNSPECIFIED COPD TYPE: Chronic | ICD-10-CM

## 2020-06-12 DIAGNOSIS — R51.9 INTRACTABLE EPISODIC HEADACHE, UNSPECIFIED HEADACHE TYPE: ICD-10-CM

## 2020-06-12 DIAGNOSIS — E78.00 HYPERCHOLESTEREMIA: Chronic | ICD-10-CM

## 2020-06-12 DIAGNOSIS — K21.9 GASTROESOPHAGEAL REFLUX DISEASE, ESOPHAGITIS PRESENCE NOT SPECIFIED: Chronic | ICD-10-CM

## 2020-06-12 DIAGNOSIS — I10 ESSENTIAL HYPERTENSION: Chronic | ICD-10-CM

## 2020-06-12 DIAGNOSIS — I25.10 CORONARY ARTERY DISEASE INVOLVING NATIVE CORONARY ARTERY OF NATIVE HEART WITHOUT ANGINA PECTORIS: Chronic | ICD-10-CM

## 2020-06-12 DIAGNOSIS — M79.604 PAIN IN BOTH LOWER EXTREMITIES: ICD-10-CM

## 2020-06-12 DIAGNOSIS — Z95.5 HX OF HEART ARTERY STENT: ICD-10-CM

## 2020-06-12 DIAGNOSIS — E03.9 HYPOTHYROIDISM, UNSPECIFIED TYPE: Chronic | ICD-10-CM

## 2020-06-12 PROBLEM — E87.5 HYPERKALEMIA: Status: RESOLVED | Noted: 2019-07-04 | Resolved: 2020-06-12

## 2020-06-12 PROBLEM — E87.0 HYPERNATREMIA: Status: RESOLVED | Noted: 2019-07-05 | Resolved: 2020-06-12

## 2020-06-12 PROBLEM — J02.9 PHARYNGITIS: Status: RESOLVED | Noted: 2019-10-18 | Resolved: 2020-06-12

## 2020-06-12 PROBLEM — E44.1 MALNUTRITION OF MILD DEGREE: Chronic | Status: RESOLVED | Noted: 2018-01-03 | Resolved: 2020-06-12

## 2020-06-12 PROBLEM — J32.9 SINUSITIS: Status: RESOLVED | Noted: 2020-01-28 | Resolved: 2020-06-12

## 2020-06-12 PROCEDURE — 1159F MED LIST DOCD IN RCRD: CPT | Mod: 95,,, | Performed by: FAMILY MEDICINE

## 2020-06-12 PROCEDURE — 1101F PT FALLS ASSESS-DOCD LE1/YR: CPT | Mod: CPTII,95,, | Performed by: FAMILY MEDICINE

## 2020-06-12 PROCEDURE — 99442 PR PHYSICIAN TELEPHONE EVALUATION 11-20 MIN: CPT | Mod: 95,,, | Performed by: FAMILY MEDICINE

## 2020-06-12 PROCEDURE — 1159F PR MEDICATION LIST DOCUMENTED IN MEDICAL RECORD: ICD-10-PCS | Mod: 95,,, | Performed by: FAMILY MEDICINE

## 2020-06-12 PROCEDURE — 1101F PR PT FALLS ASSESS DOC 0-1 FALLS W/OUT INJ PAST YR: ICD-10-PCS | Mod: CPTII,95,, | Performed by: FAMILY MEDICINE

## 2020-06-12 PROCEDURE — 99442 PR PHYSICIAN TELEPHONE EVALUATION 11-20 MIN: ICD-10-PCS | Mod: 95,,, | Performed by: FAMILY MEDICINE

## 2020-06-12 RX ORDER — LORAZEPAM 1 MG/1
TABLET ORAL
Qty: 30 TABLET | Refills: 2 | OUTPATIENT
Start: 2020-06-12

## 2020-06-12 RX ORDER — ALPRAZOLAM 0.5 MG/1
TABLET ORAL
Qty: 30 TABLET | Refills: 2 | Status: SHIPPED | OUTPATIENT
Start: 2020-06-12 | End: 2020-10-09 | Stop reason: SDUPTHER

## 2020-06-12 RX ORDER — BUTALBITAL, ACETAMINOPHEN AND CAFFEINE 50; 325; 40 MG/1; MG/1; MG/1
TABLET ORAL
Qty: 30 TABLET | Refills: 2 | Status: SHIPPED | OUTPATIENT
Start: 2020-06-12 | End: 2020-07-17 | Stop reason: SDUPTHER

## 2020-06-12 NOTE — PROGRESS NOTES
The reason for the audio only service rather than synchronous audio and video virtual visit was related to technical difficulties or patient preference/necessity.     Each patient to whom I provide medical services by telemedicine is:  (1) informed of the relationship between the physician and patient and the respective role of any other health care provider with respect to management of the patient; and (2) notified that they may decline to receive medical services by telemedicine and may withdraw from such care at any time. Patient verbally consented to receive this service via voice-only telephone call.         Subjective:       Patient ID: Josseline Stinson is a 74 y.o. female.    Chief Complaint: Medication Refill    HPI:  75 yo WF -- patient calling for refills Fioricet something for anxiety        Needs refills Fioricet---helping with headache--ankle pain--but anxiety--nerves are shot        Anxiety--due to inability to leave the house--lives with 1 dog.  Afraid to go into the stores due to her age should her heart.  Little depressed no money coming into the house.  Food issues        ROS:   Skin: no psoriasis, eczema, skin cancer-  HEENT: + HA due to nerves no  ocular pain, blurred vision, diplopia, epistaxis, hoarsene ss change in voice, thyroid trouble does have some ocular pain wears glasses  Lung: No pneumonia, asthma, Tb, wheezing, SOB, no smoking +COPD +chronic bronchitis  Heart: No chest pain, ankle edema, palpitations,+ old MI,no  rinku murmur, +hypertension,+ hyperlipidemia, + CAD with stent times 17 last stent placed in January 2019 no CABG +CHF  Abdomen:  +GERD omeprazole and history of a gastric ulcer-both doing well No nausea, vomiting, diarrhea, constipation, ulcers, hepatitis, gallbladder disease, melena, hematochezia, hematemesis  : no UTI, renal disease, stones  GYN hyst   MS: no fractures, O/A, lupus, rheumatoid, gout --ankle still giving some discomfort--saw Dr. Michaud--patient told needs  to have epidural steroid injections due to DDD lumbar spine also has DDD cervical spine  Neuro: No dizziness, LOC, seizures   No diabetes, no anemia, no anxiety, no depression   One daughter but does not talk to pt has not spoken to pt in 2 yrs, patient on disability due to heart, lives alone    Objective:   Physical Exam:  No physical exam done this was a phone call visit  General: Well nourished, well developed, no acute distress +thin somewhat he may see  Skin: No lesions  HEENT: Eyes PERRLA, EOM intact, nose clear discharge throat noneythematous ears TMs clear  NECK: Supple, no bruits, No JVD, no nodes  Lungs: Clear, no rales, rhonchi, wheezinge decreased breath sounds due to history of severe COPD  Heart: Regular rate and rhythm, no murmurs, gallops, or rubs +coarse cough distant heart sounds  Abdomen: flat, bowel sounds positive, no tenderness, or organomegaly  MS:  Swelling in the right ankle medial and lateral aspect with some swelling of the right lateral  to palpation pain with flexion extension inversion eversion weight-bearing unable to stand on toes or heels   Neuro: Alert, CN intact, oriented X 3  Extremities: No cyanosis, clubbing, or edema         Assessment:       1. Anxiety    2. Depression, unspecified depression type    3. DDD (degenerative disc disease), lumbar    4. Intractable episodic headache, unspecified headache type    5. Bronchitis    6. Chronic obstructive pulmonary disease, unspecified COPD type    7. Hypercholesteremia    8. Hx of heart artery stent    9. Essential hypertension    10. Coronary artery disease involving native coronary artery of native heart without angina pectoris    11. Chronic diastolic congestive heart failure    12. Anemia, unspecified type    13. Hypothyroidism, unspecified type    14. Gastroesophageal reflux disease, esophagitis presence not specified    15. History of tobacco abuse    16. Pain in both lower extremities    17. Primary insomnia         Plan:       Anxiety    Depression, unspecified depression type    DDD (degenerative disc disease), lumbar    Intractable episodic headache, unspecified headache type    Bronchitis    Chronic obstructive pulmonary disease, unspecified COPD type    Hypercholesteremia    Hx of heart artery stent    Essential hypertension    Coronary artery disease involving native coronary artery of native heart without angina pectoris    Chronic diastolic congestive heart failure    Anemia, unspecified type    Hypothyroidism, unspecified type    Gastroesophageal reflux disease, esophagitis presence not specified    History of tobacco abuse    Pain in both lower extremities    Primary insomnia    Other orders  -     butalbital-acetaminophen-caffeine -40 mg (FIORICET, ESGIC) -40 mg per tablet; 1 p.o. q.8 hours p.r.n. headache  Dispense: 30 tablet; Refill: 2  -     ALPRAZolam (XANAX) 0.5 MG tablet; One p.o. q.d. p.r.n. anxiety  Dispense: 30 tablet; Refill: 2        Anxiety---patient upset having to stay in isolation due to the corona virus--causing problems with depression and anxiety--Xanax 0.51 p.o. q.d. p.r.n. Anxiety--on Cymbalta 1 p.o. q.d. or depression if symptoms do not improve patient will need to see a psychiatrist for counseling  Headache Fioricet 1 p.o. q.d. p.r.n. headache headaches appear to be tension related  Patient with multiple medical issues  Dr. Baltazar--cardiologist--hypertension/hyperlipidemia/CHF/old MI/CAD with stent times 15  GERD/history ulcer disease--doing well on omeprazole--zofran   Health maintenance shingles shot  Patient advised to not taking any arthritis pills or NSAIDs for Plavix including Lodine Voltaren ibuprofen leave can cause GI problems   Issa Michaud--patient told needs epidural steroid injections--do DDD lumbar spine--also monitoring patient's ankle    Established Patient - Audio Only Telehealth Visit     The patient location is:  Home  The chief complaint leading to  consultation is:  Headache/anxiety depression  Visit type: Virtual visit with audio only (telephone)  Total time spent with patient:  20 min       The reason for the audio only service rather than synchronous audio and video virtual visit was related to technical difficulties or patient preference/necessity.     Each patient to whom I provide medical services by telemedicine is:  (1) informed of the relationship between the physician and patient and the respective role of any other health care provider with respect to management of the patient; and (2) notified that they may decline to receive medical services by telemedicine and may withdraw from such care at any time. Patient verbally consented to receive this service via voice-only telephone call.       HPI:  See history of present illness above     Assessment and plan:  See plan above                        This service was not originating from a related E/M service provided within the previous 7 days nor will  to an E/M service or procedure within the next 24 hours or my soonest available appointment.  Prevailing standard of care was able to be met in this audio-only visit.

## 2020-06-12 NOTE — TELEPHONE ENCOUNTER
----- Message from Sosa Benites sent at 6/12/2020  9:29 AM CDT -----  Contact: Logan/ Shyla/[phone]  [facility] is requesting a call from the office regarding clarification of the patient's medication.  Please advise, thank you.

## 2020-06-12 NOTE — TELEPHONE ENCOUNTER
Called [facility] states in past Dr Woo had a 30 tablet cap monthly for Fioricet last month patient filled 60 tablets asked pharmacy to place restrictions back for only 30 tablets q monthly

## 2020-07-03 DIAGNOSIS — Z12.31 ENCOUNTER FOR SCREENING MAMMOGRAM FOR BREAST CANCER: ICD-10-CM

## 2020-07-17 ENCOUNTER — OFFICE VISIT (OUTPATIENT)
Dept: PRIMARY CARE CLINIC | Facility: CLINIC | Age: 75
End: 2020-07-17
Payer: MEDICARE

## 2020-07-17 ENCOUNTER — TELEPHONE (OUTPATIENT)
Dept: PAIN MEDICINE | Facility: CLINIC | Age: 75
End: 2020-07-17

## 2020-07-17 DIAGNOSIS — J18.9 PNEUMONIA OF RIGHT LOWER LOBE DUE TO INFECTIOUS ORGANISM: ICD-10-CM

## 2020-07-17 DIAGNOSIS — D64.9 ANEMIA, UNSPECIFIED TYPE: ICD-10-CM

## 2020-07-17 DIAGNOSIS — F41.9 ANXIETY: ICD-10-CM

## 2020-07-17 DIAGNOSIS — E78.00 HYPERCHOLESTEREMIA: Chronic | ICD-10-CM

## 2020-07-17 DIAGNOSIS — I10 ESSENTIAL HYPERTENSION: Chronic | ICD-10-CM

## 2020-07-17 DIAGNOSIS — M51.36 DDD (DEGENERATIVE DISC DISEASE), LUMBAR: ICD-10-CM

## 2020-07-17 DIAGNOSIS — J40 BRONCHITIS: Primary | ICD-10-CM

## 2020-07-17 DIAGNOSIS — R51.9 HEAD ACHE: ICD-10-CM

## 2020-07-17 DIAGNOSIS — R05.9 COUGH: ICD-10-CM

## 2020-07-17 DIAGNOSIS — E03.9 HYPOTHYROIDISM, UNSPECIFIED TYPE: Chronic | ICD-10-CM

## 2020-07-17 DIAGNOSIS — F32.A DEPRESSION, UNSPECIFIED DEPRESSION TYPE: ICD-10-CM

## 2020-07-17 DIAGNOSIS — R51.9 INTRACTABLE EPISODIC HEADACHE, UNSPECIFIED HEADACHE TYPE: ICD-10-CM

## 2020-07-17 DIAGNOSIS — G43.511 INTRACTABLE PERSISTENT MIGRAINE AURA WITHOUT CEREBRAL INFARCTION AND WITH STATUS MIGRAINOSUS: ICD-10-CM

## 2020-07-17 DIAGNOSIS — I50.32 CHRONIC DIASTOLIC CONGESTIVE HEART FAILURE: Chronic | ICD-10-CM

## 2020-07-17 DIAGNOSIS — Z95.5 HX OF HEART ARTERY STENT: ICD-10-CM

## 2020-07-17 DIAGNOSIS — R11.0 NAUSEA: ICD-10-CM

## 2020-07-17 PROCEDURE — 1159F PR MEDICATION LIST DOCUMENTED IN MEDICAL RECORD: ICD-10-PCS | Mod: 95,,, | Performed by: FAMILY MEDICINE

## 2020-07-17 PROCEDURE — 1159F MED LIST DOCD IN RCRD: CPT | Mod: 95,,, | Performed by: FAMILY MEDICINE

## 2020-07-17 PROCEDURE — 1101F PR PT FALLS ASSESS DOC 0-1 FALLS W/OUT INJ PAST YR: ICD-10-PCS | Mod: CPTII,95,, | Performed by: FAMILY MEDICINE

## 2020-07-17 PROCEDURE — 99442 PR PHYSICIAN TELEPHONE EVALUATION 11-20 MIN: CPT | Mod: 95,,, | Performed by: FAMILY MEDICINE

## 2020-07-17 PROCEDURE — 1101F PT FALLS ASSESS-DOCD LE1/YR: CPT | Mod: CPTII,95,, | Performed by: FAMILY MEDICINE

## 2020-07-17 PROCEDURE — 99442 PR PHYSICIAN TELEPHONE EVALUATION 11-20 MIN: ICD-10-PCS | Mod: 95,,, | Performed by: FAMILY MEDICINE

## 2020-07-17 RX ORDER — PANTOPRAZOLE SODIUM 40 MG/1
40 TABLET, DELAYED RELEASE ORAL DAILY
COMMUNITY
Start: 2020-06-09 | End: 2020-07-20 | Stop reason: SDUPTHER

## 2020-07-17 RX ORDER — HYDROXYZINE PAMOATE 25 MG/1
25 CAPSULE ORAL
COMMUNITY
Start: 2020-07-02 | End: 2020-08-04

## 2020-07-17 RX ORDER — PROMETHAZINE HYDROCHLORIDE AND CODEINE PHOSPHATE 6.25; 1 MG/5ML; MG/5ML
5 SOLUTION ORAL EVERY 6 HOURS PRN
Qty: 180 ML | Refills: 0 | Status: SHIPPED | OUTPATIENT
Start: 2020-07-17 | End: 2020-08-11

## 2020-07-17 RX ORDER — HYDROCODONE BITARTRATE AND ACETAMINOPHEN 5; 325 MG/1; MG/1
1 TABLET ORAL EVERY 6 HOURS PRN
Qty: 30 TABLET | Refills: 0 | Status: SHIPPED | OUTPATIENT
Start: 2020-07-17 | End: 2020-08-11

## 2020-07-17 RX ORDER — GABAPENTIN 100 MG/1
100 CAPSULE ORAL 3 TIMES DAILY
COMMUNITY
Start: 2020-07-07 | End: 2020-11-06

## 2020-07-17 RX ORDER — AZITHROMYCIN 250 MG/1
TABLET, FILM COATED ORAL
Qty: 6 TABLET | Refills: 0 | Status: SHIPPED | OUTPATIENT
Start: 2020-07-17 | End: 2020-07-21

## 2020-07-17 RX ORDER — LEVOCETIRIZINE DIHYDROCHLORIDE 5 MG/1
5 TABLET, FILM COATED ORAL DAILY
COMMUNITY
Start: 2020-07-01 | End: 2020-10-09

## 2020-07-17 RX ORDER — TRAZODONE HYDROCHLORIDE 100 MG/1
100 TABLET ORAL NIGHTLY
COMMUNITY
Start: 2020-06-23 | End: 2021-04-15

## 2020-07-17 RX ORDER — BUTALBITAL, ACETAMINOPHEN AND CAFFEINE 50; 325; 40 MG/1; MG/1; MG/1
TABLET ORAL
Qty: 30 TABLET | Refills: 2 | Status: SHIPPED | OUTPATIENT
Start: 2020-07-17 | End: 2020-08-12 | Stop reason: SDUPTHER

## 2020-07-17 NOTE — PROGRESS NOTES
The reason for the audio only service rather than synchronous audio and video virtual visit was related to technical difficulties or patient preference/necessity.     Each patient to whom I provide medical services by telemedicine is:  (1) informed of the relationship between the physician and patient and the respective role of any other health care provider with respect to management of the patient; and (2) notified that they may decline to receive medical services by telemedicine and may withdraw from such care at any time. Patient verbally consented to receive this service via voice-only telephone call.         Subjective:       Patient ID: Josseline Stinson is a 74 y.o. female.    Chief Complaint: COVID-19 Concerns    HPI:  75 yo WF --a lot of pain--shoulders back legs all hurt----no fever--no runny nose--slight sore throat--+cough---+phlegm--.  Brown  No exposureto Covid.   History pneumonia years ago--see history COPD--history chronic bronchitis--has albuterol inhaler no wheezing.  No smoking  History hyperlipidemia hypertension congestive heart failure CAD with multiple stents no CABG patient unable to have any more stents placed.        No diarrhea--has headaches but on Fioricet for headaches--frontal area and across the neck and shoulder--needs get out of pain.  No loss of smell or taste no conjunctivitis no chest pain       Patient went to a chronic pain clinic told they could do anything floor for soon they do not give pain medication      ROS:   Skin: no psoriasis, eczema, skin cancer-  HEENT: + HA frontal area/occipital area shoulderso n blurred vision, diplopia, epistaxis, hoarsene ss change in voice, thyroid trouble does have some ocular pain wears glasses  Lung: No pneumonia, asthma, Tb, wheezing, SOB, no smoking +COPD +chronic bronchitis  Heart: No chest pain, ankle edema, palpitations,+ old MI,no  rinku murmur, +hypertension,+ hyperlipidemia, + CAD with stent times 17 last stent placed in January  2019 no CABG +CHF  Abdomen:  +GERD omeprazole and history of a gastric ulcer-both doing well No nausea, vomiting, diarrhea, constipation, ulcers, hepatitis, gallbladder disease, melena, hematochezia, hematemesis  : no UTI, renal disease, stones  GYN hyst   MS: no fractures, O/A, lupus, rheumatoid, gout --ankle still giving some discomfort--saw Dr. Michaud--patient told needs to have epidural steroid injections due to DDD lumbar spine also has DDD cervical spine ---Dr Michaud sits patient to the pain clinic but the pain clinic does not give any pain medication  Neuro: No dizziness, LOC, seizures   No diabetes, no anemia, no anxiety, no depression   One daughter but does not talk to pt has not spoken to pt in 2 yrs, patient on disability due to heart, lives alone    Objective:   Physical Exam:  No physical exam done this was a phone call visit   General: Well nourished, well developed, no acute distress +thin somewhat he may see  Skin: No lesions  HEENT: Eyes PERRLA, EOM intact, nose clear discharge throat noneythematous ears TMs clear  NECK: Supple, no bruits, No JVD, no nodes  Lungs: Clear, no rales, rhonchi, wheezinge decreased breath sounds due to history of severe COPD  Heart: Regular rate and rhythm, no murmurs, gallops, or rubs +coarse cough distant heart sounds  Abdomen: flat, bowel sounds positive, no tenderness, or organomegaly  MS:  Swelling in the right ankle medial and lateral aspect with some swelling of the right lateral  to palpation pain with flexion extension inversion eversion weight-bearing unable to stand on toes or heels   Neuro: Alert, CN intact, oriented X 3  Extremities: No cyanosis, clubbing, or edema         Assessment:       1. Bronchitis    2. Cough    3. Head ache    4. Nausea    5. Pneumonia of right lower lobe due to infectious organism    6. 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 Anxiety    8. Depression,  unspecified depression type    9. DDD (degenerative disc disease), lumbar    10. Intractable episodic headache, unspecified headache type    11. Chronic diastolic congestive heart failure    12. Essential hypertension    13. Hx of heart artery stent    14. Hypercholesteremia    15. Hypothyroidism, unspecified type    16. 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  Dispense: 30 tablet; Refill: 0  -     butalbital-acetaminophen-caffeine -40 mg (FIORICET, ESGIC) -40 mg per tablet; 1 p.o. q.8 hours p.r.n. headache  Dispense: 30 tablet; Refill: 2  -     azithromycin (Z-NICHOLE) 250 MG tablet; 2 tabs by mouth day 1, then 1 tab by mouth daily x 4 days  Dispense: 6 tablet; Refill: 0  -     promethazine-codeine 6.25-10 mg/5 ml (PHENERGAN WITH CODEINE) 6.25-10 mg/5 mL syrup; Take 5 mLs by mouth every 6 (six) hours as needed for Cough.  Dispense: 180 mL; Refill: 0        Bronchitis--Zithromax/Phenergan with codeine due due COVID epidemic steroids will be held--patient  has albuterol inhaler  Headaches patient using Fioricet for headache lots of stress due to the COVID virus in patient with multiple medical issues  Pain all joints all over patient asking for Norco---patient was sent to an orthopedist who sent patient to a pain clinic--pain clinic stated there was nothing they can do for patient--this pain clinic does not write pain medications so patient has no pain medication--due to pandemic unable get into a new clinic at this time so will give [place] to bridge to she is able to go to a pain clinic told I cannot be her pain doctor  Had lab done in May so no lab due  Told if symptoms persist to go to the emergency room or would come in for a regular visit  Patient with multiple medical issues  Dr. Baltazar--cardiologist--hypertension/hyperlipidemia/CHF/old MI/CAD with stent times 15  GERD/history ulcer disease--doing well on omeprazole--zofran   Health maintenance shingles shot  Patient advised to not taking any arthritis pills or NSAIDs for Plavix including Lodine Voltaren ibuprofen leave can cause GI problems   Issa Michaud--patient told needs epidural steroid injections--do DDD lumbar spine--also monitoring patient's ankle    Established Patient - Audio Only Telehealth Visit       The patient location is:  Home  The chief complaint leading to consultation is:  Headache/anxiety depression  Visit type: Virtual visit with audio only (telephone)  Total time spent with patient:  20 min       The reason for the audio only service rather than synchronous audio and video virtual visit was related to technical difficulties or patient preference/necessity.     Each patient to whom I provide medical services by telemedicine is:  (1) informed of the relationship between the physician and patient and the respective role of any other health care provider with respect to management of the patient; and (2) notified that they may decline to receive medical services by telemedicine and may  withdraw from such care at any time. Patient verbally consented to receive this service via voice-only telephone call.       HPI:  See history of present illness above     Assessment and plan:  See plan above                        This service was not originating from a related E/M service provided within the previous 7 days nor will  to an E/M service or procedure within the next 24 hours or my soonest available appointment.  Prevailing standard of care was able to be met in this audio-only visit.      Established Patient - Audio Only Telehealth Visit     The patient location is:  Home  The chief complaint leading to consultation is:  Bronchitis multiple achy joints  Visit type: Virtual visit with audio only (telephone)  Total time spent with patient:  25 min       The reason for the audio only service rather than synchronous audio and video virtual visit was related to technical difficulties or patient preference/necessity.     Each patient to whom I provide medical services by telemedicine is:  (1) informed of the relationship between the physician and patient and the respective role of any other health care provider with respect to management of the patient; and (2) notified that they may decline to receive medical services by telemedicine and may withdraw from such care at any time. Patient verbally consented to receive this service via voice-only telephone call.       HPI:  See history of present illness above     Assessment and plan:  See plan above                        This service was not originating from a related E/M service provided within the previous 7 days nor will  to an E/M service or procedure within the next 24 hours or my soonest available appointment.  Prevailing standard of care was able to be met in this audio-only visit.

## 2020-07-17 NOTE — TELEPHONE ENCOUNTER
----- Message from Rosa Isela Vergara sent at 7/17/2020  9:25 AM CDT -----  Referral in for Dr Ford . Thanks

## 2020-07-20 RX ORDER — ALPRAZOLAM 0.5 MG/1
TABLET ORAL
Qty: 30 TABLET | Refills: 2 | OUTPATIENT
Start: 2020-07-20

## 2020-07-20 RX ORDER — OMEPRAZOLE 40 MG/1
CAPSULE, DELAYED RELEASE ORAL
Qty: 30 CAPSULE | Refills: 5 | Status: SHIPPED | OUTPATIENT
Start: 2020-07-20 | End: 2021-01-26

## 2020-07-20 NOTE — TELEPHONE ENCOUNTER
Patient is requesting a refill on xanax and prilosec. She is very anxious due to her being covid tested.

## 2020-07-20 NOTE — TELEPHONE ENCOUNTER
----- Message from Kalyani Jenkins sent at 7/20/2020  2:18 PM CDT -----  Contact: patient [phone]  Patient is out of [facility] and called the pharmacy for a refill. Please ok this and notify patient .       [facility]  [place], [place] - [address] [phone] (Phone) Requesting an RX refill or new RX.  Is this a refill or new RX:    RX name and strength: ALPRAZolam (XANAX) 0.5 MG tablet  Directions (copy/paste from chart):  One p.o. q.d. p.r.n. anxiety  Is this a 30 day or 90 day RX:  30  Local pharmacy or mail order pharmacy:  local  Pharmacy name and phone # (copy/paste from chart):[facility] [place], [place] - [address] [phone] (Phone)      Patient is very anxious because of the covid test she had this morning and needs the xanax.

## 2020-07-22 ENCOUNTER — TELEPHONE (OUTPATIENT)
Dept: PRIMARY CARE CLINIC | Facility: CLINIC | Age: 75
End: 2020-07-22

## 2020-07-22 NOTE — TELEPHONE ENCOUNTER
----- Message from Tierra Ayala sent at 7/22/2020  9:25 AM CDT -----  Regarding: results  Contact: Patient [phone]  Calling to get test results.  Name of test (lab, xray, etc.):   lab  Date of test:  7-  Ordering provider: Amna  Where was the test performed:  [facility]  Would the patient rather a call back or a response via [facility]?:  Please call  Comments:

## 2020-08-11 ENCOUNTER — TELEPHONE (OUTPATIENT)
Dept: PRIMARY CARE CLINIC | Facility: CLINIC | Age: 75
End: 2020-08-11

## 2020-08-11 NOTE — TELEPHONE ENCOUNTER
----- Message from Cristina Grossman sent at 8/11/2020 12:42 PM CDT -----  Contact: self   Pt states she sent a message today in ref to her falling and going to the emergency room. Pt states she is in pain and would like something sent to the pharmacy before they close. Please send to [facility] - [place], [place] - [address] [phone] (Phone)  [phone] (Fax). Please call and advise

## 2020-08-11 NOTE — TELEPHONE ENCOUNTER
----- Message from Violeta Marshall sent at 8/11/2020 11:42 AM CDT -----  Contact: self [phone]  Patient fell on 8/8 and has a contusion on her back and is bruised and has a cut, patient would like to speak with someone because is draining and is tender.    Thank you

## 2020-08-11 NOTE — TELEPHONE ENCOUNTER
Spoke with patient in another telephone encounter. Appointment scheduled with PCP for tomorrow. Patient verbalized understanding.

## 2020-08-11 NOTE — TELEPHONE ENCOUNTER
Spoke with patient, verbalized that she would need to schedule an appointment with Dr. Woo for ER follow up to get something for pain. Patient verbalized understanding. Audio appointment scheduled for tomorrow.

## 2020-08-12 ENCOUNTER — OFFICE VISIT (OUTPATIENT)
Dept: PRIMARY CARE CLINIC | Facility: CLINIC | Age: 75
End: 2020-08-12
Payer: MEDICARE

## 2020-08-12 DIAGNOSIS — K21.9 GASTROESOPHAGEAL REFLUX DISEASE, ESOPHAGITIS PRESENCE NOT SPECIFIED: Chronic | ICD-10-CM

## 2020-08-12 DIAGNOSIS — F41.9 ANXIETY: ICD-10-CM

## 2020-08-12 DIAGNOSIS — I10 ESSENTIAL HYPERTENSION: Chronic | ICD-10-CM

## 2020-08-12 DIAGNOSIS — G89.4 CHRONIC PAIN SYNDROME: ICD-10-CM

## 2020-08-12 DIAGNOSIS — Z87.891 HISTORY OF TOBACCO ABUSE: ICD-10-CM

## 2020-08-12 DIAGNOSIS — I50.32 CHRONIC DIASTOLIC CONGESTIVE HEART FAILURE: Chronic | ICD-10-CM

## 2020-08-12 DIAGNOSIS — S20.219A CONTUSION OF CHEST WALL, UNSPECIFIED LATERALITY, INITIAL ENCOUNTER: ICD-10-CM

## 2020-08-12 DIAGNOSIS — F32.A DEPRESSION, UNSPECIFIED DEPRESSION TYPE: ICD-10-CM

## 2020-08-12 DIAGNOSIS — M51.36 DDD (DEGENERATIVE DISC DISEASE), LUMBAR: ICD-10-CM

## 2020-08-12 DIAGNOSIS — J18.9 PNEUMONIA OF RIGHT LOWER LOBE DUE TO INFECTIOUS ORGANISM: ICD-10-CM

## 2020-08-12 DIAGNOSIS — E78.00 HYPERCHOLESTEREMIA: Chronic | ICD-10-CM

## 2020-08-12 DIAGNOSIS — J44.9 CHRONIC OBSTRUCTIVE PULMONARY DISEASE, UNSPECIFIED COPD TYPE: Chronic | ICD-10-CM

## 2020-08-12 DIAGNOSIS — E03.9 HYPOTHYROIDISM, UNSPECIFIED TYPE: Primary | Chronic | ICD-10-CM

## 2020-08-12 DIAGNOSIS — Z95.5 HX OF HEART ARTERY STENT: ICD-10-CM

## 2020-08-12 DIAGNOSIS — I25.10 CORONARY ARTERY DISEASE INVOLVING NATIVE CORONARY ARTERY OF NATIVE HEART WITHOUT ANGINA PECTORIS: Chronic | ICD-10-CM

## 2020-08-12 PROCEDURE — 1159F MED LIST DOCD IN RCRD: CPT | Mod: 95,,, | Performed by: FAMILY MEDICINE

## 2020-08-12 PROCEDURE — 99443 PR PHYSICIAN TELEPHONE EVALUATION 21-30 MIN: CPT | Mod: 95,,, | Performed by: FAMILY MEDICINE

## 2020-08-12 PROCEDURE — 1101F PR PT FALLS ASSESS DOC 0-1 FALLS W/OUT INJ PAST YR: ICD-10-PCS | Mod: CPTII,95,, | Performed by: FAMILY MEDICINE

## 2020-08-12 PROCEDURE — 1101F PT FALLS ASSESS-DOCD LE1/YR: CPT | Mod: CPTII,95,, | Performed by: FAMILY MEDICINE

## 2020-08-12 PROCEDURE — 99443 PR PHYSICIAN TELEPHONE EVALUATION 21-30 MIN: ICD-10-PCS | Mod: 95,,, | Performed by: FAMILY MEDICINE

## 2020-08-12 PROCEDURE — 1159F PR MEDICATION LIST DOCUMENTED IN MEDICAL RECORD: ICD-10-PCS | Mod: 95,,, | Performed by: FAMILY MEDICINE

## 2020-08-12 RX ORDER — HYDROCODONE BITARTRATE AND ACETAMINOPHEN 5; 325 MG/1; MG/1
1 TABLET ORAL EVERY 6 HOURS PRN
Qty: 30 TABLET | Refills: 0 | Status: SHIPPED | OUTPATIENT
Start: 2020-08-12 | End: 2020-09-14 | Stop reason: SDUPTHER

## 2020-08-12 RX ORDER — BUTALBITAL, ACETAMINOPHEN AND CAFFEINE 50; 325; 40 MG/1; MG/1; MG/1
TABLET ORAL
Qty: 30 TABLET | Refills: 2 | Status: SHIPPED | OUTPATIENT
Start: 2020-08-12 | End: 2020-11-06 | Stop reason: SDUPTHER

## 2020-08-12 NOTE — PROGRESS NOTES
Subjective:       Patient ID: Josseline Stinson is a 74 y.o. female.    Chief Complaint: No chief complaint on file.    HPI:   73 yo WF --patient slipped has some cement steps--has a contusion on thorax--cut on the back not deep enough to need stitches--pt seen ER --given shot --told take mobic pt can't take it due heart. Pt did call pain management cannot take until next month .      ROS: no significant change except contusion ribcage  Skin: no psoriasis, eczema, skin cancer-  HEENT: + HA frontal area/occipital area shoulderso n blurred vision, diplopia, epistaxis, hoarsene ss change in voice, thyroid trouble does have some ocular pain wears glasses  Lung: No pneumonia, asthma, Tb, wheezing, SOB, no smoking +COPD +chronic bronchitis  Heart: No chest pain, ankle edema, palpitations,+ old MI,no  rinku murmur, +hypertension,+ hyperlipidemia, + CAD with stent times 17 last stent placed in January 2019 no CABG +CHF  Abdomen:  +GERD omeprazole and history of a gastric ulcer-both doing well No nausea, vomiting, diarrhea, constipation, ulcers, hepatitis, gallbladder disease, melena, hematochezia, hematemesis  : no UTI, renal disease, stones  GYN hyst   MS: no fractures, O/A, lupus, rheumatoid, gout --ankle still giving some discomfort--saw Dr. Michaud--patient told needs to have epidural steroid injections due to DDD lumbar spine also has DDD cervical spine ---Dr Michaud sits patient to the pain clinic but the pain clinic does not give any pain medication  Neuro: No dizziness, LOC, seizures   No diabetes, no anemia, no anxiety, no depression   One daughter but does not talk to pt has not spoken to pt in 2 yrs, patient on disability due to heart, lives alone    Objective:   Physical Exam:  No physical exam done this was a phone call visit physical exam from below is from a prior office visit  General: Well nourished, well developed, no acute distress +thin somewhat he may see  Skin: No lesions  HEENT: Eyes  PERRLA, EOM intact, nose clear discharge throat noneythematous ears TMs clear  NECK: Supple, no bruits, No JVD, no nodes  Lungs: Clear, no rales, rhonchi, wheezinge decreased breath sounds due to history of severe COPD  Heart: Regular rate and rhythm, no murmurs, gallops, or rubs +coarse cough distant heart sounds  Abdomen: flat, bowel sounds positive, no tenderness, or organomegaly  MS:  Swelling in the right ankle medial and lateral aspect with some swelling of the right lateral  to palpation pain with flexion extension inversion eversion weight-bearing unable to stand on toes or heels   Neuro: Alert, CN intact, oriented X 3  Extremities: No cyanosis, clubbing, or edema         Assessment:       1. Hypothyroidism, unspecified type    2. Contusion of chest wall, unspecified laterality, initial encounter    3. Chronic diastolic congestive heart failure    4. Coronary artery disease involving native coronary artery of native heart without angina pectoris    5. Essential hypertension    6. Hx of heart artery stent    7. Hypercholesteremia    8. Pneumonia of right lower lobe due to infectious organism    9. Chronic obstructive pulmonary disease, unspecified COPD type    10. Anxiety    11. Depression, unspecified depression type    12. DDD (degenerative disc disease), lumbar    13. Chronic pain syndrome    14. Gastroesophageal reflux disease, esophagitis presence not specified    15. History of tobacco abuse        Plan:       Hypothyroidism, unspecified type    Contusion of chest wall, unspecified laterality, initial encounter    Chronic diastolic congestive heart failure    Coronary artery disease involving native coronary artery of native heart without angina pectoris    Essential hypertension    Hx of heart artery stent    Hypercholesteremia    Pneumonia of right lower lobe due to infectious organism    Chronic obstructive pulmonary disease, unspecified COPD type    Anxiety    Depression, unspecified  depression type    DDD (degenerative disc disease), lumbar    Chronic pain syndrome    Gastroesophageal reflux disease, esophagitis presence not specified    History of tobacco abuse    Other orders  -     HYDROcodone-acetaminophen (NORCO) 5-325 mg per tablet; Take 1 tablet by mouth every 6 (six) hours as needed.  Dispense: 30 tablet; Refill: 0  -     butalbital-acetaminophen-caffeine -40 mg (FIORICET, ESGIC) -40 mg per tablet; 1 p.o. q.8 hours p.r.n. headache  Dispense: 30 tablet; Refill: 2        Patient with recent fall down the steps--seen in the emergency room--patient states has a laceration on the thorax and contusion of the chest wall--told cup was not deep enough for stitches--pain could last 4-6 weeks---patient needs some pain medication unable take NSAIDs due to blood thinners for cardiac problems  Patient also asking for results of Fioricet  Patient told I will not refill any pain or control substances again so please call someone else that this problem can last 4-6 weeks but I will not give any more pain medication  Patient claims to have pain clinic appointment middle of next month   Patient with multiple medical issues  Dr. Baltazar--cardiologist--hypertension/hyperlipidemia/CHF/old MI/CAD with stent times 15  GERD/history ulcer disease--doing well on omeprazole--zofran   Health maintenance shingles shot  Patient advised to not taking any arthritis pills or NSAIDs for Plavix including Lodine Voltaren ibuprofen leave can cause GI problems   Issa Michaud--patient told needs epidural steroid injections--do DDD lumbar spine--also monitoring patient's ankle                        Established Patient - Audio Only Telehealth Visit     The patient location is:  Home  The chief complaint leading to consultation is:  Hypothyroidism  Visit type: Virtual visit with audio only (telephone)  Total time spent with patient:  25 min       The reason for the audio only service rather than synchronous  audio and video virtual visit was related to technical difficulties or patient preference/necessity.     Each patient to whom I provide medical services by telemedicine is:  (1) informed of the relationship between the physician and patient and the respective role of any other health care provider with respect to management of the patient; and (2) notified that they may decline to receive medical services by telemedicine and may withdraw from such care at any time. Patient verbally consented to receive this service via voice-only telephone call.       HPI:  See history of present illness above     Assessment and plan:  See plan above                        This service was not originating from a related E/M service provided within the previous 7 days nor will  to an E/M service or procedure within the next 24 hours or my soonest available appointment.  Prevailing standard of care was able to be met in this audio-only visit.

## 2020-08-16 RX ORDER — METOPROLOL SUCCINATE 25 MG/1
TABLET, EXTENDED RELEASE ORAL
Qty: 30 TABLET | Refills: 5 | Status: SHIPPED | OUTPATIENT
Start: 2020-08-16 | End: 2021-01-26

## 2020-08-16 NOTE — TELEPHONE ENCOUNTER
Pt just e=seen 8- nurses need put in refills with each office visit to avoid unnecessary phone calls this was a virtual viist still !!!!nurses need put in refills

## 2020-08-24 ENCOUNTER — TELEPHONE (OUTPATIENT)
Dept: PRIMARY CARE CLINIC | Facility: CLINIC | Age: 75
End: 2020-08-24

## 2020-08-24 NOTE — TELEPHONE ENCOUNTER
----- Message from Isabel Hobson sent at 8/24/2020  3:00 PM CDT -----  Contact: Self   Patient is returning a phone call.  Who left a message for the patient: Magy Aburto LPN  Does patient know what this is regarding:    Comments:

## 2020-08-24 NOTE — TELEPHONE ENCOUNTER
Spoke with Anthony with [facility], patient is currently 21 out of 30 days. She can get prescription filled as early as Saturday. Verbalized to Anthony that I will ask Dr. Woo and give her a call back.

## 2020-08-24 NOTE — TELEPHONE ENCOUNTER
----- Message from Ade Linda sent at 8/24/2020  9:18 AM CDT -----  Contact: Patient  Type:  RX Refill Request    Who Called: Josseline, patient  Refill or New Rx:  Refill  RX Name and Strength:  ALPRAZolam (XANAX) 0.5 MG tablet  How is the patient currently taking it? (ex. 1XDay):  One p.o. q.d. p.r.n. anxiety  Is this a 30 day or 90 day RX:  30  Preferred Pharmacy with phone number:    [email or web address] - [address] [place] [address]  Phone: [phone] Fax: [phone]  Local or Mail Order:  Local  Ordering Provider:  Dr Woo  Would the patient rather a call back or a response via [facility]?   Phone call  Best Call Back Number:  [phone]  Additional Information:   Please advise. Thanks.

## 2020-08-24 NOTE — TELEPHONE ENCOUNTER
Patient stating that she may evacuate and needs us to call [facility] to okay for her to fill her Alprazolam early. Verbalized to patient that I will call her pharmacy.

## 2020-08-24 NOTE — TELEPHONE ENCOUNTER
Spoke with patient, verbalized that per Dr. Woo patient would have to wait until her RX can be refilled. Patient verbalized understanding.

## 2020-08-24 NOTE — TELEPHONE ENCOUNTER
"Verbalized to Anthony that per Dr. Woo, "if patient is leaving soon to evacuate she won't be able to get her prescription on Friday or Saturday if the weather does get bad."  "

## 2020-09-14 ENCOUNTER — TELEPHONE (OUTPATIENT)
Dept: PAIN MEDICINE | Facility: CLINIC | Age: 75
End: 2020-09-14

## 2020-09-14 ENCOUNTER — OFFICE VISIT (OUTPATIENT)
Dept: PRIMARY CARE CLINIC | Facility: CLINIC | Age: 75
End: 2020-09-14
Payer: MEDICARE

## 2020-09-14 DIAGNOSIS — E78.00 HYPERCHOLESTEREMIA: Chronic | ICD-10-CM

## 2020-09-14 DIAGNOSIS — Z95.5 HX OF HEART ARTERY STENT: ICD-10-CM

## 2020-09-14 DIAGNOSIS — I95.9 HYPOTENSION, UNSPECIFIED HYPOTENSION TYPE: ICD-10-CM

## 2020-09-14 DIAGNOSIS — F32.A DEPRESSION, UNSPECIFIED DEPRESSION TYPE: ICD-10-CM

## 2020-09-14 DIAGNOSIS — I10 ESSENTIAL HYPERTENSION: Chronic | ICD-10-CM

## 2020-09-14 DIAGNOSIS — I50.32 CHRONIC DIASTOLIC CONGESTIVE HEART FAILURE: Chronic | ICD-10-CM

## 2020-09-14 DIAGNOSIS — F41.9 ANXIETY: ICD-10-CM

## 2020-09-14 DIAGNOSIS — M51.36 DDD (DEGENERATIVE DISC DISEASE), LUMBAR: ICD-10-CM

## 2020-09-14 DIAGNOSIS — G89.4 CHRONIC PAIN SYNDROME: Primary | ICD-10-CM

## 2020-09-14 DIAGNOSIS — R51.9 INTRACTABLE EPISODIC HEADACHE, UNSPECIFIED HEADACHE TYPE: ICD-10-CM

## 2020-09-14 DIAGNOSIS — E03.9 HYPOTHYROIDISM, UNSPECIFIED TYPE: Chronic | ICD-10-CM

## 2020-09-14 DIAGNOSIS — I25.10 CORONARY ARTERY DISEASE INVOLVING NATIVE CORONARY ARTERY OF NATIVE HEART WITHOUT ANGINA PECTORIS: Chronic | ICD-10-CM

## 2020-09-14 DIAGNOSIS — J44.9 CHRONIC OBSTRUCTIVE PULMONARY DISEASE, UNSPECIFIED COPD TYPE: Chronic | ICD-10-CM

## 2020-09-14 PROCEDURE — 1101F PT FALLS ASSESS-DOCD LE1/YR: CPT | Mod: CPTII,95,, | Performed by: FAMILY MEDICINE

## 2020-09-14 PROCEDURE — 1159F MED LIST DOCD IN RCRD: CPT | Mod: 95,,, | Performed by: FAMILY MEDICINE

## 2020-09-14 PROCEDURE — 1159F PR MEDICATION LIST DOCUMENTED IN MEDICAL RECORD: ICD-10-PCS | Mod: 95,,, | Performed by: FAMILY MEDICINE

## 2020-09-14 PROCEDURE — 1101F PR PT FALLS ASSESS DOC 0-1 FALLS W/OUT INJ PAST YR: ICD-10-PCS | Mod: CPTII,95,, | Performed by: FAMILY MEDICINE

## 2020-09-14 PROCEDURE — 99443 PR PHYSICIAN TELEPHONE EVALUATION 21-30 MIN: CPT | Mod: 95,,, | Performed by: FAMILY MEDICINE

## 2020-09-14 PROCEDURE — 99443 PR PHYSICIAN TELEPHONE EVALUATION 21-30 MIN: ICD-10-PCS | Mod: 95,,, | Performed by: FAMILY MEDICINE

## 2020-09-14 RX ORDER — PROMETHAZINE HYDROCHLORIDE AND CODEINE PHOSPHATE 6.25; 1 MG/5ML; MG/5ML
5 SOLUTION ORAL EVERY 6 HOURS PRN
Qty: 180 ML | Refills: 0 | Status: SHIPPED | OUTPATIENT
Start: 2020-09-14 | End: 2020-10-09

## 2020-09-14 RX ORDER — METHYLPREDNISOLONE 4 MG/1
TABLET ORAL
Qty: 1 PACKAGE | Refills: 0 | Status: SHIPPED | OUTPATIENT
Start: 2020-09-14 | End: 2020-10-09

## 2020-09-14 RX ORDER — HYDROCODONE BITARTRATE AND ACETAMINOPHEN 5; 325 MG/1; MG/1
1 TABLET ORAL EVERY 6 HOURS PRN
Qty: 30 TABLET | Refills: 0 | Status: SHIPPED | OUTPATIENT
Start: 2020-09-14 | End: 2020-10-09

## 2020-09-14 RX ORDER — AZITHROMYCIN 250 MG/1
TABLET, FILM COATED ORAL
Qty: 6 TABLET | Refills: 0 | Status: SHIPPED | OUTPATIENT
Start: 2020-09-14 | End: 2020-09-18

## 2020-09-14 NOTE — TELEPHONE ENCOUNTER
----- Message from Rosa Isela Vergara sent at 9/14/2020  3:06 PM CDT -----  Referral in for Dr Shahts

## 2020-09-14 NOTE — TELEPHONE ENCOUNTER
Unable to leave message for patient on numbers provided in the chart. Will continue to try and contact patient. Patient is established with the provider.

## 2020-09-14 NOTE — PROGRESS NOTES
Subjective:       Patient ID: Josseline Stinson is a 74 y.o. female.    Chief Complaint: No chief complaint on file.    HPI:  74-year-old female calling for medication refills---  Subjective:       Patient ID: Josseline Stinson is a 74 y.o. female.    Chief Complaint: No chief complaint on file.    HPI:  74-year-old female--states has a cold--x1 half weeks--no fever but chills at night---no runny stuffy nose--+sore throat--+cough---+phlegm--brown.  Chest had pneumonia flu shot yesterday--see history of COPD chronic bronchitis---has albuterol--  Left arm hurts all the way to the elbow feel secondary to the shots.       Feel congestion chest history hypertension/hyperlipidemia/CAD with stents-numerous CHF total of 15 stents no longer able place anymore stents told not a bypass candidate.      Lower back and legs killing--alot of pain--unable get in the pain clinic due to the pandemic and now or ro[place]ia [place].   See rest of history of present illness review of systems and physical below         Assessment:       1. Chronic pain syndrome    2. DDD (degenerative disc disease), lumbar    3. Intractable episodic headache, unspecified headache type    4. Anxiety    5. Depression, unspecified depression type    6. Chronic obstructive pulmonary disease, unspecified COPD type    7. Chronic diastolic congestive heart failure    8. Essential hypertension    9. Hx of heart artery stent    10. Hypercholesteremia    11. Hypotension, unspecified hypotension type    12. Coronary artery disease involving native coronary artery of native heart without angina pectoris    13. Hypothyroidism, unspecified type        Plan:       Chronic pain syndrome  -     Ambulatory referral/consult to Pain Clinic; Future; Expected date: 09/21/2020    DDD (degenerative disc disease), lumbar    Intractable episodic headache, unspecified headache type    Anxiety    Depression, unspecified depression type    Chronic obstructive pulmonary disease,  unspecified COPD type    Chronic diastolic congestive heart failure    Essential hypertension    Hx of heart artery stent    Hypercholesteremia    Hypotension, unspecified hypotension type    Coronary artery disease involving native coronary artery of native heart without angina pectoris    Hypothyroidism, unspecified type    Other orders  -     azithromycin (Z-NICHOLE) 250 MG tablet; 2 tabs by mouth day 1, then 1 tab by mouth daily x 4 days  Dispense: 6 tablet; Refill: 0  -     promethazine-codeine 6.25-10 mg/5 ml (PHENERGAN WITH CODEINE) 6.25-10 mg/5 mL syrup; Take 5 mLs by mouth every 6 (six) hours as needed for Cough.  Dispense: 180 mL; Refill: 0  -     methylPREDNISolone (MEDROL DOSEPACK) 4 mg tablet; use as directed  Dispense: 1 Package; Refill: 0  -     HYDROcodone-acetaminophen (NORCO) 5-325 mg per tablet; Take 1 tablet by mouth every 6 (six) hours as needed.  Dispense: 30 tablet; Refill: 0          See HPI above     Office  visit 08/12/2020 73 yo [place] --patient slipped has some cement steps--has a contusion on thorax--cut on the back not deep enough to need stitches--pt seen ER --given shot --told take mobic pt can't take it due heart. Pt did call pain management cannot take until next month .      ROS: no significant change except contusion ribcage  Skin: no psoriasis, eczema, skin cancer-  HEENT: + HA frontal area/occipital area shoulderso n blurred vision, diplopia, epistaxis, hoarsene ss change in voice, thyroid trouble does have some ocular pain wears glasses  Lung: No pneumonia, asthma, Tb, wheezing, SOB, no smoking +COPD +chronic bronchitis  Heart: No chest pain, ankle edema, palpitations,+ old MI,no  rinku murmur, +hypertension,+ hyperlipidemia, + CAD with stent times 17 last stent placed in January 2019 no CABG +CHF  Abdomen:  +GERD omeprazole and history of a gastric ulcer-both doing well No nausea, vomiting, diarrhea, constipation, ulcers, hepatitis, gallbladder disease, melena, hematochezia,  hematemesis  : no UTI, renal disease, stones  GYN hyst   MS: no fractures, O/A, lupus, rheumatoid, gout --ankle still giving some discomfort--saw Dr. Michaud--patient told needs to have epidural steroid injections due to DDD lumbar spine also has DDD cervical spine --no medication given byDr Arechiga--was advised to get epidural steroid injections--states had the injections years ago made her sick   Neuro: No dizziness, LOC, seizures   No diabetes, no anemia, no anxiety, no depression   One daughter but does not talk to pt has not spoken to pt in 2 yrs, patient on disability due to heart, lives alone    Objective:   Physical Exam:  No physical exam done this was a phone call visit physical exam from below is from a prior office visit  General: Well nourished, well developed, no acute distress +thin somewhat he may see  Skin: No lesions  HEENT: Eyes PERRLA, EOM intact, nose clear discharge throat noneythematous ears TMs clear  NECK: Supple, no bruits, No JVD, no nodes  Lungs: Clear, no rales, rhonchi, wheezinge decreased breath sounds due to history of severe COPD  Heart: Regular rate and rhythm, no murmurs, gallops, or rubs +coarse cough distant heart sounds  Abdomen: flat, bowel sounds positive, no tenderness, or organomegaly  MS:  Swelling in the right ankle medial and lateral aspect with some swelling of the right lateral  to palpation pain with flexion extension inversion eversion weight-bearing unable to stand on toes or heels   Neuro: Alert, CN intact, oriented X 3  Extremities: No cyanosis, clubbing, or edema         Assessment:       1. Chronic pain syndrome    2. DDD (degenerative disc disease), lumbar    3. Intractable episodic headache, unspecified headache type    4. Anxiety    5. Depression, unspecified depression type    6. Chronic obstructive pulmonary disease, unspecified COPD type    7. Chronic diastolic congestive heart failure    8. Essential hypertension    9. Hx of heart artery  stent    10. Hypercholesteremia    11. Hypotension, unspecified hypotension type    12. Coronary artery disease involving native coronary artery of native heart without angina pectoris    13. Hypothyroidism, unspecified type        Plan:       Chronic pain syndrome  -     Ambulatory referral/consult to Pain Clinic; Future; Expected date: 09/21/2020    DDD (degenerative disc disease), lumbar    Intractable episodic headache, unspecified headache type    Anxiety    Depression, unspecified depression type    Chronic obstructive pulmonary disease, unspecified COPD type    Chronic diastolic congestive heart failure    Essential hypertension    Hx of heart artery stent    Hypercholesteremia    Hypotension, unspecified hypotension type    Coronary artery disease involving native coronary artery of native heart without angina pectoris    Hypothyroidism, unspecified type    Other orders  -     azithromycin (Z-NICHOLE) 250 MG tablet; 2 tabs by mouth day 1, then 1 tab by mouth daily x 4 days  Dispense: 6 tablet; Refill: 0  -     promethazine-codeine 6.25-10 mg/5 ml (PHENERGAN WITH CODEINE) 6.25-10 mg/5 mL syrup; Take 5 mLs by mouth every 6 (six) hours as needed for Cough.  Dispense: 180 mL; Refill: 0  -     methylPREDNISolone (MEDROL DOSEPACK) 4 mg tablet; use as directed  Dispense: 1 Package; Refill: 0  -     HYDROcodone-acetaminophen (NORCO) 5-325 mg per tablet; Take 1 tablet by mouth every 6 (six) hours as needed.  Dispense: 30 tablet; Refill: 0        States has not been on antibiotics in awhile--will give Zithromax/Medrol/Phenergan with codeine---for cold  Patient referred to pain clinic but unable get in the pain clinic will giveNarco 5 mg #30 --last time able get pain medicine patient needs to be in pain clinic  Patient told I will not refill any pain or control substances again so please call someone else that this problem can last 4-6 weeks but I will not give any more pain medication   Patient with multiple medical  issues  Dr. Baltazar--cardiologist--hypertension/hyperlipidemia/CHF/old MI/CAD with stent times 15  GERD/history ulcer disease--doing well on omeprazole--zofran   Health maintenance shingles shot  Patient advised to not taking any arthritis pills or NSAIDs for Plavix including Lodine Voltaren ibuprofen leave can cause GI problems  Should have routine labs q.6 months with Dr. Baltazar    Established Patient - Audio Only Telehealth Visit     The patient location is:  Home  The chief complaint leading to consultation is:  Headache  Visit type: Virtual visit with audio only (telephone)  Total time spent with patient:  35 min       The reason for the audio only service rather than synchronous audio and video virtual visit was related to technical difficulties or patient preference/necessity.     Each patient to whom I provide medical services by telemedicine is:  (1) informed of the relationship between the physician and patient and the respective role Eof any other health care provider with respect to management of the patient; and (2) notified that they may decline to receive medical services by telemedicine and may withdraw from such care at any time. Patient verbally consented to receive this service via voice-only telephone call.       HPI:  See history of present illness above     Assessment and plan:  See plan above                        This service was not originating from a related E/M service provided within the previous 7 days nor will  to an E/M service or procedure within the next 24 hours or my soonest available appointment.  Prevailing standard of care was able to be met in this audio-only visit.

## 2020-09-18 ENCOUNTER — TELEPHONE (OUTPATIENT)
Dept: PAIN MEDICINE | Facility: CLINIC | Age: 75
End: 2020-09-18

## 2020-09-18 NOTE — TELEPHONE ENCOUNTER
----- Message from Antwan Ashton sent at 9/18/2020  1:09 PM CDT -----  Contact: pt @ [phone]  Pt returning Mau's call

## 2020-09-18 NOTE — TELEPHONE ENCOUNTER
Spoke with patient. Discussed with her that Dr Woo put a new referral to Dr Ford to treat her pain. Stated that she still was not interested in having the injections done due to her having them done in the past and not having a good experience. I informed the patient that she is in control of her treatment and did not have to do the injections but she could come to the clinic for a visit to discuss other possible options to possibly reduce her pain levels. Patient agreed to an appointment. No further issues discussed.

## 2020-09-23 ENCOUNTER — PATIENT OUTREACH (OUTPATIENT)
Dept: ADMINISTRATIVE | Facility: OTHER | Age: 75
End: 2020-09-23

## 2020-09-23 NOTE — PROGRESS NOTES
LINKS immunization registry updated  Care Everywhere updated  Health Maintenance updated  Chart reviewed for overdue Proactive [facility] Encounters ([facility]) health maintenance testing (CRS, Breast Ca, Diabetic Eye Exam)   Orders entered:N/A  Patient has had positive occult stool tests.  [employer] order not entered

## 2020-10-01 DIAGNOSIS — M47.27 LUMBOSACRAL RADICULOPATHY DUE TO DEGENERATIVE JOINT DISEASE OF SPINE: Primary | ICD-10-CM

## 2020-10-01 DIAGNOSIS — M47.897 OTHER SPONDYLOSIS, LUMBOSACRAL REGION: ICD-10-CM

## 2020-10-01 DIAGNOSIS — M47.26 OTHER SPONDYLOSIS WITH RADICULOPATHY, LUMBAR REGION: ICD-10-CM

## 2020-10-01 DIAGNOSIS — M47.896 OTHER SPONDYLOSIS, LUMBAR REGION: ICD-10-CM

## 2020-10-06 ENCOUNTER — TELEPHONE (OUTPATIENT)
Dept: PRIMARY CARE CLINIC | Facility: CLINIC | Age: 75
End: 2020-10-06

## 2020-10-06 NOTE — TELEPHONE ENCOUNTER
----- Message from Ade Mckeon sent at 10/6/2020  3:09 PM CDT -----  Contact: Patient  Type: Needs Medical Advice    Who Called:  Josseline, patient  Symptoms (please be specific):  Fractured elbow  How long has patient had these symptoms:  Since Sunday  Pharmacy name and phone #:    [facility] - [place], [place] - [address]  [address] [place] [address]  Phone: [phone] Fax: [phone]  Best Call Back Number: [phone]  Additional Information: Calling because she went to the emergency room Sunday, she says they told her to call her primary to get pain medication. Please advise. Thanks.

## 2020-10-07 ENCOUNTER — TELEPHONE (OUTPATIENT)
Dept: PRIMARY CARE CLINIC | Facility: CLINIC | Age: 75
End: 2020-10-07

## 2020-10-07 NOTE — TELEPHONE ENCOUNTER
----- Message from Ade Mckeon sent at 10/7/2020  2:22 PM CDT -----  Contact: Patient  Type:  Patient Returning Call    Who Called:  Josseline, patient  Who Left Message for Patient:  Karin  Does the patient know what this is regarding?:  Medications  Best Call Back Number:  [phone]  Additional Information:  Missed your call, please call her back. Thanks.

## 2020-10-09 ENCOUNTER — OFFICE VISIT (OUTPATIENT)
Dept: PRIMARY CARE CLINIC | Facility: CLINIC | Age: 75
End: 2020-10-09
Payer: MEDICARE

## 2020-10-09 ENCOUNTER — TELEPHONE (OUTPATIENT)
Dept: PRIMARY CARE CLINIC | Facility: CLINIC | Age: 75
End: 2020-10-09

## 2020-10-09 VITALS
SYSTOLIC BLOOD PRESSURE: 112 MMHG | HEIGHT: 64 IN | HEART RATE: 89 BPM | WEIGHT: 111.63 LBS | TEMPERATURE: 98 F | RESPIRATION RATE: 16 BRPM | OXYGEN SATURATION: 99 % | DIASTOLIC BLOOD PRESSURE: 66 MMHG | BODY MASS INDEX: 19.06 KG/M2

## 2020-10-09 DIAGNOSIS — Z87.891 HISTORY OF TOBACCO ABUSE: ICD-10-CM

## 2020-10-09 DIAGNOSIS — F41.9 ANXIETY: Primary | ICD-10-CM

## 2020-10-09 DIAGNOSIS — F32.A DEPRESSION, UNSPECIFIED DEPRESSION TYPE: ICD-10-CM

## 2020-10-09 DIAGNOSIS — G89.4 CHRONIC PAIN SYNDROME: ICD-10-CM

## 2020-10-09 DIAGNOSIS — J44.9 CHRONIC OBSTRUCTIVE PULMONARY DISEASE, UNSPECIFIED COPD TYPE: Chronic | ICD-10-CM

## 2020-10-09 DIAGNOSIS — K21.9 GASTROESOPHAGEAL REFLUX DISEASE, UNSPECIFIED WHETHER ESOPHAGITIS PRESENT: Chronic | ICD-10-CM

## 2020-10-09 DIAGNOSIS — Z95.5 HX OF HEART ARTERY STENT: ICD-10-CM

## 2020-10-09 DIAGNOSIS — I10 ESSENTIAL HYPERTENSION: Chronic | ICD-10-CM

## 2020-10-09 DIAGNOSIS — M51.36 DDD (DEGENERATIVE DISC DISEASE), LUMBAR: ICD-10-CM

## 2020-10-09 DIAGNOSIS — E78.00 HYPERCHOLESTEREMIA: Chronic | ICD-10-CM

## 2020-10-09 DIAGNOSIS — E03.9 HYPOTHYROIDISM, UNSPECIFIED TYPE: Chronic | ICD-10-CM

## 2020-10-09 DIAGNOSIS — I25.10 CORONARY ARTERY DISEASE INVOLVING NATIVE CORONARY ARTERY OF NATIVE HEART WITHOUT ANGINA PECTORIS: Chronic | ICD-10-CM

## 2020-10-09 PROCEDURE — 99214 OFFICE O/P EST MOD 30 MIN: CPT | Mod: S$GLB,,, | Performed by: FAMILY MEDICINE

## 2020-10-09 PROCEDURE — 1159F PR MEDICATION LIST DOCUMENTED IN MEDICAL RECORD: ICD-10-PCS | Mod: S$GLB,,, | Performed by: FAMILY MEDICINE

## 2020-10-09 PROCEDURE — 99499 RISK ADDL DX/OHS AUDIT: ICD-10-PCS | Mod: S$GLB,,, | Performed by: FAMILY MEDICINE

## 2020-10-09 PROCEDURE — 99214 PR OFFICE/OUTPT VISIT, EST, LEVL IV, 30-39 MIN: ICD-10-PCS | Mod: S$GLB,,, | Performed by: FAMILY MEDICINE

## 2020-10-09 PROCEDURE — 1101F PT FALLS ASSESS-DOCD LE1/YR: CPT | Mod: CPTII,S$GLB,, | Performed by: FAMILY MEDICINE

## 2020-10-09 PROCEDURE — 99999 PR PBB SHADOW E&M-EST. PATIENT-LVL III: ICD-10-PCS | Mod: PBBFAC,,, | Performed by: FAMILY MEDICINE

## 2020-10-09 PROCEDURE — 3074F SYST BP LT 130 MM HG: CPT | Mod: CPTII,S$GLB,, | Performed by: FAMILY MEDICINE

## 2020-10-09 PROCEDURE — 3078F PR MOST RECENT DIASTOLIC BLOOD PRESSURE < 80 MM HG: ICD-10-PCS | Mod: CPTII,S$GLB,, | Performed by: FAMILY MEDICINE

## 2020-10-09 PROCEDURE — 1125F AMNT PAIN NOTED PAIN PRSNT: CPT | Mod: S$GLB,,, | Performed by: FAMILY MEDICINE

## 2020-10-09 PROCEDURE — 3078F DIAST BP <80 MM HG: CPT | Mod: CPTII,S$GLB,, | Performed by: FAMILY MEDICINE

## 2020-10-09 PROCEDURE — 3074F PR MOST RECENT SYSTOLIC BLOOD PRESSURE < 130 MM HG: ICD-10-PCS | Mod: CPTII,S$GLB,, | Performed by: FAMILY MEDICINE

## 2020-10-09 PROCEDURE — 99499 UNLISTED E&M SERVICE: CPT | Mod: S$GLB,,, | Performed by: FAMILY MEDICINE

## 2020-10-09 PROCEDURE — 3008F BODY MASS INDEX DOCD: CPT | Mod: CPTII,S$GLB,, | Performed by: FAMILY MEDICINE

## 2020-10-09 PROCEDURE — 1159F MED LIST DOCD IN RCRD: CPT | Mod: S$GLB,,, | Performed by: FAMILY MEDICINE

## 2020-10-09 PROCEDURE — 1101F PR PT FALLS ASSESS DOC 0-1 FALLS W/OUT INJ PAST YR: ICD-10-PCS | Mod: CPTII,S$GLB,, | Performed by: FAMILY MEDICINE

## 2020-10-09 PROCEDURE — 3008F PR BODY MASS INDEX (BMI) DOCUMENTED: ICD-10-PCS | Mod: CPTII,S$GLB,, | Performed by: FAMILY MEDICINE

## 2020-10-09 PROCEDURE — 1125F PR PAIN SEVERITY QUANTIFIED, PAIN PRESENT: ICD-10-PCS | Mod: S$GLB,,, | Performed by: FAMILY MEDICINE

## 2020-10-09 PROCEDURE — 99999 PR PBB SHADOW E&M-EST. PATIENT-LVL III: CPT | Mod: PBBFAC,,, | Performed by: FAMILY MEDICINE

## 2020-10-09 RX ORDER — ALPRAZOLAM 0.5 MG/1
TABLET ORAL
Qty: 30 TABLET | Refills: 2 | Status: SHIPPED | OUTPATIENT
Start: 2020-10-09 | End: 2020-12-29 | Stop reason: SDUPTHER

## 2020-10-09 RX ORDER — ALBUTEROL SULFATE 90 UG/1
AEROSOL, METERED RESPIRATORY (INHALATION)
Qty: 18 G | Refills: 5 | Status: SHIPPED | OUTPATIENT
Start: 2020-10-09 | End: 2020-11-06 | Stop reason: SDUPTHER

## 2020-10-09 RX ORDER — ACETAMINOPHEN AND CODEINE PHOSPHATE 300; 30 MG/1; MG/1
1 TABLET ORAL EVERY 8 HOURS PRN
Qty: 30 TABLET | Refills: 0 | Status: SHIPPED | OUTPATIENT
Start: 2020-10-09 | End: 2020-10-19

## 2020-10-09 NOTE — PROGRESS NOTES
Subjective:       Patient ID: Josseline Stinson is a 74 y.o. female.    Chief Complaint: No chief complaint on file.    HPI:  74-year-old female calling for medication refills---  Subjective:       Patient ID: Josseline Stinson is a 74 y.o. female.    Chief Complaint: No chief complaint on file.    HPI:  74-year-old female-patient was backing down the steps of her house--was carrying her puppy--missed the bottom step---patient fell backwards into the left landing on her left elbow---brought to the emergency room 10/04/2020--splint was applied--patient given appointment to Sonny Michaud--Tuesday--patient will be evaluated for cast needs Tylenol 3 for pain       Overall doing better eating well--+BM--was ambulating all right until this occurred         Assessment:           Plan:                 ROS:   Skin: no psoriasis, eczema, skin cancer-  HEENT: + HA frontal area/occipital area shoulderso n blurred vision, diplopia, epistaxis, hoarsene ss change in voice, thyroid trouble does have some ocular pain wears glasses  Lung: No pneumonia, asthma, Tb, wheezing, SOB, no smoking +COPD +chronic bronchitis  Heart: No chest pain, ankle edema, palpitations,+ old MI,no  rinku murmur, +hypertension,+ hyperlipidemia, + CAD with stent times 17 last stent placed in January 2019 no CABG +CHF  Abdomen:  +GERD omeprazole and history of a gastric ulcer-both doing well No nausea, vomiting, diarrhea, constipation, ulcers, hepatitis, gallbladder disease, melena, hematochezia, hematemesis  : no UTI, renal disease, stones  GYN hyst   MS: no fractures, O/A, lupus, rheumatoid, gout --ankle still giving some discomfort--saw Dr. Michaud--patient told needs to have epidural steroid injections due to DDD lumbar spine also has DDD cervical spine --no medication given byDr Arechiga--was advised to get epidural steroid injections--states had the injections years ago made her sick   Neuro: No dizziness, LOC, seizures   No diabetes, no anemia, no  anxiety, no depression   One daughter but does not talk to pt has not spoken to pt in 2 yrs, patient on disability due to heart, lives with a friend     Objective:   Physical Exam:   General: Well nourished, well developed, no acute distress +thin  Skin: No lesions  HEENT: Eyes PERRLA, EOM intact, nose clear discharge throat noneythematous ears TMs clear  NECK: Supple, no bruits, No JVD, no nodes  Lungs: Clear, no rales, rhonchi, wheezinge decreased breath sounds due to history of severe COPD  Heart: Regular rate and rhythm, no murmurs, gallops, or rubs +coarse cough distant heart sounds  Abdomen: flat, bowel sounds positive, no tenderness, or organomegaly  MS:  Has splint on the left elbow and forearm and upper arm--some swelling of the finger--good range of motion muscle strength of the fingers but some discomfort due to the swelling unable to move the elbow due to splint   Neuro: Alert, CN intact, oriented X 3  Extremities: No cyanosis, clubbing, or edema         Assessment:       1. Anxiety        Plan:       Anxiety        Fracture left elbow---x-ray shows fracture of the distal olecranon process with large joint effusion and overlying edema---screw in plates in the distal wrist from a prior fracture years ago---has Cornelius Michaud--for cast--will treat with Tylenol No.  3--also noted to have osteopenia may benefit from a bone density to see if needs treatment for osteoporosis   Patient with multiple medical issues  Dr. Baltazar--cardiologist--hypertension/hyperlipidemia/CHF/old MI/CAD with stent times 15-states has not seen cardiologist for long time told with multiple stents should be seen by cardiology at least twice a year  GERD/history ulcer disease--doing well on omeprazole--zofran   Health maintenance shingles shot  Patient advised to not taking any arthritis pills or NSAIDs for Plavix including Lodine Voltaren ibuprofen leave can cause GI problem  Patient should have lab q.6 months last lab was  in May should have CBCs CMP lipids T4 TSH  Health maintenance shingles colorectal screen flu   Told can only treat temper only with Tylenol No.  3 due to being on Xanax

## 2020-10-09 NOTE — TELEPHONE ENCOUNTER
Spoke with Candance with [employer], patient filled a prescription for Norco on 9/30/2020 from Dr. Farfan. According to patient's chart, physician is neuroscience/pain management. Per Dr. Woo patient okay to fill RX for Tylenol #3 due to fracture of left elbow. Candance verbalized understanding.

## 2020-10-09 NOTE — TELEPHONE ENCOUNTER
----- Message from Ade Mckeon sent at 10/9/2020 12:11 PM CDT -----  Contact: Candance with [employer] phone [phone]  Candance with [employer] phone [phone], Calling regarding Rx acetaminophen-codeine 300-30mg (TYLENOL #3) 300-30 mg Tab, wanted to inform you that the patient just had Norco filled by another doctor nine days ago. Please advise if it is alright to fill. Thanks.

## 2020-10-12 ENCOUNTER — OFFICE VISIT (OUTPATIENT)
Dept: ORTHOPEDICS | Facility: CLINIC | Age: 75
End: 2020-10-12
Payer: MEDICARE

## 2020-10-12 ENCOUNTER — TELEPHONE (OUTPATIENT)
Dept: UROLOGY | Facility: CLINIC | Age: 75
End: 2020-10-12

## 2020-10-12 ENCOUNTER — TELEPHONE (OUTPATIENT)
Dept: ORTHOPEDICS | Facility: CLINIC | Age: 75
End: 2020-10-12

## 2020-10-12 VITALS
HEIGHT: 64 IN | DIASTOLIC BLOOD PRESSURE: 89 MMHG | WEIGHT: 115.88 LBS | HEART RATE: 84 BPM | SYSTOLIC BLOOD PRESSURE: 159 MMHG | BODY MASS INDEX: 19.78 KG/M2

## 2020-10-12 DIAGNOSIS — S52.022A CLOSED FRACTURE OF OLECRANON PROCESS OF LEFT ULNA, INITIAL ENCOUNTER: Primary | ICD-10-CM

## 2020-10-12 PROCEDURE — 1125F PR PAIN SEVERITY QUANTIFIED, PAIN PRESENT: ICD-10-PCS | Mod: S$GLB,,, | Performed by: ORTHOPAEDIC SURGERY

## 2020-10-12 PROCEDURE — 3008F BODY MASS INDEX DOCD: CPT | Mod: CPTII,S$GLB,, | Performed by: ORTHOPAEDIC SURGERY

## 2020-10-12 PROCEDURE — 3079F PR MOST RECENT DIASTOLIC BLOOD PRESSURE 80-89 MM HG: ICD-10-PCS | Mod: CPTII,S$GLB,, | Performed by: ORTHOPAEDIC SURGERY

## 2020-10-12 PROCEDURE — 3288F PR FALLS RISK ASSESSMENT DOCUMENTED: ICD-10-PCS | Mod: CPTII,S$GLB,, | Performed by: ORTHOPAEDIC SURGERY

## 2020-10-12 PROCEDURE — 1159F MED LIST DOCD IN RCRD: CPT | Mod: S$GLB,,, | Performed by: ORTHOPAEDIC SURGERY

## 2020-10-12 PROCEDURE — 99999 PR PBB SHADOW E&M-EST. PATIENT-LVL IV: CPT | Mod: PBBFAC,,, | Performed by: ORTHOPAEDIC SURGERY

## 2020-10-12 PROCEDURE — 3288F FALL RISK ASSESSMENT DOCD: CPT | Mod: CPTII,S$GLB,, | Performed by: ORTHOPAEDIC SURGERY

## 2020-10-12 PROCEDURE — 3077F SYST BP >= 140 MM HG: CPT | Mod: CPTII,S$GLB,, | Performed by: ORTHOPAEDIC SURGERY

## 2020-10-12 PROCEDURE — 1100F PTFALLS ASSESS-DOCD GE2>/YR: CPT | Mod: CPTII,S$GLB,, | Performed by: ORTHOPAEDIC SURGERY

## 2020-10-12 PROCEDURE — 3079F DIAST BP 80-89 MM HG: CPT | Mod: CPTII,S$GLB,, | Performed by: ORTHOPAEDIC SURGERY

## 2020-10-12 PROCEDURE — 3008F PR BODY MASS INDEX (BMI) DOCUMENTED: ICD-10-PCS | Mod: CPTII,S$GLB,, | Performed by: ORTHOPAEDIC SURGERY

## 2020-10-12 PROCEDURE — 1125F AMNT PAIN NOTED PAIN PRSNT: CPT | Mod: S$GLB,,, | Performed by: ORTHOPAEDIC SURGERY

## 2020-10-12 PROCEDURE — 1100F PR PT FALLS ASSESS DOC 2+ FALLS/FALL W/INJURY/YR: ICD-10-PCS | Mod: CPTII,S$GLB,, | Performed by: ORTHOPAEDIC SURGERY

## 2020-10-12 PROCEDURE — 99999 PR PBB SHADOW E&M-EST. PATIENT-LVL IV: ICD-10-PCS | Mod: PBBFAC,,, | Performed by: ORTHOPAEDIC SURGERY

## 2020-10-12 PROCEDURE — 99214 OFFICE O/P EST MOD 30 MIN: CPT | Mod: S$GLB,,, | Performed by: ORTHOPAEDIC SURGERY

## 2020-10-12 PROCEDURE — 1159F PR MEDICATION LIST DOCUMENTED IN MEDICAL RECORD: ICD-10-PCS | Mod: S$GLB,,, | Performed by: ORTHOPAEDIC SURGERY

## 2020-10-12 PROCEDURE — 99214 PR OFFICE/OUTPT VISIT, EST, LEVL IV, 30-39 MIN: ICD-10-PCS | Mod: S$GLB,,, | Performed by: ORTHOPAEDIC SURGERY

## 2020-10-12 PROCEDURE — 3077F PR MOST RECENT SYSTOLIC BLOOD PRESSURE >= 140 MM HG: ICD-10-PCS | Mod: CPTII,S$GLB,, | Performed by: ORTHOPAEDIC SURGERY

## 2020-10-12 RX ORDER — IBUPROFEN 800 MG/1
800 TABLET ORAL 3 TIMES DAILY
Qty: 45 TABLET | Refills: 0 | Status: SHIPPED | OUTPATIENT
Start: 2020-10-12 | End: 2021-01-11

## 2020-10-12 NOTE — TELEPHONE ENCOUNTER
Spoke with pt. Pt states she is having pain with the fracture she has. She is still in a splint. Advised pt she can take extra strength Tylenol and ibuprofen to help with the pain. Pt currently has an appt scheduled for Wednesday at 8am. Advised pt I will have Dr Black review her fracture. All questions answered. Pt verbalized understanding.

## 2020-10-12 NOTE — PROGRESS NOTES
Subjective:    Patient ID:  Josseline Stinson is a 74 y.o. y.o. female who presents for initial visit for Injury and Pain of the Left Elbow      73 yo female, RHD, reports that she fell down stairs on 10/4/2020 injuring her left elbow. She sought initial evaluation at [facility] ED where x-rays of the left elbow were obtained and reported to show an olecranon fracture. She was treated with a long-arm posterior ortho-glass splint and sling. She has been referred for orthopedic follow-up care.           Past Medical History:   Diagnosis Date    Anxiety     Arthritis     CHF (congestive heart failure)     Chronic pain syndrome 6/4/2019    COPD (chronic obstructive pulmonary disease)     Coronary artery disease involving native coronary artery of native heart without angina pectoris 3/21/2016    Encounter for blood transfusion     Essential hypertension 3/21/2016    GERD (gastroesophageal reflux disease)     History of gastric ulcer 5/14/2019    Hx of heart artery stent 5/14/2019    Hypercholesteremia 3/21/2016    MI (myocardial infarction)     x4    NSTEMI (non-ST elevated myocardial infarction) 1/2/2018    Occult blood positive stool     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 3/21/2016    Pneumonia of right lower lobe due to infectious organism 5/14/2019    Positive occult stool blood test     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    Thyroid disease     Ulcer         Past Surgical History:   Procedure Laterality Date    carotid artery surgeriy      catheterization cardiac cath Left 01/03/2017    Angioplasty    COLONOSCOPY N/A 7/9/2019    Procedure: COLONOSCOPY;  Surgeon: Lorna Paula MD;  Location: [facility];  Service: Endoscopy;  Laterality: N/A;    ESOPHAGOGASTRODUODENOSCOPY N/A 7/5/2019    Procedure: ESOPHAGOGASTRODUODENOSCOPY (EGD);  Surgeon: Dane Khoury MD;  Location: [facility] ENDO;  Service: Endoscopy;  Laterality: N/A;    ESOPHAGOGASTRODUODENOSCOPY N/A 7/8/2019    Procedure:  ESOPHAGOGASTRODUODENOSCOPY (EGD);  Surgeon: Sandra Alicea MD;  Location: [facility];  Service: Endoscopy;  Laterality: N/A;    FRACTURE SURGERY Left     elbow and wrist    HIP SURGERY Right     x 4    HYSTERECTOMY  1972       Review of patient's allergies indicates:   Allergen Reactions    Cortisone Swelling     SWELLING    Darvocet a500 [propoxyphene n-acetaminophen] Nausea And Vomiting    Toradol [ketorolac] Nausea And Vomiting    Tramadol Nausea And Vomiting          Current Outpatient Medications:     acetaminophen-codeine 300-30mg (TYLENOL #3) 300-30 mg Tab, Take 1 tablet by mouth every 8 (eight) hours as needed., Disp: 30 tablet, Rfl: 0    albuterol (PROVENTIL/VENTOLIN HFA) 90 mcg/actuation inhaler, INHALE one PUFF BY MOUTH EVERY 4 TO 6 HOURS AS NEEDED, Disp: 18 g, Rfl: 5    alendronate (FOSAMAX) 70 MG tablet, TAKE ONE TABLET BY MOUTH EVERY 7 DAYS, Disp: 4 tablet, Rfl: 3    ALPRAZolam (XANAX) 0.5 MG tablet, One p.o. q.d. p.r.n. anxiety, Disp: 30 tablet, Rfl: 2    atorvastatin (LIPITOR) 40 MG tablet, TAKE ONE TABLET BY MOUTH ONCE DAILY, Disp: 30 tablet, Rfl: 5    butalbital-acetaminophen-caffeine -40 mg (FIORICET, ESGIC) -40 mg per tablet, 1 p.o. q.8 hours p.r.n. headache, Disp: 30 tablet, Rfl: 2    clopidogreL (PLAVIX) 75 mg tablet, TAKE ONE TABLET BY MOUTH ONCE DAILY, Disp: 30 tablet, Rfl: 5    cyclobenzaprine (FLEXERIL) 10 MG tablet, Take 1 tablet (10 mg total) by mouth 3 (three) times daily as needed., Disp: 30 tablet, Rfl: 5    metoprolol succinate (TOPROL-XL) 25 MG 24 hr tablet, TAKE ONE TABLET BY MOUTH ONCE DAILY, Disp: 30 tablet, Rfl: 5    omeprazole (PRILOSEC) 40 MG capsule, TAKE ONE CAPSULE BY MOUTH ONCE DAILY, Disp: 30 capsule, Rfl: 5    traZODone (DESYREL) 100 MG tablet, Take 100 mg by mouth every evening., Disp: , Rfl:     diclofenac (VOLTAREN) 50 MG EC tablet, TAKE ONE TABLET BY MOUTH TWICE DAILY AS NEEDED FOR FOR BACK, HAND, OR FOOT PAIN. TAKE WITH OMEPRAZOLE  TO PREVENT GASTRITIS. TAKE NO OTHER NSAIDS (Patient not taking: Reported on 10/12/2020), Disp: 60 tablet, Rfl: 2    gabapentin (NEURONTIN) 100 MG capsule, Take 100 mg by mouth 3 (three) times daily., Disp: , Rfl:     mupirocin (BACTROBAN) 2 % ointment, APPLY TO THE AFFECTED AREA THREE TIMES DAILY (Patient not taking: Reported on 10/12/2020), Disp: 22 g, Rfl: 1    Social History     Socioeconomic History    Marital status:      Spouse name: Not on file    Number of children: Not on file    Years of education: Not on file    Highest education level: Not on file   Occupational History    Occupation: disabled    Social Needs    Financial resource strain: Not on file    Food insecurity     Worry: Not on file     Inability: Not on file    Transportation needs     Medical: Not on file     Non-medical: Not on file   Tobacco Use    Smoking status: Light Tobacco Smoker     Packs/day: 1.00     Years: 51.00     Pack years: 51.00     Types: Cigarettes     Start date:      Last attempt to quit: 2019     Years since quittin.7    Smokeless tobacco: Never Used    Tobacco comment: Patient enrolled in tobacco trust and ambulatory referral  to cessation   Substance and Sexual Activity    Alcohol use: No     Alcohol/week: 0.0 standard drinks    Drug use: No    Sexual activity: Yes     Partners: Male   Lifestyle    Physical activity     Days per week: Not on file     Minutes per session: Not on file    Stress: Only a little   Relationships    Social connections     Talks on phone: Not on file     Gets together: Not on file     Attends Jain service: Not on file     Active member of club or organization: Not on file     Attends meetings of clubs or organizations: Not on file     Relationship status: Not on file   Other Topics Concern    Not on file   Social History Narrative    Not on file        Family History   Problem Relation Age of Onset    Cancer Mother         colon    Diabetes Mother      "Heart disease Mother     Heart disease Father     Cancer Sister         breast    Heart disease Sister     Cancer Brother         leukemia    Heart disease Brother     Heart disease Maternal Grandmother     Heart disease Maternal Grandfather     Breast cancer Daughter         Review of Systems   Constitutional: Negative for chills and fever.   HENT: Negative for hearing loss.    Eyes: Negative for blurred vision.   Respiratory: Negative for shortness of breath.    Cardiovascular: Negative for chest pain.   Gastrointestinal: Negative for nausea and vomiting.   Genitourinary: Negative for dysuria.   Musculoskeletal: Negative for myalgias.   Skin: Negative for rash.   Neurological: Negative for speech change and loss of consciousness.   Endo/Heme/Allergies: Does not bruise/bleed easily.   Psychiatric/Behavioral: Positive for depression.        Objective:     BP (!) 159/89 (BP Location: Right arm, Patient Position: Sitting, BP Method: Small (Automatic))   Pulse 84   Ht 5' 4" (1.626 m)   Wt 52.6 kg (115 lb 13.6 oz)   BMI 19.89 kg/m²     Ortho Exam     73 yo female in NAD; alert, oriented x 3    LUE: splint removed; N/V intact; skin intact    Left elbow: moderate swelling with ecchymosis/tenderness olecranon process; AROM: -10 to 115 flexion    Imaging:     X-rays 3-view left elbow dated 10/4/2020 are independently reviewed by me and show a minimally displaced intra-articular fracture of the olecranon process.       Assessment & Plan:      1. Closed fracture of olecranon process of left ulna, initial encounter       1.  Left arm sling support for comfort, may wean from sling as tolertated  2.  Minimum TID left elbow ROM exercises to tolerance  3.  Progressive increase left arm use for ADLs to comfort  4.  Follow-up in one month    "

## 2020-10-12 NOTE — TELEPHONE ENCOUNTER
----- Message from Gloria Brand sent at 10/12/2020  8:02 AM CDT -----  Pt is calling to get an appt today to get a casket put on her arm and would like for the nurse to give her a call back at [phone]

## 2020-10-12 NOTE — TELEPHONE ENCOUNTER
Spoke with pt. Advised pt that an appointment did become available today. appt scheduled. All questions answered. Pt verbalized understanding.

## 2020-10-12 NOTE — LETTER
October 12, 2020      Dony Woo MD  [address] [place] [address]           [facility]  [address], [facility] [address]  [place] [address]  Phone: [phone]  Fax: [phone]          Patient: Josseline Stinson   MR Number: [id number]   YOB: 1945   Date of Visit: 10/12/2020       Dear Dr. Dony Woo:    Thank you for referring Josseline Stinson to me for evaluation. Attached you will find relevant portions of my assessment and plan of care.    If you have questions, please do not hesitate to call me. I look forward to following Josseline Stinson along with you.    Sincerely,    Michele Black MD    Enclosure  CC:  No Recipients    If you would like to receive this communication electronically, please contact [email or web address] or [phone] to request more information on [employer] access.    For providers and/or their staff who would like to refer a patient to [facility], please contact us through our one-stop-shop provider referral line, [facility], at [phone].    If you feel you have received this communication in error or would no longer like to receive these types of communications, please e-mail [email or web address]

## 2020-10-12 NOTE — TELEPHONE ENCOUNTER
----- Message from Simón Ray sent at 10/12/2020 10:30 AM CDT -----  Regarding: .CCTIMESENSITIVE  Same Day Appointment Request    Was an appointment with another provider offered?   Pt has an appt on 10/14/20 and wants to be seen today for her broken elbow.      Reason for FST appt.:Broken Elbow    Communication Preference: [phone]

## 2020-10-14 ENCOUNTER — TELEPHONE (OUTPATIENT)
Dept: PRIMARY CARE CLINIC | Facility: CLINIC | Age: 75
End: 2020-10-14

## 2020-10-14 NOTE — TELEPHONE ENCOUNTER
----- Message from Carmelita Thornton sent at 10/14/2020  2:02 PM CDT -----  Contact: [phone]  Pt went to orthopedic dr and was not prescribed anything for pain for broken elbow she would like to know if she could get medication sent to pharmacy     Also pt has been having bad cough as well     [facility] - [place], [place] - [address] [place] [phone] (Phone)  [phone] (Fax)

## 2020-10-14 NOTE — TELEPHONE ENCOUNTER
Called patient, notified that she can not receive any more medication since she received pain medication 5 days ago from Dr Woo notified patient to follow Dr Black's treatment.

## 2020-10-20 ENCOUNTER — HOSPITAL ENCOUNTER (EMERGENCY)
Facility: HOSPITAL | Age: 75
Discharge: HOME | End: 2020-10-20
Attending: EMERGENCY MEDICINE
Payer: MEDICARE

## 2020-10-20 VITALS
TEMPERATURE: 98 F | BODY MASS INDEX: 19.68 KG/M2 | WEIGHT: 114.63 LBS | SYSTOLIC BLOOD PRESSURE: 145 MMHG | OXYGEN SATURATION: 99 % | HEART RATE: 79 BPM | RESPIRATION RATE: 18 BRPM | DIASTOLIC BLOOD PRESSURE: 67 MMHG

## 2020-10-20 DIAGNOSIS — M79.603 ARM PAIN: ICD-10-CM

## 2020-10-20 DIAGNOSIS — M25.522 LEFT ELBOW PAIN: ICD-10-CM

## 2020-10-20 DIAGNOSIS — S52.022D CLOSED FRACTURE OF OLECRANON PROCESS OF LEFT ULNA WITH ROUTINE HEALING, SUBSEQUENT ENCOUNTER: Primary | ICD-10-CM

## 2020-10-20 DIAGNOSIS — M25.539 WRIST PAIN: ICD-10-CM

## 2020-10-20 PROCEDURE — 99283 EMERGENCY DEPT VISIT LOW MDM: CPT | Mod: 25

## 2020-10-20 RX ORDER — DICLOFENAC SODIUM 50 MG/1
50 TABLET, DELAYED RELEASE ORAL 3 TIMES DAILY PRN
Qty: 15 TABLET | Refills: 0 | Status: SHIPPED | OUTPATIENT
Start: 2020-10-20 | End: 2020-11-06

## 2020-10-20 NOTE — ED PROVIDER NOTES
Encounter Date: 10/20/2020    SCRIBE #1 NOTE: IMaddie, fermin scribing for, and in the presence of, Cayetano Stark MD.       History     Chief Complaint   Patient presents with    Arm Pain     fell x 2 weeks ago, arrives in sling, left elbow . follow up with ortho, splint removed , pain worsening.     Time seen by provider: 11:17 AM on 10/20/2020    Josseline Stinson is a 74 y.o. female with a PMHx of COPD, CHF, MI, and CAD who presents to the ED with an onset of arm pain. The patient fell 16 days ago, and was seen at an ED and placed in a splint. She states she saw the orthopedic for a follow up, had the splint removed, and reports no improvement with pain. The patient reports during the fall she missed a bottom step and fell onto her arm. The patient denies headache, back pain or any other symptoms at this time. PSHx of elbow fracture surgery and hip surgery.    The history is provided by the patient.     Review of patient's allergies indicates:   Allergen Reactions    Cortisone Swelling     SWELLING    Darvocet a500 [propoxyphene n-acetaminophen] Nausea And Vomiting    Toradol [ketorolac] Nausea And Vomiting    Tramadol Nausea And Vomiting     Past Medical History:   Diagnosis Date    Anxiety     Arthritis     CHF (congestive heart failure)     Chronic pain syndrome 6/4/2019    COPD (chronic obstructive pulmonary disease)     Coronary artery disease involving native coronary artery of native heart without angina pectoris 3/21/2016    Encounter for blood transfusion     Essential hypertension 3/21/2016    GERD (gastroesophageal reflux disease)     History of gastric ulcer 5/14/2019    Hx of heart artery stent 5/14/2019    Hypercholesteremia 3/21/2016    MI (myocardial infarction)     x4    NSTEMI (non-ST elevated myocardial infarction) 1/2/2018    Occult blood positive stool     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 3/21/2016    Pneumonia of right lower lobe due to infectious  organism 2019    Positive occult stool blood test     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    Thyroid disease     Ulcer      Past Surgical History:   Procedure Laterality Date    carotid artery surgeriy      catheterization cardiac cath Left 2017    Angioplasty    COLONOSCOPY N/A 2019    Procedure: COLONOSCOPY;  Surgeon: Lorna Paula MD;  Location: [facility] ENDO;  Service: Endoscopy;  Laterality: N/A;    ESOPHAGOGASTRODUODENOSCOPY N/A 2019    Procedure: ESOPHAGOGASTRODUODENOSCOPY (EGD);  Surgeon: Dane Khoury MD;  Location: [facility] ENDO;  Service: Endoscopy;  Laterality: N/A;    ESOPHAGOGASTRODUODENOSCOPY N/A 2019    Procedure: ESOPHAGOGASTRODUODENOSCOPY (EGD);  Surgeon: Sandra Alicea MD;  Location: [facility] ENDO;  Service: Endoscopy;  Laterality: N/A;    FRACTURE SURGERY Left     elbow and wrist    HIP SURGERY Right     x 4    HYSTERECTOMY  1972     Family History   Problem Relation Age of Onset    Cancer Mother         colon    Diabetes Mother     Heart disease Mother     Heart disease Father     Cancer Sister         breast    Heart disease Sister     Cancer Brother         leukemia    Heart disease Brother     Heart disease Maternal Grandmother     Heart disease Maternal Grandfather     Breast cancer Daughter      Social History     Tobacco Use    Smoking status: Light Tobacco Smoker     Packs/day: 1.00     Years: 51.00     Pack years: 51.00     Types: Cigarettes     Start date:      Last attempt to quit: 2019     Years since quittin.8    Smokeless tobacco: Never Used    Tobacco comment: Patient enrolled in tobacco trust and ambulatory referral  to cessation   Substance Use Topics    Alcohol use: No     Alcohol/week: 0.0 standard drinks    Drug use: No     Review of Systems   Constitutional: Negative for fever.   HENT: Negative for sore throat.    Respiratory: Negative for shortness of breath.    Cardiovascular: Negative for chest pain.    Gastrointestinal: Negative for nausea.   Genitourinary: Negative for dysuria.   Musculoskeletal: Positive for arthralgias. Negative for back pain.   Skin: Negative for rash.   Neurological: Negative for weakness and headaches.   Hematological: Does not bruise/bleed easily.       Physical Exam     Initial Vitals [10/20/20 1014]   BP Pulse Resp Temp SpO2   (!) 145/67 79 18 98.2 °F (36.8 °C) 99 %      MAP       --         Physical Exam    Nursing note and vitals reviewed.  Constitutional: She appears well-developed and well-nourished. She is not diaphoretic. No distress.   HENT:   Head: Normocephalic and atraumatic.   Eyes: EOM are normal. Pupils are equal, round, and reactive to light.   Neck: Normal range of motion. Neck supple.   Cardiovascular: Normal rate, regular rhythm, normal heart sounds and intact distal pulses. Exam reveals no gallop and no friction rub.    No murmur heard.  Pulmonary/Chest: Breath sounds normal. No respiratory distress. She has no wheezes. She has no rhonchi. She has no rales.   Abdominal: Soft. Bowel sounds are normal. There is no abdominal tenderness. There is no rebound and no guarding.   Musculoskeletal: Normal range of motion.      Left elbow: Tenderness found.      Left wrist: She exhibits tenderness.      Left forearm: She exhibits tenderness.      Comments: Tenderness of left forearm, elbow, and wrist.   Neurological: She is alert and oriented to person, place, and time.   Skin: Skin is warm and dry.   Psychiatric: She has a normal mood and affect. Her behavior is normal. Judgment and thought content normal.         ED Course   Procedures  Labs Reviewed - No data to display       Imaging Results          X-Ray Forearm Left (Final result)  Result time 10/20/20 12:00:03    Final result by Caio Sebastian Jr., MD (10/20/20 12:00:03)                 Impression:      Mildly shifted comminuted fracture of the olecranon with soft tissue swelling and positive joint effusion.  Prior  fixation of the lateral condyle with 2 screws in place.  For are fixation of a Colles fracture of the distal radius with a volar plate and screws in position.      Electronically signed by: Caio Sebastian MD  Date:    10/20/2020  Time:    12:00             Narrative:    EXAMINATION:  XR FOREARM LEFT    CLINICAL HISTORY:  Pain in arm, unspecified    TECHNIQUE:  AP and lateral views of the left forearm were performed.    COMPARISON:  Elbow x-rays of October 20, 2020.    FINDINGS:  There is fracture of the olecranon probably comminuted extending into the articular surface without marked displacement and only mild shift.  Soft tissue swelling and positive joint effusion is noted.  The patient has had prior a fixation of the lateral condyle with 2 screws in place.  The patient has had prior fixation of a Colles fracture of the distal radius with a volar plate and screws in position.  An acute fracture of the radius is not seen.                               X-Ray Wrist Complete Left (Final result)  Result time 10/20/20 11:58:22    Final result by Caio Sebastian Jr., MD (10/20/20 11:58:22)                 Impression:      Prior Colles fracture of the distal left radius status post ORIF with volar plate and screws in position.  Osteoporosis noted without acute fracture identified.      Electronically signed by: Caio Sebastian MD  Date:    10/20/2020  Time:    11:58             Narrative:    EXAMINATION:  XR WRIST COMPLETE 3 VIEWS LEFT    CLINICAL HISTORY:  Pain in unspecified wrist    TECHNIQUE:  PA, lateral, and oblique views of the left wrist were performed.    COMPARISON:  None    FINDINGS:  At the left wrist the patient had suffered a Colles fracture of the distal radius.  A volar plate and screws are in position with normal angulation of the articular surface.  There is however osteoporosis noted of the radius, ulna, carpals and metacarpals.  Fracture subluxation or dislocation of the carpals is not identified  at this time.  Soft tissue swelling is not noted.                               X-Ray Elbow Complete Left (Final result)  Result time 10/20/20 11:13:53    Final result by Caio Sebastian Jr., MD (10/20/20 11:13:53)                 Impression:      Nondisplaced comminuted fracture of the left olecranon.  Soft tissue swelling and joint effusion noted.  Prior placement of 2 screws in the left humeral lateral condyle without acute fractures of the humerus noted      Electronically signed by: Caio Sebastian MD  Date:    10/20/2020  Time:    11:13             Narrative:    EXAMINATION:  XR ELBOW COMPLETE 3 VIEW LEFT    CLINICAL HISTORY:  Pain in left elbow    TECHNIQUE:  AP, lateral, and oblique views of the left elbow were performed.    COMPARISON:  Left elbow x-rays of October 4, 2020    FINDINGS:  The patient has had prior fixation of the lateral condyle with 2 screws in position and mild deformity.  The radius is intact.  A new not significantly displaced fracture of the olecranon is seen.  Soft tissue swelling is noted.  The anterior fat pad sign is positive.                                 Medical Decision Making:   History:   Old Medical Records: I decided to obtain old medical records.  Initial Assessment:   74-year-old female presented for evaluation of injuries status post fall 2 weeks ago.  Differential Diagnosis:   Initial differential diagnosis included but not limited to fracture, dislocation, and contusion.  Clinical Tests:   Radiological Study: Ordered and Reviewed  ED Management:  The patient was emergently evaluated in the emergency department, her evaluation was significant for an elderly female tenderness noted to the left elbow, forearm, and wrist regions.  The patient's x-rays were concerning for an olecranon fracture left elbow per Radiology.  This fracture is old and was noted on x-rays done 2 weeks ago.  There is no need for acute intervention at this time, and the patient is stable for  discharge to home.  She is to continue use her sling that she already has.  Additionally, the she will be discharged home with p.o. diclofenac.  She is referred to Orthopedics for follow-up and further care.            Scribe Attestation:   Scribe #1: I performed the above scribed service and the documentation accurately describes the services I performed. I attest to the accuracy of the note.           I, Dr. Cayetano Stark, personally performed the services described in this documentation. All medical record entries made by the scribe were at my direction and in my presence.  I have reviewed the chart and agree that the record reflects my personal performance and is accurate and complete. Cayetano Stark MD.  2:56 PM 10/20/2020              Clinical Impression:       ICD-10-CM ICD-9-CM   1. Closed fracture of olecranon process of left ulna with routine healing, subsequent encounter  S52.022D V54.12   2. Left elbow pain  M25.522 719.42   3. Wrist pain  M25.539 719.43   4. Arm pain  M79.603 729.5                      Disposition:   Disposition: Discharged  Condition: Stable     ED Disposition Condition    Discharge Stable        ED Prescriptions     Medication Sig Dispense Start Date End Date Auth. Provider    diclofenac (VOLTAREN) 50 MG EC tablet Take 1 tablet (50 mg total) by mouth 3 (three) times daily as needed (pain). 15 tablet 10/20/2020  Cayetano Stark MD        Follow-up Information     Follow up With Specialties Details Why Contact Info    Alan Albrecht MD Orthopedic Surgery, Surgery, Sports Medicine Schedule an appointment as soon as possible for a visit   [address] [address]  [phone]                                         Cayetano tSark MD  10/20/20 7287

## 2020-10-20 NOTE — FIRST PROVIDER EVALUATION
" Emergency Department TeleTriage Encounter Note      CHIEF COMPLAINT    Chief Complaint   Patient presents with    Arm Pain     fell x 2 weeks ago, arrives in sling, left elbow . follow up with ortho, splint removed , pain worsening.       VITAL SIGNS   Initial Vitals [10/20/20 1014]   BP Pulse Resp Temp SpO2   (!) 145/67 79 18 98.2 °F (36.8 °C) 99 %      MAP       --            ALLERGIES    Review of patient's allergies indicates:   Allergen Reactions    Cortisone Swelling     SWELLING    Darvocet a500 [propoxyphene n-acetaminophen] Nausea And Vomiting    Toradol [ketorolac] Nausea And Vomiting    Tramadol Nausea And Vomiting       PROVIDER TRIAGE NOTE   Patient presents to the ER with left elbow pain.  She had an injury on 10/4 and was diagnosed with    " Fracture involving the distal olecranon noting large joint effusion and overlying edema."  She reports that she follow up with Orthopedics and splint was removed last week, now with sling.  She denies additional fall.  She is taking ASA for pain without improvement.  Will order xray, patient has declined tylenol at this time.  VSS.    ORDERS  Labs Reviewed - No data to display    ED Orders (720h ago, onward)    None            Virtual Visit Note: The provider triage portion of this emergency department evaluation and documentation was performed via [employer], a HIPAA-compliant telemedicine application, in concert with a tele-presenter in the room. A face to face patient evaluation with one of my colleagues will occur once the patient is placed in an emergency department room.      DISCLAIMER: This note was prepared with [employer] voice recognition transcription software. Garbled syntax, mangled pronouns, and other bizarre constructions may be attributed to that software system.  "

## 2020-10-20 NOTE — ED NOTES
AAOx4, skin warm/dry, respirations even/unlabored.  Appears in mild distress.  Presents with left arm in sling.

## 2020-10-22 ENCOUNTER — TELEPHONE (OUTPATIENT)
Dept: PRIMARY CARE CLINIC | Facility: CLINIC | Age: 75
End: 2020-10-22

## 2020-10-22 NOTE — TELEPHONE ENCOUNTER
Called [facility] states patient requesting Fioricet refill 3 days early. Notified pharmacy that patient should not be taking medications q8 everyday should be PRN states understanding and will not refill medication early

## 2020-10-23 ENCOUNTER — TELEPHONE (OUTPATIENT)
Dept: PRIMARY CARE CLINIC | Facility: CLINIC | Age: 75
End: 2020-10-23

## 2020-10-23 ENCOUNTER — PATIENT OUTREACH (OUTPATIENT)
Dept: EMERGENCY MEDICINE | Facility: HOSPITAL | Age: 75
End: 2020-10-23

## 2020-10-23 DIAGNOSIS — M25.529 ELBOW PAIN, UNSPECIFIED LATERALITY: Primary | ICD-10-CM

## 2020-10-23 NOTE — PROGRESS NOTES
Provided Patient with education on program and services. Patient declined services but asked how to obtain an elbow brace. Suggested patient contact her PCP but she states she was not sure PCP would be able to assist with her request.  Patient has an appointment with the orthopedics on 11/16/20. ED Navigator contacted patient's PCP on behalf of patient to inform of patient's request for an elbow brace. The request was placed and the patient will be notified by the office  once the order is placed. Patient is aware and verbalized understanding.

## 2020-10-23 NOTE — TELEPHONE ENCOUNTER
----- Message from Rosario Pierre sent at 10/23/2020  3:59 PM CDT -----  Contact: Raji Dawn [phone]  State that the pharmacy don't do braces there.    Please call and advise.    Thank You

## 2020-10-23 NOTE — TELEPHONE ENCOUNTER
Spoke with skyla at [facility] states the only place that takes arm brace through patients insurance is [facility] in [place]. Faxed order to nat[facility]

## 2020-10-23 NOTE — TELEPHONE ENCOUNTER
----- Message from Nakia Scherer sent at 10/23/2020  2:29 PM CDT -----  Regarding: [facility]/ Shyla [phone]  She wants to speak with you about the order she received for an arm brace.    Thank you

## 2020-10-23 NOTE — TELEPHONE ENCOUNTER
----- Message from Mariah Lieberman sent at 10/23/2020 12:12 PM CDT -----  Contact: [phone]  Patient had a recently fall and is in need of an elbow brace. Patient's orthopedic jon is scheduled for the soonest available which is on 11/16. Patient wants to find out if Dr. Woo can send orders for an elbow brace. Please call and advise.

## 2020-10-23 NOTE — TELEPHONE ENCOUNTER
----- Message from Isabel Hobson sent at 10/23/2020  1:36 PM CDT -----  Contact: Self   Pt is requesting copy of recent results. Please advise

## 2020-11-05 ENCOUNTER — TELEPHONE (OUTPATIENT)
Dept: PRIMARY CARE CLINIC | Facility: CLINIC | Age: 75
End: 2020-11-05

## 2020-11-05 NOTE — TELEPHONE ENCOUNTER
----- Message from Estelle Dill sent at 11/5/2020 12:40 PM CST -----  Contact: Patient [phone]  Would like to get medical advice  Symptoms: Cough/mucus/body aches  How long has patient had these symptoms: 2 days  Pharmacy name and phone #: [facility] - [place], [place] - [address]  Comments:    Please call and advise.    Thank you

## 2020-11-06 ENCOUNTER — TELEPHONE (OUTPATIENT)
Dept: PRIMARY CARE CLINIC | Facility: CLINIC | Age: 75
End: 2020-11-06

## 2020-11-06 ENCOUNTER — OFFICE VISIT (OUTPATIENT)
Dept: PRIMARY CARE CLINIC | Facility: CLINIC | Age: 75
End: 2020-11-06
Payer: MEDICARE

## 2020-11-06 VITALS — WEIGHT: 117.75 LBS | HEIGHT: 64 IN | BODY MASS INDEX: 20.1 KG/M2

## 2020-11-06 DIAGNOSIS — E78.00 HYPERCHOLESTEREMIA: Chronic | ICD-10-CM

## 2020-11-06 DIAGNOSIS — G89.4 CHRONIC PAIN SYNDROME: ICD-10-CM

## 2020-11-06 DIAGNOSIS — J40 BRONCHITIS: ICD-10-CM

## 2020-11-06 DIAGNOSIS — Z95.5 HX OF HEART ARTERY STENT: ICD-10-CM

## 2020-11-06 DIAGNOSIS — S42.402D CLOSED FRACTURE OF LEFT ELBOW WITH ROUTINE HEALING, SUBSEQUENT ENCOUNTER: ICD-10-CM

## 2020-11-06 DIAGNOSIS — F41.9 ANXIETY: ICD-10-CM

## 2020-11-06 DIAGNOSIS — I10 ESSENTIAL HYPERTENSION: Primary | Chronic | ICD-10-CM

## 2020-11-06 DIAGNOSIS — Z87.891 HISTORY OF TOBACCO ABUSE: ICD-10-CM

## 2020-11-06 DIAGNOSIS — I50.32 CHRONIC DIASTOLIC CONGESTIVE HEART FAILURE: Chronic | ICD-10-CM

## 2020-11-06 DIAGNOSIS — R05.9 COUGH: ICD-10-CM

## 2020-11-06 DIAGNOSIS — F32.A DEPRESSION, UNSPECIFIED DEPRESSION TYPE: ICD-10-CM

## 2020-11-06 PROBLEM — S42.402A CLOSED FRACTURE OF LEFT ELBOW: Status: ACTIVE | Noted: 2020-11-06

## 2020-11-06 PROCEDURE — 3008F BODY MASS INDEX DOCD: CPT | Mod: CPTII,S$GLB,, | Performed by: FAMILY MEDICINE

## 2020-11-06 PROCEDURE — 99214 OFFICE O/P EST MOD 30 MIN: CPT | Mod: S$GLB,,, | Performed by: FAMILY MEDICINE

## 2020-11-06 PROCEDURE — 1125F AMNT PAIN NOTED PAIN PRSNT: CPT | Mod: S$GLB,,, | Performed by: FAMILY MEDICINE

## 2020-11-06 PROCEDURE — 99999 PR PBB SHADOW E&M-EST. PATIENT-LVL III: CPT | Mod: PBBFAC,,, | Performed by: FAMILY MEDICINE

## 2020-11-06 PROCEDURE — 1125F PR PAIN SEVERITY QUANTIFIED, PAIN PRESENT: ICD-10-PCS | Mod: S$GLB,,, | Performed by: FAMILY MEDICINE

## 2020-11-06 PROCEDURE — 99214 PR OFFICE/OUTPT VISIT, EST, LEVL IV, 30-39 MIN: ICD-10-PCS | Mod: S$GLB,,, | Performed by: FAMILY MEDICINE

## 2020-11-06 PROCEDURE — 3008F PR BODY MASS INDEX (BMI) DOCUMENTED: ICD-10-PCS | Mod: CPTII,S$GLB,, | Performed by: FAMILY MEDICINE

## 2020-11-06 PROCEDURE — 1159F MED LIST DOCD IN RCRD: CPT | Mod: S$GLB,,, | Performed by: FAMILY MEDICINE

## 2020-11-06 PROCEDURE — 99499 RISK ADDL DX/OHS AUDIT: ICD-10-PCS | Mod: S$GLB,,, | Performed by: FAMILY MEDICINE

## 2020-11-06 PROCEDURE — 1101F PR PT FALLS ASSESS DOC 0-1 FALLS W/OUT INJ PAST YR: ICD-10-PCS | Mod: CPTII,S$GLB,, | Performed by: FAMILY MEDICINE

## 2020-11-06 PROCEDURE — 99999 PR PBB SHADOW E&M-EST. PATIENT-LVL III: ICD-10-PCS | Mod: PBBFAC,,, | Performed by: FAMILY MEDICINE

## 2020-11-06 PROCEDURE — 1159F PR MEDICATION LIST DOCUMENTED IN MEDICAL RECORD: ICD-10-PCS | Mod: S$GLB,,, | Performed by: FAMILY MEDICINE

## 2020-11-06 PROCEDURE — 99499 UNLISTED E&M SERVICE: CPT | Mod: S$GLB,,, | Performed by: FAMILY MEDICINE

## 2020-11-06 PROCEDURE — 1101F PT FALLS ASSESS-DOCD LE1/YR: CPT | Mod: CPTII,S$GLB,, | Performed by: FAMILY MEDICINE

## 2020-11-06 RX ORDER — BUTALBITAL, ACETAMINOPHEN AND CAFFEINE 50; 325; 40 MG/1; MG/1; MG/1
TABLET ORAL
Qty: 30 TABLET | Refills: 2 | Status: SHIPPED | OUTPATIENT
Start: 2020-11-06 | End: 2020-11-17 | Stop reason: SDUPTHER

## 2020-11-06 RX ORDER — ALBUTEROL SULFATE 90 UG/1
AEROSOL, METERED RESPIRATORY (INHALATION)
Qty: 18 G | Refills: 5 | Status: SHIPPED | OUTPATIENT
Start: 2020-11-06 | End: 2021-04-08 | Stop reason: SDUPTHER

## 2020-11-06 RX ORDER — LEVOFLOXACIN 500 MG/1
500 TABLET, FILM COATED ORAL DAILY
Qty: 10 TABLET | Refills: 0 | Status: SHIPPED | OUTPATIENT
Start: 2020-11-06 | End: 2020-11-17

## 2020-11-06 NOTE — TELEPHONE ENCOUNTER
----- Message from Ade Mckeon sent at 11/6/2020  3:11 PM CST -----  Contact: Patient, [phone]  Was seen today and is requesting a prescription cough syrup. Please advise. Thanks.

## 2020-11-06 NOTE — PROGRESS NOTES
Subjective:       Patient ID: Josseline Stinson is a 74 y.o. female.    Chief Complaint: Cough (Patient c/o cough and body aches x 2 days. Denies fever ) and Elbow Pain (Patient c/o elbow pain, fractured elbow )    HPI:  74-year-old female calling for medication refills---   Subjective:       Patient ID: Josseline Stinson is a 74 y.o. female.    Chief Complaint: Cough (Patient c/o cough and body aches x 2 days. Denies fever ) and Elbow Pain (Patient c/o elbow pain, fractured elbow )    HPI:  74-year-old female--seen in the emergency room 10-21-20--x-ray of the elbow shows a nondisplaced comminuted fracture of the left olecranon process with 2 screws left humeral condyle from old surgery--saw  1 week later splint was removed--told to keep left arm and forearm and 90 degree angle       Patient also had a cough body aches for 2 days----no fever--no runny stuffy nose--+sore throat--+cough--+phlegm--.  Kelley no pneumonia asthma TB--no smoking.       Office visit 10/09/2020 74-year-old female-patient was backing down the steps of her house--was carrying her puppy--missed the bottom step---patient fell backwards into the left landing on her left elbow---brought to the emergency room 10/04/2020--splint was applied--patient given appointment to Faith Michaud--Tuesday--patient will be evaluated for cast needs Tylenol 3 for pain       Overall doing better eating well--+BM--was ambulating all right until this occurred         Assessment:           Plan:                 ROS:   Skin: no psoriasis, eczema, skin cancer-  HEENT: + HA frontal area/occipital area shoulderso n blurred vision, diplopia, epistaxis, hoarsene ss change in voice, thyroid trouble does have some ocular pain wears glasses  Lung: No pneumonia, asthma, Tb, wheezing, SOB, no smoking +COPD +chronic bronchitis  Heart: No chest pain, ankle edema, palpitations,+ old MI,no  rinku murmur, +hypertension,+ hyperlipidemia, + CAD with stent times 17 last stent  placed in January 2019 no CABG +CHF  Abdomen:  +GERD omeprazole and history of a gastric ulcer-both doing well No nausea, vomiting, diarrhea, constipation, ulcers, hepatitis, gallbladder disease, melena, hematochezia, hematemesis  : no UTI, renal disease, stones  GYN hyst   MS:  10/20/2020 patient seen emergency room for nondisplaced comminuted fracture left olecranon process with 2 screws left humeral condyle from prior surgery seen by Dr. Michaud told to DC splint told to keep arm in the 90 degree angle.  Neuro: No dizziness, LOC, seizures   No diabetes, no anemia, no anxiety, no depression   One daughter but does not talk to pt has not spoken to pt in 2 yrs, patient on disability due to heart, lives with a friend     Objective:   Physical Exam:   General: Well nourished, well developed, no acute distress +thin  Skin: No lesions  HEENT: Eyes PERRLA, EOM intact, nose clear discharge throat noneythematous ears TMs clear  NECK: Supple, no bruits, No JVD, no nodes  Lungs: Clear, no rales, rhonchi, wheezinge decreased breath sounds due to history of severe COPD slight rales at the bases bilaterally right greater than left  Heart: Regular rate and rhythm, no murmurs, gallops, or rubs +coarse cough distant heart sounds  Abdomen: flat, bowel sounds positive, no tenderness, or organomegaly  MS:  Has splint on the left elbow and forearm and upper arm--some swelling of the finger--good range of motion muscle strength of the fingers but some discomfort due to the swelling unable to move the elbow due to splint   Neuro: Alert, CN intact, oriented X 3  Extremities: No cyanosis, clubbing, or edema         Assessment:       1. Essential hypertension    2. Closed fracture of left elbow with routine healing, subsequent encounter    3. Hx of heart artery stent    4. Hypercholesteremia    5. Chronic diastolic congestive heart failure    6. Anxiety    7. Depression, unspecified depression type    8. Chronic pain syndrome    9.  History of tobacco abuse    10. Bronchitis    11. Cough        Plan:       Essential hypertension    Closed fracture of left elbow with routine healing, subsequent encounter    Hx of heart artery stent    Hypercholesteremia    Chronic diastolic congestive heart failure    Anxiety    Depression, unspecified depression type    Chronic pain syndrome    History of tobacco abuse    Bronchitis  -     X-Ray Chest PA And Lateral; Future; Expected date: 11/06/2020    Cough  -     COVID-19 Routine Screening; Future; Expected date: 11/06/2020    Other orders  -     levoFLOXacin (LEVAQUIN) 500 MG tablet; Take 1 tablet (500 mg total) by mouth once daily. for 10 days  Dispense: 10 tablet; Refill: 0  -     butalbital-acetaminophen-caffeine -40 mg (FIORICET, ESGIC) -40 mg per tablet; 1 p.o. q.8 hours p.r.n. headache  Dispense: 30 tablet; Refill: 2        Fracture left elbow---10/20/2020 seen in emergency room for close fracture olecranon process nondisplaced comminuted fracture left olecranon process-- 2 screws right humeral condyle from old surgery --saw Dr Michaud told to remove splint--hold arm at a 90 degree angle in that would heal--suggested patient get a sling for her left arm to keep it at a 90 degree angle follow-up with orthopedist for elbow fracture  Cold--mainly cough phlegm--rales at the bases bilaterally right greater than left---will do COVID test start on Levaquin 500 q.d. times 10 days----Mucinex DM---also given-albuterol inhaler-2 puffs q.6 hours p.r.n. wheezing--if no relief can get a nebulizer machine and will add Symbicort if needs- due to patient not able to take any type of codeine derivative because in a pain clinic  History of headaches needs refills of Fioricet  Patient was referred to a pain clinic--did recently get [place] 10s from the pain clinic 1-2 weeks ago   Patient with multiple medical issues  Dr. Baltazar--cardiologist--hypertension/hyperlipidemia/CHF/old MI/CAD with stent times 15-states  has not seen cardiologist for long time told with multiple stents should be seen by cardiology at least twice a year  GERD/history ulcer disease--doing well on omeprazole--zofran   Health maintenance shingles shot  Patient advised to not taking any arthritis pills or NSAIDs for Plavix including Lodine Voltaren ibuprofen leave can cause GI problem  Patient should have lab q.6 months last lab was in May should have CBCs CMP lipids T4 TSH  Health maintenance shingles colorectal screen flu   Told can only treat temper only with Tylenol No.  3 due to being on Xanax

## 2020-11-10 ENCOUNTER — TELEPHONE (OUTPATIENT)
Dept: ORTHOPEDICS | Facility: CLINIC | Age: 75
End: 2020-11-10

## 2020-11-10 ENCOUNTER — TELEPHONE (OUTPATIENT)
Dept: PRIMARY CARE CLINIC | Facility: CLINIC | Age: 75
End: 2020-11-10

## 2020-11-10 NOTE — TELEPHONE ENCOUNTER
----- Message from Nakia Scherer sent at 11/10/2020  9:43 AM CST -----  Regarding: Pt [phone]  Calling to get test results.  Name of test (lab, x-ray): [facility]  Date of test: 11/9/20

## 2020-11-10 NOTE — TELEPHONE ENCOUNTER
----- Message from Johanna Gr sent at 11/10/2020 12:09 PM CST -----  Regarding: Pt requesting different Orthopedist  Contact: Josseline  Pt calling to request to see another Ortho doctor at the [facility]. She states she does not want to see Dr. Michaud.  Please contact her to schedule at [phone]

## 2020-11-12 ENCOUNTER — PATIENT OUTREACH (OUTPATIENT)
Dept: ADMINISTRATIVE | Facility: OTHER | Age: 75
End: 2020-11-12

## 2020-11-12 NOTE — PROGRESS NOTES
LINKS immunization registry not responding  Care Everywhere updated  Health Maintenance updated  Chart reviewed for overdue Proactive [facility] Encounters ([facility]) health maintenance testing (CRS, Breast Ca, Diabetic Eye Exam)   Orders entered:N/A

## 2020-11-13 ENCOUNTER — TELEPHONE (OUTPATIENT)
Dept: PRIMARY CARE CLINIC | Facility: CLINIC | Age: 75
End: 2020-11-13

## 2020-11-13 NOTE — TELEPHONE ENCOUNTER
----- Message from Ade Mckeon sent at 11/13/2020  3:45 PM CST -----  Contact: Patient, [phone]  Patient is returning a phone call.  Who left a message for the patient: Magy  Does patient know what this is regarding:  Medication  Comments: Missed your call, please call her back. Thanks.

## 2020-11-13 NOTE — TELEPHONE ENCOUNTER
Called patient notified Robitussin DM or mucinex DM OTC no medication from us due to pain clinic states understanding

## 2020-11-13 NOTE — TELEPHONE ENCOUNTER
----- Message from Ade Mckeon sent at 11/13/2020  9:45 AM CST -----  Contact: Patient, [phone]  Requesting an RX refill or new RX.  Is this a refill or new RX: New  RX name and strength:  Prescription cough syrup  Is this a 30 day or 90 day RX: ?  Pharmacy name and phone # (copy/paste from chart):    [facility] - [place] - [address]  [address] [place] [address]  Phone: [phone] Fax: [phone]  Comments: Calling to ask for a prescription cough syrup. Please advise. Thanks.

## 2020-11-15 RX ORDER — CLOPIDOGREL BISULFATE 75 MG/1
TABLET ORAL
Qty: 30 TABLET | Refills: 5 | Status: SHIPPED | OUTPATIENT
Start: 2020-11-15 | End: 2021-05-06

## 2020-11-16 ENCOUNTER — TELEPHONE (OUTPATIENT)
Dept: PRIMARY CARE CLINIC | Facility: CLINIC | Age: 75
End: 2020-11-16

## 2020-11-16 NOTE — TELEPHONE ENCOUNTER
Spoke with patient, verbalized that if she is still congested she needs to come in and be seen again. Appointment scheduled for tomorrow at 1:40. Patient verbalized understanding.

## 2020-11-16 NOTE — TELEPHONE ENCOUNTER
----- Message from Isabel Hobson sent at 11/16/2020  9:41 AM CST -----  Contact: Self   Patient is returning a phone call.  Who left a message for the patient: Magy Aburto LPN  Does patient know what this is regarding:    Comments:

## 2020-11-16 NOTE — TELEPHONE ENCOUNTER
----- Message from Ade Mckeon sent at 11/16/2020  9:23 AM CST -----  Contact: Patient, [phone]  Calling to ask for more antibiotic because she still has the congestion. Please advise. Thanks.

## 2020-11-17 ENCOUNTER — OFFICE VISIT (OUTPATIENT)
Dept: PRIMARY CARE CLINIC | Facility: CLINIC | Age: 75
End: 2020-11-17
Payer: MEDICARE

## 2020-11-17 VITALS
DIASTOLIC BLOOD PRESSURE: 82 MMHG | OXYGEN SATURATION: 96 % | HEIGHT: 64 IN | WEIGHT: 113.75 LBS | BODY MASS INDEX: 19.42 KG/M2 | HEART RATE: 86 BPM | SYSTOLIC BLOOD PRESSURE: 150 MMHG | RESPIRATION RATE: 18 BRPM

## 2020-11-17 DIAGNOSIS — I50.32 CHRONIC DIASTOLIC CONGESTIVE HEART FAILURE: Chronic | ICD-10-CM

## 2020-11-17 DIAGNOSIS — F41.9 ANXIETY: ICD-10-CM

## 2020-11-17 DIAGNOSIS — E78.00 HYPERCHOLESTEREMIA: Chronic | ICD-10-CM

## 2020-11-17 DIAGNOSIS — J44.9 CHRONIC OBSTRUCTIVE PULMONARY DISEASE, UNSPECIFIED COPD TYPE: Chronic | ICD-10-CM

## 2020-11-17 DIAGNOSIS — K21.9 GASTROESOPHAGEAL REFLUX DISEASE, UNSPECIFIED WHETHER ESOPHAGITIS PRESENT: Chronic | ICD-10-CM

## 2020-11-17 DIAGNOSIS — Z95.5 HX OF HEART ARTERY STENT: ICD-10-CM

## 2020-11-17 DIAGNOSIS — I10 ESSENTIAL HYPERTENSION: Chronic | ICD-10-CM

## 2020-11-17 DIAGNOSIS — I25.10 CORONARY ARTERY DISEASE INVOLVING NATIVE CORONARY ARTERY OF NATIVE HEART WITHOUT ANGINA PECTORIS: Chronic | ICD-10-CM

## 2020-11-17 DIAGNOSIS — J40 BRONCHITIS: Primary | ICD-10-CM

## 2020-11-17 DIAGNOSIS — D64.9 ANEMIA, UNSPECIFIED TYPE: ICD-10-CM

## 2020-11-17 DIAGNOSIS — E44.1 MALNUTRITION OF MILD DEGREE: Chronic | ICD-10-CM

## 2020-11-17 PROCEDURE — 3079F DIAST BP 80-89 MM HG: CPT | Mod: CPTII,S$GLB,, | Performed by: FAMILY MEDICINE

## 2020-11-17 PROCEDURE — 1157F ADVNC CARE PLAN IN RCRD: CPT | Mod: S$GLB,,, | Performed by: FAMILY MEDICINE

## 2020-11-17 PROCEDURE — 99214 OFFICE O/P EST MOD 30 MIN: CPT | Mod: S$GLB,,, | Performed by: FAMILY MEDICINE

## 2020-11-17 PROCEDURE — 99214 PR OFFICE/OUTPT VISIT, EST, LEVL IV, 30-39 MIN: ICD-10-PCS | Mod: S$GLB,,, | Performed by: FAMILY MEDICINE

## 2020-11-17 PROCEDURE — 99999 PR PBB SHADOW E&M-EST. PATIENT-LVL IV: ICD-10-PCS | Mod: PBBFAC,,, | Performed by: FAMILY MEDICINE

## 2020-11-17 PROCEDURE — 3077F PR MOST RECENT SYSTOLIC BLOOD PRESSURE >= 140 MM HG: ICD-10-PCS | Mod: CPTII,S$GLB,, | Performed by: FAMILY MEDICINE

## 2020-11-17 PROCEDURE — 3008F BODY MASS INDEX DOCD: CPT | Mod: CPTII,S$GLB,, | Performed by: FAMILY MEDICINE

## 2020-11-17 PROCEDURE — 3288F FALL RISK ASSESSMENT DOCD: CPT | Mod: CPTII,S$GLB,, | Performed by: FAMILY MEDICINE

## 2020-11-17 PROCEDURE — 1159F MED LIST DOCD IN RCRD: CPT | Mod: S$GLB,,, | Performed by: FAMILY MEDICINE

## 2020-11-17 PROCEDURE — 1125F PR PAIN SEVERITY QUANTIFIED, PAIN PRESENT: ICD-10-PCS | Mod: S$GLB,,, | Performed by: FAMILY MEDICINE

## 2020-11-17 PROCEDURE — 1159F PR MEDICATION LIST DOCUMENTED IN MEDICAL RECORD: ICD-10-PCS | Mod: S$GLB,,, | Performed by: FAMILY MEDICINE

## 2020-11-17 PROCEDURE — 99999 PR PBB SHADOW E&M-EST. PATIENT-LVL IV: CPT | Mod: PBBFAC,,, | Performed by: FAMILY MEDICINE

## 2020-11-17 PROCEDURE — 99499 RISK ADDL DX/OHS AUDIT: ICD-10-PCS | Mod: S$GLB,,, | Performed by: FAMILY MEDICINE

## 2020-11-17 PROCEDURE — 99499 UNLISTED E&M SERVICE: CPT | Mod: S$GLB,,, | Performed by: FAMILY MEDICINE

## 2020-11-17 PROCEDURE — 1125F AMNT PAIN NOTED PAIN PRSNT: CPT | Mod: S$GLB,,, | Performed by: FAMILY MEDICINE

## 2020-11-17 PROCEDURE — 3077F SYST BP >= 140 MM HG: CPT | Mod: CPTII,S$GLB,, | Performed by: FAMILY MEDICINE

## 2020-11-17 PROCEDURE — 1101F PR PT FALLS ASSESS DOC 0-1 FALLS W/OUT INJ PAST YR: ICD-10-PCS | Mod: CPTII,S$GLB,, | Performed by: FAMILY MEDICINE

## 2020-11-17 PROCEDURE — 1101F PT FALLS ASSESS-DOCD LE1/YR: CPT | Mod: CPTII,S$GLB,, | Performed by: FAMILY MEDICINE

## 2020-11-17 PROCEDURE — 3008F PR BODY MASS INDEX (BMI) DOCUMENTED: ICD-10-PCS | Mod: CPTII,S$GLB,, | Performed by: FAMILY MEDICINE

## 2020-11-17 PROCEDURE — 3079F PR MOST RECENT DIASTOLIC BLOOD PRESSURE 80-89 MM HG: ICD-10-PCS | Mod: CPTII,S$GLB,, | Performed by: FAMILY MEDICINE

## 2020-11-17 PROCEDURE — 3288F PR FALLS RISK ASSESSMENT DOCUMENTED: ICD-10-PCS | Mod: CPTII,S$GLB,, | Performed by: FAMILY MEDICINE

## 2020-11-17 PROCEDURE — 1157F PR ADVANCE CARE PLAN OR EQUIV PRESENT IN MEDICAL RECORD: ICD-10-PCS | Mod: S$GLB,,, | Performed by: FAMILY MEDICINE

## 2020-11-17 RX ORDER — PROMETHAZINE HYDROCHLORIDE AND CODEINE PHOSPHATE 6.25; 1 MG/5ML; MG/5ML
5 SOLUTION ORAL EVERY 6 HOURS PRN
Qty: 180 ML | Refills: 0 | Status: SHIPPED | OUTPATIENT
Start: 2020-11-17 | End: 2020-11-17

## 2020-11-17 RX ORDER — BUTALBITAL, ACETAMINOPHEN AND CAFFEINE 50; 325; 40 MG/1; MG/1; MG/1
TABLET ORAL
Qty: 30 TABLET | Refills: 2 | Status: SHIPPED | OUTPATIENT
Start: 2020-11-17 | End: 2021-03-03 | Stop reason: SDUPTHER

## 2020-11-17 RX ORDER — ALPRAZOLAM 0.5 MG/1
TABLET ORAL
Qty: 30 TABLET | Refills: 2 | Status: CANCELLED | OUTPATIENT
Start: 2020-11-17

## 2020-11-17 RX ORDER — CEFDINIR 300 MG/1
CAPSULE ORAL
Qty: 20 CAPSULE | Refills: 0 | Status: SHIPPED | OUTPATIENT
Start: 2020-11-17 | End: 2020-12-24

## 2020-11-17 NOTE — PROGRESS NOTES
Subjective:       Patient ID: Josseline Stinson is a 74 y.o. female.    Chief Complaint: Cough and Medication Refill    HPI:  74-year-old female calling for medication refills---   Subjective:       Patient ID: Josselien Stinson is a 74 y.o. female.    Chief Complaint: Cough and Medication Refill    HPI:  74-year-old female --patient recently seen for cold treated with Levaquin--Mucinex DM---never did get well--no fever--+runny stuffy nose-+sore throat --+cough--+phlegm-brown.  History pneumonia in the past--has chronic bronchitis--treated with albuterol and Symbicort.      History of fracture left elbow has appoint with orthopedist next week see prior clinic nose              Assessment:           Plan:                 ROS:   Skin: no psoriasis, eczema, skin cancer-  HEENT: no HA no blurred vision, diplopia, epistaxis, hoarsene ss change in voice, thyroid trouble does have some ocular pain wears glasses  Lung: No pneumonia, asthma, Tb, wheezing, SOB, no smoking +COPD +chronic bronchitis--chronic sinusitis bronchitis see history of present illness  Heart: No chest pain, ankle edema, palpitations,+ old MI,no  rinku murmur, +hypertension,+ hyperlipidemia, + CAD with stent times 17 last stent placed in January 2019 no CABG +CHF  Abdomen:  +GERD omeprazole and history of a gastric ulcer-both doing well No nausea, vomiting, diarrhea, constipation, ulcers, hepatitis, gallbladder disease, melena, hematochezia, hematemesis  : no UTI, renal disease, stones  GYN hyst   MS:  10/20/2020 patient seen emergency room for nondisplaced comminuted fracture left olecranon process with 2 screws left humeral condyle from prior surgery seen by Dr. Michaud told to DC splint told to keep arm in the 90 degree angle.  Neuro: No dizziness, LOC, seizures   No diabetes, no anemia, no anxiety, no depression   One daughter but does not talk to pt has not spoken to pt in 2 yrs, patient on disability due to heart, lives with a friend      Objective:   Physical Exam:   General: Well nourished, well developed, no acute distress +thin  Skin: No lesions  HEENT: Eyes PERRLA, EOM intact, nose clear discharge throat +1/4 eythematous ears TMs clear  NECK: Supple, no bruits, No JVD, no nodes  Lungs: Clear, no rales, rhonchi, wheezing--coarse cough  Heart: Regular rate and rhythm, no murmurs, gallops, or rubs +coarse cough distant heart sounds  Abdomen: flat, bowel sounds positive, no tenderness, or organomegaly  MS:  Has splint on the left elbow and forearm and upper arm--some swelling of the finger--good range of motion muscle strength of the fingers but some discomfort due to the swelling unable to move the elbow due to splint   Neuro: Alert, CN intact, oriented X 3  Extremities: No cyanosis, clubbing, or edema         Assessment:       1. Bronchitis    2. Anxiety    3. Chronic obstructive pulmonary disease, unspecified COPD type    4. Essential hypertension    5. Hypercholesteremia    6. Hx of heart artery stent    7. Coronary artery disease involving native coronary artery of native heart without angina pectoris    8. Chronic diastolic congestive heart failure    9. Malnutrition of mild degree    10. Anemia, unspecified type    11. Gastroesophageal reflux disease, unspecified whether esophagitis present        Plan:       Bronchitis    Anxiety  -     Ambulatory referral/consult to Psychiatry; Future; Expected date: 11/24/2020    Chronic obstructive pulmonary disease, unspecified COPD type    Essential hypertension    Hypercholesteremia    Hx of heart artery stent    Coronary artery disease involving native coronary artery of native heart without angina pectoris    Chronic diastolic congestive heart failure    Malnutrition of mild degree    Anemia, unspecified type    Gastroesophageal reflux disease, unspecified whether esophagitis present    Other orders  -     butalbital-acetaminophen-caffeine -40 mg (FIORICET, ESGIC) -40 mg per tablet; 1  p.o. q.8 hours p.r.n. headache  Dispense: 30 tablet; Refill: 2  -     cefdinir (OMNICEF) 300 MG capsule; 1 po bid x 7-10 days stop if develops done  Dispense: 20 capsule; Refill: 0  -     Discontinue: promethazine-codeine 6.25-10 mg/5 ml (PHENERGAN WITH CODEINE) 6.25-10 mg/5 mL syrup; Take 5 mLs by mouth every 6 (six) hours as needed for Cough.  Dispense: 180 mL; Refill: 0        Main problem--persistent sinusitis/bronchitis--recently treated with Levaquin-Mucinex DM-will treat with cefdinir 300 b.i.d. x7 days--Phenergan with codeine  Fracture left elbow---10/20/2020 seen in emergency room for close fracture olecranon process nondisplaced comminuted fracture left olecranon process-- 2 screws right humeral condyle from old surgery --saw Dr Michaud told to remove splint--hold arm at a 90 degree angle in that would heal--suggested patient get a sling for her left arm to keep it at a 90 degree angle follow-up with orthopedist for elbow fracture  History of headaches needs refills of Fioricet   Patient with multiple medical issues  Dr. Baltazar--cardiologist--hypertension/hyperlipidemia/CHF/old MI/CAD with stent times 15-states has not seen cardiologist for long time told with multiple stents should be seen by cardiology at least twice a year  GERD/history ulcer disease--doing well on omeprazole--zofran   Health maintenance shingles shot  Patient advised to not taking any arthritis pills or NSAIDs for Plavix including Lodine Voltaren ibuprofen leave can cause GI problem  Patient should have lab q.6 months last lab was in May should have CBCs CMP lipids T4 TSH  Health maintenance shingles colorectal screen flu   Patient given Fioricet--Phenergan with codeine--so will not give Xanax--patient states not pain clinic anymore--if needs xanax to psychiatirist

## 2020-11-18 ENCOUNTER — TELEPHONE (OUTPATIENT)
Dept: PRIMARY CARE CLINIC | Facility: CLINIC | Age: 75
End: 2020-11-18

## 2020-11-18 NOTE — TELEPHONE ENCOUNTER
Called patient notified again can not get rx for Promethazine with Codeine since she is in a pain clinic states understanding

## 2020-11-18 NOTE — TELEPHONE ENCOUNTER
----- Message from Cristina Grossman sent at 11/18/2020 11:43 AM CST -----  Contact: self [phone]  Pt would like to speak to the nurse or dr in ref to the cough medication that she was picking up from the pharmacy. Pt states they wont give her the medication because there is a hold on it. Please call and advise. Thank you

## 2020-11-25 ENCOUNTER — OFFICE VISIT (OUTPATIENT)
Dept: SPORTS MEDICINE | Facility: CLINIC | Age: 75
End: 2020-11-25
Payer: MEDICARE

## 2020-11-25 ENCOUNTER — HOSPITAL ENCOUNTER (OUTPATIENT)
Dept: RADIOLOGY | Facility: CLINIC | Age: 75
Discharge: HOME | End: 2020-11-25
Attending: FAMILY MEDICINE
Payer: MEDICARE

## 2020-11-25 VITALS
HEIGHT: 64 IN | SYSTOLIC BLOOD PRESSURE: 140 MMHG | OXYGEN SATURATION: 98 % | WEIGHT: 119.5 LBS | DIASTOLIC BLOOD PRESSURE: 68 MMHG | TEMPERATURE: 98 F | BODY MASS INDEX: 20.4 KG/M2 | HEART RATE: 98 BPM

## 2020-11-25 DIAGNOSIS — S20.212A CONTUSION OF LEFT CHEST WALL, INITIAL ENCOUNTER: ICD-10-CM

## 2020-11-25 DIAGNOSIS — S52.022A OLECRANON FRACTURE, LEFT, CLOSED, INITIAL ENCOUNTER: ICD-10-CM

## 2020-11-25 DIAGNOSIS — S52.022A CLOSED FRACTURE OF OLECRANON PROCESS OF LEFT ULNA, INITIAL ENCOUNTER: ICD-10-CM

## 2020-11-25 DIAGNOSIS — S52.022A CLOSED FRACTURE OF OLECRANON PROCESS OF LEFT ULNA, INITIAL ENCOUNTER: Primary | ICD-10-CM

## 2020-11-25 PROCEDURE — 99214 OFFICE O/P EST MOD 30 MIN: CPT | Mod: S$GLB,,, | Performed by: FAMILY MEDICINE

## 2020-11-25 PROCEDURE — 3078F DIAST BP <80 MM HG: CPT | Mod: CPTII,S$GLB,, | Performed by: FAMILY MEDICINE

## 2020-11-25 PROCEDURE — 1157F PR ADVANCE CARE PLAN OR EQUIV PRESENT IN MEDICAL RECORD: ICD-10-PCS | Mod: S$GLB,,, | Performed by: FAMILY MEDICINE

## 2020-11-25 PROCEDURE — 3008F PR BODY MASS INDEX (BMI) DOCUMENTED: ICD-10-PCS | Mod: CPTII,S$GLB,, | Performed by: FAMILY MEDICINE

## 2020-11-25 PROCEDURE — 73080 X-RAY EXAM OF ELBOW: CPT | Mod: TC,FY,PO,LT

## 2020-11-25 PROCEDURE — 99999 PR PBB SHADOW E&M-EST. PATIENT-LVL IV: CPT | Mod: PBBFAC,,, | Performed by: FAMILY MEDICINE

## 2020-11-25 PROCEDURE — 3288F PR FALLS RISK ASSESSMENT DOCUMENTED: ICD-10-PCS | Mod: CPTII,S$GLB,, | Performed by: FAMILY MEDICINE

## 2020-11-25 PROCEDURE — 3008F BODY MASS INDEX DOCD: CPT | Mod: CPTII,S$GLB,, | Performed by: FAMILY MEDICINE

## 2020-11-25 PROCEDURE — 99999 PR PBB SHADOW E&M-EST. PATIENT-LVL IV: ICD-10-PCS | Mod: PBBFAC,,, | Performed by: FAMILY MEDICINE

## 2020-11-25 PROCEDURE — 3288F FALL RISK ASSESSMENT DOCD: CPT | Mod: CPTII,S$GLB,, | Performed by: FAMILY MEDICINE

## 2020-11-25 PROCEDURE — 3077F SYST BP >= 140 MM HG: CPT | Mod: CPTII,S$GLB,, | Performed by: FAMILY MEDICINE

## 2020-11-25 PROCEDURE — 1125F PR PAIN SEVERITY QUANTIFIED, PAIN PRESENT: ICD-10-PCS | Mod: S$GLB,,, | Performed by: FAMILY MEDICINE

## 2020-11-25 PROCEDURE — 1100F PR PT FALLS ASSESS DOC 2+ FALLS/FALL W/INJURY/YR: ICD-10-PCS | Mod: CPTII,S$GLB,, | Performed by: FAMILY MEDICINE

## 2020-11-25 PROCEDURE — 1125F AMNT PAIN NOTED PAIN PRSNT: CPT | Mod: S$GLB,,, | Performed by: FAMILY MEDICINE

## 2020-11-25 PROCEDURE — 3078F PR MOST RECENT DIASTOLIC BLOOD PRESSURE < 80 MM HG: ICD-10-PCS | Mod: CPTII,S$GLB,, | Performed by: FAMILY MEDICINE

## 2020-11-25 PROCEDURE — 99214 PR OFFICE/OUTPT VISIT, EST, LEVL IV, 30-39 MIN: ICD-10-PCS | Mod: S$GLB,,, | Performed by: FAMILY MEDICINE

## 2020-11-25 PROCEDURE — 1159F MED LIST DOCD IN RCRD: CPT | Mod: S$GLB,,, | Performed by: FAMILY MEDICINE

## 2020-11-25 PROCEDURE — 1157F ADVNC CARE PLAN IN RCRD: CPT | Mod: S$GLB,,, | Performed by: FAMILY MEDICINE

## 2020-11-25 PROCEDURE — 73080 X-RAY EXAM OF ELBOW: CPT | Mod: 26,LT,S$GLB, | Performed by: RADIOLOGY

## 2020-11-25 PROCEDURE — 1159F PR MEDICATION LIST DOCUMENTED IN MEDICAL RECORD: ICD-10-PCS | Mod: S$GLB,,, | Performed by: FAMILY MEDICINE

## 2020-11-25 PROCEDURE — 73080 XR ELBOW COMPLETE 3 VIEW LEFT: ICD-10-PCS | Mod: 26,LT,S$GLB, | Performed by: RADIOLOGY

## 2020-11-25 PROCEDURE — 1100F PTFALLS ASSESS-DOCD GE2>/YR: CPT | Mod: CPTII,S$GLB,, | Performed by: FAMILY MEDICINE

## 2020-11-25 PROCEDURE — 3077F PR MOST RECENT SYSTOLIC BLOOD PRESSURE >= 140 MM HG: ICD-10-PCS | Mod: CPTII,S$GLB,, | Performed by: FAMILY MEDICINE

## 2020-11-25 RX ORDER — TIZANIDINE 4 MG/1
4 TABLET ORAL EVERY 6 HOURS PRN
Qty: 30 TABLET | Refills: 0 | Status: SHIPPED | OUTPATIENT
Start: 2020-11-25 | End: 2020-12-05

## 2020-11-25 RX ORDER — GABAPENTIN 100 MG/1
CAPSULE ORAL
COMMUNITY
Start: 2020-11-24 | End: 2020-12-14

## 2020-11-25 RX ORDER — HYDROCODONE BITARTRATE AND ACETAMINOPHEN 5; 325 MG/1; MG/1
1 TABLET ORAL EVERY 4 HOURS PRN
Qty: 10 TABLET | Refills: 0 | Status: SHIPPED | OUTPATIENT
Start: 2020-11-25 | End: 2021-01-05

## 2020-11-25 RX ORDER — DICLOFENAC SODIUM 10 MG/G
GEL TOPICAL
COMMUNITY
Start: 2020-11-17 | End: 2021-01-05

## 2020-11-25 RX ORDER — LIDOCAINE AND PRILOCAINE 25; 25 MG/G; MG/G
CREAM TOPICAL
COMMUNITY
Start: 2020-11-17 | End: 2021-07-12

## 2020-11-25 NOTE — PROGRESS NOTES
Subjective:          Chief Complaint: Josseline Stinson is a 74 y.o. female who had concerns including Elbow Injury (Left).    Patient here today for management of a left olecranon fracture.  Her initial injury occurred on October 4, 2020 when she fell down some stairs.  She went to the ER where the fracture was discovered and she was placed in a splint and given follow-up with Orthopedics.  He has a previous history of a surgical pair done on the same elbow with hardware still present.  Was seen by orthopedist initial fracture looked stable in the splint was discontinued.  However she still experience pain in her elbow and return to an ER some weeks later with a repeat x-ray showing some healing of the fracture but up it worsening displacement.  She was not able to obtain any more follow-up after this visit and made an additional trip to the ER on November 9th.  There the fracture appeared to no longer be in the process of healing and the displacement had worsened.  She is still in a splint that was placed by the ER and has now become old in disheveled.  She has a sling that she uses for comfort.  Still experiencing a great amount of pain in her elbow.          Review of Systems   Constitution: Negative for chills and decreased appetite.   HENT: Negative for congestion and sore throat.    Eyes: Negative for blurred vision.   Cardiovascular: Negative for chest pain, dyspnea on exertion and palpitations.   Respiratory: Negative for cough and shortness of breath.    Skin: Negative for rash.   Neurological: Negative for difficulty with concentration, disturbances in coordination and headaches.   Psychiatric/Behavioral: Negative for altered mental status, depression, hallucinations, memory loss and suicidal ideas.                   Objective:        General: Josseline is well-developed, well-nourished, appears stated age, in no acute distress, alert and oriented to time, place and person.     General    Nursing note and vitals  reviewed.  Constitutional: She is oriented to person, place, and time. She appears well-developed and well-nourished.   HENT:   Nose: Nose normal.   Eyes: EOM are normal. Pupils are equal, round, and reactive to light.   Neck: Normal range of motion. Neck supple.   Cardiovascular: Normal rate.    Pulmonary/Chest: Effort normal.   Abdominal: Soft.   Neurological: She is alert and oriented to person, place, and time. She has normal reflexes.   Psychiatric: She has a normal mood and affect. Her behavior is normal. Judgment and thought content normal.         Right Elbow Exam   Right elbow exam is normal.      Left Elbow Exam     Inspection   Effusion: present  Deformity: present    Pain   The patient exhibits pain of the olecranon    Swelling   The patient is swollen on the olecranon    Tenderness   The patient is tender to palpation of the olecranon fossa.     Range of Motion   Extension: abnormal   Flexion: abnormal     Tests   Tinel's sign (cubital tunnel): negative    Other   Sensation: normal        Vascular Exam       Left Pulses      Radial:                    2+        X-ray images ordered obtained interpreted by me.  They show the previously described olecranon fracture however there appears to be more displacement when compared to previous views.  Despite this fracture occurring on October 4th initially there is not appear to be much evidence of healing          Assessment:       Encounter Diagnoses   Name Primary?    Closed fracture of olecranon process of left ulna, initial encounter Yes    Olecranon fracture, left, closed, initial encounter     Contusion of left chest wall, initial encounter           Plan:       The repeat x-rays done today are not encouraging.  It being about six weeks since the day of initial injury is unlikely that this fracture will heal on its own.  Patient will likely require open reduction internal fixation of this fracture fragment.  Will have her follow-up with orthopedic  surgery sometime next week.  Discontinue splint in remain in sling for comfort measures.  Prescribe patient hydrocodone for pain management until she may be seen by orthopedic surgery.                    Patient questionnaires may have been collected.

## 2020-11-27 DIAGNOSIS — M25.522 LEFT ELBOW PAIN: Primary | ICD-10-CM

## 2020-11-30 ENCOUNTER — HOSPITAL ENCOUNTER (OUTPATIENT)
Dept: RADIOLOGY | Facility: HOSPITAL | Age: 75
Discharge: HOME | End: 2020-11-30
Attending: ORTHOPAEDIC SURGERY
Payer: MEDICARE

## 2020-11-30 ENCOUNTER — OFFICE VISIT (OUTPATIENT)
Dept: ORTHOPEDICS | Facility: CLINIC | Age: 75
End: 2020-11-30
Payer: MEDICARE

## 2020-11-30 VITALS — HEIGHT: 64 IN | BODY MASS INDEX: 20.32 KG/M2 | WEIGHT: 119 LBS | RESPIRATION RATE: 16 BRPM

## 2020-11-30 DIAGNOSIS — S52.022A CLOSED FRACTURE OF OLECRANON PROCESS OF LEFT ULNA, INITIAL ENCOUNTER: Primary | ICD-10-CM

## 2020-11-30 DIAGNOSIS — M25.522 LEFT ELBOW PAIN: ICD-10-CM

## 2020-11-30 DIAGNOSIS — S52.022A CLOSED FRACTURE OF OLECRANON PROCESS OF LEFT ULNA, INITIAL ENCOUNTER: ICD-10-CM

## 2020-11-30 PROCEDURE — 1157F PR ADVANCE CARE PLAN OR EQUIV PRESENT IN MEDICAL RECORD: ICD-10-PCS | Mod: S$GLB,,, | Performed by: ORTHOPAEDIC SURGERY

## 2020-11-30 PROCEDURE — 99999 PR PBB SHADOW E&M-EST. PATIENT-LVL IV: CPT | Mod: PBBFAC,,, | Performed by: ORTHOPAEDIC SURGERY

## 2020-11-30 PROCEDURE — 1125F PR PAIN SEVERITY QUANTIFIED, PAIN PRESENT: ICD-10-PCS | Mod: S$GLB,,, | Performed by: ORTHOPAEDIC SURGERY

## 2020-11-30 PROCEDURE — 1159F PR MEDICATION LIST DOCUMENTED IN MEDICAL RECORD: ICD-10-PCS | Mod: S$GLB,,, | Performed by: ORTHOPAEDIC SURGERY

## 2020-11-30 PROCEDURE — 1157F ADVNC CARE PLAN IN RCRD: CPT | Mod: S$GLB,,, | Performed by: ORTHOPAEDIC SURGERY

## 2020-11-30 PROCEDURE — 1101F PT FALLS ASSESS-DOCD LE1/YR: CPT | Mod: CPTII,S$GLB,, | Performed by: ORTHOPAEDIC SURGERY

## 2020-11-30 PROCEDURE — 73080 X-RAY EXAM OF ELBOW: CPT | Mod: TC,PN,LT

## 2020-11-30 PROCEDURE — 3008F BODY MASS INDEX DOCD: CPT | Mod: CPTII,S$GLB,, | Performed by: ORTHOPAEDIC SURGERY

## 2020-11-30 PROCEDURE — 1101F PR PT FALLS ASSESS DOC 0-1 FALLS W/OUT INJ PAST YR: ICD-10-PCS | Mod: CPTII,S$GLB,, | Performed by: ORTHOPAEDIC SURGERY

## 2020-11-30 PROCEDURE — 3008F PR BODY MASS INDEX (BMI) DOCUMENTED: ICD-10-PCS | Mod: CPTII,S$GLB,, | Performed by: ORTHOPAEDIC SURGERY

## 2020-11-30 PROCEDURE — 1159F MED LIST DOCD IN RCRD: CPT | Mod: S$GLB,,, | Performed by: ORTHOPAEDIC SURGERY

## 2020-11-30 PROCEDURE — 99203 OFFICE O/P NEW LOW 30 MIN: CPT | Mod: S$GLB,,, | Performed by: ORTHOPAEDIC SURGERY

## 2020-11-30 PROCEDURE — 73080 XR ELBOW COMPLETE 3 VIEW LEFT: ICD-10-PCS | Mod: 26,LT,, | Performed by: RADIOLOGY

## 2020-11-30 PROCEDURE — 99203 PR OFFICE/OUTPT VISIT, NEW, LEVL III, 30-44 MIN: ICD-10-PCS | Mod: S$GLB,,, | Performed by: ORTHOPAEDIC SURGERY

## 2020-11-30 PROCEDURE — 3288F FALL RISK ASSESSMENT DOCD: CPT | Mod: CPTII,S$GLB,, | Performed by: ORTHOPAEDIC SURGERY

## 2020-11-30 PROCEDURE — 1125F AMNT PAIN NOTED PAIN PRSNT: CPT | Mod: S$GLB,,, | Performed by: ORTHOPAEDIC SURGERY

## 2020-11-30 PROCEDURE — 99999 PR PBB SHADOW E&M-EST. PATIENT-LVL IV: ICD-10-PCS | Mod: PBBFAC,,, | Performed by: ORTHOPAEDIC SURGERY

## 2020-11-30 PROCEDURE — 73080 X-RAY EXAM OF ELBOW: CPT | Mod: 26,LT,, | Performed by: RADIOLOGY

## 2020-11-30 PROCEDURE — 3288F PR FALLS RISK ASSESSMENT DOCUMENTED: ICD-10-PCS | Mod: CPTII,S$GLB,, | Performed by: ORTHOPAEDIC SURGERY

## 2020-11-30 RX ORDER — CYCLOBENZAPRINE HCL 10 MG
10 TABLET ORAL 3 TIMES DAILY PRN
Qty: 20 TABLET | Refills: 0 | Status: SHIPPED | OUTPATIENT
Start: 2020-11-30 | End: 2020-12-10

## 2020-11-30 RX ORDER — CYCLOBENZAPRINE HCL 10 MG
10 TABLET ORAL 3 TIMES DAILY PRN
Qty: 30 TABLET | Refills: 0 | Status: CANCELLED | OUTPATIENT
Start: 2020-11-30 | End: 2020-12-10

## 2020-11-30 NOTE — LETTER
December 1, 2020      Fred Daly MD  [address]  [place] [address]           [facility]  [place] [address]  Phone: [phone]          Patient: Josseline Stinson   MR Number: [id number]   YOB: 1945   Date of Visit: 11/30/2020       Dear Dr. Fred Daly:    Thank you for referring Josseline Stinson to me for evaluation. Attached you will find relevant portions of my assessment and plan of care.    If you have questions, please do not hesitate to call me. I look forward to following Josseline Stinson along with you.    Sincerely,    Mau Louis MD    Enclosure  CC:  No Recipients    If you would like to receive this communication electronically, please contact [email or web address] or [phone] to request more information on [employer] Link access.    For providers and/or their staff who would like to refer a patient to [facility], please contact us through our one-stop-shop provider referral line, [facility] , at [phone].    If you feel you have received this communication in error or would no longer like to receive these types of communications, please e-mail [email or web address]

## 2020-12-01 NOTE — PROGRESS NOTES
Past Medical History:   Diagnosis Date    Anxiety     Arthritis     CHF (congestive heart failure)     Chronic pain syndrome 6/4/2019    COPD (chronic obstructive pulmonary disease)     Coronary artery disease involving native coronary artery of native heart without angina pectoris 3/21/2016    Encounter for blood transfusion     Essential hypertension 3/21/2016    GERD (gastroesophageal reflux disease)     History of gastric ulcer 5/14/2019    Hx of heart artery stent 5/14/2019    Hypercholesteremia 3/21/2016    MI (myocardial infarction)     x4    NSTEMI (non-ST elevated myocardial infarction) 1/2/2018    Occult blood positive stool     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 3/21/2016    Pneumonia of right lower lobe due to infectious organism 5/14/2019    Positive FIT (fecal immunochemical test)     Positive occult stool blood test     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    Thyroid disease     Ulcer        Past Surgical History:   Procedure Laterality Date    carotid artery surgeriy      catheterization cardiac cath Left 01/03/2017    Angioplasty    COLONOSCOPY N/A 7/9/2019    Procedure: COLONOSCOPY;  Surgeon: Lorna Paula MD;  Location: [facility];  Service: Endoscopy;  Laterality: N/A;    ESOPHAGOGASTRODUODENOSCOPY N/A 7/5/2019    Procedure: ESOPHAGOGASTRODUODENOSCOPY (EGD);  Surgeon: Dane Khoury MD;  Location: [facility];  Service: Endoscopy;  Laterality: N/A;    ESOPHAGOGASTRODUODENOSCOPY N/A 7/8/2019    Procedure: ESOPHAGOGASTRODUODENOSCOPY (EGD);  Surgeon: Sandra Alicea MD;  Location: [facility];  Service: Endoscopy;  Laterality: N/A;    FRACTURE SURGERY Left     elbow and wrist    HIP SURGERY Right     x 4    HYSTERECTOMY  1972       Current Outpatient Medications   Medication Sig    albuterol (PROVENTIL/VENTOLIN HFA) 90 mcg/actuation inhaler INHALE one PUFF BY MOUTH EVERY 4 TO 6 HOURS AS NEEDED    alendronate (FOSAMAX) 70 MG tablet TAKE ONE  TABLET BY MOUTH EVERY 7 DAYS    ALPRAZolam (XANAX) 0.5 MG tablet One p.o. q.d. p.r.n. anxiety    arm brace Misc As directed    atorvastatin (LIPITOR) 40 MG tablet TAKE ONE TABLET BY MOUTH ONCE DAILY    butalbital-acetaminophen-caffeine -40 mg (FIORICET, ESGIC) -40 mg per tablet 1 p.o. q.8 hours p.r.n. headache    cefdinir (OMNICEF) 300 MG capsule 1 po bid x 7-10 days stop if develops done    clopidogreL (PLAVIX) 75 mg tablet TAKE ONE TABLET BY MOUTH ONCE DAILY    cyclobenzaprine (FLEXERIL) 10 MG tablet Take 1 tablet (10 mg total) by mouth 3 (three) times daily as needed.    cyclobenzaprine (FLEXERIL) 10 MG tablet Take 1 tablet (10 mg total) by mouth 3 (three) times daily as needed for Muscle spasms.    diclofenac sodium (VOLTAREN) 1 % Gel APPLY 2-4 GRAMS TO EACH PAINFUL AREA FOUR TIMES DAILY - MAX 32 GRAMS/DAY    gabapentin (NEURONTIN) 100 MG capsule     HYDROcodone-acetaminophen (NORCO) 5-325 mg per tablet Take 1 tablet by mouth every 4 (four) hours as needed.    lidocaine-prilocaine (EMLA) cream APPLY 4 GRAMS 3-4 TIMES DAILY FOR TREATMENT OF PAIN    metoprolol succinate (TOPROL-XL) 25 MG 24 hr tablet TAKE ONE TABLET BY MOUTH ONCE DAILY    mupirocin (BACTROBAN) 2 % ointment APPLY TO THE AFFECTED AREA THREE TIMES DAILY    omeprazole (PRILOSEC) 40 MG capsule TAKE ONE CAPSULE BY MOUTH ONCE DAILY    tiZANidine (ZANAFLEX) 4 MG tablet Take 1 tablet (4 mg total) by mouth every 6 (six) hours as needed.    traZODone (DESYREL) 100 MG tablet Take 100 mg by mouth every evening.     No current facility-administered medications for this visit.        Review of patient's allergies indicates:   Allergen Reactions    Cortisone Swelling     SWELLING    Darvocet a500 [propoxyphene n-acetaminophen] Nausea And Vomiting    Toradol [ketorolac] Nausea And Vomiting    Tramadol Nausea And Vomiting       Family History   Problem Relation Age of Onset    Cancer Mother         colon    Diabetes Mother      Heart disease Mother     Heart disease Father     Cancer Sister         breast    Heart disease Sister     Cancer Brother         leukemia    Heart disease Brother     Heart disease Maternal Grandmother     Heart disease Maternal Grandfather     Breast cancer Daughter        Social History     Socioeconomic History    Marital status:      Spouse name: Not on file    Number of children: Not on file    Years of education: Not on file    Highest education level: Not on file   Occupational History    Occupation: disabled    Social Needs    Financial resource strain: Not on file    Food insecurity     Worry: Not on file     Inability: Not on file    Transportation needs     Medical: Not on file     Non-medical: Not on file   Tobacco Use    Smoking status: Light Tobacco Smoker     Packs/day: 1.00     Years: 51.00     Pack years: 51.00     Types: Cigarettes     Start date:      Last attempt to quit: 2019     Years since quittin.9    Smokeless tobacco: Never Used    Tobacco comment: Patient enrolled in tobacco trust and ambulatory referral  to cessation   Substance and Sexual Activity    Alcohol use: No     Alcohol/week: 0.0 standard drinks    Drug use: No    Sexual activity: Yes     Partners: Male   Lifestyle    Physical activity     Days per week: Not on file     Minutes per session: Not on file    Stress: Only a little   Relationships    Social connections     Talks on phone: Not on file     Gets together: Not on file     Attends Jewish service: Not on file     Active member of club or organization: Not on file     Attends meetings of clubs or organizations: Not on file     Relationship status: Not on file   Other Topics Concern    Not on file   Social History Narrative    Not on file       Chief Complaint:   Chief Complaint   Patient presents with    Left Elbow - Initial Visit, Pain       Consulting Physician: Fred Daly MD    History of present illness:    This is a 74  "y.o. year old female who complains of left elbow pain following a fall on 10/04/2020.  She states she fell down stairs.  She was seen in the emergency room.  She was splinted at the time.  She has seen several other physicians prior to coming to us.  Today she is not splinted.  She puts her pain at an 8/10.    Review of Systems:    Constitution:   Denies chills, fever, and sweats.  HENT:   Denies headaches or blurry vision.  Cardiovascular:  Denies chest pain or irregular heart beat.  Respiratory:   Denies cough or shortness of breath.  Gastrointestinal:  Denies abdominal pain, nausea, or vomiting.  Musculoskeletal:   Denies muscle cramps.  Neurological:   Denies dizziness or focal weakness.  Psychiatric/Behavior: Normal mental status.  Hematology/Lymph:  Denies bleeding problem or easy bruising/bleeding.  Skin:    Denies rash or suspicious lesions.    Examination:    Vital Signs:    Vitals:    11/30/20 1345   Resp: 16   Weight: 54 kg (119 lb)   Height: 5' 4" (1.626 m)   PainSc:   8   PainLoc: Elbow       Body mass index is 20.43 kg/m².    Constitution:   Well-developed, well nourished patient in no acute distress.  Neurological:   Alert and oriented x 3 and cooperative to examination.     Psychiatric/Behavior: Normal mental status.  Respiratory:   No shortness of breath.  Eyes:    Extraoccular muscles intact  Skin:    No scars, rash or suspicious lesions.    Physical Exam: Left Elbow Exam    Skin  Scars:   None  Rash:   None    Inspection  Erythema:  None  Bruising:  mild  Swelling:  mild  Masses:  None  Lymphadenopathy: None    Range of Motion     Tenderness  Diffuse    Stability  Not tested due to injury    Strength:  Not tested due to injury  Sensation:  Intact    Vascular  Pulses:  Palpable  Capillary Refill: Normal            Imaging: X-rays ordered and images interpreted today personally by me of left elbow shows a comminuted fracture of the olecranon with minimal displacement.         Assessment: " Closed fracture of olecranon process of left ulna, initial encounter  -     Ambulatory referral/consult to Orthopedics        Plan:  The fracture is now 7 weeks out and essentially healed.  There is some malalignment not significant.  At this point will start physical therapy in [place].  Will ask her to stay out of any immobilization and begin gentle use of the arm and no lifting pushing pulling.  We gave her prescription for Flexeril today.  We will see her back in 4 weeks.      DISCLAIMER: This note may have been dictated using voice recognition software and may contain grammatical errors.     NOTE: Consult report sent to referring provider via [employer] EMR.

## 2020-12-02 ENCOUNTER — TELEPHONE (OUTPATIENT)
Dept: PRIMARY CARE CLINIC | Facility: CLINIC | Age: 75
End: 2020-12-02

## 2020-12-02 RX ORDER — AZITHROMYCIN 250 MG/1
TABLET, FILM COATED ORAL
Qty: 6 TABLET | Refills: 0 | Status: SHIPPED | OUTPATIENT
Start: 2020-12-02 | End: 2020-12-07

## 2020-12-02 NOTE — TELEPHONE ENCOUNTER
----- Message from Estelle Dill sent at 12/2/2020  3:41 PM CST -----  Contact: Patient [phone]  Patient is returning a phone call.  Who left a message for the patient: Magy Aburto LPN   Does patient know what this is regarding: n/a    Please call and advise.    Thank You

## 2020-12-02 NOTE — TELEPHONE ENCOUNTER
Spoke with patient, requesting cough medicine and an antibiotic. Verbalized to patient that Dr. Woo is not in clinic. Patient was scheduled for a virtual visit this morning for 0800 with Bernice Hernandez NP but she did not answer x 2. Patient states she did not receive any missed calls. Verbalized to patient that I will talk with Bernice and give her a call back. Patient states understanding.

## 2020-12-02 NOTE — TELEPHONE ENCOUNTER
----- Message from Aide Henderson sent at 12/2/2020  2:57 PM CST -----  Contact: self [phone]  Pt is calling to follow up on a message that was sent on 12/1 for cough medication. Please call and advise.

## 2020-12-10 ENCOUNTER — TELEPHONE (OUTPATIENT)
Dept: PRIMARY CARE CLINIC | Facility: CLINIC | Age: 75
End: 2020-12-10

## 2020-12-10 RX ORDER — BUTALBITAL, ACETAMINOPHEN AND CAFFEINE 50; 325; 40 MG/1; MG/1; MG/1
TABLET ORAL
Qty: 30 TABLET | Refills: 2 | OUTPATIENT
Start: 2020-12-10

## 2020-12-10 NOTE — TELEPHONE ENCOUNTER
----- Message from Jen Rudd sent at 12/10/2020  9:09 AM CST -----  Regarding: Pt self Home [phone] or Mobile [phone]  Requesting an RX refill or new RX.  Is this a refill or new RX: Refill  RX name and strength: butalbital-acetaminophen-caffeine -40 mg (FIORICET, ESGIC) -40 mg per tablet  Is this a 30 day or 90 day RX: 30  Pharmacy name and phone # [facility] - [place], [place] - [address]    [phone] Fax# [phone]

## 2020-12-10 NOTE — TELEPHONE ENCOUNTER
Spoke with pharmacy, verbalized patient can fill Fioricet RX one day early. Pharmacy verbalized understanding.

## 2020-12-10 NOTE — TELEPHONE ENCOUNTER
----- Message from Mariah Lieberman sent at 12/10/2020  4:38 PM CST -----  Contact: [phone]  Patient is returning a phone call.  Who left a message for the patient: Mara  Does patient know what this is regarding:  Refill  Comments:

## 2020-12-10 NOTE — TELEPHONE ENCOUNTER
Call tell patient Fioricet not refilled over the phone can do virtual visit if needs refill immediately

## 2020-12-10 NOTE — TELEPHONE ENCOUNTER
----- Message from Elinor Mejiajade sent at 12/10/2020 10:15 AM CST -----  Contact: Patient @ [phone]  Good Morning  Patient cannot get Rx butalbital-acetaminophen-caffeine -40 mg (FIORICET, ESGIC) -40 mg per tablet until tomorrow unless Nurse or Dr call so she can get it today .    Please call [facility] - [place], [place] - [address] @ [phone]

## 2020-12-10 NOTE — TELEPHONE ENCOUNTER
Spoke with patient, she is requesting to have her Fioricet RX filled one day early because she has a ride this evening. Verbalized to patient that I will call the pharmacy.

## 2020-12-14 RX ORDER — GABAPENTIN 100 MG/1
CAPSULE ORAL
Qty: 90 CAPSULE | Refills: 2 | Status: SHIPPED | OUTPATIENT
Start: 2020-12-14 | End: 2021-03-16

## 2020-12-15 NOTE — TELEPHONE ENCOUNTER
Call tell pt with HLP needs lab q 6mo N=Last lab May needs lab CBC CMP Lipid T4 TSh see me 2 weekslater OK 3 mo refills give timew come in get seen get adtional refills Tell neurontin not usually refilled ove the phone IN future get refills during office visits No more refills unitl lab done

## 2020-12-15 NOTE — TELEPHONE ENCOUNTER
Pt was notified about her medication, she says she will be getting labs done this week, and making an appt as soon as possible.

## 2020-12-17 DIAGNOSIS — M25.522 LEFT ELBOW PAIN: Primary | ICD-10-CM

## 2020-12-23 ENCOUNTER — PATIENT OUTREACH (OUTPATIENT)
Dept: ADMINISTRATIVE | Facility: OTHER | Age: 75
End: 2020-12-23

## 2020-12-23 DIAGNOSIS — F41.9 ANXIETY: ICD-10-CM

## 2020-12-23 NOTE — TELEPHONE ENCOUNTER
Spoke with patient, verbalized that Dr. Woo does not send in cough medication without an appointment. Patient has an appointment tomorrow at 0740 with PCP. Verbalized she can ask for cough medicine at her appointment. Patient verbalized understanding.

## 2020-12-23 NOTE — TELEPHONE ENCOUNTER
----- Message from Elizabeth Hitchcock MA sent at 12/23/2020 12:05 PM CST -----  Contact: [phone]  Requesting an RX refill or new RX.  Is this a refill or new RX:   RX name and strength: promethazine-dextromethorphan (PROMETHAZINE-DM) 6.25-15 mg/5 mL Syrp  Is this a 30 day or 90 day RX:   Pharmacy name and phone # (copy/paste from chart):  [facility] - [place], [place] - [address] [phone] (Phone)  [phone] (Fax)      Comments:

## 2020-12-24 ENCOUNTER — OFFICE VISIT (OUTPATIENT)
Dept: PRIMARY CARE CLINIC | Facility: CLINIC | Age: 75
End: 2020-12-24
Payer: MEDICARE

## 2020-12-24 VITALS
RESPIRATION RATE: 18 BRPM | HEIGHT: 64 IN | SYSTOLIC BLOOD PRESSURE: 132 MMHG | DIASTOLIC BLOOD PRESSURE: 66 MMHG | OXYGEN SATURATION: 98 % | HEART RATE: 66 BPM | TEMPERATURE: 97 F | BODY MASS INDEX: 20.38 KG/M2 | WEIGHT: 119.38 LBS

## 2020-12-24 DIAGNOSIS — J32.9 SINUSITIS, UNSPECIFIED CHRONICITY, UNSPECIFIED LOCATION: Primary | ICD-10-CM

## 2020-12-24 DIAGNOSIS — K92.2 GASTROINTESTINAL HEMORRHAGE, UNSPECIFIED GASTROINTESTINAL HEMORRHAGE TYPE: ICD-10-CM

## 2020-12-24 DIAGNOSIS — I10 HYPERTENSION, UNSPECIFIED TYPE: ICD-10-CM

## 2020-12-24 DIAGNOSIS — Z87.891 HISTORY OF TOBACCO ABUSE: ICD-10-CM

## 2020-12-24 DIAGNOSIS — J40 BRONCHITIS: ICD-10-CM

## 2020-12-24 DIAGNOSIS — D64.9 ANEMIA, UNSPECIFIED TYPE: ICD-10-CM

## 2020-12-24 DIAGNOSIS — Z95.5 HX OF HEART ARTERY STENT: ICD-10-CM

## 2020-12-24 DIAGNOSIS — F51.01 PRIMARY INSOMNIA: Chronic | ICD-10-CM

## 2020-12-24 DIAGNOSIS — I50.9 CONGESTIVE HEART FAILURE, UNSPECIFIED HF CHRONICITY, UNSPECIFIED HEART FAILURE TYPE: ICD-10-CM

## 2020-12-24 DIAGNOSIS — I25.2 OLD MI (MYOCARDIAL INFARCTION): ICD-10-CM

## 2020-12-24 DIAGNOSIS — J44.9 CHRONIC OBSTRUCTIVE PULMONARY DISEASE, UNSPECIFIED COPD TYPE: ICD-10-CM

## 2020-12-24 DIAGNOSIS — E03.9 HYPOTHYROIDISM, UNSPECIFIED TYPE: Chronic | ICD-10-CM

## 2020-12-24 DIAGNOSIS — R11.0 NAUSEA: ICD-10-CM

## 2020-12-24 DIAGNOSIS — E78.5 HYPERLIPIDEMIA, UNSPECIFIED HYPERLIPIDEMIA TYPE: ICD-10-CM

## 2020-12-24 DIAGNOSIS — F41.9 ANXIETY: ICD-10-CM

## 2020-12-24 PROCEDURE — 3008F PR BODY MASS INDEX (BMI) DOCUMENTED: ICD-10-PCS | Mod: CPTII,S$GLB,, | Performed by: FAMILY MEDICINE

## 2020-12-24 PROCEDURE — 3078F DIAST BP <80 MM HG: CPT | Mod: CPTII,S$GLB,, | Performed by: FAMILY MEDICINE

## 2020-12-24 PROCEDURE — 3075F PR MOST RECENT SYSTOLIC BLOOD PRESS GE 130-139MM HG: ICD-10-PCS | Mod: CPTII,S$GLB,, | Performed by: FAMILY MEDICINE

## 2020-12-24 PROCEDURE — 1101F PR PT FALLS ASSESS DOC 0-1 FALLS W/OUT INJ PAST YR: ICD-10-PCS | Mod: CPTII,S$GLB,, | Performed by: FAMILY MEDICINE

## 2020-12-24 PROCEDURE — 3078F PR MOST RECENT DIASTOLIC BLOOD PRESSURE < 80 MM HG: ICD-10-PCS | Mod: CPTII,S$GLB,, | Performed by: FAMILY MEDICINE

## 2020-12-24 PROCEDURE — 1101F PT FALLS ASSESS-DOCD LE1/YR: CPT | Mod: CPTII,S$GLB,, | Performed by: FAMILY MEDICINE

## 2020-12-24 PROCEDURE — 99999 PR PBB SHADOW E&M-EST. PATIENT-LVL III: ICD-10-PCS | Mod: PBBFAC,,, | Performed by: FAMILY MEDICINE

## 2020-12-24 PROCEDURE — 99499 UNLISTED E&M SERVICE: CPT | Mod: S$GLB,,, | Performed by: FAMILY MEDICINE

## 2020-12-24 PROCEDURE — 3288F PR FALLS RISK ASSESSMENT DOCUMENTED: ICD-10-PCS | Mod: CPTII,S$GLB,, | Performed by: FAMILY MEDICINE

## 2020-12-24 PROCEDURE — 3288F FALL RISK ASSESSMENT DOCD: CPT | Mod: CPTII,S$GLB,, | Performed by: FAMILY MEDICINE

## 2020-12-24 PROCEDURE — 1159F MED LIST DOCD IN RCRD: CPT | Mod: S$GLB,,, | Performed by: FAMILY MEDICINE

## 2020-12-24 PROCEDURE — 99214 OFFICE O/P EST MOD 30 MIN: CPT | Mod: S$GLB,,, | Performed by: FAMILY MEDICINE

## 2020-12-24 PROCEDURE — 1157F PR ADVANCE CARE PLAN OR EQUIV PRESENT IN MEDICAL RECORD: ICD-10-PCS | Mod: S$GLB,,, | Performed by: FAMILY MEDICINE

## 2020-12-24 PROCEDURE — 1157F ADVNC CARE PLAN IN RCRD: CPT | Mod: S$GLB,,, | Performed by: FAMILY MEDICINE

## 2020-12-24 PROCEDURE — 99499 RISK ADDL DX/OHS AUDIT: ICD-10-PCS | Mod: S$GLB,,, | Performed by: FAMILY MEDICINE

## 2020-12-24 PROCEDURE — 99214 PR OFFICE/OUTPT VISIT, EST, LEVL IV, 30-39 MIN: ICD-10-PCS | Mod: S$GLB,,, | Performed by: FAMILY MEDICINE

## 2020-12-24 PROCEDURE — 1159F PR MEDICATION LIST DOCUMENTED IN MEDICAL RECORD: ICD-10-PCS | Mod: S$GLB,,, | Performed by: FAMILY MEDICINE

## 2020-12-24 PROCEDURE — 3075F SYST BP GE 130 - 139MM HG: CPT | Mod: CPTII,S$GLB,, | Performed by: FAMILY MEDICINE

## 2020-12-24 PROCEDURE — 99999 PR PBB SHADOW E&M-EST. PATIENT-LVL III: CPT | Mod: PBBFAC,,, | Performed by: FAMILY MEDICINE

## 2020-12-24 PROCEDURE — 3008F BODY MASS INDEX DOCD: CPT | Mod: CPTII,S$GLB,, | Performed by: FAMILY MEDICINE

## 2020-12-24 RX ORDER — ONDANSETRON 4 MG/1
4 TABLET, FILM COATED ORAL EVERY 6 HOURS PRN
Qty: 30 TABLET | Refills: 2 | Status: SHIPPED | OUTPATIENT
Start: 2020-12-24 | End: 2021-03-16

## 2020-12-24 RX ORDER — ESCITALOPRAM OXALATE 10 MG/1
10 TABLET ORAL DAILY
Qty: 30 TABLET | Refills: 5 | Status: SHIPPED | OUTPATIENT
Start: 2020-12-24 | End: 2021-03-16 | Stop reason: SDUPTHER

## 2020-12-24 RX ORDER — ACETAMINOPHEN AND CODEINE PHOSPHATE 300; 30 MG/1; MG/1
1 TABLET ORAL EVERY 4 HOURS PRN
Qty: 30 TABLET | Refills: 1 | Status: SHIPPED | OUTPATIENT
Start: 2020-12-24 | End: 2021-01-03

## 2020-12-24 RX ORDER — PROMETHAZINE HYDROCHLORIDE AND CODEINE PHOSPHATE 6.25; 1 MG/5ML; MG/5ML
5 SOLUTION ORAL EVERY 6 HOURS PRN
Qty: 180 ML | Refills: 0 | Status: SHIPPED | OUTPATIENT
Start: 2020-12-24 | End: 2021-03-16

## 2020-12-24 RX ORDER — AZITHROMYCIN 250 MG/1
TABLET, FILM COATED ORAL
Qty: 6 TABLET | Refills: 0 | Status: SHIPPED | OUTPATIENT
Start: 2020-12-24 | End: 2020-12-28

## 2020-12-24 RX ORDER — ALPRAZOLAM 0.5 MG/1
TABLET ORAL
Qty: 30 TABLET | Refills: 2 | Status: CANCELLED | OUTPATIENT
Start: 2020-12-24

## 2020-12-24 RX ORDER — PROMETHAZINE HYDROCHLORIDE AND CODEINE PHOSPHATE 6.25; 1 MG/5ML; MG/5ML
5 SOLUTION ORAL EVERY 6 HOURS PRN
Qty: 180 ML | Refills: 0 | Status: SHIPPED | OUTPATIENT
Start: 2020-12-24 | End: 2020-12-24

## 2020-12-24 RX ORDER — METHYLPREDNISOLONE 4 MG/1
TABLET ORAL
Qty: 1 PACKAGE | Refills: 0 | Status: SHIPPED | OUTPATIENT
Start: 2020-12-24 | End: 2021-01-05

## 2020-12-24 NOTE — TELEPHONE ENCOUNTER
----- Message from Tierra Ayala sent at 12/24/2020  9:16 AM CST -----  Regarding: Medication  Contact: Patient [phone]  Request. Pharmacy does not have medication- promethazine-codeine 6.25-10 mg/5 ml (PHENERGAN WITH CODEINE) 6.25-10 mg/5 mL syrup    Please send to [id number] - [place], [place] - [address] [facility] [address] [phone] (Phone)  [phone] (Fax)    Please call and advise

## 2020-12-24 NOTE — PROGRESS NOTES
Subjective:       Patient ID: Josseline Stinson is a 74 y.o. female.    Chief Complaint: Cough (congestion)    HPI:  74-year-old female calling for medication refills---   Subjective:       Patient ID: Josseline Stinson is a 74 y.o. female.    Chief Complaint: Cough (congestion)    HPI:  74-year-old female--x4 days--no fever--+runny nose--+sore throat slight--+cough--+phlegm--gray.  No pneumonia asthma TB but has COPD and chronic bronchitis.  No smoking + nausea no vomiting diarrhea.  +achy joints--no loss of smell or taste no exposure covid.  Patient went to the emergency room yesterday was tested for COVID                          ROS:   Skin: no psoriasis, eczema, skin cancer-  HEENT: no HA no blurred vision, diplopia, epistaxis, hoarsene ss change in voice, thyroid trouble does have some ocular pain wears glasses  Lung: No pneumonia, asthma, Tb, wheezing, SOB, no smoking +COPD +chronic bronchitis--chronic sinusitis bronchitis see history of present illness  Heart: No chest pain, ankle edema, palpitations,+ old MI,no  rinku murmur, +hypertension,+ hyperlipidemia, + CAD with stent times 17 last stent placed in January 2019 no CABG +CHF  Abdomen:  +GERD omeprazole and history of a gastric ulcer-both doing well No nausea, vomiting, diarrhea, constipation, ulcers, hepatitis, gallbladder disease, melena, hematochezia, hematemesis  : no UTI, renal disease, stones  GYN hyst   MS:  10/20/2020 patient seen emergency room for nondisplaced comminuted fracture left olecranon process with 2 screws left humeral condyle from prior surgery seen by Dr. Michaud told to DC splint told to keep arm in the 90 degree angle.Not going to pain management   Neuro: No dizziness, LOC, seizures   No diabetes, no anemia, no anxiety, no depression   One daughter but does not talk to pt has not spoken to pt in 2 yrs, patient on disability due to heart, lives with a friend     Objective:   Physical Exam:   General: Well nourished, well  developed, no acute distress +thin  Skin: No lesions  HEENT: Eyes PERRLA, EOM intact, nose clear discharge throat +1/4 eythematous ears TMs clear  NECK: Supple, no bruits, No JVD, no nodes  Lungs: Clear, no rales, rhonchi, wheezing--coarse cough  Heart: Regular rate and rhythm, no murmurs, gallops, or rubs +coarse cough distant heart sounds  Abdomen: flat, bowel sounds positive, no tenderness, or organomegaly  MS:  Has splint on the left elbow and forearm and upper arm--some swelling of the finger--good range of motion muscle strength of the fingers but some discomfort due to the swelling unable to move the elbow due to splint   Neuro: Alert, CN intact, oriented X 3  Extremities: No cyanosis, clubbing, or edema         Assessment:       1. Sinusitis, unspecified chronicity, unspecified location    2. Anxiety    3. Bronchitis    4. Chronic obstructive pulmonary disease, unspecified COPD type    5. Nausea    6. Hypertension, unspecified type    7. Hyperlipidemia, unspecified hyperlipidemia type    8. Congestive heart failure, unspecified HF chronicity, unspecified heart failure type    9. Old MI (myocardial infarction)    10. History of tobacco abuse    11. Primary insomnia    12. Gastrointestinal hemorrhage, unspecified gastrointestinal hemorrhage type    13. Hypothyroidism, unspecified type    14. Anemia, unspecified type    15. Hx of heart artery stent        Plan:       Sinusitis, unspecified chronicity, unspecified location    Anxiety    Bronchitis    Chronic obstructive pulmonary disease, unspecified COPD type    Nausea    Hypertension, unspecified type    Hyperlipidemia, unspecified hyperlipidemia type    Congestive heart failure, unspecified HF chronicity, unspecified heart failure type    Old MI (myocardial infarction)    History of tobacco abuse    Primary insomnia    Gastrointestinal hemorrhage, unspecified gastrointestinal hemorrhage type    Hypothyroidism, unspecified type    Anemia, unspecified  type    Hx of heart artery stent    Other orders  -     azithromycin (Z-NICHOLE) 250 MG tablet; 2 tabs by mouth day 1, then 1 tab by mouth daily x 4 days  Dispense: 6 tablet; Refill: 0  -     promethazine-codeine 6.25-10 mg/5 ml (PHENERGAN WITH CODEINE) 6.25-10 mg/5 mL syrup; Take 5 mLs by mouth every 6 (six) hours as needed for Cough.  Dispense: 180 mL; Refill: 0  -     acetaminophen-codeine 300-30mg (TYLENOL #3) 300-30 mg Tab; Take 1 tablet by mouth every 4 (four) hours as needed.  Dispense: 30 tablet; Refill: 1  -     methylPREDNISolone (MEDROL DOSEPACK) 4 mg tablet; use as directed  Dispense: 1 Package; Refill: 0        Main problem--persistent sinusitis/bronchitis--due to possible exposure COVID--COVID test pending from emergency room--Zithromax Phenergan with codeine Tylenol No.  3 for pain  Zofran for nausea  Other medical issues  Fracture left elbow---10/20/2020 seen in emergency room for close fracture olecranon process nondisplaced comminuted fracture left olecranon process-- 2 screws right humeral condyle from old surgery --saw Dr Michaud told to remove splint--hold arm at a 90 degree angle in that would heal--suggested patient get a sling for her left arm to keep it at a 90 degree angle follow-up with orthopedist for elbow fracture  History of headaches on Fioricet  Dr. Baltazar--patient states heart is been doing well cardiologist--hypertension/hyperlipidemia/CHF/old MI/CAD with stent times 15-states has not seen cardiologist for long time told with multiple stents should be seen by cardiology at least twice a year  GERD/history ulcer disease--doing well on omeprazole--zofran   Health maintenance shingles shot  Patient advised to not taking any arthritis pills or NSAIDs for Plavix including Lodine Voltaren ibuprofen leave can cause GI problem  Patient should have lab q.6 months last lab was in May should have CBCs CMP lipids T4 TSH  Health maintenance shingles colorectal screen flu   Patient given  Fioricet--Phenergan with codeine--so will not give Xanax--patient states not pain clinic anymore--if needs xanax to psychiatirist

## 2020-12-24 NOTE — TELEPHONE ENCOUNTER
----- Message from Cristina Grossman sent at 12/24/2020 10:23 AM CST -----  Contact: self [phone]  Pt states she went to the pharmacy and they do not have her cough medication in, they are currently out of stock. Please resend to [id number] - [facility] [place] - [address] AT [facility] [address] [phone] (Phone)  [phone] (Fax). Thank you

## 2020-12-25 RX ORDER — ATORVASTATIN CALCIUM 40 MG/1
TABLET, FILM COATED ORAL
Qty: 30 TABLET | Refills: 5 | Status: SHIPPED | OUTPATIENT
Start: 2020-12-25 | End: 2021-06-12

## 2020-12-25 RX ORDER — ALPRAZOLAM 0.5 MG/1
TABLET ORAL
Qty: 30 TABLET | Refills: 2 | OUTPATIENT
Start: 2020-12-25

## 2020-12-29 DIAGNOSIS — F41.9 ANXIETY: ICD-10-CM

## 2020-12-29 NOTE — TELEPHONE ENCOUNTER
----- Message from Jen Rudd sent at 12/29/2020  1:04 PM CST -----  Regarding: Pt self Home [phone]  Requesting an RX refill or new RX.  Is this a refill or new RX: Refill  RX name and strength: ALPRAZolam (XANAX) 0.5 MG tablet  Is this a 30 day or 90 day RX: 30  Pharmacy name and phone # [facility] - [place], [place] - [address]  Phone# [phone] Fax# [phone]

## 2020-12-30 RX ORDER — ALPRAZOLAM 0.5 MG/1
TABLET ORAL
Qty: 30 TABLET | Refills: 2 | Status: SHIPPED | OUTPATIENT
Start: 2020-12-30 | End: 2021-04-22 | Stop reason: SDUPTHER

## 2021-01-05 ENCOUNTER — OFFICE VISIT (OUTPATIENT)
Dept: PRIMARY CARE CLINIC | Facility: CLINIC | Age: 76
End: 2021-01-05
Payer: MEDICARE

## 2021-01-05 VITALS
WEIGHT: 117.5 LBS | TEMPERATURE: 98 F | HEART RATE: 76 BPM | BODY MASS INDEX: 20.06 KG/M2 | DIASTOLIC BLOOD PRESSURE: 68 MMHG | HEIGHT: 64 IN | SYSTOLIC BLOOD PRESSURE: 108 MMHG | OXYGEN SATURATION: 98 % | RESPIRATION RATE: 17 BRPM

## 2021-01-05 DIAGNOSIS — R05.9 COUGH: ICD-10-CM

## 2021-01-05 DIAGNOSIS — J32.2 ETHMOID SINUSITIS, UNSPECIFIED CHRONICITY: Primary | ICD-10-CM

## 2021-01-05 PROCEDURE — 99214 OFFICE O/P EST MOD 30 MIN: CPT | Mod: S$GLB,,, | Performed by: STUDENT IN AN ORGANIZED HEALTH CARE EDUCATION/TRAINING PROGRAM

## 2021-01-05 PROCEDURE — 99999 PR PBB SHADOW E&M-EST. PATIENT-LVL V: CPT | Mod: PBBFAC,,, | Performed by: STUDENT IN AN ORGANIZED HEALTH CARE EDUCATION/TRAINING PROGRAM

## 2021-01-05 PROCEDURE — 3078F DIAST BP <80 MM HG: CPT | Mod: CPTII,S$GLB,, | Performed by: STUDENT IN AN ORGANIZED HEALTH CARE EDUCATION/TRAINING PROGRAM

## 2021-01-05 PROCEDURE — 1101F PT FALLS ASSESS-DOCD LE1/YR: CPT | Mod: CPTII,S$GLB,, | Performed by: STUDENT IN AN ORGANIZED HEALTH CARE EDUCATION/TRAINING PROGRAM

## 2021-01-05 PROCEDURE — 3078F PR MOST RECENT DIASTOLIC BLOOD PRESSURE < 80 MM HG: ICD-10-PCS | Mod: CPTII,S$GLB,, | Performed by: STUDENT IN AN ORGANIZED HEALTH CARE EDUCATION/TRAINING PROGRAM

## 2021-01-05 PROCEDURE — 3008F BODY MASS INDEX DOCD: CPT | Mod: CPTII,S$GLB,, | Performed by: STUDENT IN AN ORGANIZED HEALTH CARE EDUCATION/TRAINING PROGRAM

## 2021-01-05 PROCEDURE — 1125F AMNT PAIN NOTED PAIN PRSNT: CPT | Mod: S$GLB,,, | Performed by: STUDENT IN AN ORGANIZED HEALTH CARE EDUCATION/TRAINING PROGRAM

## 2021-01-05 PROCEDURE — 3008F PR BODY MASS INDEX (BMI) DOCUMENTED: ICD-10-PCS | Mod: CPTII,S$GLB,, | Performed by: STUDENT IN AN ORGANIZED HEALTH CARE EDUCATION/TRAINING PROGRAM

## 2021-01-05 PROCEDURE — 1157F ADVNC CARE PLAN IN RCRD: CPT | Mod: S$GLB,,, | Performed by: STUDENT IN AN ORGANIZED HEALTH CARE EDUCATION/TRAINING PROGRAM

## 2021-01-05 PROCEDURE — 99999 PR PBB SHADOW E&M-EST. PATIENT-LVL V: ICD-10-PCS | Mod: PBBFAC,,, | Performed by: STUDENT IN AN ORGANIZED HEALTH CARE EDUCATION/TRAINING PROGRAM

## 2021-01-05 PROCEDURE — 1159F PR MEDICATION LIST DOCUMENTED IN MEDICAL RECORD: ICD-10-PCS | Mod: S$GLB,,, | Performed by: STUDENT IN AN ORGANIZED HEALTH CARE EDUCATION/TRAINING PROGRAM

## 2021-01-05 PROCEDURE — 3288F FALL RISK ASSESSMENT DOCD: CPT | Mod: CPTII,S$GLB,, | Performed by: STUDENT IN AN ORGANIZED HEALTH CARE EDUCATION/TRAINING PROGRAM

## 2021-01-05 PROCEDURE — 3074F PR MOST RECENT SYSTOLIC BLOOD PRESSURE < 130 MM HG: ICD-10-PCS | Mod: CPTII,S$GLB,, | Performed by: STUDENT IN AN ORGANIZED HEALTH CARE EDUCATION/TRAINING PROGRAM

## 2021-01-05 PROCEDURE — 3288F PR FALLS RISK ASSESSMENT DOCUMENTED: ICD-10-PCS | Mod: CPTII,S$GLB,, | Performed by: STUDENT IN AN ORGANIZED HEALTH CARE EDUCATION/TRAINING PROGRAM

## 2021-01-05 PROCEDURE — 99214 PR OFFICE/OUTPT VISIT, EST, LEVL IV, 30-39 MIN: ICD-10-PCS | Mod: S$GLB,,, | Performed by: STUDENT IN AN ORGANIZED HEALTH CARE EDUCATION/TRAINING PROGRAM

## 2021-01-05 PROCEDURE — 1159F MED LIST DOCD IN RCRD: CPT | Mod: S$GLB,,, | Performed by: STUDENT IN AN ORGANIZED HEALTH CARE EDUCATION/TRAINING PROGRAM

## 2021-01-05 PROCEDURE — 1157F PR ADVANCE CARE PLAN OR EQUIV PRESENT IN MEDICAL RECORD: ICD-10-PCS | Mod: S$GLB,,, | Performed by: STUDENT IN AN ORGANIZED HEALTH CARE EDUCATION/TRAINING PROGRAM

## 2021-01-05 PROCEDURE — 1125F PR PAIN SEVERITY QUANTIFIED, PAIN PRESENT: ICD-10-PCS | Mod: S$GLB,,, | Performed by: STUDENT IN AN ORGANIZED HEALTH CARE EDUCATION/TRAINING PROGRAM

## 2021-01-05 PROCEDURE — 3074F SYST BP LT 130 MM HG: CPT | Mod: CPTII,S$GLB,, | Performed by: STUDENT IN AN ORGANIZED HEALTH CARE EDUCATION/TRAINING PROGRAM

## 2021-01-05 PROCEDURE — 1101F PR PT FALLS ASSESS DOC 0-1 FALLS W/OUT INJ PAST YR: ICD-10-PCS | Mod: CPTII,S$GLB,, | Performed by: STUDENT IN AN ORGANIZED HEALTH CARE EDUCATION/TRAINING PROGRAM

## 2021-01-05 RX ORDER — AMOXICILLIN AND CLAVULANATE POTASSIUM 875; 125 MG/1; MG/1
1 TABLET, FILM COATED ORAL 2 TIMES DAILY
Qty: 20 TABLET | Refills: 0 | Status: SHIPPED | OUTPATIENT
Start: 2020-01-08 | End: 2020-01-18

## 2021-01-05 RX ORDER — BENZONATATE 200 MG/1
200 CAPSULE ORAL 3 TIMES DAILY PRN
Qty: 30 CAPSULE | Refills: 0 | Status: SHIPPED | OUTPATIENT
Start: 2021-01-05 | End: 2021-01-15

## 2021-01-20 ENCOUNTER — IMMUNIZATION (OUTPATIENT)
Dept: PRIMARY CARE CLINIC | Facility: CLINIC | Age: 76
End: 2021-01-20
Payer: MEDICARE

## 2021-01-20 DIAGNOSIS — Z23 NEED FOR VACCINATION: Primary | ICD-10-CM

## 2021-01-20 PROCEDURE — 91300 COVID-19, MRNA, LNP-S, PF, 30 MCG/0.3 ML DOSE VACCINE: CPT | Mod: PBBFAC | Performed by: EMERGENCY MEDICINE

## 2021-02-01 ENCOUNTER — TELEPHONE (OUTPATIENT)
Dept: PRIMARY CARE CLINIC | Facility: CLINIC | Age: 76
End: 2021-02-01

## 2021-02-09 ENCOUNTER — OFFICE VISIT (OUTPATIENT)
Dept: PRIMARY CARE CLINIC | Facility: CLINIC | Age: 76
End: 2021-02-09
Payer: MEDICARE

## 2021-02-09 DIAGNOSIS — Z95.5 HX OF HEART ARTERY STENT: ICD-10-CM

## 2021-02-09 DIAGNOSIS — K21.9 GASTROESOPHAGEAL REFLUX DISEASE, UNSPECIFIED WHETHER ESOPHAGITIS PRESENT: Chronic | ICD-10-CM

## 2021-02-09 DIAGNOSIS — Z87.891 HISTORY OF TOBACCO ABUSE: ICD-10-CM

## 2021-02-09 DIAGNOSIS — M51.36 DDD (DEGENERATIVE DISC DISEASE), LUMBAR: ICD-10-CM

## 2021-02-09 DIAGNOSIS — J44.9 CHRONIC OBSTRUCTIVE PULMONARY DISEASE, UNSPECIFIED COPD TYPE: Chronic | ICD-10-CM

## 2021-02-09 DIAGNOSIS — F41.9 ANXIETY: ICD-10-CM

## 2021-02-09 DIAGNOSIS — D64.9 ANEMIA, UNSPECIFIED TYPE: ICD-10-CM

## 2021-02-09 DIAGNOSIS — G89.4 CHRONIC PAIN SYNDROME: ICD-10-CM

## 2021-02-09 DIAGNOSIS — J40 BRONCHITIS: Primary | ICD-10-CM

## 2021-02-09 DIAGNOSIS — I95.9 HYPOTENSION, UNSPECIFIED HYPOTENSION TYPE: ICD-10-CM

## 2021-02-09 DIAGNOSIS — F32.A DEPRESSION, UNSPECIFIED DEPRESSION TYPE: ICD-10-CM

## 2021-02-09 DIAGNOSIS — E78.00 HYPERCHOLESTEREMIA: Chronic | ICD-10-CM

## 2021-02-09 DIAGNOSIS — I50.32 CHRONIC DIASTOLIC CONGESTIVE HEART FAILURE: Chronic | ICD-10-CM

## 2021-02-09 DIAGNOSIS — R51.9 INTRACTABLE EPISODIC HEADACHE, UNSPECIFIED HEADACHE TYPE: ICD-10-CM

## 2021-02-09 DIAGNOSIS — I10 ESSENTIAL HYPERTENSION: Chronic | ICD-10-CM

## 2021-02-09 PROCEDURE — 99443 PR PHYSICIAN TELEPHONE EVALUATION 21-30 MIN: ICD-10-PCS | Mod: 95,,, | Performed by: FAMILY MEDICINE

## 2021-02-09 PROCEDURE — 1157F PR ADVANCE CARE PLAN OR EQUIV PRESENT IN MEDICAL RECORD: ICD-10-PCS | Mod: 95,,, | Performed by: FAMILY MEDICINE

## 2021-02-09 PROCEDURE — 99443 PR PHYSICIAN TELEPHONE EVALUATION 21-30 MIN: CPT | Mod: 95,,, | Performed by: FAMILY MEDICINE

## 2021-02-09 PROCEDURE — 1159F MED LIST DOCD IN RCRD: CPT | Mod: 95,,, | Performed by: FAMILY MEDICINE

## 2021-02-09 PROCEDURE — 1157F ADVNC CARE PLAN IN RCRD: CPT | Mod: 95,,, | Performed by: FAMILY MEDICINE

## 2021-02-09 PROCEDURE — 1159F PR MEDICATION LIST DOCUMENTED IN MEDICAL RECORD: ICD-10-PCS | Mod: 95,,, | Performed by: FAMILY MEDICINE

## 2021-02-09 RX ORDER — AZITHROMYCIN 250 MG/1
TABLET, FILM COATED ORAL
Qty: 6 TABLET | Refills: 0 | Status: SHIPPED | OUTPATIENT
Start: 2021-02-09 | End: 2021-02-13

## 2021-02-09 RX ORDER — ACETAMINOPHEN AND CODEINE PHOSPHATE 300; 30 MG/1; MG/1
TABLET ORAL
Qty: 30 TABLET | Refills: 0 | Status: SHIPPED | OUTPATIENT
Start: 2021-02-09 | End: 2021-03-03 | Stop reason: SDUPTHER

## 2021-02-10 ENCOUNTER — IMMUNIZATION (OUTPATIENT)
Dept: PRIMARY CARE CLINIC | Facility: CLINIC | Age: 76
End: 2021-02-10
Payer: MEDICARE

## 2021-02-10 DIAGNOSIS — Z23 NEED FOR VACCINATION: Primary | ICD-10-CM

## 2021-03-01 ENCOUNTER — TELEPHONE (OUTPATIENT)
Dept: PRIMARY CARE CLINIC | Facility: CLINIC | Age: 76
End: 2021-03-01

## 2021-03-02 ENCOUNTER — TELEPHONE (OUTPATIENT)
Dept: PRIMARY CARE CLINIC | Facility: CLINIC | Age: 76
End: 2021-03-02

## 2021-03-03 ENCOUNTER — OFFICE VISIT (OUTPATIENT)
Dept: PRIMARY CARE CLINIC | Facility: CLINIC | Age: 76
End: 2021-03-03
Payer: MEDICARE

## 2021-03-03 VITALS
RESPIRATION RATE: 16 BRPM | DIASTOLIC BLOOD PRESSURE: 66 MMHG | HEIGHT: 64 IN | HEART RATE: 94 BPM | SYSTOLIC BLOOD PRESSURE: 128 MMHG | WEIGHT: 120.81 LBS | BODY MASS INDEX: 20.63 KG/M2 | TEMPERATURE: 98 F | OXYGEN SATURATION: 99 %

## 2021-03-03 DIAGNOSIS — R51.9 INTRACTABLE EPISODIC HEADACHE, UNSPECIFIED HEADACHE TYPE: ICD-10-CM

## 2021-03-03 DIAGNOSIS — M54.41 CHRONIC BILATERAL LOW BACK PAIN WITH RIGHT-SIDED SCIATICA: Primary | ICD-10-CM

## 2021-03-03 DIAGNOSIS — G89.29 CHRONIC BILATERAL LOW BACK PAIN WITH RIGHT-SIDED SCIATICA: Primary | ICD-10-CM

## 2021-03-03 DIAGNOSIS — M54.41 ACUTE BILATERAL LOW BACK PAIN WITH RIGHT-SIDED SCIATICA: ICD-10-CM

## 2021-03-03 PROCEDURE — 1157F PR ADVANCE CARE PLAN OR EQUIV PRESENT IN MEDICAL RECORD: ICD-10-PCS | Mod: S$GLB,,, | Performed by: STUDENT IN AN ORGANIZED HEALTH CARE EDUCATION/TRAINING PROGRAM

## 2021-03-03 PROCEDURE — 1125F AMNT PAIN NOTED PAIN PRSNT: CPT | Mod: S$GLB,,, | Performed by: STUDENT IN AN ORGANIZED HEALTH CARE EDUCATION/TRAINING PROGRAM

## 2021-03-03 PROCEDURE — 3078F DIAST BP <80 MM HG: CPT | Mod: CPTII,S$GLB,, | Performed by: STUDENT IN AN ORGANIZED HEALTH CARE EDUCATION/TRAINING PROGRAM

## 2021-03-03 PROCEDURE — 3008F PR BODY MASS INDEX (BMI) DOCUMENTED: ICD-10-PCS | Mod: CPTII,S$GLB,, | Performed by: STUDENT IN AN ORGANIZED HEALTH CARE EDUCATION/TRAINING PROGRAM

## 2021-03-03 PROCEDURE — 1157F ADVNC CARE PLAN IN RCRD: CPT | Mod: S$GLB,,, | Performed by: STUDENT IN AN ORGANIZED HEALTH CARE EDUCATION/TRAINING PROGRAM

## 2021-03-03 PROCEDURE — 1159F MED LIST DOCD IN RCRD: CPT | Mod: S$GLB,,, | Performed by: STUDENT IN AN ORGANIZED HEALTH CARE EDUCATION/TRAINING PROGRAM

## 2021-03-03 PROCEDURE — 99999 PR PBB SHADOW E&M-EST. PATIENT-LVL V: CPT | Mod: PBBFAC,,, | Performed by: STUDENT IN AN ORGANIZED HEALTH CARE EDUCATION/TRAINING PROGRAM

## 2021-03-03 PROCEDURE — 3288F FALL RISK ASSESSMENT DOCD: CPT | Mod: CPTII,S$GLB,, | Performed by: STUDENT IN AN ORGANIZED HEALTH CARE EDUCATION/TRAINING PROGRAM

## 2021-03-03 PROCEDURE — 3288F PR FALLS RISK ASSESSMENT DOCUMENTED: ICD-10-PCS | Mod: CPTII,S$GLB,, | Performed by: STUDENT IN AN ORGANIZED HEALTH CARE EDUCATION/TRAINING PROGRAM

## 2021-03-03 PROCEDURE — 1125F PR PAIN SEVERITY QUANTIFIED, PAIN PRESENT: ICD-10-PCS | Mod: S$GLB,,, | Performed by: STUDENT IN AN ORGANIZED HEALTH CARE EDUCATION/TRAINING PROGRAM

## 2021-03-03 PROCEDURE — 99214 PR OFFICE/OUTPT VISIT, EST, LEVL IV, 30-39 MIN: ICD-10-PCS | Mod: S$GLB,,, | Performed by: STUDENT IN AN ORGANIZED HEALTH CARE EDUCATION/TRAINING PROGRAM

## 2021-03-03 PROCEDURE — 99999 PR PBB SHADOW E&M-EST. PATIENT-LVL V: ICD-10-PCS | Mod: PBBFAC,,, | Performed by: STUDENT IN AN ORGANIZED HEALTH CARE EDUCATION/TRAINING PROGRAM

## 2021-03-03 PROCEDURE — 3078F PR MOST RECENT DIASTOLIC BLOOD PRESSURE < 80 MM HG: ICD-10-PCS | Mod: CPTII,S$GLB,, | Performed by: STUDENT IN AN ORGANIZED HEALTH CARE EDUCATION/TRAINING PROGRAM

## 2021-03-03 PROCEDURE — 1159F PR MEDICATION LIST DOCUMENTED IN MEDICAL RECORD: ICD-10-PCS | Mod: S$GLB,,, | Performed by: STUDENT IN AN ORGANIZED HEALTH CARE EDUCATION/TRAINING PROGRAM

## 2021-03-03 PROCEDURE — 1101F PT FALLS ASSESS-DOCD LE1/YR: CPT | Mod: CPTII,S$GLB,, | Performed by: STUDENT IN AN ORGANIZED HEALTH CARE EDUCATION/TRAINING PROGRAM

## 2021-03-03 PROCEDURE — 3074F PR MOST RECENT SYSTOLIC BLOOD PRESSURE < 130 MM HG: ICD-10-PCS | Mod: CPTII,S$GLB,, | Performed by: STUDENT IN AN ORGANIZED HEALTH CARE EDUCATION/TRAINING PROGRAM

## 2021-03-03 PROCEDURE — 1101F PR PT FALLS ASSESS DOC 0-1 FALLS W/OUT INJ PAST YR: ICD-10-PCS | Mod: CPTII,S$GLB,, | Performed by: STUDENT IN AN ORGANIZED HEALTH CARE EDUCATION/TRAINING PROGRAM

## 2021-03-03 PROCEDURE — 3074F SYST BP LT 130 MM HG: CPT | Mod: CPTII,S$GLB,, | Performed by: STUDENT IN AN ORGANIZED HEALTH CARE EDUCATION/TRAINING PROGRAM

## 2021-03-03 PROCEDURE — 3008F BODY MASS INDEX DOCD: CPT | Mod: CPTII,S$GLB,, | Performed by: STUDENT IN AN ORGANIZED HEALTH CARE EDUCATION/TRAINING PROGRAM

## 2021-03-03 PROCEDURE — 99214 OFFICE O/P EST MOD 30 MIN: CPT | Mod: S$GLB,,, | Performed by: STUDENT IN AN ORGANIZED HEALTH CARE EDUCATION/TRAINING PROGRAM

## 2021-03-03 RX ORDER — BUTALBITAL, ACETAMINOPHEN AND CAFFEINE 50; 325; 40 MG/1; MG/1; MG/1
TABLET ORAL
Qty: 30 TABLET | Refills: 2 | Status: SHIPPED | OUTPATIENT
Start: 2021-03-03 | End: 2021-03-16 | Stop reason: SDUPTHER

## 2021-03-03 RX ORDER — ACETAMINOPHEN AND CODEINE PHOSPHATE 300; 30 MG/1; MG/1
TABLET ORAL
Qty: 28 TABLET | Refills: 0 | Status: SHIPPED | OUTPATIENT
Start: 2021-03-03 | End: 2021-03-08

## 2021-03-04 ENCOUNTER — TELEPHONE (OUTPATIENT)
Dept: PRIMARY CARE CLINIC | Facility: CLINIC | Age: 76
End: 2021-03-04

## 2021-03-04 DIAGNOSIS — R51.9 INTRACTABLE EPISODIC HEADACHE, UNSPECIFIED HEADACHE TYPE: ICD-10-CM

## 2021-03-04 RX ORDER — BUTALBITAL, ACETAMINOPHEN AND CAFFEINE 50; 325; 40 MG/1; MG/1; MG/1
TABLET ORAL
Qty: 30 TABLET | Refills: 2 | Status: CANCELLED | OUTPATIENT
Start: 2021-03-04

## 2021-03-04 RX ORDER — BUTALBITAL, ACETAMINOPHEN AND CAFFEINE 50; 325; 40 MG/1; MG/1; MG/1
TABLET ORAL
Qty: 30 TABLET | Refills: 2 | OUTPATIENT
Start: 2021-03-04

## 2021-03-08 RX ORDER — ACETAMINOPHEN AND CODEINE PHOSPHATE 300; 30 MG/1; MG/1
1 TABLET ORAL EVERY 6 HOURS PRN
Qty: 18 TABLET | Refills: 0 | Status: SHIPPED | OUTPATIENT
Start: 2021-03-09 | End: 2021-03-16

## 2021-03-09 ENCOUNTER — TELEPHONE (OUTPATIENT)
Dept: PRIMARY CARE CLINIC | Facility: CLINIC | Age: 76
End: 2021-03-09

## 2021-03-15 ENCOUNTER — TELEPHONE (OUTPATIENT)
Dept: PRIMARY CARE CLINIC | Facility: CLINIC | Age: 76
End: 2021-03-15

## 2021-03-16 ENCOUNTER — OFFICE VISIT (OUTPATIENT)
Dept: PRIMARY CARE CLINIC | Facility: CLINIC | Age: 76
End: 2021-03-16
Payer: MEDICARE

## 2021-03-16 VITALS
HEIGHT: 64 IN | RESPIRATION RATE: 18 BRPM | SYSTOLIC BLOOD PRESSURE: 152 MMHG | OXYGEN SATURATION: 97 % | HEART RATE: 90 BPM | TEMPERATURE: 98 F | DIASTOLIC BLOOD PRESSURE: 80 MMHG | WEIGHT: 121.5 LBS | BODY MASS INDEX: 20.74 KG/M2

## 2021-03-16 DIAGNOSIS — I25.10 CORONARY ARTERY DISEASE INVOLVING NATIVE CORONARY ARTERY OF NATIVE HEART WITHOUT ANGINA PECTORIS: Chronic | ICD-10-CM

## 2021-03-16 DIAGNOSIS — I50.32 CHRONIC DIASTOLIC CONGESTIVE HEART FAILURE: Chronic | ICD-10-CM

## 2021-03-16 DIAGNOSIS — R51.9 INTRACTABLE EPISODIC HEADACHE, UNSPECIFIED HEADACHE TYPE: ICD-10-CM

## 2021-03-16 DIAGNOSIS — M51.36 DDD (DEGENERATIVE DISC DISEASE), LUMBAR: ICD-10-CM

## 2021-03-16 DIAGNOSIS — K21.9 GASTROESOPHAGEAL REFLUX DISEASE, UNSPECIFIED WHETHER ESOPHAGITIS PRESENT: Chronic | ICD-10-CM

## 2021-03-16 DIAGNOSIS — G89.4 CHRONIC PAIN SYNDROME: ICD-10-CM

## 2021-03-16 DIAGNOSIS — Z12.31 VISIT FOR SCREENING MAMMOGRAM: ICD-10-CM

## 2021-03-16 DIAGNOSIS — F41.9 ANXIETY: ICD-10-CM

## 2021-03-16 DIAGNOSIS — F32.A DEPRESSION, UNSPECIFIED DEPRESSION TYPE: ICD-10-CM

## 2021-03-16 DIAGNOSIS — E78.00 HYPERCHOLESTEREMIA: Chronic | ICD-10-CM

## 2021-03-16 DIAGNOSIS — J44.9 CHRONIC OBSTRUCTIVE PULMONARY DISEASE, UNSPECIFIED COPD TYPE: Chronic | ICD-10-CM

## 2021-03-16 DIAGNOSIS — Z95.5 HX OF HEART ARTERY STENT: ICD-10-CM

## 2021-03-16 DIAGNOSIS — I10 ESSENTIAL HYPERTENSION: Primary | Chronic | ICD-10-CM

## 2021-03-16 DIAGNOSIS — E03.9 HYPOTHYROIDISM, UNSPECIFIED TYPE: Chronic | ICD-10-CM

## 2021-03-16 LAB
BILIRUB SERPL-MCNC: NORMAL MG/DL
BLOOD URINE, POC: NORMAL
CLARITY, POC UA: CLEAR
COLOR, POC UA: YELLOW
GLUCOSE UR QL STRIP: NORMAL
KETONES UR QL STRIP: NORMAL
LEUKOCYTE ESTERASE URINE, POC: NORMAL
NITRITE, POC UA: NORMAL
PH, POC UA: 5
PROTEIN, POC: NORMAL
SPECIFIC GRAVITY, POC UA: 1
UROBILINOGEN, POC UA: NORMAL

## 2021-03-16 PROCEDURE — 3288F FALL RISK ASSESSMENT DOCD: CPT | Mod: CPTII,S$GLB,, | Performed by: FAMILY MEDICINE

## 2021-03-16 PROCEDURE — 3079F DIAST BP 80-89 MM HG: CPT | Mod: CPTII,S$GLB,, | Performed by: FAMILY MEDICINE

## 2021-03-16 PROCEDURE — 99999 PR PBB SHADOW E&M-EST. PATIENT-LVL IV: ICD-10-PCS | Mod: PBBFAC,,, | Performed by: FAMILY MEDICINE

## 2021-03-16 PROCEDURE — 81002 POCT URINE DIPSTICK WITHOUT MICROSCOPE: ICD-10-PCS | Mod: S$GLB,,, | Performed by: FAMILY MEDICINE

## 2021-03-16 PROCEDURE — 3008F PR BODY MASS INDEX (BMI) DOCUMENTED: ICD-10-PCS | Mod: CPTII,S$GLB,, | Performed by: FAMILY MEDICINE

## 2021-03-16 PROCEDURE — 1101F PT FALLS ASSESS-DOCD LE1/YR: CPT | Mod: CPTII,S$GLB,, | Performed by: FAMILY MEDICINE

## 2021-03-16 PROCEDURE — 99999 PR PBB SHADOW E&M-EST. PATIENT-LVL IV: CPT | Mod: PBBFAC,,, | Performed by: FAMILY MEDICINE

## 2021-03-16 PROCEDURE — 99214 PR OFFICE/OUTPT VISIT, EST, LEVL IV, 30-39 MIN: ICD-10-PCS | Mod: 25,S$GLB,, | Performed by: FAMILY MEDICINE

## 2021-03-16 PROCEDURE — 1159F PR MEDICATION LIST DOCUMENTED IN MEDICAL RECORD: ICD-10-PCS | Mod: S$GLB,,, | Performed by: FAMILY MEDICINE

## 2021-03-16 PROCEDURE — 1101F PR PT FALLS ASSESS DOC 0-1 FALLS W/OUT INJ PAST YR: ICD-10-PCS | Mod: CPTII,S$GLB,, | Performed by: FAMILY MEDICINE

## 2021-03-16 PROCEDURE — 3077F PR MOST RECENT SYSTOLIC BLOOD PRESSURE >= 140 MM HG: ICD-10-PCS | Mod: CPTII,S$GLB,, | Performed by: FAMILY MEDICINE

## 2021-03-16 PROCEDURE — 1157F ADVNC CARE PLAN IN RCRD: CPT | Mod: S$GLB,,, | Performed by: FAMILY MEDICINE

## 2021-03-16 PROCEDURE — 99499 RISK ADDL DX/OHS AUDIT: ICD-10-PCS | Mod: S$GLB,,, | Performed by: FAMILY MEDICINE

## 2021-03-16 PROCEDURE — 99499 UNLISTED E&M SERVICE: CPT | Mod: S$GLB,,, | Performed by: FAMILY MEDICINE

## 2021-03-16 PROCEDURE — 81002 URINALYSIS NONAUTO W/O SCOPE: CPT | Mod: S$GLB,,, | Performed by: FAMILY MEDICINE

## 2021-03-16 PROCEDURE — 1159F MED LIST DOCD IN RCRD: CPT | Mod: S$GLB,,, | Performed by: FAMILY MEDICINE

## 2021-03-16 PROCEDURE — 3008F BODY MASS INDEX DOCD: CPT | Mod: CPTII,S$GLB,, | Performed by: FAMILY MEDICINE

## 2021-03-16 PROCEDURE — 1125F AMNT PAIN NOTED PAIN PRSNT: CPT | Mod: S$GLB,,, | Performed by: FAMILY MEDICINE

## 2021-03-16 PROCEDURE — 1157F PR ADVANCE CARE PLAN OR EQUIV PRESENT IN MEDICAL RECORD: ICD-10-PCS | Mod: S$GLB,,, | Performed by: FAMILY MEDICINE

## 2021-03-16 PROCEDURE — 3077F SYST BP >= 140 MM HG: CPT | Mod: CPTII,S$GLB,, | Performed by: FAMILY MEDICINE

## 2021-03-16 PROCEDURE — 99214 OFFICE O/P EST MOD 30 MIN: CPT | Mod: 25,S$GLB,, | Performed by: FAMILY MEDICINE

## 2021-03-16 PROCEDURE — 3288F PR FALLS RISK ASSESSMENT DOCUMENTED: ICD-10-PCS | Mod: CPTII,S$GLB,, | Performed by: FAMILY MEDICINE

## 2021-03-16 PROCEDURE — 3079F PR MOST RECENT DIASTOLIC BLOOD PRESSURE 80-89 MM HG: ICD-10-PCS | Mod: CPTII,S$GLB,, | Performed by: FAMILY MEDICINE

## 2021-03-16 PROCEDURE — 1125F PR PAIN SEVERITY QUANTIFIED, PAIN PRESENT: ICD-10-PCS | Mod: S$GLB,,, | Performed by: FAMILY MEDICINE

## 2021-03-16 RX ORDER — GABAPENTIN 300 MG/1
CAPSULE ORAL
Qty: 90 CAPSULE | Refills: 5 | Status: SHIPPED | OUTPATIENT
Start: 2021-03-16 | End: 2021-06-12

## 2021-03-16 RX ORDER — HYDROCODONE BITARTRATE AND ACETAMINOPHEN 5; 325 MG/1; MG/1
1 TABLET ORAL EVERY 6 HOURS PRN
Qty: 30 TABLET | Refills: 0 | OUTPATIENT
Start: 2021-03-16 | End: 2021-04-16

## 2021-03-16 RX ORDER — CYCLOBENZAPRINE HCL 10 MG
10 TABLET ORAL 3 TIMES DAILY PRN
Qty: 30 TABLET | Refills: 5 | Status: SHIPPED | OUTPATIENT
Start: 2021-03-16 | End: 2021-05-19

## 2021-03-16 RX ORDER — BUTALBITAL, ACETAMINOPHEN AND CAFFEINE 50; 325; 40 MG/1; MG/1; MG/1
TABLET ORAL
Qty: 30 TABLET | Refills: 2 | Status: SHIPPED | OUTPATIENT
Start: 2021-03-16 | End: 2021-05-10 | Stop reason: SDUPTHER

## 2021-03-16 RX ORDER — GABAPENTIN 100 MG/1
100 CAPSULE ORAL 3 TIMES DAILY
Qty: 90 CAPSULE | Refills: 2 | Status: CANCELLED | OUTPATIENT
Start: 2021-03-16

## 2021-03-16 RX ORDER — ALPRAZOLAM 0.5 MG/1
TABLET ORAL
Qty: 30 TABLET | Refills: 2 | Status: CANCELLED | OUTPATIENT
Start: 2021-03-16

## 2021-03-16 RX ORDER — ESCITALOPRAM OXALATE 10 MG/1
10 TABLET ORAL DAILY
Qty: 30 TABLET | Refills: 5 | Status: SHIPPED | OUTPATIENT
Start: 2021-03-16 | End: 2021-06-12

## 2021-03-19 ENCOUNTER — CLINICAL SUPPORT (OUTPATIENT)
Dept: PRIMARY CARE CLINIC | Facility: CLINIC | Age: 76
End: 2021-03-19
Payer: MEDICARE

## 2021-03-19 VITALS — DIASTOLIC BLOOD PRESSURE: 72 MMHG | SYSTOLIC BLOOD PRESSURE: 136 MMHG

## 2021-03-19 DIAGNOSIS — I10 ESSENTIAL HYPERTENSION: Primary | ICD-10-CM

## 2021-03-19 PROCEDURE — 99999 PR PBB SHADOW E&M-EST. PATIENT-LVL III: CPT | Mod: PBBFAC,,,

## 2021-03-19 PROCEDURE — 99999 PR PBB SHADOW E&M-EST. PATIENT-LVL III: ICD-10-PCS | Mod: PBBFAC,,,

## 2021-04-08 ENCOUNTER — TELEPHONE (OUTPATIENT)
Dept: PRIMARY CARE CLINIC | Facility: CLINIC | Age: 76
End: 2021-04-08

## 2021-04-08 ENCOUNTER — OFFICE VISIT (OUTPATIENT)
Dept: PRIMARY CARE CLINIC | Facility: CLINIC | Age: 76
End: 2021-04-08
Payer: MEDICARE

## 2021-04-08 DIAGNOSIS — M51.36 DDD (DEGENERATIVE DISC DISEASE), LUMBAR: ICD-10-CM

## 2021-04-08 DIAGNOSIS — I25.10 CORONARY ARTERY DISEASE INVOLVING NATIVE CORONARY ARTERY OF NATIVE HEART WITHOUT ANGINA PECTORIS: Chronic | ICD-10-CM

## 2021-04-08 DIAGNOSIS — Z95.5 HX OF HEART ARTERY STENT: ICD-10-CM

## 2021-04-08 DIAGNOSIS — F41.9 ANXIETY: ICD-10-CM

## 2021-04-08 DIAGNOSIS — I10 ESSENTIAL HYPERTENSION: Primary | Chronic | ICD-10-CM

## 2021-04-08 DIAGNOSIS — E78.00 HYPERCHOLESTEREMIA: Chronic | ICD-10-CM

## 2021-04-08 DIAGNOSIS — F32.A DEPRESSION, UNSPECIFIED DEPRESSION TYPE: ICD-10-CM

## 2021-04-08 DIAGNOSIS — J44.9 CHRONIC OBSTRUCTIVE PULMONARY DISEASE, UNSPECIFIED COPD TYPE: Chronic | ICD-10-CM

## 2021-04-08 DIAGNOSIS — R19.7 DIARRHEA, UNSPECIFIED TYPE: ICD-10-CM

## 2021-04-08 DIAGNOSIS — J06.9 UPPER RESPIRATORY TRACT INFECTION, UNSPECIFIED TYPE: ICD-10-CM

## 2021-04-08 PROCEDURE — 99499 UNLISTED E&M SERVICE: CPT | Mod: 95,,, | Performed by: FAMILY MEDICINE

## 2021-04-08 PROCEDURE — 1159F MED LIST DOCD IN RCRD: CPT | Mod: 95,,, | Performed by: FAMILY MEDICINE

## 2021-04-08 PROCEDURE — 1157F ADVNC CARE PLAN IN RCRD: CPT | Mod: 95,,, | Performed by: FAMILY MEDICINE

## 2021-04-08 PROCEDURE — 99443 PR PHYSICIAN TELEPHONE EVALUATION 21-30 MIN: CPT | Mod: 95,,, | Performed by: FAMILY MEDICINE

## 2021-04-08 PROCEDURE — 1157F PR ADVANCE CARE PLAN OR EQUIV PRESENT IN MEDICAL RECORD: ICD-10-PCS | Mod: 95,,, | Performed by: FAMILY MEDICINE

## 2021-04-08 PROCEDURE — 99443 PR PHYSICIAN TELEPHONE EVALUATION 21-30 MIN: ICD-10-PCS | Mod: 95,,, | Performed by: FAMILY MEDICINE

## 2021-04-08 PROCEDURE — 1159F PR MEDICATION LIST DOCUMENTED IN MEDICAL RECORD: ICD-10-PCS | Mod: 95,,, | Performed by: FAMILY MEDICINE

## 2021-04-08 PROCEDURE — 99499 RISK ADDL DX/OHS AUDIT: ICD-10-PCS | Mod: 95,,, | Performed by: FAMILY MEDICINE

## 2021-04-08 RX ORDER — PROMETHAZINE HYDROCHLORIDE AND CODEINE PHOSPHATE 6.25; 1 MG/5ML; MG/5ML
5 SOLUTION ORAL EVERY 6 HOURS PRN
Qty: 180 ML | Refills: 0 | Status: SHIPPED | OUTPATIENT
Start: 2021-04-08 | End: 2021-04-08

## 2021-04-08 RX ORDER — DIPHENOXYLATE HYDROCHLORIDE AND ATROPINE SULFATE 2.5; .025 MG/1; MG/1
TABLET ORAL
Qty: 20 TABLET | Refills: 0 | Status: SHIPPED | OUTPATIENT
Start: 2021-04-08 | End: 2021-09-08

## 2021-04-08 RX ORDER — PREDNISONE 5 MG/1
TABLET ORAL
Qty: 20 TABLET | Refills: 0 | Status: SHIPPED | OUTPATIENT
Start: 2021-04-08 | End: 2021-06-01

## 2021-04-08 RX ORDER — AZITHROMYCIN 250 MG/1
TABLET, FILM COATED ORAL
Qty: 6 TABLET | Refills: 0 | Status: SHIPPED | OUTPATIENT
Start: 2021-04-08 | End: 2021-04-12

## 2021-04-08 RX ORDER — ALBUTEROL SULFATE 90 UG/1
AEROSOL, METERED RESPIRATORY (INHALATION)
Qty: 18 G | Refills: 5 | Status: SHIPPED | OUTPATIENT
Start: 2021-04-08 | End: 2021-05-10 | Stop reason: SDUPTHER

## 2021-04-08 RX ORDER — CODEINE PHOSPHATE AND GUAIFENESIN 10; 100 MG/5ML; MG/5ML
5 SOLUTION ORAL EVERY 6 HOURS PRN
Qty: 180 ML | Refills: 0 | Status: SHIPPED | OUTPATIENT
Start: 2021-04-08 | End: 2021-06-01 | Stop reason: SDUPTHER

## 2021-04-20 ENCOUNTER — HOSPITAL ENCOUNTER (OUTPATIENT)
Dept: RADIOLOGY | Facility: HOSPITAL | Age: 76
Discharge: HOME | End: 2021-04-20
Attending: FAMILY MEDICINE
Payer: MEDICARE

## 2021-04-20 VITALS — HEIGHT: 64 IN | WEIGHT: 121.25 LBS | BODY MASS INDEX: 20.7 KG/M2

## 2021-04-20 DIAGNOSIS — Z12.31 VISIT FOR SCREENING MAMMOGRAM: ICD-10-CM

## 2021-04-20 PROCEDURE — 77067 SCR MAMMO BI INCL CAD: CPT | Mod: TC,PO

## 2021-04-21 ENCOUNTER — OFFICE VISIT (OUTPATIENT)
Dept: PRIMARY CARE CLINIC | Facility: CLINIC | Age: 76
End: 2021-04-21
Payer: MEDICARE

## 2021-04-21 DIAGNOSIS — J44.9 CHRONIC OBSTRUCTIVE PULMONARY DISEASE, UNSPECIFIED COPD TYPE: Primary | Chronic | ICD-10-CM

## 2021-04-21 DIAGNOSIS — K21.9 GASTROESOPHAGEAL REFLUX DISEASE, UNSPECIFIED WHETHER ESOPHAGITIS PRESENT: Chronic | ICD-10-CM

## 2021-04-21 DIAGNOSIS — E03.9 HYPOTHYROIDISM, UNSPECIFIED TYPE: Chronic | ICD-10-CM

## 2021-04-21 DIAGNOSIS — Z95.5 HX OF HEART ARTERY STENT: ICD-10-CM

## 2021-04-21 DIAGNOSIS — E78.00 HYPERCHOLESTEREMIA: Chronic | ICD-10-CM

## 2021-04-21 DIAGNOSIS — I10 ESSENTIAL HYPERTENSION: Chronic | ICD-10-CM

## 2021-04-21 DIAGNOSIS — R51.9 INTRACTABLE EPISODIC HEADACHE, UNSPECIFIED HEADACHE TYPE: ICD-10-CM

## 2021-04-21 DIAGNOSIS — I50.32 CHRONIC DIASTOLIC CONGESTIVE HEART FAILURE: Chronic | ICD-10-CM

## 2021-04-21 DIAGNOSIS — D64.9 ANEMIA, UNSPECIFIED TYPE: ICD-10-CM

## 2021-04-21 PROCEDURE — 99443 PR PHYSICIAN TELEPHONE EVALUATION 21-30 MIN: CPT | Mod: 95,,, | Performed by: FAMILY MEDICINE

## 2021-04-21 PROCEDURE — 1157F PR ADVANCE CARE PLAN OR EQUIV PRESENT IN MEDICAL RECORD: ICD-10-PCS | Mod: 95,,, | Performed by: FAMILY MEDICINE

## 2021-04-21 PROCEDURE — 1159F MED LIST DOCD IN RCRD: CPT | Mod: 95,,, | Performed by: FAMILY MEDICINE

## 2021-04-21 PROCEDURE — 1159F PR MEDICATION LIST DOCUMENTED IN MEDICAL RECORD: ICD-10-PCS | Mod: 95,,, | Performed by: FAMILY MEDICINE

## 2021-04-21 PROCEDURE — 1157F ADVNC CARE PLAN IN RCRD: CPT | Mod: 95,,, | Performed by: FAMILY MEDICINE

## 2021-04-21 PROCEDURE — 99443 PR PHYSICIAN TELEPHONE EVALUATION 21-30 MIN: ICD-10-PCS | Mod: 95,,, | Performed by: FAMILY MEDICINE

## 2021-04-21 RX ORDER — BUTALBITAL, ACETAMINOPHEN AND CAFFEINE 50; 325; 40 MG/1; MG/1; MG/1
TABLET ORAL
Qty: 30 TABLET | Refills: 1 | Status: SHIPPED | OUTPATIENT
Start: 2021-04-21 | End: 2021-06-01

## 2021-04-22 ENCOUNTER — TELEPHONE (OUTPATIENT)
Dept: PRIMARY CARE CLINIC | Facility: CLINIC | Age: 76
End: 2021-04-22

## 2021-04-22 DIAGNOSIS — F41.9 ANXIETY: ICD-10-CM

## 2021-04-22 RX ORDER — ALPRAZOLAM 0.5 MG/1
TABLET ORAL
Qty: 30 TABLET | Refills: 2 | Status: SHIPPED | OUTPATIENT
Start: 2021-04-22 | End: 2021-09-08

## 2021-04-29 ENCOUNTER — TELEPHONE (OUTPATIENT)
Dept: PAIN MEDICINE | Facility: CLINIC | Age: 76
End: 2021-04-29

## 2021-05-06 ENCOUNTER — TELEPHONE (OUTPATIENT)
Dept: PRIMARY CARE CLINIC | Facility: CLINIC | Age: 76
End: 2021-05-06

## 2021-05-10 ENCOUNTER — OFFICE VISIT (OUTPATIENT)
Dept: PRIMARY CARE CLINIC | Facility: CLINIC | Age: 76
End: 2021-05-10
Payer: MEDICARE

## 2021-05-10 DIAGNOSIS — R51.9 INTRACTABLE EPISODIC HEADACHE, UNSPECIFIED HEADACHE TYPE: ICD-10-CM

## 2021-05-10 PROCEDURE — 99443 PR PHYSICIAN TELEPHONE EVALUATION 21-30 MIN: CPT | Mod: 95,,, | Performed by: FAMILY MEDICINE

## 2021-05-10 PROCEDURE — 1157F PR ADVANCE CARE PLAN OR EQUIV PRESENT IN MEDICAL RECORD: ICD-10-PCS | Mod: 95,,, | Performed by: FAMILY MEDICINE

## 2021-05-10 PROCEDURE — 1159F MED LIST DOCD IN RCRD: CPT | Mod: 95,,, | Performed by: FAMILY MEDICINE

## 2021-05-10 PROCEDURE — 1159F PR MEDICATION LIST DOCUMENTED IN MEDICAL RECORD: ICD-10-PCS | Mod: 95,,, | Performed by: FAMILY MEDICINE

## 2021-05-10 PROCEDURE — 1157F ADVNC CARE PLAN IN RCRD: CPT | Mod: 95,,, | Performed by: FAMILY MEDICINE

## 2021-05-10 PROCEDURE — 99443 PR PHYSICIAN TELEPHONE EVALUATION 21-30 MIN: ICD-10-PCS | Mod: 95,,, | Performed by: FAMILY MEDICINE

## 2021-05-10 RX ORDER — ALBUTEROL SULFATE 90 UG/1
AEROSOL, METERED RESPIRATORY (INHALATION)
Qty: 18 G | Refills: 5 | Status: SHIPPED | OUTPATIENT
Start: 2021-05-10 | End: 2021-09-08

## 2021-05-10 RX ORDER — BUTALBITAL, ACETAMINOPHEN AND CAFFEINE 50; 325; 40 MG/1; MG/1; MG/1
TABLET ORAL
Qty: 40 TABLET | Refills: 2 | Status: SHIPPED | OUTPATIENT
Start: 2021-05-10 | End: 2021-06-01 | Stop reason: SDUPTHER

## 2021-05-12 ENCOUNTER — TELEPHONE (OUTPATIENT)
Dept: PRIMARY CARE CLINIC | Facility: CLINIC | Age: 76
End: 2021-05-12

## 2021-06-01 ENCOUNTER — OFFICE VISIT (OUTPATIENT)
Dept: PRIMARY CARE CLINIC | Facility: CLINIC | Age: 76
End: 2021-06-01
Payer: COMMERCIAL

## 2021-06-01 VITALS
WEIGHT: 119.38 LBS | HEIGHT: 64 IN | BODY MASS INDEX: 20.38 KG/M2 | DIASTOLIC BLOOD PRESSURE: 79 MMHG | SYSTOLIC BLOOD PRESSURE: 160 MMHG | OXYGEN SATURATION: 100 % | HEART RATE: 87 BPM | RESPIRATION RATE: 18 BRPM

## 2021-06-01 DIAGNOSIS — Z87.891 HISTORY OF TOBACCO ABUSE: ICD-10-CM

## 2021-06-01 DIAGNOSIS — F32.A DEPRESSION, UNSPECIFIED DEPRESSION TYPE: ICD-10-CM

## 2021-06-01 DIAGNOSIS — D64.9 ANEMIA, UNSPECIFIED TYPE: ICD-10-CM

## 2021-06-01 DIAGNOSIS — I25.10 CORONARY ARTERY DISEASE INVOLVING NATIVE CORONARY ARTERY OF NATIVE HEART WITHOUT ANGINA PECTORIS: Chronic | ICD-10-CM

## 2021-06-01 DIAGNOSIS — E03.9 HYPOTHYROIDISM, UNSPECIFIED TYPE: Chronic | ICD-10-CM

## 2021-06-01 DIAGNOSIS — K21.9 GASTROESOPHAGEAL REFLUX DISEASE, UNSPECIFIED WHETHER ESOPHAGITIS PRESENT: Chronic | ICD-10-CM

## 2021-06-01 DIAGNOSIS — F41.9 ANXIETY: ICD-10-CM

## 2021-06-01 DIAGNOSIS — Z95.5 HX OF HEART ARTERY STENT: ICD-10-CM

## 2021-06-01 DIAGNOSIS — J44.9 CHRONIC OBSTRUCTIVE PULMONARY DISEASE, UNSPECIFIED COPD TYPE: Chronic | ICD-10-CM

## 2021-06-01 DIAGNOSIS — I50.32 CHRONIC DIASTOLIC CONGESTIVE HEART FAILURE: Chronic | ICD-10-CM

## 2021-06-01 DIAGNOSIS — J06.9 UPPER RESPIRATORY TRACT INFECTION, UNSPECIFIED TYPE: Primary | ICD-10-CM

## 2021-06-01 DIAGNOSIS — E78.00 HYPERCHOLESTEREMIA: Chronic | ICD-10-CM

## 2021-06-01 DIAGNOSIS — R51.9 INTRACTABLE EPISODIC HEADACHE, UNSPECIFIED HEADACHE TYPE: ICD-10-CM

## 2021-06-01 DIAGNOSIS — E44.1 MALNUTRITION OF MILD DEGREE: Chronic | ICD-10-CM

## 2021-06-01 PROCEDURE — 1125F PR PAIN SEVERITY QUANTIFIED, PAIN PRESENT: ICD-10-PCS | Mod: S$GLB,,, | Performed by: FAMILY MEDICINE

## 2021-06-01 PROCEDURE — 1159F MED LIST DOCD IN RCRD: CPT | Mod: S$GLB,,, | Performed by: FAMILY MEDICINE

## 2021-06-01 PROCEDURE — 3288F FALL RISK ASSESSMENT DOCD: CPT | Mod: CPTII,S$GLB,, | Performed by: FAMILY MEDICINE

## 2021-06-01 PROCEDURE — 1125F AMNT PAIN NOTED PAIN PRSNT: CPT | Mod: S$GLB,,, | Performed by: FAMILY MEDICINE

## 2021-06-01 PROCEDURE — 1159F PR MEDICATION LIST DOCUMENTED IN MEDICAL RECORD: ICD-10-PCS | Mod: S$GLB,,, | Performed by: FAMILY MEDICINE

## 2021-06-01 PROCEDURE — 3008F BODY MASS INDEX DOCD: CPT | Mod: CPTII,S$GLB,, | Performed by: FAMILY MEDICINE

## 2021-06-01 PROCEDURE — 99999 PR PBB SHADOW E&M-EST. PATIENT-LVL IV: ICD-10-PCS | Mod: PBBFAC,,, | Performed by: FAMILY MEDICINE

## 2021-06-01 PROCEDURE — 99214 PR OFFICE/OUTPT VISIT, EST, LEVL IV, 30-39 MIN: ICD-10-PCS | Mod: S$GLB,,, | Performed by: FAMILY MEDICINE

## 2021-06-01 PROCEDURE — 99999 PR PBB SHADOW E&M-EST. PATIENT-LVL IV: CPT | Mod: PBBFAC,,, | Performed by: FAMILY MEDICINE

## 2021-06-01 PROCEDURE — 1100F PR PT FALLS ASSESS DOC 2+ FALLS/FALL W/INJURY/YR: ICD-10-PCS | Mod: CPTII,S$GLB,, | Performed by: FAMILY MEDICINE

## 2021-06-01 PROCEDURE — 3288F PR FALLS RISK ASSESSMENT DOCUMENTED: ICD-10-PCS | Mod: CPTII,S$GLB,, | Performed by: FAMILY MEDICINE

## 2021-06-01 PROCEDURE — 1100F PTFALLS ASSESS-DOCD GE2>/YR: CPT | Mod: CPTII,S$GLB,, | Performed by: FAMILY MEDICINE

## 2021-06-01 PROCEDURE — 99214 OFFICE O/P EST MOD 30 MIN: CPT | Mod: S$GLB,,, | Performed by: FAMILY MEDICINE

## 2021-06-01 PROCEDURE — 3008F PR BODY MASS INDEX (BMI) DOCUMENTED: ICD-10-PCS | Mod: CPTII,S$GLB,, | Performed by: FAMILY MEDICINE

## 2021-06-01 PROCEDURE — 1157F ADVNC CARE PLAN IN RCRD: CPT | Mod: S$GLB,,, | Performed by: FAMILY MEDICINE

## 2021-06-01 PROCEDURE — 1157F PR ADVANCE CARE PLAN OR EQUIV PRESENT IN MEDICAL RECORD: ICD-10-PCS | Mod: S$GLB,,, | Performed by: FAMILY MEDICINE

## 2021-06-01 RX ORDER — BUTALBITAL, ACETAMINOPHEN AND CAFFEINE 50; 325; 40 MG/1; MG/1; MG/1
TABLET ORAL
Qty: 40 TABLET | Refills: 2 | Status: SHIPPED | OUTPATIENT
Start: 2021-06-01 | End: 2021-06-22 | Stop reason: SDUPTHER

## 2021-06-01 RX ORDER — AZITHROMYCIN 250 MG/1
TABLET, FILM COATED ORAL
Qty: 6 TABLET | Refills: 0 | Status: SHIPPED | OUTPATIENT
Start: 2021-06-01 | End: 2021-06-05

## 2021-06-01 RX ORDER — CODEINE PHOSPHATE AND GUAIFENESIN 10; 100 MG/5ML; MG/5ML
5 SOLUTION ORAL EVERY 6 HOURS PRN
Qty: 180 ML | Refills: 0 | Status: SHIPPED | OUTPATIENT
Start: 2021-06-01 | End: 2021-06-12

## 2021-06-16 ENCOUNTER — TELEPHONE (OUTPATIENT)
Dept: PRIMARY CARE CLINIC | Facility: CLINIC | Age: 76
End: 2021-06-16

## 2021-06-17 ENCOUNTER — TELEPHONE (OUTPATIENT)
Dept: PRIMARY CARE CLINIC | Facility: CLINIC | Age: 76
End: 2021-06-17

## 2021-06-18 ENCOUNTER — TELEPHONE (OUTPATIENT)
Dept: PRIMARY CARE CLINIC | Facility: CLINIC | Age: 76
End: 2021-06-18

## 2021-06-22 ENCOUNTER — OFFICE VISIT (OUTPATIENT)
Dept: PRIMARY CARE CLINIC | Facility: CLINIC | Age: 76
End: 2021-06-22
Payer: COMMERCIAL

## 2021-06-22 VITALS
WEIGHT: 117.31 LBS | TEMPERATURE: 98 F | DIASTOLIC BLOOD PRESSURE: 66 MMHG | BODY MASS INDEX: 20.03 KG/M2 | HEIGHT: 64 IN | HEART RATE: 89 BPM | OXYGEN SATURATION: 96 % | RESPIRATION RATE: 18 BRPM | SYSTOLIC BLOOD PRESSURE: 138 MMHG

## 2021-06-22 DIAGNOSIS — I50.32 CHRONIC DIASTOLIC CONGESTIVE HEART FAILURE: Chronic | ICD-10-CM

## 2021-06-22 DIAGNOSIS — F41.9 ANXIETY: ICD-10-CM

## 2021-06-22 DIAGNOSIS — K21.9 GASTROESOPHAGEAL REFLUX DISEASE, UNSPECIFIED WHETHER ESOPHAGITIS PRESENT: Chronic | ICD-10-CM

## 2021-06-22 DIAGNOSIS — I10 ESSENTIAL HYPERTENSION: Chronic | ICD-10-CM

## 2021-06-22 DIAGNOSIS — R51.9 INTRACTABLE EPISODIC HEADACHE, UNSPECIFIED HEADACHE TYPE: ICD-10-CM

## 2021-06-22 DIAGNOSIS — M79.604 LOWER LIMB PAIN, DIFFUSE, RIGHT: Primary | ICD-10-CM

## 2021-06-22 DIAGNOSIS — J44.9 CHRONIC OBSTRUCTIVE PULMONARY DISEASE, UNSPECIFIED COPD TYPE: Chronic | ICD-10-CM

## 2021-06-22 DIAGNOSIS — Z95.5 HX OF HEART ARTERY STENT: ICD-10-CM

## 2021-06-22 DIAGNOSIS — F32.A DEPRESSION, UNSPECIFIED DEPRESSION TYPE: ICD-10-CM

## 2021-06-22 DIAGNOSIS — M51.36 DDD (DEGENERATIVE DISC DISEASE), LUMBAR: ICD-10-CM

## 2021-06-22 DIAGNOSIS — E03.9 HYPOTHYROIDISM, UNSPECIFIED TYPE: Chronic | ICD-10-CM

## 2021-06-22 DIAGNOSIS — M19.90 OSTEOARTHRITIS, UNSPECIFIED OSTEOARTHRITIS TYPE, UNSPECIFIED SITE: ICD-10-CM

## 2021-06-22 PROCEDURE — 1159F PR MEDICATION LIST DOCUMENTED IN MEDICAL RECORD: ICD-10-PCS | Mod: S$GLB,,, | Performed by: FAMILY MEDICINE

## 2021-06-22 PROCEDURE — 3288F FALL RISK ASSESSMENT DOCD: CPT | Mod: CPTII,S$GLB,, | Performed by: FAMILY MEDICINE

## 2021-06-22 PROCEDURE — 3008F PR BODY MASS INDEX (BMI) DOCUMENTED: ICD-10-PCS | Mod: CPTII,S$GLB,, | Performed by: FAMILY MEDICINE

## 2021-06-22 PROCEDURE — 1157F ADVNC CARE PLAN IN RCRD: CPT | Mod: S$GLB,,, | Performed by: FAMILY MEDICINE

## 2021-06-22 PROCEDURE — 1126F PR PAIN SEVERITY QUANTIFIED, NO PAIN PRESENT: ICD-10-PCS | Mod: S$GLB,,, | Performed by: FAMILY MEDICINE

## 2021-06-22 PROCEDURE — 1101F PT FALLS ASSESS-DOCD LE1/YR: CPT | Mod: CPTII,S$GLB,, | Performed by: FAMILY MEDICINE

## 2021-06-22 PROCEDURE — 1126F AMNT PAIN NOTED NONE PRSNT: CPT | Mod: S$GLB,,, | Performed by: FAMILY MEDICINE

## 2021-06-22 PROCEDURE — 99214 OFFICE O/P EST MOD 30 MIN: CPT | Mod: S$GLB,,, | Performed by: FAMILY MEDICINE

## 2021-06-22 PROCEDURE — 1157F PR ADVANCE CARE PLAN OR EQUIV PRESENT IN MEDICAL RECORD: ICD-10-PCS | Mod: S$GLB,,, | Performed by: FAMILY MEDICINE

## 2021-06-22 PROCEDURE — 99999 PR PBB SHADOW E&M-EST. PATIENT-LVL IV: CPT | Mod: PBBFAC,,, | Performed by: FAMILY MEDICINE

## 2021-06-22 PROCEDURE — 99214 PR OFFICE/OUTPT VISIT, EST, LEVL IV, 30-39 MIN: ICD-10-PCS | Mod: S$GLB,,, | Performed by: FAMILY MEDICINE

## 2021-06-22 PROCEDURE — 1159F MED LIST DOCD IN RCRD: CPT | Mod: S$GLB,,, | Performed by: FAMILY MEDICINE

## 2021-06-22 PROCEDURE — 99999 PR PBB SHADOW E&M-EST. PATIENT-LVL IV: ICD-10-PCS | Mod: PBBFAC,,, | Performed by: FAMILY MEDICINE

## 2021-06-22 PROCEDURE — 3008F BODY MASS INDEX DOCD: CPT | Mod: CPTII,S$GLB,, | Performed by: FAMILY MEDICINE

## 2021-06-22 PROCEDURE — 1101F PR PT FALLS ASSESS DOC 0-1 FALLS W/OUT INJ PAST YR: ICD-10-PCS | Mod: CPTII,S$GLB,, | Performed by: FAMILY MEDICINE

## 2021-06-22 PROCEDURE — 3288F PR FALLS RISK ASSESSMENT DOCUMENTED: ICD-10-PCS | Mod: CPTII,S$GLB,, | Performed by: FAMILY MEDICINE

## 2021-06-22 RX ORDER — ATORVASTATIN CALCIUM 40 MG/1
40 TABLET, FILM COATED ORAL DAILY
Qty: 30 TABLET | Refills: 5 | Status: SHIPPED | OUTPATIENT
Start: 2021-06-22 | End: 2021-07-12

## 2021-06-22 RX ORDER — ACETAMINOPHEN AND CODEINE PHOSPHATE 300; 30 MG/1; MG/1
1 TABLET ORAL EVERY 4 HOURS PRN
Qty: 30 TABLET | Refills: 0 | Status: SHIPPED | OUTPATIENT
Start: 2021-06-22 | End: 2021-07-02

## 2021-06-22 RX ORDER — OMEPRAZOLE 40 MG/1
40 CAPSULE, DELAYED RELEASE ORAL DAILY
Qty: 30 CAPSULE | Refills: 5 | Status: SHIPPED | OUTPATIENT
Start: 2021-06-22 | End: 2021-09-22 | Stop reason: SDUPTHER

## 2021-06-22 RX ORDER — ALPRAZOLAM 0.5 MG/1
TABLET ORAL
Qty: 30 TABLET | Refills: 2 | Status: CANCELLED | OUTPATIENT
Start: 2021-06-22

## 2021-06-22 RX ORDER — BUTALBITAL, ACETAMINOPHEN AND CAFFEINE 50; 325; 40 MG/1; MG/1; MG/1
TABLET ORAL
Qty: 40 TABLET | Refills: 2 | Status: SHIPPED | OUTPATIENT
Start: 2021-06-22 | End: 2021-09-08

## 2021-06-24 ENCOUNTER — TELEPHONE (OUTPATIENT)
Dept: PRIMARY CARE CLINIC | Facility: CLINIC | Age: 76
End: 2021-06-24

## 2021-07-01 ENCOUNTER — TELEPHONE (OUTPATIENT)
Dept: PRIMARY CARE CLINIC | Facility: CLINIC | Age: 76
End: 2021-07-01

## 2021-07-07 DIAGNOSIS — F41.9 ANXIETY: ICD-10-CM

## 2021-07-07 RX ORDER — ALPRAZOLAM 0.5 MG/1
TABLET ORAL
Qty: 30 TABLET | Refills: 2 | OUTPATIENT
Start: 2021-07-07

## 2021-07-08 ENCOUNTER — TELEPHONE (OUTPATIENT)
Dept: PRIMARY CARE CLINIC | Facility: CLINIC | Age: 76
End: 2021-07-08

## 2021-07-09 ENCOUNTER — TELEPHONE (OUTPATIENT)
Dept: PRIMARY CARE CLINIC | Facility: CLINIC | Age: 76
End: 2021-07-09

## 2021-07-12 ENCOUNTER — OFFICE VISIT (OUTPATIENT)
Dept: PRIMARY CARE CLINIC | Facility: CLINIC | Age: 76
End: 2021-07-12
Payer: COMMERCIAL

## 2021-07-12 VITALS
HEART RATE: 91 BPM | HEIGHT: 64 IN | TEMPERATURE: 98 F | BODY MASS INDEX: 20.35 KG/M2 | SYSTOLIC BLOOD PRESSURE: 128 MMHG | WEIGHT: 119.19 LBS | OXYGEN SATURATION: 98 % | DIASTOLIC BLOOD PRESSURE: 86 MMHG | RESPIRATION RATE: 18 BRPM

## 2021-07-12 DIAGNOSIS — F32.A DEPRESSION, UNSPECIFIED DEPRESSION TYPE: ICD-10-CM

## 2021-07-12 DIAGNOSIS — I25.10 CORONARY ARTERY DISEASE INVOLVING NATIVE CORONARY ARTERY OF NATIVE HEART WITHOUT ANGINA PECTORIS: Chronic | ICD-10-CM

## 2021-07-12 DIAGNOSIS — G89.4 CHRONIC PAIN SYNDROME: Primary | ICD-10-CM

## 2021-07-12 DIAGNOSIS — F41.9 ANXIETY: ICD-10-CM

## 2021-07-12 DIAGNOSIS — E03.9 HYPOTHYROIDISM, UNSPECIFIED TYPE: Chronic | ICD-10-CM

## 2021-07-12 DIAGNOSIS — K21.9 GASTROESOPHAGEAL REFLUX DISEASE, UNSPECIFIED WHETHER ESOPHAGITIS PRESENT: Chronic | ICD-10-CM

## 2021-07-12 DIAGNOSIS — M51.36 DDD (DEGENERATIVE DISC DISEASE), LUMBAR: ICD-10-CM

## 2021-07-12 DIAGNOSIS — E78.00 HYPERCHOLESTEREMIA: Chronic | ICD-10-CM

## 2021-07-12 DIAGNOSIS — J18.9 PNEUMONIA OF RIGHT LOWER LOBE DUE TO INFECTIOUS ORGANISM: ICD-10-CM

## 2021-07-12 PROCEDURE — 99999 PR PBB SHADOW E&M-EST. PATIENT-LVL III: CPT | Mod: PBBFAC,,, | Performed by: FAMILY MEDICINE

## 2021-07-12 PROCEDURE — 1159F MED LIST DOCD IN RCRD: CPT | Mod: S$GLB,,, | Performed by: FAMILY MEDICINE

## 2021-07-12 PROCEDURE — 1101F PR PT FALLS ASSESS DOC 0-1 FALLS W/OUT INJ PAST YR: ICD-10-PCS | Mod: CPTII,S$GLB,, | Performed by: FAMILY MEDICINE

## 2021-07-12 PROCEDURE — 3008F PR BODY MASS INDEX (BMI) DOCUMENTED: ICD-10-PCS | Mod: CPTII,S$GLB,, | Performed by: FAMILY MEDICINE

## 2021-07-12 PROCEDURE — 3008F BODY MASS INDEX DOCD: CPT | Mod: CPTII,S$GLB,, | Performed by: FAMILY MEDICINE

## 2021-07-12 PROCEDURE — 99999 PR PBB SHADOW E&M-EST. PATIENT-LVL III: ICD-10-PCS | Mod: PBBFAC,,, | Performed by: FAMILY MEDICINE

## 2021-07-12 PROCEDURE — 1101F PT FALLS ASSESS-DOCD LE1/YR: CPT | Mod: CPTII,S$GLB,, | Performed by: FAMILY MEDICINE

## 2021-07-12 PROCEDURE — 99214 PR OFFICE/OUTPT VISIT, EST, LEVL IV, 30-39 MIN: ICD-10-PCS | Mod: S$GLB,,, | Performed by: FAMILY MEDICINE

## 2021-07-12 PROCEDURE — 99214 OFFICE O/P EST MOD 30 MIN: CPT | Mod: S$GLB,,, | Performed by: FAMILY MEDICINE

## 2021-07-12 PROCEDURE — 1126F AMNT PAIN NOTED NONE PRSNT: CPT | Mod: S$GLB,,, | Performed by: FAMILY MEDICINE

## 2021-07-12 PROCEDURE — 1159F PR MEDICATION LIST DOCUMENTED IN MEDICAL RECORD: ICD-10-PCS | Mod: S$GLB,,, | Performed by: FAMILY MEDICINE

## 2021-07-12 PROCEDURE — 1157F PR ADVANCE CARE PLAN OR EQUIV PRESENT IN MEDICAL RECORD: ICD-10-PCS | Mod: S$GLB,,, | Performed by: FAMILY MEDICINE

## 2021-07-12 PROCEDURE — 1157F ADVNC CARE PLAN IN RCRD: CPT | Mod: S$GLB,,, | Performed by: FAMILY MEDICINE

## 2021-07-12 PROCEDURE — 1126F PR PAIN SEVERITY QUANTIFIED, NO PAIN PRESENT: ICD-10-PCS | Mod: S$GLB,,, | Performed by: FAMILY MEDICINE

## 2021-07-12 PROCEDURE — 3288F PR FALLS RISK ASSESSMENT DOCUMENTED: ICD-10-PCS | Mod: CPTII,S$GLB,, | Performed by: FAMILY MEDICINE

## 2021-07-12 PROCEDURE — 3288F FALL RISK ASSESSMENT DOCD: CPT | Mod: CPTII,S$GLB,, | Performed by: FAMILY MEDICINE

## 2021-07-12 RX ORDER — ALPRAZOLAM 0.5 MG/1
TABLET ORAL
Qty: 30 TABLET | Refills: 2 | Status: CANCELLED | OUTPATIENT
Start: 2021-07-12

## 2021-07-12 RX ORDER — ACETAMINOPHEN AND CODEINE PHOSPHATE 300; 30 MG/1; MG/1
1 TABLET ORAL EVERY 4 HOURS PRN
Qty: 30 TABLET | Refills: 1 | Status: SHIPPED | OUTPATIENT
Start: 2021-07-12 | End: 2021-09-22 | Stop reason: SDUPTHER

## 2021-07-27 ENCOUNTER — TELEPHONE (OUTPATIENT)
Dept: NEUROLOGY | Facility: CLINIC | Age: 76
End: 2021-07-27

## 2021-07-27 DIAGNOSIS — M51.36 DDD (DEGENERATIVE DISC DISEASE), LUMBAR: Primary | ICD-10-CM

## 2021-07-27 DIAGNOSIS — F41.9 ANXIETY: ICD-10-CM

## 2021-07-27 RX ORDER — ACETAMINOPHEN AND CODEINE PHOSPHATE 300; 30 MG/1; MG/1
TABLET ORAL
Qty: 30 TABLET | Refills: 1 | OUTPATIENT
Start: 2021-07-27

## 2021-08-03 ENCOUNTER — TELEPHONE (OUTPATIENT)
Dept: PRIMARY CARE CLINIC | Facility: CLINIC | Age: 76
End: 2021-08-03

## 2021-08-04 DIAGNOSIS — G89.4 CHRONIC PAIN SYNDROME: ICD-10-CM

## 2021-08-04 DIAGNOSIS — F41.9 ANXIETY: ICD-10-CM

## 2021-08-04 DIAGNOSIS — R51.9 INTRACTABLE EPISODIC HEADACHE, UNSPECIFIED HEADACHE TYPE: Primary | ICD-10-CM

## 2021-08-06 ENCOUNTER — TELEPHONE (OUTPATIENT)
Dept: PSYCHIATRY | Facility: CLINIC | Age: 76
End: 2021-08-06

## 2021-08-06 ENCOUNTER — TELEPHONE (OUTPATIENT)
Dept: PRIMARY CARE CLINIC | Facility: CLINIC | Age: 76
End: 2021-08-06

## 2021-08-11 ENCOUNTER — TELEPHONE (OUTPATIENT)
Dept: PRIMARY CARE CLINIC | Facility: CLINIC | Age: 76
End: 2021-08-11

## 2021-08-11 ENCOUNTER — TELEPHONE (OUTPATIENT)
Dept: NEUROLOGY | Facility: CLINIC | Age: 76
End: 2021-08-11

## 2021-08-13 ENCOUNTER — TELEPHONE (OUTPATIENT)
Dept: PRIMARY CARE CLINIC | Facility: CLINIC | Age: 76
End: 2021-08-13

## 2021-08-13 ENCOUNTER — OFFICE VISIT (OUTPATIENT)
Dept: PRIMARY CARE CLINIC | Facility: CLINIC | Age: 76
End: 2021-08-13
Payer: MEDICARE

## 2021-08-13 VITALS
BODY MASS INDEX: 19.59 KG/M2 | HEART RATE: 83 BPM | TEMPERATURE: 98 F | WEIGHT: 114.75 LBS | HEIGHT: 64 IN | RESPIRATION RATE: 18 BRPM | DIASTOLIC BLOOD PRESSURE: 80 MMHG | OXYGEN SATURATION: 99 % | SYSTOLIC BLOOD PRESSURE: 132 MMHG

## 2021-08-13 DIAGNOSIS — J32.9 SINUSITIS, UNSPECIFIED CHRONICITY, UNSPECIFIED LOCATION: Primary | ICD-10-CM

## 2021-08-13 DIAGNOSIS — J18.9 PNEUMONIA OF RIGHT LOWER LOBE DUE TO INFECTIOUS ORGANISM: ICD-10-CM

## 2021-08-13 DIAGNOSIS — J44.9 CHRONIC OBSTRUCTIVE PULMONARY DISEASE, UNSPECIFIED COPD TYPE: Chronic | ICD-10-CM

## 2021-08-13 DIAGNOSIS — J40 BRONCHITIS: ICD-10-CM

## 2021-08-13 DIAGNOSIS — Z95.5 HX OF HEART ARTERY STENT: ICD-10-CM

## 2021-08-13 DIAGNOSIS — E78.00 HYPERCHOLESTEREMIA: Chronic | ICD-10-CM

## 2021-08-13 DIAGNOSIS — E03.9 HYPOTHYROIDISM, UNSPECIFIED TYPE: Chronic | ICD-10-CM

## 2021-08-13 DIAGNOSIS — D64.9 ANEMIA, UNSPECIFIED TYPE: ICD-10-CM

## 2021-08-13 DIAGNOSIS — I10 ESSENTIAL HYPERTENSION: Chronic | ICD-10-CM

## 2021-08-13 DIAGNOSIS — G89.4 CHRONIC PAIN SYNDROME: ICD-10-CM

## 2021-08-13 DIAGNOSIS — F32.A DEPRESSION, UNSPECIFIED DEPRESSION TYPE: ICD-10-CM

## 2021-08-13 DIAGNOSIS — F41.9 ANXIETY: ICD-10-CM

## 2021-08-13 DIAGNOSIS — M51.36 DDD (DEGENERATIVE DISC DISEASE), LUMBAR: ICD-10-CM

## 2021-08-13 PROCEDURE — 99499 RISK ADDL DX/OHS AUDIT: ICD-10-PCS | Mod: S$GLB,,, | Performed by: FAMILY MEDICINE

## 2021-08-13 PROCEDURE — 1157F ADVNC CARE PLAN IN RCRD: CPT | Mod: CPTII,S$GLB,, | Performed by: FAMILY MEDICINE

## 2021-08-13 PROCEDURE — 1126F AMNT PAIN NOTED NONE PRSNT: CPT | Mod: CPTII,S$GLB,, | Performed by: FAMILY MEDICINE

## 2021-08-13 PROCEDURE — 99214 PR OFFICE/OUTPT VISIT, EST, LEVL IV, 30-39 MIN: ICD-10-PCS | Mod: S$GLB,,, | Performed by: FAMILY MEDICINE

## 2021-08-13 PROCEDURE — 3008F PR BODY MASS INDEX (BMI) DOCUMENTED: ICD-10-PCS | Mod: CPTII,S$GLB,, | Performed by: FAMILY MEDICINE

## 2021-08-13 PROCEDURE — 3079F DIAST BP 80-89 MM HG: CPT | Mod: CPTII,S$GLB,, | Performed by: FAMILY MEDICINE

## 2021-08-13 PROCEDURE — 3288F PR FALLS RISK ASSESSMENT DOCUMENTED: ICD-10-PCS | Mod: CPTII,S$GLB,, | Performed by: FAMILY MEDICINE

## 2021-08-13 PROCEDURE — 99999 PR PBB SHADOW E&M-EST. PATIENT-LVL III: CPT | Mod: PBBFAC,,, | Performed by: FAMILY MEDICINE

## 2021-08-13 PROCEDURE — 3075F SYST BP GE 130 - 139MM HG: CPT | Mod: CPTII,S$GLB,, | Performed by: FAMILY MEDICINE

## 2021-08-13 PROCEDURE — 3079F PR MOST RECENT DIASTOLIC BLOOD PRESSURE 80-89 MM HG: ICD-10-PCS | Mod: CPTII,S$GLB,, | Performed by: FAMILY MEDICINE

## 2021-08-13 PROCEDURE — 1101F PT FALLS ASSESS-DOCD LE1/YR: CPT | Mod: CPTII,S$GLB,, | Performed by: FAMILY MEDICINE

## 2021-08-13 PROCEDURE — 1101F PR PT FALLS ASSESS DOC 0-1 FALLS W/OUT INJ PAST YR: ICD-10-PCS | Mod: CPTII,S$GLB,, | Performed by: FAMILY MEDICINE

## 2021-08-13 PROCEDURE — 3075F PR MOST RECENT SYSTOLIC BLOOD PRESS GE 130-139MM HG: ICD-10-PCS | Mod: CPTII,S$GLB,, | Performed by: FAMILY MEDICINE

## 2021-08-13 PROCEDURE — 1159F MED LIST DOCD IN RCRD: CPT | Mod: CPTII,S$GLB,, | Performed by: FAMILY MEDICINE

## 2021-08-13 PROCEDURE — 99214 OFFICE O/P EST MOD 30 MIN: CPT | Mod: S$GLB,,, | Performed by: FAMILY MEDICINE

## 2021-08-13 PROCEDURE — 99999 PR PBB SHADOW E&M-EST. PATIENT-LVL III: ICD-10-PCS | Mod: PBBFAC,,, | Performed by: FAMILY MEDICINE

## 2021-08-13 PROCEDURE — 3008F BODY MASS INDEX DOCD: CPT | Mod: CPTII,S$GLB,, | Performed by: FAMILY MEDICINE

## 2021-08-13 PROCEDURE — 99499 UNLISTED E&M SERVICE: CPT | Mod: S$GLB,,, | Performed by: FAMILY MEDICINE

## 2021-08-13 PROCEDURE — 1159F PR MEDICATION LIST DOCUMENTED IN MEDICAL RECORD: ICD-10-PCS | Mod: CPTII,S$GLB,, | Performed by: FAMILY MEDICINE

## 2021-08-13 PROCEDURE — 1126F PR PAIN SEVERITY QUANTIFIED, NO PAIN PRESENT: ICD-10-PCS | Mod: CPTII,S$GLB,, | Performed by: FAMILY MEDICINE

## 2021-08-13 PROCEDURE — 3288F FALL RISK ASSESSMENT DOCD: CPT | Mod: CPTII,S$GLB,, | Performed by: FAMILY MEDICINE

## 2021-08-13 PROCEDURE — 1157F PR ADVANCE CARE PLAN OR EQUIV PRESENT IN MEDICAL RECORD: ICD-10-PCS | Mod: CPTII,S$GLB,, | Performed by: FAMILY MEDICINE

## 2021-08-13 RX ORDER — ACETAMINOPHEN AND CODEINE PHOSPHATE 300; 30 MG/1; MG/1
1 TABLET ORAL EVERY 4 HOURS PRN
COMMUNITY
Start: 2021-07-26 | End: 2021-08-13 | Stop reason: SDUPTHER

## 2021-08-13 RX ORDER — PROMETHAZINE HYDROCHLORIDE AND CODEINE PHOSPHATE 6.25; 1 MG/5ML; MG/5ML
SOLUTION ORAL
Qty: 180 ML | Refills: 0 | Status: SHIPPED | OUTPATIENT
Start: 2021-08-13 | End: 2021-09-08

## 2021-08-13 RX ORDER — ACETAMINOPHEN AND CODEINE PHOSPHATE 300; 30 MG/1; MG/1
1 TABLET ORAL EVERY 4 HOURS PRN
Qty: 40 TABLET | Refills: 0 | Status: SHIPPED | OUTPATIENT
Start: 2021-08-13 | End: 2021-08-13

## 2021-08-13 RX ORDER — CEFDINIR 300 MG/1
300 CAPSULE ORAL 2 TIMES DAILY
Qty: 20 CAPSULE | Refills: 0 | Status: SHIPPED | OUTPATIENT
Start: 2021-08-13 | End: 2021-08-23

## 2021-08-13 RX ORDER — ACETAMINOPHEN AND CODEINE PHOSPHATE 300; 30 MG/1; MG/1
1 TABLET ORAL EVERY 4 HOURS PRN
Qty: 40 TABLET | Refills: 1 | OUTPATIENT
Start: 2021-08-13 | End: 2021-08-27

## 2021-08-16 RX ORDER — CODEINE PHOSPHATE AND GUAIFENESIN 10; 100 MG/5ML; MG/5ML
5 SOLUTION ORAL EVERY 6 HOURS PRN
Qty: 180 ML | Refills: 0 | Status: SHIPPED | OUTPATIENT
Start: 2021-08-16 | End: 2021-08-16 | Stop reason: SDUPTHER

## 2021-08-16 RX ORDER — CODEINE PHOSPHATE AND GUAIFENESIN 10; 100 MG/5ML; MG/5ML
5 SOLUTION ORAL EVERY 6 HOURS PRN
Qty: 180 ML | Refills: 0 | Status: SHIPPED | OUTPATIENT
Start: 2021-08-16 | End: 2021-09-08

## 2021-08-21 DIAGNOSIS — R51.9 INTRACTABLE EPISODIC HEADACHE, UNSPECIFIED HEADACHE TYPE: ICD-10-CM

## 2021-08-23 RX ORDER — BUTALBITAL, ACETAMINOPHEN AND CAFFEINE 50; 325; 40 MG/1; MG/1; MG/1
TABLET ORAL
Qty: 40 TABLET | Refills: 2 | OUTPATIENT
Start: 2021-08-23

## 2021-08-25 ENCOUNTER — TELEPHONE (OUTPATIENT)
Dept: PRIMARY CARE CLINIC | Facility: CLINIC | Age: 76
End: 2021-08-25

## 2021-09-08 ENCOUNTER — OFFICE VISIT (OUTPATIENT)
Dept: PRIMARY CARE CLINIC | Facility: CLINIC | Age: 76
End: 2021-09-08
Payer: MEDICARE

## 2021-09-08 VITALS
TEMPERATURE: 98 F | SYSTOLIC BLOOD PRESSURE: 130 MMHG | RESPIRATION RATE: 20 BRPM | BODY MASS INDEX: 18.63 KG/M2 | HEART RATE: 88 BPM | DIASTOLIC BLOOD PRESSURE: 72 MMHG | OXYGEN SATURATION: 98 % | HEIGHT: 64 IN | WEIGHT: 109.13 LBS

## 2021-09-08 DIAGNOSIS — I25.10 CORONARY ARTERY DISEASE INVOLVING NATIVE CORONARY ARTERY OF NATIVE HEART WITHOUT ANGINA PECTORIS: Chronic | ICD-10-CM

## 2021-09-08 DIAGNOSIS — G43.511 INTRACTABLE PERSISTENT MIGRAINE AURA WITHOUT CEREBRAL INFARCTION AND WITH STATUS MIGRAINOSUS: ICD-10-CM

## 2021-09-08 DIAGNOSIS — J44.9 CHRONIC OBSTRUCTIVE PULMONARY DISEASE, UNSPECIFIED COPD TYPE: Chronic | ICD-10-CM
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
## 2021-09-08 DIAGNOSIS — M25.512 ACUTE PAIN OF LEFT SHOULDER: ICD-10-CM

## 2021-09-08 DIAGNOSIS — Z95.5 HX OF HEART ARTERY STENT: ICD-10-CM

## 2021-09-08 DIAGNOSIS — Z09 HOSPITAL DISCHARGE FOLLOW-UP: Primary | ICD-10-CM

## 2021-09-08 DIAGNOSIS — E78.00 HYPERCHOLESTEREMIA: Chronic | ICD-10-CM

## 2021-09-08 DIAGNOSIS — Z87.891 HISTORY OF TOBACCO ABUSE: ICD-10-CM

## 2021-09-08 PROBLEM — M79.671 RIGHT FOOT PAIN: Status: RESOLVED | Noted: 2020-04-17 | Resolved: 2021-09-08

## 2021-09-08 PROBLEM — J06.9 URI (UPPER RESPIRATORY INFECTION): Status: RESOLVED | Noted: 2021-04-08 | Resolved: 2021-09-08

## 2021-09-08 PROBLEM — S96.911A STRAIN OF RIGHT ANKLE: Status: RESOLVED | Noted: 2020-05-12 | Resolved: 2021-09-08

## 2021-09-08 PROBLEM — S93.401A RIGHT ANKLE SPRAIN: Status: RESOLVED | Noted: 2020-04-17 | Resolved: 2021-09-08

## 2021-09-08 PROCEDURE — 99213 PR OFFICE/OUTPT VISIT, EST, LEVL III, 20-29 MIN: ICD-10-PCS | Mod: S$GLB,,, | Performed by: NURSE PRACTITIONER

## 2021-09-08 PROCEDURE — 1160F PR REVIEW ALL MEDS BY PRESCRIBER/CLIN PHARMACIST DOCUMENTED: ICD-10-PCS | Mod: CPTII,S$GLB,, | Performed by: NURSE PRACTITIONER

## 2021-09-08 PROCEDURE — 99999 PR PBB SHADOW E&M-EST. PATIENT-LVL IV: ICD-10-PCS | Mod: PBBFAC,,, | Performed by: NURSE PRACTITIONER

## 2021-09-08 PROCEDURE — 3008F PR BODY MASS INDEX (BMI) DOCUMENTED: ICD-10-PCS | Mod: CPTII,S$GLB,, | Performed by: NURSE PRACTITIONER

## 2021-09-08 PROCEDURE — 1101F PT FALLS ASSESS-DOCD LE1/YR: CPT | Mod: CPTII,S$GLB,, | Performed by: NURSE PRACTITIONER

## 2021-09-08 PROCEDURE — 3078F DIAST BP <80 MM HG: CPT | Mod: CPTII,S$GLB,, | Performed by: NURSE PRACTITIONER

## 2021-09-08 PROCEDURE — 3078F PR MOST RECENT DIASTOLIC BLOOD PRESSURE < 80 MM HG: ICD-10-PCS | Mod: CPTII,S$GLB,, | Performed by: NURSE PRACTITIONER

## 2021-09-08 PROCEDURE — 1125F PR PAIN SEVERITY QUANTIFIED, PAIN PRESENT: ICD-10-PCS | Mod: CPTII,S$GLB,, | Performed by: NURSE PRACTITIONER

## 2021-09-08 PROCEDURE — 1160F RVW MEDS BY RX/DR IN RCRD: CPT | Mod: CPTII,S$GLB,, | Performed by: NURSE PRACTITIONER

## 2021-09-08 PROCEDURE — 3288F PR FALLS RISK ASSESSMENT DOCUMENTED: ICD-10-PCS | Mod: CPTII,S$GLB,, | Performed by: NURSE PRACTITIONER

## 2021-09-08 PROCEDURE — 1101F PR PT FALLS ASSESS DOC 0-1 FALLS W/OUT INJ PAST YR: ICD-10-PCS | Mod: CPTII,S$GLB,, | Performed by: NURSE PRACTITIONER

## 2021-09-08 PROCEDURE — 1157F PR ADVANCE CARE PLAN OR EQUIV PRESENT IN MEDICAL RECORD: ICD-10-PCS | Mod: CPTII,S$GLB,, | Performed by: NURSE PRACTITIONER

## 2021-09-08 PROCEDURE — 3008F BODY MASS INDEX DOCD: CPT | Mod: CPTII,S$GLB,, | Performed by: NURSE PRACTITIONER

## 2021-09-08 PROCEDURE — 3075F PR MOST RECENT SYSTOLIC BLOOD PRESS GE 130-139MM HG: ICD-10-PCS | Mod: CPTII,S$GLB,, | Performed by: NURSE PRACTITIONER

## 2021-09-08 PROCEDURE — 3288F FALL RISK ASSESSMENT DOCD: CPT | Mod: CPTII,S$GLB,, | Performed by: NURSE PRACTITIONER

## 2021-09-08 PROCEDURE — 1159F PR MEDICATION LIST DOCUMENTED IN MEDICAL RECORD: ICD-10-PCS | Mod: CPTII,S$GLB,, | Performed by: NURSE PRACTITIONER

## 2021-09-08 PROCEDURE — 99999 PR PBB SHADOW E&M-EST. PATIENT-LVL IV: CPT | Mod: PBBFAC,,, | Performed by: NURSE PRACTITIONER

## 2021-09-08 PROCEDURE — 3075F SYST BP GE 130 - 139MM HG: CPT | Mod: CPTII,S$GLB,, | Performed by: NURSE PRACTITIONER

## 2021-09-08 PROCEDURE — 1157F ADVNC CARE PLAN IN RCRD: CPT | Mod: CPTII,S$GLB,, | Performed by: NURSE PRACTITIONER

## 2021-09-08 PROCEDURE — 1125F AMNT PAIN NOTED PAIN PRSNT: CPT | Mod: CPTII,S$GLB,, | Performed by: NURSE PRACTITIONER

## 2021-09-08 PROCEDURE — 1159F MED LIST DOCD IN RCRD: CPT | Mod: CPTII,S$GLB,, | Performed by: NURSE PRACTITIONER

## 2021-09-08 PROCEDURE — 99213 OFFICE O/P EST LOW 20 MIN: CPT | Mod: S$GLB,,, | Performed by: NURSE PRACTITIONER

## 2021-09-08 RX ORDER — OXYCODONE AND ACETAMINOPHEN 5; 325 MG/1; MG/1
1 TABLET ORAL EVERY 4 HOURS PRN
Qty: 28 TABLET | Refills: 0 | Status: SHIPPED | OUTPATIENT
Start: 2021-09-08 | End: 2021-09-22

## 2021-09-10 ENCOUNTER — TELEPHONE (OUTPATIENT)
Dept: PRIMARY CARE CLINIC | Facility: CLINIC | Age: 76
End: 2021-09-10

## 2021-09-21 ENCOUNTER — TELEPHONE (OUTPATIENT)
Dept: PRIMARY CARE CLINIC | Facility: CLINIC | Age: 76
End: 2021-09-21

## 2021-09-22 ENCOUNTER — OFFICE VISIT (OUTPATIENT)
Dept: PRIMARY CARE CLINIC | Facility: CLINIC | Age: 76
End: 2021-09-22
Payer: MEDICARE

## 2021-09-22 ENCOUNTER — TELEPHONE (OUTPATIENT)
Dept: PRIMARY CARE CLINIC | Facility: CLINIC | Age: 76
End: 2021-09-22

## 2021-09-22 VITALS
BODY MASS INDEX: 19.04 KG/M2 | DIASTOLIC BLOOD PRESSURE: 76 MMHG | RESPIRATION RATE: 18 BRPM | OXYGEN SATURATION: 98 % | SYSTOLIC BLOOD PRESSURE: 138 MMHG | HEIGHT: 64 IN | WEIGHT: 111.56 LBS | HEART RATE: 88 BPM

## 2021-09-22 DIAGNOSIS — S42.002A CLOSED NONDISPLACED FRACTURE OF LEFT CLAVICLE, UNSPECIFIED PART OF CLAVICLE, INITIAL ENCOUNTER: Primary | ICD-10-CM

## 2021-09-22 DIAGNOSIS — M51.36 DDD (DEGENERATIVE DISC DISEASE), LUMBAR: ICD-10-CM

## 2021-09-22 DIAGNOSIS — S40.012D CONTUSION OF MULTIPLE SITES OF LEFT SHOULDER AND UPPER ARM, SUBSEQUENT ENCOUNTER: ICD-10-CM

## 2021-09-22 DIAGNOSIS — S40.022D CONTUSION OF MULTIPLE SITES OF LEFT SHOULDER AND UPPER ARM, SUBSEQUENT ENCOUNTER: ICD-10-CM

## 2021-09-22 DIAGNOSIS — G89.4 CHRONIC PAIN SYNDROME: ICD-10-CM

## 2021-09-22 DIAGNOSIS — F41.9 ANXIETY: ICD-10-CM

## 2021-09-22 DIAGNOSIS — Z95.5 HX OF HEART ARTERY STENT: ICD-10-CM

## 2021-09-22 DIAGNOSIS — S42.035D CLOSED NONDISPLACED FRACTURE OF ACROMIAL END OF LEFT CLAVICLE WITH ROUTINE HEALING, SUBSEQUENT ENCOUNTER: Primary | ICD-10-CM

## 2021-09-22 DIAGNOSIS — F32.A DEPRESSION, UNSPECIFIED DEPRESSION TYPE: ICD-10-CM

## 2021-09-22 DIAGNOSIS — E78.00 HYPERCHOLESTEREMIA: Chronic | ICD-10-CM

## 2021-09-22 DIAGNOSIS — J44.9 CHRONIC OBSTRUCTIVE PULMONARY DISEASE, UNSPECIFIED COPD TYPE: Chronic | ICD-10-CM

## 2021-09-22 DIAGNOSIS — I95.9 HYPOTENSION, UNSPECIFIED HYPOTENSION TYPE: ICD-10-CM

## 2021-09-22 DIAGNOSIS — I10 ESSENTIAL HYPERTENSION: Chronic | ICD-10-CM

## 2021-09-22 PROCEDURE — 1126F PR PAIN SEVERITY QUANTIFIED, NO PAIN PRESENT: ICD-10-PCS | Mod: CPTII,S$GLB,, | Performed by: FAMILY MEDICINE

## 2021-09-22 PROCEDURE — 3075F SYST BP GE 130 - 139MM HG: CPT | Mod: CPTII,S$GLB,, | Performed by: FAMILY MEDICINE

## 2021-09-22 PROCEDURE — 1159F PR MEDICATION LIST DOCUMENTED IN MEDICAL RECORD: ICD-10-PCS | Mod: CPTII,S$GLB,, | Performed by: FAMILY MEDICINE

## 2021-09-22 PROCEDURE — 99999 PR PBB SHADOW E&M-EST. PATIENT-LVL III: ICD-10-PCS | Mod: PBBFAC,,, | Performed by: FAMILY MEDICINE

## 2021-09-22 PROCEDURE — 99214 OFFICE O/P EST MOD 30 MIN: CPT | Mod: S$GLB,,, | Performed by: FAMILY MEDICINE

## 2021-09-22 PROCEDURE — 3075F PR MOST RECENT SYSTOLIC BLOOD PRESS GE 130-139MM HG: ICD-10-PCS | Mod: CPTII,S$GLB,, | Performed by: FAMILY MEDICINE

## 2021-09-22 PROCEDURE — 3008F BODY MASS INDEX DOCD: CPT | Mod: CPTII,S$GLB,, | Performed by: FAMILY MEDICINE

## 2021-09-22 PROCEDURE — 99499 UNLISTED E&M SERVICE: CPT | Mod: S$GLB,,, | Performed by: FAMILY MEDICINE

## 2021-09-22 PROCEDURE — 1159F MED LIST DOCD IN RCRD: CPT | Mod: CPTII,S$GLB,, | Performed by: FAMILY MEDICINE

## 2021-09-22 PROCEDURE — 1157F PR ADVANCE CARE PLAN OR EQUIV PRESENT IN MEDICAL RECORD: ICD-10-PCS | Mod: CPTII,S$GLB,, | Performed by: FAMILY MEDICINE

## 2021-09-22 PROCEDURE — 99499 RISK ADDL DX/OHS AUDIT: ICD-10-PCS | Mod: S$GLB,,, | Performed by: FAMILY MEDICINE

## 2021-09-22 PROCEDURE — 3078F DIAST BP <80 MM HG: CPT | Mod: CPTII,S$GLB,, | Performed by: FAMILY MEDICINE

## 2021-09-22 PROCEDURE — 99999 PR PBB SHADOW E&M-EST. PATIENT-LVL III: CPT | Mod: PBBFAC,,, | Performed by: FAMILY MEDICINE

## 2021-09-22 PROCEDURE — 99214 PR OFFICE/OUTPT VISIT, EST, LEVL IV, 30-39 MIN: ICD-10-PCS | Mod: S$GLB,,, | Performed by: FAMILY MEDICINE

## 2021-09-22 PROCEDURE — 3008F PR BODY MASS INDEX (BMI) DOCUMENTED: ICD-10-PCS | Mod: CPTII,S$GLB,, | Performed by: FAMILY MEDICINE

## 2021-09-22 PROCEDURE — 3078F PR MOST RECENT DIASTOLIC BLOOD PRESSURE < 80 MM HG: ICD-10-PCS | Mod: CPTII,S$GLB,, | Performed by: FAMILY MEDICINE

## 2021-09-22 PROCEDURE — 1126F AMNT PAIN NOTED NONE PRSNT: CPT | Mod: CPTII,S$GLB,, | Performed by: FAMILY MEDICINE

## 2021-09-22 PROCEDURE — 1157F ADVNC CARE PLAN IN RCRD: CPT | Mod: CPTII,S$GLB,, | Performed by: FAMILY MEDICINE

## 2021-09-22 RX ORDER — BUTALBITAL, ACETAMINOPHEN AND CAFFEINE 50; 325; 40 MG/1; MG/1; MG/1
TABLET ORAL
COMMUNITY
Start: 2021-09-10 | End: 2021-10-26 | Stop reason: SDUPTHER

## 2021-09-22 RX ORDER — ACETAMINOPHEN AND CODEINE PHOSPHATE 300; 30 MG/1; MG/1
1 TABLET ORAL EVERY 6 HOURS PRN
Qty: 30 TABLET | Refills: 2 | Status: SHIPPED | OUTPATIENT
Start: 2021-09-22 | End: 2021-10-02

## 2021-09-22 RX ORDER — CLOPIDOGREL BISULFATE 75 MG/1
TABLET ORAL
Qty: 30 TABLET | Refills: 5 | Status: SHIPPED | OUTPATIENT
Start: 2021-09-22 | End: 2021-10-28

## 2021-09-22 RX ORDER — LEVOCETIRIZINE DIHYDROCHLORIDE 5 MG/1
5 TABLET, FILM COATED ORAL NIGHTLY
COMMUNITY
Start: 2021-09-20 | End: 2022-01-02

## 2021-09-22 RX ORDER — TRAZODONE HYDROCHLORIDE 100 MG/1
100 TABLET ORAL NIGHTLY PRN
COMMUNITY
Start: 2021-09-20 | End: 2021-10-10

## 2021-09-22 RX ORDER — OMEPRAZOLE 40 MG/1
40 CAPSULE, DELAYED RELEASE ORAL DAILY
Qty: 30 CAPSULE | Refills: 5 | Status: SHIPPED | OUTPATIENT
Start: 2021-09-22 | End: 2022-01-02

## 2021-09-22 RX ORDER — ALENDRONATE SODIUM 70 MG/1
70 TABLET ORAL
Qty: 12 TABLET | Refills: 3 | Status: SHIPPED | OUTPATIENT
Start: 2021-09-22

## 2021-09-22 RX ORDER — METOPROLOL SUCCINATE 25 MG/1
25 TABLET, EXTENDED RELEASE ORAL DAILY
Qty: 30 TABLET | Refills: 5 | Status: SHIPPED | OUTPATIENT
Start: 2021-09-22 | End: 2022-01-02

## 2021-09-22 RX ORDER — CYCLOBENZAPRINE HCL 10 MG
TABLET ORAL
Qty: 100 TABLET | Refills: 5 | Status: SHIPPED | OUTPATIENT
Start: 2021-09-22 | End: 2022-04-06

## 2021-09-22 RX ORDER — ESCITALOPRAM OXALATE 10 MG/1
10 TABLET ORAL DAILY
Qty: 30 TABLET | Refills: 5 | Status: SHIPPED | OUTPATIENT
Start: 2021-09-22 | End: 2021-09-23

## 2021-09-23 RX ORDER — ESCITALOPRAM OXALATE 20 MG/1
20 TABLET ORAL DAILY
Qty: 30 TABLET | Refills: 11 | Status: SHIPPED | OUTPATIENT
Start: 2021-09-23 | End: 2021-11-15

## 2021-09-23 RX ORDER — OXYCODONE AND ACETAMINOPHEN 5; 325 MG/1; MG/1
1 TABLET ORAL EVERY 4 HOURS PRN
Qty: 15 TABLET | Refills: 0 | Status: SHIPPED | OUTPATIENT
Start: 2021-09-23 | End: 2021-10-13

## 2021-10-06 ENCOUNTER — TELEPHONE (OUTPATIENT)
Dept: PRIMARY CARE CLINIC | Facility: CLINIC | Age: 76
End: 2021-10-06

## 2021-10-13 ENCOUNTER — TELEPHONE (OUTPATIENT)
Dept: PRIMARY CARE CLINIC | Facility: CLINIC | Age: 76
End: 2021-10-13

## 2021-10-13 ENCOUNTER — OFFICE VISIT (OUTPATIENT)
Dept: PRIMARY CARE CLINIC | Facility: CLINIC | Age: 76
End: 2021-10-13
Payer: MEDICARE

## 2021-10-13 VITALS
HEART RATE: 70 BPM | RESPIRATION RATE: 18 BRPM | DIASTOLIC BLOOD PRESSURE: 76 MMHG | HEIGHT: 64 IN | OXYGEN SATURATION: 96 % | WEIGHT: 116.19 LBS | SYSTOLIC BLOOD PRESSURE: 124 MMHG | BODY MASS INDEX: 19.84 KG/M2

## 2021-10-13 DIAGNOSIS — G89.4 CHRONIC PAIN SYNDROME: ICD-10-CM

## 2021-10-13 DIAGNOSIS — Z87.891 HISTORY OF TOBACCO ABUSE: ICD-10-CM

## 2021-10-13 DIAGNOSIS — F32.A DEPRESSION, UNSPECIFIED DEPRESSION TYPE: ICD-10-CM

## 2021-10-13 DIAGNOSIS — M47.816 LUMBAR SPONDYLOSIS: ICD-10-CM

## 2021-10-13 DIAGNOSIS — Z95.5 HX OF HEART ARTERY STENT: ICD-10-CM

## 2021-10-13 DIAGNOSIS — J44.9 CHRONIC OBSTRUCTIVE PULMONARY DISEASE, UNSPECIFIED COPD TYPE: Chronic | ICD-10-CM

## 2021-10-13 DIAGNOSIS — I50.32 CHRONIC DIASTOLIC CONGESTIVE HEART FAILURE: Chronic | ICD-10-CM

## 2021-10-13 DIAGNOSIS — I21.4 NSTEMI (NON-ST ELEVATED MYOCARDIAL INFARCTION): ICD-10-CM

## 2021-10-13 DIAGNOSIS — F41.9 ANXIETY: ICD-10-CM

## 2021-10-13 DIAGNOSIS — K21.9 GASTROESOPHAGEAL REFLUX DISEASE, UNSPECIFIED WHETHER ESOPHAGITIS PRESENT: Chronic | ICD-10-CM

## 2021-10-13 DIAGNOSIS — M19.90 OSTEOARTHRITIS, UNSPECIFIED OSTEOARTHRITIS TYPE, UNSPECIFIED SITE: ICD-10-CM

## 2021-10-13 DIAGNOSIS — Z87.81 HX OF FRACTURE OF CLAVICLE: ICD-10-CM

## 2021-10-13 DIAGNOSIS — R51.9 INTRACTABLE EPISODIC HEADACHE, UNSPECIFIED HEADACHE TYPE: Primary | ICD-10-CM

## 2021-10-13 DIAGNOSIS — I10 ESSENTIAL HYPERTENSION: Chronic | ICD-10-CM

## 2021-10-13 DIAGNOSIS — E03.9 HYPOTHYROIDISM, UNSPECIFIED TYPE: Chronic | ICD-10-CM

## 2021-10-13 DIAGNOSIS — E78.00 HYPERCHOLESTEREMIA: Chronic | ICD-10-CM

## 2021-10-13 DIAGNOSIS — I25.10 CORONARY ARTERY DISEASE INVOLVING NATIVE CORONARY ARTERY OF NATIVE HEART WITHOUT ANGINA PECTORIS: Chronic | ICD-10-CM

## 2021-10-13 PROCEDURE — 99999 PR PBB SHADOW E&M-EST. PATIENT-LVL III: CPT | Mod: PBBFAC,,, | Performed by: FAMILY MEDICINE

## 2021-10-13 PROCEDURE — 1159F MED LIST DOCD IN RCRD: CPT | Mod: CPTII,S$GLB,, | Performed by: FAMILY MEDICINE

## 2021-10-13 PROCEDURE — 1126F PR PAIN SEVERITY QUANTIFIED, NO PAIN PRESENT: ICD-10-PCS | Mod: CPTII,S$GLB,, | Performed by: FAMILY MEDICINE

## 2021-10-13 PROCEDURE — 1159F PR MEDICATION LIST DOCUMENTED IN MEDICAL RECORD: ICD-10-PCS | Mod: CPTII,S$GLB,, | Performed by: FAMILY MEDICINE

## 2021-10-13 PROCEDURE — 99499 RISK ADDL DX/OHS AUDIT: ICD-10-PCS | Mod: S$GLB,,, | Performed by: FAMILY MEDICINE

## 2021-10-13 PROCEDURE — 99999 PR PBB SHADOW E&M-EST. PATIENT-LVL III: ICD-10-PCS | Mod: PBBFAC,,, | Performed by: FAMILY MEDICINE

## 2021-10-13 PROCEDURE — 1157F ADVNC CARE PLAN IN RCRD: CPT | Mod: CPTII,S$GLB,, | Performed by: FAMILY MEDICINE

## 2021-10-13 PROCEDURE — 1126F AMNT PAIN NOTED NONE PRSNT: CPT | Mod: CPTII,S$GLB,, | Performed by: FAMILY MEDICINE

## 2021-10-13 PROCEDURE — 99214 PR OFFICE/OUTPT VISIT, EST, LEVL IV, 30-39 MIN: ICD-10-PCS | Mod: S$GLB,,, | Performed by: FAMILY MEDICINE

## 2021-10-13 PROCEDURE — 3008F BODY MASS INDEX DOCD: CPT | Mod: CPTII,S$GLB,, | Performed by: FAMILY MEDICINE

## 2021-10-13 PROCEDURE — 3288F PR FALLS RISK ASSESSMENT DOCUMENTED: ICD-10-PCS | Mod: CPTII,S$GLB,, | Performed by: FAMILY MEDICINE

## 2021-10-13 PROCEDURE — 3078F DIAST BP <80 MM HG: CPT | Mod: CPTII,S$GLB,, | Performed by: FAMILY MEDICINE

## 2021-10-13 PROCEDURE — 1101F PR PT FALLS ASSESS DOC 0-1 FALLS W/OUT INJ PAST YR: ICD-10-PCS | Mod: CPTII,S$GLB,, | Performed by: FAMILY MEDICINE

## 2021-10-13 PROCEDURE — 99499 UNLISTED E&M SERVICE: CPT | Mod: S$GLB,,, | Performed by: FAMILY MEDICINE

## 2021-10-13 PROCEDURE — 99214 OFFICE O/P EST MOD 30 MIN: CPT | Mod: S$GLB,,, | Performed by: FAMILY MEDICINE

## 2021-10-13 PROCEDURE — 3008F PR BODY MASS INDEX (BMI) DOCUMENTED: ICD-10-PCS | Mod: CPTII,S$GLB,, | Performed by: FAMILY MEDICINE

## 2021-10-13 PROCEDURE — 3074F SYST BP LT 130 MM HG: CPT | Mod: CPTII,S$GLB,, | Performed by: FAMILY MEDICINE

## 2021-10-13 PROCEDURE — 3078F PR MOST RECENT DIASTOLIC BLOOD PRESSURE < 80 MM HG: ICD-10-PCS | Mod: CPTII,S$GLB,, | Performed by: FAMILY MEDICINE

## 2021-10-13 PROCEDURE — 1101F PT FALLS ASSESS-DOCD LE1/YR: CPT | Mod: CPTII,S$GLB,, | Performed by: FAMILY MEDICINE

## 2021-10-13 PROCEDURE — 3288F FALL RISK ASSESSMENT DOCD: CPT | Mod: CPTII,S$GLB,, | Performed by: FAMILY MEDICINE

## 2021-10-13 PROCEDURE — 1157F PR ADVANCE CARE PLAN OR EQUIV PRESENT IN MEDICAL RECORD: ICD-10-PCS | Mod: CPTII,S$GLB,, | Performed by: FAMILY MEDICINE

## 2021-10-13 PROCEDURE — 3074F PR MOST RECENT SYSTOLIC BLOOD PRESSURE < 130 MM HG: ICD-10-PCS | Mod: CPTII,S$GLB,, | Performed by: FAMILY MEDICINE

## 2021-10-13 RX ORDER — ACETAMINOPHEN AND CODEINE PHOSPHATE 300; 30 MG/1; MG/1
1 TABLET ORAL EVERY 6 HOURS PRN
COMMUNITY
Start: 2021-10-11 | End: 2021-10-13 | Stop reason: SDUPTHER

## 2021-10-13 RX ORDER — BUTALBITAL, ACETAMINOPHEN AND CAFFEINE 50; 325; 40 MG/1; MG/1; MG/1
TABLET ORAL
Qty: 30 TABLET | Refills: 0 | Status: CANCELLED | OUTPATIENT
Start: 2021-10-13

## 2021-10-13 RX ORDER — ACETAMINOPHEN AND CODEINE PHOSPHATE 300; 30 MG/1; MG/1
1 TABLET ORAL EVERY 6 HOURS PRN
Qty: 30 TABLET | Refills: 1 | Status: SHIPPED | OUTPATIENT
Start: 2021-10-13 | End: 2021-10-26

## 2021-10-13 RX ORDER — ATORVASTATIN CALCIUM 40 MG/1
40 TABLET, FILM COATED ORAL DAILY
COMMUNITY
Start: 2021-09-27 | End: 2022-02-16

## 2021-10-20 ENCOUNTER — TELEPHONE (OUTPATIENT)
Dept: PRIMARY CARE CLINIC | Facility: CLINIC | Age: 76
End: 2021-10-20

## 2021-10-25 RX ORDER — ACETAMINOPHEN AND CODEINE PHOSPHATE 300; 30 MG/1; MG/1
TABLET ORAL
Qty: 30 TABLET | Refills: 1 | OUTPATIENT
Start: 2021-10-25

## 2021-10-26 ENCOUNTER — OFFICE VISIT (OUTPATIENT)
Dept: PRIMARY CARE CLINIC | Facility: CLINIC | Age: 76
End: 2021-10-26
Payer: MEDICARE

## 2021-10-26 VITALS
HEART RATE: 80 BPM | SYSTOLIC BLOOD PRESSURE: 134 MMHG | WEIGHT: 114.75 LBS | RESPIRATION RATE: 18 BRPM | OXYGEN SATURATION: 96 % | HEIGHT: 64 IN | BODY MASS INDEX: 19.59 KG/M2 | DIASTOLIC BLOOD PRESSURE: 78 MMHG

## 2021-10-26 DIAGNOSIS — F32.A DEPRESSION, UNSPECIFIED DEPRESSION TYPE: ICD-10-CM

## 2021-10-26 DIAGNOSIS — R51.9 INTRACTABLE EPISODIC HEADACHE, UNSPECIFIED HEADACHE TYPE: ICD-10-CM

## 2021-10-26 DIAGNOSIS — F41.9 ANXIETY: ICD-10-CM

## 2021-10-26 DIAGNOSIS — Z95.5 HX OF HEART ARTERY STENT: ICD-10-CM

## 2021-10-26 DIAGNOSIS — I50.32 CHRONIC DIASTOLIC CONGESTIVE HEART FAILURE: Chronic | ICD-10-CM

## 2021-10-26 DIAGNOSIS — M51.36 DDD (DEGENERATIVE DISC DISEASE), LUMBAR: ICD-10-CM

## 2021-10-26 DIAGNOSIS — J44.9 CHRONIC OBSTRUCTIVE PULMONARY DISEASE, UNSPECIFIED COPD TYPE: Chronic | ICD-10-CM

## 2021-10-26 DIAGNOSIS — E03.9 HYPOTHYROIDISM, UNSPECIFIED TYPE: Chronic | ICD-10-CM

## 2021-10-26 DIAGNOSIS — G89.4 CHRONIC PAIN SYNDROME: ICD-10-CM

## 2021-10-26 DIAGNOSIS — M47.816 LUMBAR SPONDYLOSIS: Primary | ICD-10-CM

## 2021-10-26 DIAGNOSIS — E78.00 HYPERCHOLESTEREMIA: Chronic | ICD-10-CM

## 2021-10-26 PROCEDURE — 99214 PR OFFICE/OUTPT VISIT, EST, LEVL IV, 30-39 MIN: ICD-10-PCS | Mod: S$GLB,,, | Performed by: FAMILY MEDICINE

## 2021-10-26 PROCEDURE — 3078F DIAST BP <80 MM HG: CPT | Mod: CPTII,S$GLB,, | Performed by: FAMILY MEDICINE

## 2021-10-26 PROCEDURE — 1126F AMNT PAIN NOTED NONE PRSNT: CPT | Mod: CPTII,S$GLB,, | Performed by: FAMILY MEDICINE

## 2021-10-26 PROCEDURE — 99999 PR PBB SHADOW E&M-EST. PATIENT-LVL IV: ICD-10-PCS | Mod: PBBFAC,,, | Performed by: FAMILY MEDICINE

## 2021-10-26 PROCEDURE — 3288F PR FALLS RISK ASSESSMENT DOCUMENTED: ICD-10-PCS | Mod: CPTII,S$GLB,, | Performed by: FAMILY MEDICINE

## 2021-10-26 PROCEDURE — 3008F BODY MASS INDEX DOCD: CPT | Mod: CPTII,S$GLB,, | Performed by: FAMILY MEDICINE

## 2021-10-26 PROCEDURE — 1101F PR PT FALLS ASSESS DOC 0-1 FALLS W/OUT INJ PAST YR: ICD-10-PCS | Mod: CPTII,S$GLB,, | Performed by: FAMILY MEDICINE

## 2021-10-26 PROCEDURE — 1157F PR ADVANCE CARE PLAN OR EQUIV PRESENT IN MEDICAL RECORD: ICD-10-PCS | Mod: CPTII,S$GLB,, | Performed by: FAMILY MEDICINE

## 2021-10-26 PROCEDURE — 3288F FALL RISK ASSESSMENT DOCD: CPT | Mod: CPTII,S$GLB,, | Performed by: FAMILY MEDICINE

## 2021-10-26 PROCEDURE — 1126F PR PAIN SEVERITY QUANTIFIED, NO PAIN PRESENT: ICD-10-PCS | Mod: CPTII,S$GLB,, | Performed by: FAMILY MEDICINE

## 2021-10-26 PROCEDURE — 3075F SYST BP GE 130 - 139MM HG: CPT | Mod: CPTII,S$GLB,, | Performed by: FAMILY MEDICINE

## 2021-10-26 PROCEDURE — 3008F PR BODY MASS INDEX (BMI) DOCUMENTED: ICD-10-PCS | Mod: CPTII,S$GLB,, | Performed by: FAMILY MEDICINE

## 2021-10-26 PROCEDURE — 99214 OFFICE O/P EST MOD 30 MIN: CPT | Mod: S$GLB,,, | Performed by: FAMILY MEDICINE

## 2021-10-26 PROCEDURE — 99999 PR PBB SHADOW E&M-EST. PATIENT-LVL IV: CPT | Mod: PBBFAC,,, | Performed by: FAMILY MEDICINE

## 2021-10-26 PROCEDURE — 1101F PT FALLS ASSESS-DOCD LE1/YR: CPT | Mod: CPTII,S$GLB,, | Performed by: FAMILY MEDICINE

## 2021-10-26 PROCEDURE — 1159F MED LIST DOCD IN RCRD: CPT | Mod: CPTII,S$GLB,, | Performed by: FAMILY MEDICINE

## 2021-10-26 PROCEDURE — 1159F PR MEDICATION LIST DOCUMENTED IN MEDICAL RECORD: ICD-10-PCS | Mod: CPTII,S$GLB,, | Performed by: FAMILY MEDICINE

## 2021-10-26 PROCEDURE — 3078F PR MOST RECENT DIASTOLIC BLOOD PRESSURE < 80 MM HG: ICD-10-PCS | Mod: CPTII,S$GLB,, | Performed by: FAMILY MEDICINE

## 2021-10-26 PROCEDURE — 1157F ADVNC CARE PLAN IN RCRD: CPT | Mod: CPTII,S$GLB,, | Performed by: FAMILY MEDICINE

## 2021-10-26 PROCEDURE — 3075F PR MOST RECENT SYSTOLIC BLOOD PRESS GE 130-139MM HG: ICD-10-PCS | Mod: CPTII,S$GLB,, | Performed by: FAMILY MEDICINE

## 2021-10-26 RX ORDER — ACETAMINOPHEN AND CODEINE PHOSPHATE 300; 30 MG/1; MG/1
1 TABLET ORAL EVERY 4 HOURS PRN
Qty: 30 TABLET | Refills: 2 | Status: SHIPPED | OUTPATIENT
Start: 2021-10-26 | End: 2021-11-05

## 2021-10-26 RX ORDER — BUTALBITAL, ACETAMINOPHEN AND CAFFEINE 50; 325; 40 MG/1; MG/1; MG/1
TABLET ORAL
Qty: 30 TABLET | Refills: 2 | Status: SHIPPED | OUTPATIENT
Start: 2021-10-26 | End: 2021-11-15 | Stop reason: SDUPTHER

## 2021-11-15 ENCOUNTER — OFFICE VISIT (OUTPATIENT)
Dept: PRIMARY CARE CLINIC | Facility: CLINIC | Age: 76
End: 2021-11-15
Payer: MEDICARE

## 2021-11-15 VITALS
HEIGHT: 64 IN | DIASTOLIC BLOOD PRESSURE: 58 MMHG | BODY MASS INDEX: 18.97 KG/M2 | SYSTOLIC BLOOD PRESSURE: 96 MMHG | HEART RATE: 95 BPM | OXYGEN SATURATION: 93 % | RESPIRATION RATE: 18 BRPM | WEIGHT: 111.13 LBS

## 2021-11-15 DIAGNOSIS — M46.96 UNSPECIFIED INFLAMMATORY SPONDYLOPATHY, LUMBAR REGION: Primary | ICD-10-CM

## 2021-11-15 DIAGNOSIS — E78.00 HYPERCHOLESTEREMIA: Chronic | ICD-10-CM

## 2021-11-15 DIAGNOSIS — G89.4 CHRONIC PAIN SYNDROME: ICD-10-CM

## 2021-11-15 DIAGNOSIS — F41.9 ANXIETY: ICD-10-CM

## 2021-11-15 DIAGNOSIS — I50.32 CHRONIC DIASTOLIC CONGESTIVE HEART FAILURE: Chronic | ICD-10-CM

## 2021-11-15 DIAGNOSIS — R62.7 FAILURE TO THRIVE SYNDROME, ADULT: ICD-10-CM

## 2021-11-15 DIAGNOSIS — I73.9 PERIPHERAL VASCULAR DISEASE, UNSPECIFIED: ICD-10-CM

## 2021-11-15 DIAGNOSIS — K21.9 GASTROESOPHAGEAL REFLUX DISEASE, UNSPECIFIED WHETHER ESOPHAGITIS PRESENT: Chronic | ICD-10-CM

## 2021-11-15 DIAGNOSIS — Z87.891 HISTORY OF TOBACCO ABUSE: ICD-10-CM

## 2021-11-15 DIAGNOSIS — F32.A DEPRESSION, UNSPECIFIED DEPRESSION TYPE: ICD-10-CM

## 2021-11-15 DIAGNOSIS — R51.9 INTRACTABLE EPISODIC HEADACHE, UNSPECIFIED HEADACHE TYPE: ICD-10-CM

## 2021-11-15 DIAGNOSIS — M51.36 DDD (DEGENERATIVE DISC DISEASE), LUMBAR: ICD-10-CM

## 2021-11-15 DIAGNOSIS — I10 ESSENTIAL HYPERTENSION: Chronic | ICD-10-CM

## 2021-11-15 DIAGNOSIS — Z95.5 HX OF HEART ARTERY STENT: ICD-10-CM

## 2021-11-15 DIAGNOSIS — F33.0 MAJOR DEPRESSIVE DISORDER, RECURRENT, MILD: ICD-10-CM

## 2021-11-15 PROBLEM — D64.9 ANEMIA: Status: RESOLVED | Noted: 2019-07-04 | Resolved: 2021-11-15

## 2021-11-15 PROCEDURE — 3074F PR MOST RECENT SYSTOLIC BLOOD PRESSURE < 130 MM HG: ICD-10-PCS | Mod: CPTII,S$GLB,, | Performed by: FAMILY MEDICINE

## 2021-11-15 PROCEDURE — 99214 OFFICE O/P EST MOD 30 MIN: CPT | Mod: S$GLB,,, | Performed by: FAMILY MEDICINE

## 2021-11-15 PROCEDURE — 3008F PR BODY MASS INDEX (BMI) DOCUMENTED: ICD-10-PCS | Mod: CPTII,S$GLB,, | Performed by: FAMILY MEDICINE

## 2021-11-15 PROCEDURE — 3288F FALL RISK ASSESSMENT DOCD: CPT | Mod: CPTII,S$GLB,, | Performed by: FAMILY MEDICINE

## 2021-11-15 PROCEDURE — 99999 PR PBB SHADOW E&M-EST. PATIENT-LVL IV: ICD-10-PCS | Mod: PBBFAC,,, | Performed by: FAMILY MEDICINE

## 2021-11-15 PROCEDURE — 1159F PR MEDICATION LIST DOCUMENTED IN MEDICAL RECORD: ICD-10-PCS | Mod: CPTII,S$GLB,, | Performed by: FAMILY MEDICINE

## 2021-11-15 PROCEDURE — 99999 PR PBB SHADOW E&M-EST. PATIENT-LVL IV: CPT | Mod: PBBFAC,,, | Performed by: FAMILY MEDICINE

## 2021-11-15 PROCEDURE — 1157F PR ADVANCE CARE PLAN OR EQUIV PRESENT IN MEDICAL RECORD: ICD-10-PCS | Mod: CPTII,S$GLB,, | Performed by: FAMILY MEDICINE

## 2021-11-15 PROCEDURE — 1157F ADVNC CARE PLAN IN RCRD: CPT | Mod: CPTII,S$GLB,, | Performed by: FAMILY MEDICINE

## 2021-11-15 PROCEDURE — 3078F PR MOST RECENT DIASTOLIC BLOOD PRESSURE < 80 MM HG: ICD-10-PCS | Mod: CPTII,S$GLB,, | Performed by: FAMILY MEDICINE

## 2021-11-15 PROCEDURE — 1101F PT FALLS ASSESS-DOCD LE1/YR: CPT | Mod: CPTII,S$GLB,, | Performed by: FAMILY MEDICINE

## 2021-11-15 PROCEDURE — 3074F SYST BP LT 130 MM HG: CPT | Mod: CPTII,S$GLB,, | Performed by: FAMILY MEDICINE

## 2021-11-15 PROCEDURE — 1101F PR PT FALLS ASSESS DOC 0-1 FALLS W/OUT INJ PAST YR: ICD-10-PCS | Mod: CPTII,S$GLB,, | Performed by: FAMILY MEDICINE

## 2021-11-15 PROCEDURE — 1126F AMNT PAIN NOTED NONE PRSNT: CPT | Mod: CPTII,S$GLB,, | Performed by: FAMILY MEDICINE

## 2021-11-15 PROCEDURE — 1126F PR PAIN SEVERITY QUANTIFIED, NO PAIN PRESENT: ICD-10-PCS | Mod: CPTII,S$GLB,, | Performed by: FAMILY MEDICINE

## 2021-11-15 PROCEDURE — 3288F PR FALLS RISK ASSESSMENT DOCUMENTED: ICD-10-PCS | Mod: CPTII,S$GLB,, | Performed by: FAMILY MEDICINE

## 2021-11-15 PROCEDURE — 3008F BODY MASS INDEX DOCD: CPT | Mod: CPTII,S$GLB,, | Performed by: FAMILY MEDICINE

## 2021-11-15 PROCEDURE — 3078F DIAST BP <80 MM HG: CPT | Mod: CPTII,S$GLB,, | Performed by: FAMILY MEDICINE

## 2021-11-15 PROCEDURE — 99214 PR OFFICE/OUTPT VISIT, EST, LEVL IV, 30-39 MIN: ICD-10-PCS | Mod: S$GLB,,, | Performed by: FAMILY MEDICINE

## 2021-11-15 PROCEDURE — 1159F MED LIST DOCD IN RCRD: CPT | Mod: CPTII,S$GLB,, | Performed by: FAMILY MEDICINE

## 2021-11-15 RX ORDER — ACETAMINOPHEN AND CODEINE PHOSPHATE 300; 30 MG/1; MG/1
TABLET ORAL
Qty: 60 TABLET | Refills: 1 | Status: SHIPPED | OUTPATIENT
Start: 2021-11-15 | End: 2022-01-18

## 2021-11-15 RX ORDER — ACETAMINOPHEN AND CODEINE PHOSPHATE 300; 30 MG/1; MG/1
1 TABLET ORAL EVERY 4 HOURS PRN
COMMUNITY
Start: 2021-11-11 | End: 2021-11-15 | Stop reason: SDUPTHER

## 2021-11-15 RX ORDER — BUTALBITAL, ACETAMINOPHEN AND CAFFEINE 50; 325; 40 MG/1; MG/1; MG/1
TABLET ORAL
Qty: 60 TABLET | Refills: 2 | Status: SHIPPED | OUTPATIENT
Start: 2021-11-15 | End: 2022-01-18 | Stop reason: SDUPTHER

## 2021-11-15 RX ORDER — NALOXONE HYDROCHLORIDE 4 MG/.1ML
SPRAY NASAL
Qty: 1 EACH | Refills: 11 | Status: SHIPPED | OUTPATIENT
Start: 2021-11-15 | End: 2022-04-19

## 2021-11-15 RX ORDER — ESCITALOPRAM OXALATE 10 MG/1
10 TABLET ORAL DAILY
COMMUNITY
Start: 2021-09-25 | End: 2022-02-11

## 2021-11-17 ENCOUNTER — TELEPHONE (OUTPATIENT)
Dept: PRIMARY CARE CLINIC | Facility: CLINIC | Age: 76
End: 2021-11-17
Payer: MEDICARE

## 2021-12-02 ENCOUNTER — TELEPHONE (OUTPATIENT)
Dept: NEUROLOGY | Facility: CLINIC | Age: 76
End: 2021-12-02
Payer: MEDICARE

## 2021-12-09 ENCOUNTER — IMMUNIZATION (OUTPATIENT)
Dept: PRIMARY CARE CLINIC | Facility: CLINIC | Age: 76
End: 2021-12-09
Payer: MEDICARE

## 2021-12-09 ENCOUNTER — TELEPHONE (OUTPATIENT)
Dept: PRIMARY CARE CLINIC | Facility: CLINIC | Age: 76
End: 2021-12-09

## 2021-12-09 ENCOUNTER — TELEPHONE (OUTPATIENT)
Dept: PRIMARY CARE CLINIC | Facility: CLINIC | Age: 76
End: 2021-12-09
Payer: MEDICARE

## 2021-12-09 DIAGNOSIS — Z23 NEED FOR VACCINATION: Primary | ICD-10-CM

## 2021-12-09 PROCEDURE — 0064A COVID-19, MRNA, LNP-S, PF, 100 MCG/0.25 ML DOSE VACCINE (MODERNA BOOSTER): CPT | Mod: PBBFAC | Performed by: EMERGENCY MEDICINE

## 2021-12-16 ENCOUNTER — TELEPHONE (OUTPATIENT)
Dept: PRIMARY CARE CLINIC | Facility: CLINIC | Age: 76
End: 2021-12-16
Payer: MEDICARE

## 2021-12-21 ENCOUNTER — OFFICE VISIT (OUTPATIENT)
Dept: PRIMARY CARE CLINIC | Facility: CLINIC | Age: 76
End: 2021-12-21
Payer: MEDICARE

## 2021-12-21 VITALS
SYSTOLIC BLOOD PRESSURE: 120 MMHG | OXYGEN SATURATION: 93 % | RESPIRATION RATE: 18 BRPM | BODY MASS INDEX: 19.74 KG/M2 | DIASTOLIC BLOOD PRESSURE: 84 MMHG | HEIGHT: 64 IN | WEIGHT: 115.63 LBS | HEART RATE: 83 BPM

## 2021-12-21 DIAGNOSIS — G89.4 CHRONIC PAIN SYNDROME: Primary | ICD-10-CM

## 2021-12-21 DIAGNOSIS — I21.4 NSTEMI (NON-ST ELEVATED MYOCARDIAL INFARCTION): ICD-10-CM

## 2021-12-21 DIAGNOSIS — K21.9 GASTROESOPHAGEAL REFLUX DISEASE, UNSPECIFIED WHETHER ESOPHAGITIS PRESENT: Chronic | ICD-10-CM

## 2021-12-21 DIAGNOSIS — R51.9 INTRACTABLE EPISODIC HEADACHE, UNSPECIFIED HEADACHE TYPE: ICD-10-CM

## 2021-12-21 DIAGNOSIS — I50.32 CHRONIC DIASTOLIC CONGESTIVE HEART FAILURE: Chronic | ICD-10-CM

## 2021-12-21 DIAGNOSIS — Z95.5 HX OF HEART ARTERY STENT: ICD-10-CM

## 2021-12-21 DIAGNOSIS — J44.9 CHRONIC OBSTRUCTIVE PULMONARY DISEASE, UNSPECIFIED COPD TYPE: Chronic | ICD-10-CM

## 2021-12-21 DIAGNOSIS — S20.211A CONTUSION OF RIGHT CHEST WALL, INITIAL ENCOUNTER: ICD-10-CM

## 2021-12-21 DIAGNOSIS — I10 ESSENTIAL HYPERTENSION: Chronic | ICD-10-CM

## 2021-12-21 DIAGNOSIS — E03.9 HYPOTHYROIDISM, UNSPECIFIED TYPE: Chronic | ICD-10-CM

## 2021-12-21 DIAGNOSIS — I73.9 PERIPHERAL VASCULAR DISEASE, UNSPECIFIED: ICD-10-CM

## 2021-12-21 DIAGNOSIS — S20.219A CONTUSION OF CHEST WALL, UNSPECIFIED LATERALITY, INITIAL ENCOUNTER: ICD-10-CM

## 2021-12-21 DIAGNOSIS — E78.00 HYPERCHOLESTEREMIA: Chronic | ICD-10-CM

## 2021-12-21 DIAGNOSIS — F41.9 ANXIETY: ICD-10-CM

## 2021-12-21 DIAGNOSIS — Z87.891 HISTORY OF TOBACCO ABUSE: ICD-10-CM

## 2021-12-21 DIAGNOSIS — F32.A DEPRESSION, UNSPECIFIED DEPRESSION TYPE: ICD-10-CM

## 2021-12-21 PROCEDURE — 1159F MED LIST DOCD IN RCRD: CPT | Mod: CPTII,S$GLB,, | Performed by: FAMILY MEDICINE

## 2021-12-21 PROCEDURE — 3288F PR FALLS RISK ASSESSMENT DOCUMENTED: ICD-10-PCS | Mod: CPTII,S$GLB,, | Performed by: FAMILY MEDICINE

## 2021-12-21 PROCEDURE — 99999 PR PBB SHADOW E&M-EST. PATIENT-LVL V: ICD-10-PCS | Mod: PBBFAC,,, | Performed by: FAMILY MEDICINE

## 2021-12-21 PROCEDURE — 3074F PR MOST RECENT SYSTOLIC BLOOD PRESSURE < 130 MM HG: ICD-10-PCS | Mod: CPTII,S$GLB,, | Performed by: FAMILY MEDICINE

## 2021-12-21 PROCEDURE — 1125F AMNT PAIN NOTED PAIN PRSNT: CPT | Mod: CPTII,S$GLB,, | Performed by: FAMILY MEDICINE

## 2021-12-21 PROCEDURE — 1125F PR PAIN SEVERITY QUANTIFIED, PAIN PRESENT: ICD-10-PCS | Mod: CPTII,S$GLB,, | Performed by: FAMILY MEDICINE

## 2021-12-21 PROCEDURE — 1159F PR MEDICATION LIST DOCUMENTED IN MEDICAL RECORD: ICD-10-PCS | Mod: CPTII,S$GLB,, | Performed by: FAMILY MEDICINE

## 2021-12-21 PROCEDURE — 3079F PR MOST RECENT DIASTOLIC BLOOD PRESSURE 80-89 MM HG: ICD-10-PCS | Mod: CPTII,S$GLB,, | Performed by: FAMILY MEDICINE

## 2021-12-21 PROCEDURE — 3074F SYST BP LT 130 MM HG: CPT | Mod: CPTII,S$GLB,, | Performed by: FAMILY MEDICINE

## 2021-12-21 PROCEDURE — 3288F FALL RISK ASSESSMENT DOCD: CPT | Mod: CPTII,S$GLB,, | Performed by: FAMILY MEDICINE

## 2021-12-21 PROCEDURE — 1157F ADVNC CARE PLAN IN RCRD: CPT | Mod: CPTII,S$GLB,, | Performed by: FAMILY MEDICINE

## 2021-12-21 PROCEDURE — 99499 RISK ADDL DX/OHS AUDIT: ICD-10-PCS | Mod: S$GLB,,, | Performed by: FAMILY MEDICINE

## 2021-12-21 PROCEDURE — 3008F PR BODY MASS INDEX (BMI) DOCUMENTED: ICD-10-PCS | Mod: CPTII,S$GLB,, | Performed by: FAMILY MEDICINE

## 2021-12-21 PROCEDURE — 1157F PR ADVANCE CARE PLAN OR EQUIV PRESENT IN MEDICAL RECORD: ICD-10-PCS | Mod: CPTII,S$GLB,, | Performed by: FAMILY MEDICINE

## 2021-12-21 PROCEDURE — 1100F PTFALLS ASSESS-DOCD GE2>/YR: CPT | Mod: CPTII,S$GLB,, | Performed by: FAMILY MEDICINE

## 2021-12-21 PROCEDURE — 3079F DIAST BP 80-89 MM HG: CPT | Mod: CPTII,S$GLB,, | Performed by: FAMILY MEDICINE

## 2021-12-21 PROCEDURE — 1100F PR PT FALLS ASSESS DOC 2+ FALLS/FALL W/INJURY/YR: ICD-10-PCS | Mod: CPTII,S$GLB,, | Performed by: FAMILY MEDICINE

## 2021-12-21 PROCEDURE — 99214 OFFICE O/P EST MOD 30 MIN: CPT | Mod: S$GLB,,, | Performed by: FAMILY MEDICINE

## 2021-12-21 PROCEDURE — 99214 PR OFFICE/OUTPT VISIT, EST, LEVL IV, 30-39 MIN: ICD-10-PCS | Mod: S$GLB,,, | Performed by: FAMILY MEDICINE

## 2021-12-21 PROCEDURE — 3008F BODY MASS INDEX DOCD: CPT | Mod: CPTII,S$GLB,, | Performed by: FAMILY MEDICINE

## 2021-12-21 PROCEDURE — 99999 PR PBB SHADOW E&M-EST. PATIENT-LVL V: CPT | Mod: PBBFAC,,, | Performed by: FAMILY MEDICINE

## 2021-12-21 PROCEDURE — 99499 UNLISTED E&M SERVICE: CPT | Mod: S$GLB,,, | Performed by: FAMILY MEDICINE

## 2021-12-21 RX ORDER — HYDROCODONE BITARTRATE AND ACETAMINOPHEN 5; 325 MG/1; MG/1
1 TABLET ORAL EVERY 6 HOURS PRN
Qty: 30 TABLET | Refills: 0 | Status: SHIPPED | OUTPATIENT
Start: 2021-12-21 | End: 2022-01-18 | Stop reason: SDUPTHER

## 2021-12-22 ENCOUNTER — TELEPHONE (OUTPATIENT)
Dept: PRIMARY CARE CLINIC | Facility: CLINIC | Age: 76
End: 2021-12-22
Payer: MEDICARE

## 2021-12-27 ENCOUNTER — TELEPHONE (OUTPATIENT)
Dept: PRIMARY CARE CLINIC | Facility: CLINIC | Age: 76
End: 2021-12-27
Payer: MEDICARE

## 2021-12-27 NOTE — TELEPHONE ENCOUNTER
Notified pt. Of xray results. Patient understood and stated that she is still in a lot of pain , that she finished her prescription of norco. I explained to patient that she just received a rx of norco last week that . I don't think another rx can be filled this soon. Patient then asked for a refill of her tylenol #3.Patient insisted that message to be sent to provider

## 2021-12-27 NOTE — TELEPHONE ENCOUNTER
----- Message from Shannon Martinez sent at 12/27/2021 12:36 PM CST -----  Contact: self [phone]  Calling to get test results.  Name of test (lab, x-ray): Xray  Date of test: 12/21/21  Where was the test performed: [facility]  Would you like a call back, or a response through your [facility] portal?:   phone  Comments:

## 2022-01-02 RX ORDER — LEVOCETIRIZINE DIHYDROCHLORIDE 5 MG/1
TABLET, FILM COATED ORAL
Qty: 30 TABLET | Refills: 5 | Status: SHIPPED | OUTPATIENT
Start: 2022-01-02

## 2022-01-02 RX ORDER — ACETAMINOPHEN AND CODEINE PHOSPHATE 300; 30 MG/1; MG/1
TABLET ORAL
Qty: 60 TABLET | Refills: 1 | OUTPATIENT
Start: 2022-01-02

## 2022-01-02 RX ORDER — OMEPRAZOLE 40 MG/1
CAPSULE, DELAYED RELEASE ORAL
Qty: 30 CAPSULE | Refills: 5 | Status: SHIPPED | OUTPATIENT
Start: 2022-01-02

## 2022-01-02 RX ORDER — METOPROLOL SUCCINATE 25 MG/1
TABLET, EXTENDED RELEASE ORAL
Qty: 30 TABLET | Refills: 5 | Status: SHIPPED | OUTPATIENT
Start: 2022-01-02

## 2022-01-02 NOTE — TELEPHONE ENCOUNTER
No new care gaps identified.  Powered by [employer]. Reference number: [id number].   1/02/2022 8:03:40 AM CST

## 2022-01-03 ENCOUNTER — PATIENT OUTREACH (OUTPATIENT)
Dept: ADMINISTRATIVE | Facility: OTHER | Age: 77
End: 2022-01-03
Payer: MEDICARE

## 2022-01-03 NOTE — TELEPHONE ENCOUNTER
----- Message from Tricia Staples sent at 1/3/2022  8:41 AM CST -----  Contact: pt  Patient would like to get medical advice.  Symptoms (please be specific):  coughing and  stuffy nose,sneezing  How long have you had these symptoms: 3 days  Would you like a call back, or a response through your [facility] portal?:   call  Pharmacy name and phone # (copy from chart):    [facility] - [place] [address] [place] [address]  Phone: [phone] Fax: [phone]      Comments:

## 2022-01-03 NOTE — TELEPHONE ENCOUNTER
I spoke with the patient. I let her know he can go to [address] to a swab clinic. Patient declines and said she doesn't think it's covid. She will keep her appt tomorrow so we can evaluate her in clinic

## 2022-01-04 ENCOUNTER — TELEPHONE (OUTPATIENT)
Dept: PRIMARY CARE CLINIC | Facility: CLINIC | Age: 77
End: 2022-01-04
Payer: MEDICARE

## 2022-01-04 NOTE — TELEPHONE ENCOUNTER
----- Message from Shannon Martinez sent at 1/4/2022  8:52 AM CST -----  Contact: self [phone]  Patient would like to get medical advice.  Symptoms :fever, cough, sneezing, bodyaches  How long have you had these symptoms: 4 days  Would you like a call back, or a response through your [facility] portal?:   phone  Pharmacy name and phone #   [facility] - [place] - [address] [place] [address]  Phone: [phone] Fax: [phone]    Comments:

## 2022-01-06 ENCOUNTER — TELEPHONE (OUTPATIENT)
Dept: PAIN MEDICINE | Facility: CLINIC | Age: 77
End: 2022-01-06
Payer: MEDICARE

## 2022-01-06 NOTE — TELEPHONE ENCOUNTER
Spoke to patient regarding her appointment for today. Asked patient if she would be interested in getting the ESIs that Dr Ford offered in the past. Patient said that she had the flu and wanted pain medication. I explained that Dr Ford would not write her pain meds for the flu. Patient then said she 5 hip surgeries and if he did not write pain meds it would be a problem. I explained to patient that Dr Ford would do the OSCAR for her but would not write her pain medication. Patient then said to go ahead and cancel appointment because she didn't have a way to get here anyway. I canceled patient's appointment. Nothing further discussed. Dr Ford was made aware of this conversation.

## 2022-01-07 ENCOUNTER — NURSE TRIAGE (OUTPATIENT)
Dept: ADMINISTRATIVE | Facility: CLINIC | Age: 77
End: 2022-01-07
Payer: MEDICARE

## 2022-01-07 NOTE — TELEPHONE ENCOUNTER
Pt reports elevated BP readings x days. Current /93 HR 91. Denies cp/sob. +dizziness/visual disturbances. Advised ER/UC now for eval. Pt vu, states will try to get a ride to nearby UC now.     Reason for Disposition   Systolic BP >= 160 OR Diastolic >= 100, and any cardiac or neurologic symptoms (e.g., chest pain, difficulty breathing, unsteady gait, blurred vision)    Additional Information   Negative: Sounds like a life-threatening emergency to the triager   Negative: Pregnant 20 or more weeks or postpartum (< 6 weeks after delivery) with new hand or face swelling   Negative: Pregnant 20 or more weeks or postpartum with Systolic BP >= 140 OR Diastolic >= 90    Protocols used: BLOOD PRESSURE - HIGH-A-OH

## 2022-01-14 RX ORDER — BUTALBITAL, ACETAMINOPHEN AND CAFFEINE 50; 325; 40 MG/1; MG/1; MG/1
TABLET ORAL
Qty: 60 TABLET | Refills: 2 | OUTPATIENT
Start: 2022-01-14

## 2022-01-14 NOTE — TELEPHONE ENCOUNTER
No new care gaps identified.  Powered by [employer] by [employer]. Reference number: [id number].   1/13/2022 10:45:35 PM CST

## 2022-01-18 ENCOUNTER — OFFICE VISIT (OUTPATIENT)
Dept: PRIMARY CARE CLINIC | Facility: CLINIC | Age: 77
End: 2022-01-18
Payer: MEDICARE

## 2022-01-18 VITALS
DIASTOLIC BLOOD PRESSURE: 86 MMHG | SYSTOLIC BLOOD PRESSURE: 138 MMHG | WEIGHT: 116.63 LBS | RESPIRATION RATE: 18 BRPM | OXYGEN SATURATION: 97 % | BODY MASS INDEX: 19.91 KG/M2 | HEART RATE: 90 BPM | HEIGHT: 64 IN

## 2022-01-18 DIAGNOSIS — I73.9 PERIPHERAL VASCULAR DISEASE, UNSPECIFIED: ICD-10-CM

## 2022-01-18 DIAGNOSIS — K21.9 GASTROESOPHAGEAL REFLUX DISEASE, UNSPECIFIED WHETHER ESOPHAGITIS PRESENT: Chronic | ICD-10-CM

## 2022-01-18 DIAGNOSIS — G89.4 CHRONIC PAIN SYNDROME: ICD-10-CM

## 2022-01-18 DIAGNOSIS — M51.36 DDD (DEGENERATIVE DISC DISEASE), LUMBAR: ICD-10-CM

## 2022-01-18 DIAGNOSIS — G43.511 INTRACTABLE PERSISTENT MIGRAINE AURA WITHOUT CEREBRAL INFARCTION AND WITH STATUS MIGRAINOSUS: ICD-10-CM

## 2022-01-18 DIAGNOSIS — E03.9 HYPOTHYROIDISM, UNSPECIFIED TYPE: Chronic | ICD-10-CM

## 2022-01-18 DIAGNOSIS — Z87.81 HISTORY OF FRACTURED RIB: ICD-10-CM

## 2022-01-18 DIAGNOSIS — R51.9 INTRACTABLE EPISODIC HEADACHE, UNSPECIFIED HEADACHE TYPE: ICD-10-CM

## 2022-01-18 DIAGNOSIS — Z95.5 HX OF HEART ARTERY STENT: ICD-10-CM

## 2022-01-18 DIAGNOSIS — I50.32 CHRONIC DIASTOLIC CONGESTIVE HEART FAILURE: Chronic | ICD-10-CM

## 2022-01-18 DIAGNOSIS — E78.00 HYPERCHOLESTEREMIA: Chronic | ICD-10-CM

## 2022-01-18 DIAGNOSIS — J40 BRONCHITIS: Primary | ICD-10-CM

## 2022-01-18 DIAGNOSIS — F32.A DEPRESSION, UNSPECIFIED DEPRESSION TYPE: ICD-10-CM

## 2022-01-18 DIAGNOSIS — J44.9 CHRONIC OBSTRUCTIVE PULMONARY DISEASE, UNSPECIFIED COPD TYPE: Chronic | ICD-10-CM

## 2022-01-18 DIAGNOSIS — F41.9 ANXIETY: ICD-10-CM

## 2022-01-18 DIAGNOSIS — I10 ESSENTIAL HYPERTENSION: Chronic | ICD-10-CM

## 2022-01-18 PROCEDURE — 99999 PR PBB SHADOW E&M-EST. PATIENT-LVL IV: CPT | Mod: PBBFAC,,, | Performed by: FAMILY MEDICINE

## 2022-01-18 PROCEDURE — 99214 PR OFFICE/OUTPT VISIT, EST, LEVL IV, 30-39 MIN: ICD-10-PCS | Mod: S$GLB,,, | Performed by: FAMILY MEDICINE

## 2022-01-18 PROCEDURE — 1101F PT FALLS ASSESS-DOCD LE1/YR: CPT | Mod: CPTII,S$GLB,, | Performed by: FAMILY MEDICINE

## 2022-01-18 PROCEDURE — 1126F AMNT PAIN NOTED NONE PRSNT: CPT | Mod: CPTII,S$GLB,, | Performed by: FAMILY MEDICINE

## 2022-01-18 PROCEDURE — 3075F PR MOST RECENT SYSTOLIC BLOOD PRESS GE 130-139MM HG: ICD-10-PCS | Mod: CPTII,S$GLB,, | Performed by: FAMILY MEDICINE

## 2022-01-18 PROCEDURE — 99214 OFFICE O/P EST MOD 30 MIN: CPT | Mod: S$GLB,,, | Performed by: FAMILY MEDICINE

## 2022-01-18 PROCEDURE — 1157F ADVNC CARE PLAN IN RCRD: CPT | Mod: CPTII,S$GLB,, | Performed by: FAMILY MEDICINE

## 2022-01-18 PROCEDURE — 1126F PR PAIN SEVERITY QUANTIFIED, NO PAIN PRESENT: ICD-10-PCS | Mod: CPTII,S$GLB,, | Performed by: FAMILY MEDICINE

## 2022-01-18 PROCEDURE — 3288F FALL RISK ASSESSMENT DOCD: CPT | Mod: CPTII,S$GLB,, | Performed by: FAMILY MEDICINE

## 2022-01-18 PROCEDURE — 3288F PR FALLS RISK ASSESSMENT DOCUMENTED: ICD-10-PCS | Mod: CPTII,S$GLB,, | Performed by: FAMILY MEDICINE

## 2022-01-18 PROCEDURE — 1159F PR MEDICATION LIST DOCUMENTED IN MEDICAL RECORD: ICD-10-PCS | Mod: CPTII,S$GLB,, | Performed by: FAMILY MEDICINE

## 2022-01-18 PROCEDURE — 3075F SYST BP GE 130 - 139MM HG: CPT | Mod: CPTII,S$GLB,, | Performed by: FAMILY MEDICINE

## 2022-01-18 PROCEDURE — 1157F PR ADVANCE CARE PLAN OR EQUIV PRESENT IN MEDICAL RECORD: ICD-10-PCS | Mod: CPTII,S$GLB,, | Performed by: FAMILY MEDICINE

## 2022-01-18 PROCEDURE — 1101F PR PT FALLS ASSESS DOC 0-1 FALLS W/OUT INJ PAST YR: ICD-10-PCS | Mod: CPTII,S$GLB,, | Performed by: FAMILY MEDICINE

## 2022-01-18 PROCEDURE — 3079F PR MOST RECENT DIASTOLIC BLOOD PRESSURE 80-89 MM HG: ICD-10-PCS | Mod: CPTII,S$GLB,, | Performed by: FAMILY MEDICINE

## 2022-01-18 PROCEDURE — 99999 PR PBB SHADOW E&M-EST. PATIENT-LVL IV: ICD-10-PCS | Mod: PBBFAC,,, | Performed by: FAMILY MEDICINE

## 2022-01-18 PROCEDURE — 3079F DIAST BP 80-89 MM HG: CPT | Mod: CPTII,S$GLB,, | Performed by: FAMILY MEDICINE

## 2022-01-18 PROCEDURE — 1159F MED LIST DOCD IN RCRD: CPT | Mod: CPTII,S$GLB,, | Performed by: FAMILY MEDICINE

## 2022-01-18 RX ORDER — HYDROCODONE BITARTRATE AND ACETAMINOPHEN 5; 325 MG/1; MG/1
1 TABLET ORAL EVERY 6 HOURS PRN
Qty: 30 TABLET | Refills: 0 | Status: SHIPPED | OUTPATIENT
Start: 2022-01-18 | End: 2022-02-17

## 2022-01-18 RX ORDER — BUTALBITAL, ACETAMINOPHEN AND CAFFEINE 50; 325; 40 MG/1; MG/1; MG/1
TABLET ORAL
Qty: 60 TABLET | Refills: 2 | Status: SHIPPED | OUTPATIENT
Start: 2022-01-18 | End: 2022-04-19
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
## 2022-02-03 ENCOUNTER — TELEPHONE (OUTPATIENT)
Dept: PRIMARY CARE CLINIC | Facility: CLINIC | Age: 77
End: 2022-02-03
Payer: MEDICARE

## 2022-02-03 NOTE — TELEPHONE ENCOUNTER
Per Dr. Neff-- patient is to be discharged from clinic due constant controlled prescriptions. Patient was notified numerous times will no longer write controlled medications.  printed out .

## 2022-02-03 NOTE — TELEPHONE ENCOUNTER
----- Message from Nakia Scherer sent at 2/3/2022 10:11 AM CST -----  Is this a refill or new RX: Refill    RX name and strength acetaminophen-codeine 300-30mg (TYLENOL #3) 300-30 mg Tab and guaifenesin-codeine 100-10 mg/5 ml (CHERATUSSIN AC)  mg/5 mL     Pharmacy name and phone # [facility] - [place], [place] - [address] Phone:  [phone] Fax:  [phone]

## 2022-02-11 RX ORDER — ESCITALOPRAM OXALATE 10 MG/1
TABLET ORAL
Qty: 90 TABLET | Refills: 3 | Status: SHIPPED | OUTPATIENT
Start: 2022-02-11 | End: 2022-02-17

## 2022-02-11 NOTE — TELEPHONE ENCOUNTER
No new care gaps identified.  Powered by [employer]. Reference number: [id number].   1/30/2022 8:04:26 AM CST  
Refill Authorization Note   Josseline Stinson  is requesting a refill authorization.  Brief Assessment and Rationale for Refill:  Approve     Medication Therapy Plan:       Medication Reconciliation Completed: No   Comments:   --->Care Gap information included below if applicable.   Orders Placed This Encounter    EScitalopram oxalate (LEXAPRO) 10 MG tablet      Requested Prescriptions   Signed Prescriptions Disp Refills    EScitalopram oxalate (LEXAPRO) 10 MG tablet 90 tablet 3     Sig: TAKE ONE TABLET BY MOUTH ONCE DAILY       Psychiatry:  Antidepressants - SSRI Passed - 1/30/2022  8:03 AM        Passed - Patient is at least 18 years old        Passed - Valid encounter within last 15 months     Recent Visits  Date Type Provider Dept   01/18/22 Office Visit Dony Woo MD [facility]   12/21/21 Office Visit Dony Woo MD [facility]   11/15/21 Office Visit Dony Woo MD [facility]   10/26/21 Office Visit Dony Woo MD [facility]   10/13/21 Office Visit Dony Woo MD [facility]   09/22/21 Office Visit Dony Woo MD [facility]   08/13/21 Office Visit Dony Woo MD [facility]   07/12/21 Office Visit Dony Woo MD [facility]   06/22/21 Office Visit Dony Woo MD [facility]   06/01/21 Office Visit Dony Woo MD [facility]   Showing recent visits within past 720 days and meeting all other requirements  Future Appointments  No visits were found meeting these conditions.  Showing future appointments within next 150 days and meeting all other requirements                    Appointments  past 12m or future 3m with PCP    Date Provider   Last Visit   1/18/2022 Dony Woo MD   Next Visit   2/3/2022 Dony Woo MD   ED visits in past 90 days: 0     Note composed:5:06 PM 02/11/2022         
03-Feb-2021

## 2022-02-17 ENCOUNTER — PATIENT OUTREACH (OUTPATIENT)
Dept: ADMINISTRATIVE | Facility: OTHER | Age: 77
End: 2022-02-17
Payer: MEDICARE

## 2022-02-17 ENCOUNTER — OFFICE VISIT (OUTPATIENT)
Dept: PULMONOLOGY | Facility: CLINIC | Age: 77
End: 2022-02-17
Payer: MEDICARE

## 2022-02-17 VITALS
BODY MASS INDEX: 20.02 KG/M2 | SYSTOLIC BLOOD PRESSURE: 168 MMHG | DIASTOLIC BLOOD PRESSURE: 79 MMHG | OXYGEN SATURATION: 100 % | HEART RATE: 69 BPM | HEIGHT: 64 IN

## 2022-02-17 DIAGNOSIS — R51.9 NONINTRACTABLE EPISODIC HEADACHE, UNSPECIFIED HEADACHE TYPE: ICD-10-CM

## 2022-02-17 DIAGNOSIS — J32.9 SINUSITIS, UNSPECIFIED CHRONICITY, UNSPECIFIED LOCATION: Primary | ICD-10-CM

## 2022-02-17 DIAGNOSIS — F41.9 ANXIETY: ICD-10-CM

## 2022-02-17 DIAGNOSIS — J44.9 CHRONIC OBSTRUCTIVE PULMONARY DISEASE, UNSPECIFIED COPD TYPE: ICD-10-CM

## 2022-02-17 PROCEDURE — 1101F PR PT FALLS ASSESS DOC 0-1 FALLS W/OUT INJ PAST YR: ICD-10-PCS | Mod: CPTII,S$GLB,, | Performed by: INTERNAL MEDICINE

## 2022-02-17 PROCEDURE — 1159F PR MEDICATION LIST DOCUMENTED IN MEDICAL RECORD: ICD-10-PCS | Mod: CPTII,S$GLB,, | Performed by: INTERNAL MEDICINE

## 2022-02-17 PROCEDURE — 3288F FALL RISK ASSESSMENT DOCD: CPT | Mod: CPTII,S$GLB,, | Performed by: INTERNAL MEDICINE

## 2022-02-17 PROCEDURE — 99999 PR PBB SHADOW E&M-EST. PATIENT-LVL III: ICD-10-PCS | Mod: PBBFAC,,, | Performed by: INTERNAL MEDICINE

## 2022-02-17 PROCEDURE — 1125F AMNT PAIN NOTED PAIN PRSNT: CPT | Mod: CPTII,S$GLB,, | Performed by: INTERNAL MEDICINE

## 2022-02-17 PROCEDURE — 99203 OFFICE O/P NEW LOW 30 MIN: CPT | Mod: S$GLB,,, | Performed by: INTERNAL MEDICINE

## 2022-02-17 PROCEDURE — 1159F MED LIST DOCD IN RCRD: CPT | Mod: CPTII,S$GLB,, | Performed by: INTERNAL MEDICINE

## 2022-02-17 PROCEDURE — 1157F PR ADVANCE CARE PLAN OR EQUIV PRESENT IN MEDICAL RECORD: ICD-10-PCS | Mod: CPTII,S$GLB,, | Performed by: INTERNAL MEDICINE

## 2022-02-17 PROCEDURE — 3077F PR MOST RECENT SYSTOLIC BLOOD PRESSURE >= 140 MM HG: ICD-10-PCS | Mod: CPTII,S$GLB,, | Performed by: INTERNAL MEDICINE

## 2022-02-17 PROCEDURE — 99999 PR PBB SHADOW E&M-EST. PATIENT-LVL III: CPT | Mod: PBBFAC,,, | Performed by: INTERNAL MEDICINE

## 2022-02-17 PROCEDURE — 3077F SYST BP >= 140 MM HG: CPT | Mod: CPTII,S$GLB,, | Performed by: INTERNAL MEDICINE

## 2022-02-17 PROCEDURE — 1101F PT FALLS ASSESS-DOCD LE1/YR: CPT | Mod: CPTII,S$GLB,, | Performed by: INTERNAL MEDICINE

## 2022-02-17 PROCEDURE — 3288F PR FALLS RISK ASSESSMENT DOCUMENTED: ICD-10-PCS | Mod: CPTII,S$GLB,, | Performed by: INTERNAL MEDICINE

## 2022-02-17 PROCEDURE — 3078F DIAST BP <80 MM HG: CPT | Mod: CPTII,S$GLB,, | Performed by: INTERNAL MEDICINE

## 2022-02-17 PROCEDURE — 3078F PR MOST RECENT DIASTOLIC BLOOD PRESSURE < 80 MM HG: ICD-10-PCS | Mod: CPTII,S$GLB,, | Performed by: INTERNAL MEDICINE

## 2022-02-17 PROCEDURE — 99203 PR OFFICE/OUTPT VISIT, NEW, LEVL III, 30-44 MIN: ICD-10-PCS | Mod: S$GLB,,, | Performed by: INTERNAL MEDICINE

## 2022-02-17 PROCEDURE — 1157F ADVNC CARE PLAN IN RCRD: CPT | Mod: CPTII,S$GLB,, | Performed by: INTERNAL MEDICINE

## 2022-02-17 PROCEDURE — 1125F PR PAIN SEVERITY QUANTIFIED, PAIN PRESENT: ICD-10-PCS | Mod: CPTII,S$GLB,, | Performed by: INTERNAL MEDICINE

## 2022-02-17 RX ORDER — FLUTICASONE FUROATE, UMECLIDINIUM BROMIDE AND VILANTEROL TRIFENATATE 200; 62.5; 25 UG/1; UG/1; UG/1
1 POWDER RESPIRATORY (INHALATION) DAILY
Qty: 60 EACH | Refills: 11 | Status: SHIPPED | OUTPATIENT
Start: 2022-02-17 | End: 2022-04-25

## 2022-02-17 RX ORDER — ALPRAZOLAM 0.5 MG/1
0.5 TABLET ORAL 3 TIMES DAILY PRN
Qty: 21 TABLET | Refills: 0 | Status: SHIPPED | OUTPATIENT
Start: 2022-02-17 | End: 2022-04-19

## 2022-02-17 RX ORDER — TRAMADOL HYDROCHLORIDE 50 MG/1
50 TABLET ORAL EVERY 6 HOURS PRN
Qty: 28 TABLET | Refills: 0 | Status: SHIPPED | OUTPATIENT
Start: 2022-02-17 | End: 2022-04-05

## 2022-02-17 RX ORDER — DOXYCYCLINE HYCLATE 100 MG
100 TABLET ORAL EVERY 12 HOURS
Qty: 28 TABLET | Refills: 0 | Status: SHIPPED | OUTPATIENT
Start: 2022-02-17 | End: 2022-04-25

## 2022-02-17 RX ORDER — ALBUTEROL SULFATE 90 UG/1
AEROSOL, METERED RESPIRATORY (INHALATION)
COMMUNITY
Start: 2022-02-07 | End: 2022-04-06

## 2022-02-17 NOTE — PATIENT INSTRUCTIONS
You have had headaches and may have sinus infection-- will send in doxycyline 100 twice daily for 2 wks.  And may use tramadol for pain as needed.  Should have follow up elsewhere for headaches and pain.  Could do ct sinus and refer to ENT?      Would recommend trial trelegy once day should help.  Should stop smokes.      Will send in xanax for nerves -- not good as has increase fall risk-- would not be able to dose long term.      Your left calf has had pain last 2 days but I find no significant finding to exam.
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
## 2022-03-22 NOTE — TELEPHONE ENCOUNTER
Care Due:                  Date            Visit Type   Department     Provider  --------------------------------------------------------------------------------                                EP -                              PRIMARY      [facility]  Last Visit: 01-      CARE ([facility])   PRIMARY CARE   Dony Woo  Next Visit: None Scheduled  None         None Found                                                            Last  Test          Frequency    Reason                     Performed    Due Date  --------------------------------------------------------------------------------    Lipid Panel.  12 months..  atorvastatin.............  06- 06-    Powered by [employer]. Reference number: [id number].   3/22/2022 9:47:46 AM CDT

## 2022-03-23 RX ORDER — GABAPENTIN 300 MG/1
CAPSULE ORAL
Qty: 90 CAPSULE | Refills: 5 | OUTPATIENT
Start: 2022-03-23

## 2022-03-24 NOTE — TELEPHONE ENCOUNTER
This Rx Request does not qualify for assessment with the OR   Please review protocol details and the Care Due Message for extra clinical information    Reasons Rx Request may be deferred:  The original prescription was discontinued on 2/17/2022 by Jay Kitchen MD., PLEASE ADVISE. MEDICATION IS NOT ACTIVE ON MED LIST    Note composed:8:35 AM 03/24/2022

## 2022-03-24 NOTE — TELEPHONE ENCOUNTER
Call tell p[t control medication not called in over phone This patieent needs come in cannot do virtual visit   Need check see ifpatient not kicked out clinic first

## 2022-03-24 NOTE — TELEPHONE ENCOUNTER
No new care gaps identified.  Powered by [employer]. Reference number: [id number].   3/24/2022 8:05:05 AM CDT

## 2022-03-25 RX ORDER — TRAZODONE HYDROCHLORIDE 100 MG/1
TABLET ORAL
Qty: 30 TABLET | Refills: 5 | Status: SHIPPED | OUTPATIENT
Start: 2022-03-25

## 2022-04-05 ENCOUNTER — OFFICE VISIT (OUTPATIENT)
Dept: PRIMARY CARE CLINIC | Facility: CLINIC | Age: 77
End: 2022-04-05
Payer: MEDICARE

## 2022-04-05 VITALS
OXYGEN SATURATION: 94 % | DIASTOLIC BLOOD PRESSURE: 78 MMHG | HEIGHT: 64 IN | SYSTOLIC BLOOD PRESSURE: 122 MMHG | TEMPERATURE: 99 F | BODY MASS INDEX: 20.57 KG/M2 | HEART RATE: 109 BPM | RESPIRATION RATE: 18 BRPM | WEIGHT: 120.5 LBS

## 2022-04-05 DIAGNOSIS — M54.50 CHRONIC BILATERAL LOW BACK PAIN WITHOUT SCIATICA: ICD-10-CM

## 2022-04-05 DIAGNOSIS — J06.9 UPPER RESPIRATORY TRACT INFECTION, UNSPECIFIED TYPE: ICD-10-CM

## 2022-04-05 DIAGNOSIS — F33.0 MAJOR DEPRESSIVE DISORDER, RECURRENT, MILD: ICD-10-CM

## 2022-04-05 DIAGNOSIS — G89.29 CHRONIC BILATERAL LOW BACK PAIN WITHOUT SCIATICA: ICD-10-CM

## 2022-04-05 DIAGNOSIS — M51.36 DDD (DEGENERATIVE DISC DISEASE), LUMBAR: Primary | ICD-10-CM

## 2022-04-05 PROCEDURE — 1157F ADVNC CARE PLAN IN RCRD: CPT | Mod: CPTII,S$GLB,, | Performed by: FAMILY MEDICINE

## 2022-04-05 PROCEDURE — 1101F PT FALLS ASSESS-DOCD LE1/YR: CPT | Mod: CPTII,S$GLB,, | Performed by: FAMILY MEDICINE

## 2022-04-05 PROCEDURE — 3288F PR FALLS RISK ASSESSMENT DOCUMENTED: ICD-10-PCS | Mod: CPTII,S$GLB,, | Performed by: FAMILY MEDICINE

## 2022-04-05 PROCEDURE — 3078F PR MOST RECENT DIASTOLIC BLOOD PRESSURE < 80 MM HG: ICD-10-PCS | Mod: CPTII,S$GLB,, | Performed by: FAMILY MEDICINE

## 2022-04-05 PROCEDURE — 99214 OFFICE O/P EST MOD 30 MIN: CPT | Mod: S$GLB,,, | Performed by: FAMILY MEDICINE

## 2022-04-05 PROCEDURE — 99499 UNLISTED E&M SERVICE: CPT | Mod: S$GLB,,, | Performed by: FAMILY MEDICINE

## 2022-04-05 PROCEDURE — 1101F PR PT FALLS ASSESS DOC 0-1 FALLS W/OUT INJ PAST YR: ICD-10-PCS | Mod: CPTII,S$GLB,, | Performed by: FAMILY MEDICINE

## 2022-04-05 PROCEDURE — 3288F FALL RISK ASSESSMENT DOCD: CPT | Mod: CPTII,S$GLB,, | Performed by: FAMILY MEDICINE

## 2022-04-05 PROCEDURE — 99499 RISK ADDL DX/OHS AUDIT: ICD-10-PCS | Mod: S$GLB,,, | Performed by: FAMILY MEDICINE

## 2022-04-05 PROCEDURE — 1157F PR ADVANCE CARE PLAN OR EQUIV PRESENT IN MEDICAL RECORD: ICD-10-PCS | Mod: CPTII,S$GLB,, | Performed by: FAMILY MEDICINE

## 2022-04-05 PROCEDURE — 99999 PR PBB SHADOW E&M-EST. PATIENT-LVL V: ICD-10-PCS | Mod: PBBFAC,,, | Performed by: FAMILY MEDICINE

## 2022-04-05 PROCEDURE — 99999 PR PBB SHADOW E&M-EST. PATIENT-LVL V: CPT | Mod: PBBFAC,,, | Performed by: FAMILY MEDICINE

## 2022-04-05 PROCEDURE — 1159F MED LIST DOCD IN RCRD: CPT | Mod: CPTII,S$GLB,, | Performed by: FAMILY MEDICINE

## 2022-04-05 PROCEDURE — 1125F AMNT PAIN NOTED PAIN PRSNT: CPT | Mod: CPTII,S$GLB,, | Performed by: FAMILY MEDICINE

## 2022-04-05 PROCEDURE — 1159F PR MEDICATION LIST DOCUMENTED IN MEDICAL RECORD: ICD-10-PCS | Mod: CPTII,S$GLB,, | Performed by: FAMILY MEDICINE

## 2022-04-05 PROCEDURE — 3074F PR MOST RECENT SYSTOLIC BLOOD PRESSURE < 130 MM HG: ICD-10-PCS | Mod: CPTII,S$GLB,, | Performed by: FAMILY MEDICINE

## 2022-04-05 PROCEDURE — 3074F SYST BP LT 130 MM HG: CPT | Mod: CPTII,S$GLB,, | Performed by: FAMILY MEDICINE

## 2022-04-05 PROCEDURE — 1125F PR PAIN SEVERITY QUANTIFIED, PAIN PRESENT: ICD-10-PCS | Mod: CPTII,S$GLB,, | Performed by: FAMILY MEDICINE

## 2022-04-05 PROCEDURE — 3078F DIAST BP <80 MM HG: CPT | Mod: CPTII,S$GLB,, | Performed by: FAMILY MEDICINE

## 2022-04-05 PROCEDURE — 99214 PR OFFICE/OUTPT VISIT, EST, LEVL IV, 30-39 MIN: ICD-10-PCS | Mod: S$GLB,,, | Performed by: FAMILY MEDICINE

## 2022-04-05 RX ORDER — DULOXETIN HYDROCHLORIDE 30 MG/1
30 CAPSULE, DELAYED RELEASE ORAL DAILY
Qty: 30 CAPSULE | Refills: 11 | Status: SHIPPED | OUTPATIENT
Start: 2022-04-05 | End: 2022-04-25

## 2022-04-05 RX ORDER — AZITHROMYCIN 250 MG/1
TABLET, FILM COATED ORAL
Qty: 6 TABLET | Refills: 0 | Status: SHIPPED | OUTPATIENT
Start: 2022-04-05 | End: 2022-04-10

## 2022-04-05 RX ORDER — DICLOFENAC SODIUM 50 MG/1
50 TABLET, DELAYED RELEASE ORAL 2 TIMES DAILY
Qty: 20 TABLET | Refills: 0 | Status: SHIPPED | OUTPATIENT
Start: 2022-04-05 | End: 2022-04-15

## 2022-04-05 NOTE — PROGRESS NOTES
"Subjective:       Patient ID: Josseline Stinson is a 76 y.o. female.    Chief Complaint: Cough, Sore Throat, and Low-back Pain    1. Complains of congestion, productive cough. Some nausea. No fevers or chills. No sick contacts.   2. Complains of chronic back pain interested in seeing a different pain doctor.   3. Interested in trying something again for depression. Had tried duloxetine in the past and open to trying again.     Review of Systems    Objective:      Vitals:    04/05/22 0848   BP: 122/78   BP Location: Right arm   Patient Position: Sitting   BP Method: Medium (Manual)   Pulse: 109   Resp: 18   Temp: 98.8 °F (37.1 °C)   TempSrc: Oral   SpO2: (!) 94%   Weight: 54.6 kg (120 lb 7.7 oz)   Height: 5' 4" (1.626 m)     Physical Exam  Vitals and nursing note reviewed.   Constitutional:       General: She is in acute distress.      Appearance: She is well-developed.   HENT:      Head: Normocephalic and atraumatic.      Nose: Nose normal.   Eyes:      Conjunctiva/sclera: Conjunctivae normal.   Cardiovascular:      Heart sounds: Normal heart sounds.   Pulmonary:      Effort: Pulmonary effort is normal. No respiratory distress.      Comments: bl basaler crackles   Abdominal:      General: There is no distension.      Palpations: Abdomen is soft.      Tenderness: There is no abdominal tenderness.   Neurological:      Mental Status: She is alert.      Cranial Nerves: No cranial nerve deficit.             Lab Results   Component Value Date     10/28/2021    K 4.2 10/28/2021     10/28/2021    CO2 21 (L) 10/28/2021    BUN 9 10/28/2021    CREATININE 0.7 10/28/2021    ANIONGAP 9 10/28/2021     Lab Results   Component Value Date    HGBA1C 5.6 05/05/2019     Lab Results   Component Value Date     (H) 05/13/2019     (H) 05/11/2019     (H) 05/04/2019       Lab Results   Component Value Date    WBC 7.26 10/28/2021    HGB 15.5 10/28/2021    HCT 48.6 (H) 10/28/2021    HCT 39 01/16/2020     " 10/28/2021    GRAN 4.2 10/28/2021    GRAN 57.9 10/28/2021     Lab Results   Component Value Date    CHOL 137 06/23/2021    HDL 37 (L) 06/23/2021    LDLCALC 55.4 (L) 06/23/2021    TRIG 223 (H) 06/23/2021          Current Outpatient Medications:     alendronate (FOSAMAX) 70 MG tablet, Take 1 tablet (70 mg total) by mouth every 7 days., Disp: 12 tablet, Rfl: 3    atorvastatin (LIPITOR) 40 MG tablet, TAKE ONE TABLET BY MOUTH ONCE DAILY, Disp: 30 tablet, Rfl: 5    clopidogreL (PLAVIX) 75 mg tablet, TAKE ONE TABLET BY MOUTH ONCE DAILY, Disp: 30 tablet, Rfl: 5    doxycycline (VIBRA-TABS) 100 MG tablet, Take 1 tablet (100 mg total) by mouth every 12 (twelve) hours., Disp: 28 tablet, Rfl: 0    metoprolol succinate (TOPROL-XL) 25 MG 24 hr tablet, TAKE ONE TABLET BY MOUTH ONCE DAILY, Disp: 30 tablet, Rfl: 5    omeprazole (PRILOSEC) 40 MG capsule, TAKE ONE CAPSULE BY MOUTH ONCE DAILY, Disp: 30 capsule, Rfl: 5    traZODone (DESYREL) 100 MG tablet, TAKE ONE TABLET BY MOUTH AT BEDTIME AS NEEDED FOR INSOMNIA, Disp: 30 tablet, Rfl: 5    acetaminophen-codeine 300-30mg (TYLENOL #3) 300-30 mg Tab, Take by mouth., Disp: , Rfl:     albuterol (PROVENTIL/VENTOLIN HFA) 90 mcg/actuation inhaler, INHALE ONE PUFF BY MOUTH EVERY 4 TO 6 HOURS AS NEEDED, Disp: , Rfl:     ALPRAZolam (XANAX) 0.5 MG tablet, Take 1 tablet (0.5 mg total) by mouth 3 (three) times daily as needed for Anxiety., Disp: 21 tablet, Rfl: 0    azithromycin (Z-NICHOLE) 250 MG tablet, Take 2 tablets by mouth on day 1; Take 1 tablet by mouth on days 2-5, Disp: 6 tablet, Rfl: 0    butalbital-acetaminophen-caffeine -40 mg (FIORICET, ESGIC) -40 mg per tablet, One p.o. b.i.d. p.r.n. headache (Patient not taking: Reported on 4/5/2022), Disp: 60 tablet, Rfl: 2    cyclobenzaprine (FLEXERIL) 10 MG tablet, 1 po q hs prn muscle spsm--if needed can increaase to q8 hrs prn muscle spasm (Patient not taking: Reported on 4/5/2022), Disp: 100 tablet, Rfl: 5    diclofenac  (VOLTAREN) 50 MG EC tablet, Take 1 tablet (50 mg total) by mouth 2 (two) times daily. for 10 days, Disp: 20 tablet, Rfl: 0    DULoxetine (CYMBALTA) 30 MG capsule, Take 1 capsule (30 mg total) by mouth once daily., Disp: 30 capsule, Rfl: 11    fluticasone-umeclidin-vilanter (TRELEGY ELLIPTA) 200-62.5-25 mcg inhaler, Inhale 1 puff into the lungs once daily. (Patient not taking: Reported on 4/5/2022), Disp: 60 each, Rfl: 11    levocetirizine (XYZAL) 5 MG tablet, TAKE ONE TABLET BY MOUTH AT BEDTIME, Disp: 30 tablet, Rfl: 5    naloxone (NARCAN) 4 mg/actuation Spry, 4mg by nasal route as needed for opioid overdose; may repeat every 2-3 minutes in alternating nostrils until medical help arrives. Call 911 (Patient not taking: Reported on 4/5/2022), Disp: 1 each, Rfl: 11        Assessment:       1. DDD (degenerative disc disease), lumbar    2. Chronic bilateral low back pain without sciatica    3. Upper respiratory tract infection, unspecified type    4. Major depressive disorder, recurrent, mild           Plan:       DDD (degenerative disc disease), lumbar  -     Ambulatory referral/consult to Pain Clinic; Future; Expected date: 04/12/2022  -     diclofenac (VOLTAREN) 50 MG EC tablet; Take 1 tablet (50 mg total) by mouth 2 (two) times daily. for 10 days  Dispense: 20 tablet; Refill: 0  Ongoing low back pain chronic. Desiring to see a new pain doctor. New referral made.      Chronic bilateral low back pain without sciatica  -     Ambulatory referral/consult to Pain Clinic; Future; Expected date: 04/12/2022  -     diclofenac (VOLTAREN) 50 MG EC tablet; Take 1 tablet (50 mg total) by mouth 2 (two) times daily. for 10 days  Dispense: 20 tablet; Refill: 0    Upper respiratory tract infection, unspecified type  -     azithromycin (Z-NICHOLE) 250 MG tablet; Take 2 tablets by mouth on day 1; Take 1 tablet by mouth on days 2-5  Dispense: 6 tablet; Refill: 0  Uri symptoms. Covid negative likely viral. Zpack prescribed if no  improvement over the next few days. Discussed risks of inappropriate antibiotics.     Major depressive disorder, recurrent, mild  -     DULoxetine (CYMBALTA) 30 MG capsule; Take 1 capsule (30 mg total) by mouth once daily.  Dispense: 30 capsule; Refill: 11  Helped by duloxetine in the past seen by provider 2 years ago. Refill today.

## 2022-04-06 RX ORDER — ALBUTEROL SULFATE 90 UG/1
AEROSOL, METERED RESPIRATORY (INHALATION)
Qty: 8.5 G | Refills: 5 | Status: SHIPPED | OUTPATIENT
Start: 2022-04-06

## 2022-04-06 RX ORDER — CYCLOBENZAPRINE HCL 10 MG
TABLET ORAL
Qty: 100 TABLET | Refills: 5 | Status: SHIPPED | OUTPATIENT
Start: 2022-04-06

## 2022-04-06 NOTE — TELEPHONE ENCOUNTER
Refill Routing Note   Medication(s) are not appropriate for processing by [facility] for the following reason(s):      - Outside of protocol  - no pcp listed on profile; unclear if pt est care    [facility] action(s):  Defer  Route          Medication reconciliation completed: No     Appointments  past 12m or future 3m with PCP    Date Provider   Last Visit   1/18/2022 Dony Woo MD   Next Visit   Visit date not found Dony Woo MD   ED visits in past 90 days: 0        Note composed:1:04 PM 04/06/2022

## 2022-04-11 NOTE — TELEPHONE ENCOUNTER
----- Message from Tierra Ayala sent at 4/11/2022  1:20 PM CDT -----  Regarding: refill  Contact: jayne [phone]  Requesting an RX refill or new RX.  Is this a refill or new RX: butalbital-acetaminophen-caffeine -40 mg (FIORICET, ESGIC) -40 mg per tablet  RX name and strength (copy/paste from chart):    Is this a 30 day or 90 day RX:   Pharmacy name and phone #   [facility] [place] - [address]  [address] [place] [address]  Phone: [phone] Fax: [phone]      The doctors have asked that we provide their patients with the following 2 reminders -- prescription refills can take up to 72 hours, and a friendly reminder that in the future you can use your [facility] account to request refills: yes      Comment: Naida call if refill is being sent to pharmacy

## 2022-04-11 NOTE — TELEPHONE ENCOUNTER
----- Message from Tierra Ayala sent at 4/11/2022  1:20 PM CDT -----  Regarding: refill  Contact: jayne [phone]  Requesting an RX refill or new RX.  Is this a refill or new RX: butalbital-acetaminophen-caffeine -40 mg (FIORICET, ESGIC) -40 mg per tablet  RX name and strength (copy/paste from chart):    Is this a 30 day or 90 day RX:   Pharmacy name and phone #   [facility] - [place] - [address]  [address] [place] [address]  Phone: [phone] Fax: [phone]      The doctors have asked that we provide their patients with the following 2 reminders -- prescription refills can take up to 72 hours, and a friendly reminder that in the future you can use your [facility] account to request refills: yes      Comment: Naida call if refill is being sent to pharmacy

## 2022-04-12 RX ORDER — BUTALBITAL, ACETAMINOPHEN AND CAFFEINE 50; 325; 40 MG/1; MG/1; MG/1
TABLET ORAL
Qty: 60 TABLET | Refills: 2 | OUTPATIENT
Start: 2022-04-12

## 2022-04-12 NOTE — TELEPHONE ENCOUNTER
Called pt regarding prescription denial. Provider stated pt needs an appointment. Informed pt of this message. Pt is scheduled for 4/19/2022. Pt verbalized understanding.

## 2022-04-18 RX ORDER — CLOPIDOGREL BISULFATE 75 MG/1
TABLET ORAL
Qty: 30 TABLET | Refills: 5 | Status: SHIPPED | OUTPATIENT
Start: 2022-04-18

## 2022-04-18 NOTE — TELEPHONE ENCOUNTER
Refill Routing Note   Medication(s) are not appropriate for processing by [facility] for the following reason(s):      - Patient has not been seen in over 15 months by PCP  - Patient has been seen in the ED/Hospital since the last PCP visit    [facility] action(s):  Defer          Medication reconciliation completed: No     Appointments  past 12m or future 3m with PCP    Date Provider   Last Visit   Visit date not found Fred Johnson MD   Next Visit   Visit date not found Fred Johnson MD   ED visits in past 90 days: 0        Note composed:8:00 AM 04/18/2022

## 2022-04-19 ENCOUNTER — OFFICE VISIT (OUTPATIENT)
Dept: PRIMARY CARE CLINIC | Facility: CLINIC | Age: 77
End: 2022-04-19
Payer: MEDICARE

## 2022-04-19 VITALS
HEART RATE: 75 BPM | RESPIRATION RATE: 16 BRPM | SYSTOLIC BLOOD PRESSURE: 112 MMHG | DIASTOLIC BLOOD PRESSURE: 68 MMHG | WEIGHT: 121.94 LBS | BODY MASS INDEX: 20.82 KG/M2 | TEMPERATURE: 98 F | OXYGEN SATURATION: 99 % | HEIGHT: 64 IN

## 2022-04-19 DIAGNOSIS — G43.511 INTRACTABLE PERSISTENT MIGRAINE AURA WITHOUT CEREBRAL INFARCTION AND WITH STATUS MIGRAINOSUS: Primary | ICD-10-CM

## 2022-04-19 DIAGNOSIS — T73.0XXA HUNGRY, INITIAL ENCOUNTER: ICD-10-CM

## 2022-04-19 PROCEDURE — 3288F FALL RISK ASSESSMENT DOCD: CPT | Mod: CPTII,S$GLB,, | Performed by: FAMILY MEDICINE

## 2022-04-19 PROCEDURE — 1125F PR PAIN SEVERITY QUANTIFIED, PAIN PRESENT: ICD-10-PCS | Mod: CPTII,S$GLB,, | Performed by: FAMILY MEDICINE

## 2022-04-19 PROCEDURE — 3078F PR MOST RECENT DIASTOLIC BLOOD PRESSURE < 80 MM HG: ICD-10-PCS | Mod: CPTII,S$GLB,, | Performed by: FAMILY MEDICINE

## 2022-04-19 PROCEDURE — 3074F PR MOST RECENT SYSTOLIC BLOOD PRESSURE < 130 MM HG: ICD-10-PCS | Mod: CPTII,S$GLB,, | Performed by: FAMILY MEDICINE

## 2022-04-19 PROCEDURE — 1159F MED LIST DOCD IN RCRD: CPT | Mod: CPTII,S$GLB,, | Performed by: FAMILY MEDICINE

## 2022-04-19 PROCEDURE — 3078F DIAST BP <80 MM HG: CPT | Mod: CPTII,S$GLB,, | Performed by: FAMILY MEDICINE

## 2022-04-19 PROCEDURE — 3288F PR FALLS RISK ASSESSMENT DOCUMENTED: ICD-10-PCS | Mod: CPTII,S$GLB,, | Performed by: FAMILY MEDICINE

## 2022-04-19 PROCEDURE — 99214 OFFICE O/P EST MOD 30 MIN: CPT | Mod: S$GLB,,, | Performed by: FAMILY MEDICINE

## 2022-04-19 PROCEDURE — 1159F PR MEDICATION LIST DOCUMENTED IN MEDICAL RECORD: ICD-10-PCS | Mod: CPTII,S$GLB,, | Performed by: FAMILY MEDICINE

## 2022-04-19 PROCEDURE — 1157F PR ADVANCE CARE PLAN OR EQUIV PRESENT IN MEDICAL RECORD: ICD-10-PCS | Mod: CPTII,S$GLB,, | Performed by: FAMILY MEDICINE

## 2022-04-19 PROCEDURE — 1157F ADVNC CARE PLAN IN RCRD: CPT | Mod: CPTII,S$GLB,, | Performed by: FAMILY MEDICINE

## 2022-04-19 PROCEDURE — 1101F PT FALLS ASSESS-DOCD LE1/YR: CPT | Mod: CPTII,S$GLB,, | Performed by: FAMILY MEDICINE

## 2022-04-19 PROCEDURE — 3074F SYST BP LT 130 MM HG: CPT | Mod: CPTII,S$GLB,, | Performed by: FAMILY MEDICINE

## 2022-04-19 PROCEDURE — 1101F PR PT FALLS ASSESS DOC 0-1 FALLS W/OUT INJ PAST YR: ICD-10-PCS | Mod: CPTII,S$GLB,, | Performed by: FAMILY MEDICINE

## 2022-04-19 PROCEDURE — 1125F AMNT PAIN NOTED PAIN PRSNT: CPT | Mod: CPTII,S$GLB,, | Performed by: FAMILY MEDICINE

## 2022-04-19 PROCEDURE — 99999 PR PBB SHADOW E&M-EST. PATIENT-LVL IV: CPT | Mod: PBBFAC,,, | Performed by: FAMILY MEDICINE

## 2022-04-19 PROCEDURE — 99214 PR OFFICE/OUTPT VISIT, EST, LEVL IV, 30-39 MIN: ICD-10-PCS | Mod: S$GLB,,, | Performed by: FAMILY MEDICINE

## 2022-04-19 PROCEDURE — 99999 PR PBB SHADOW E&M-EST. PATIENT-LVL IV: ICD-10-PCS | Mod: PBBFAC,,, | Performed by: FAMILY MEDICINE

## 2022-04-19 RX ORDER — ONDANSETRON 4 MG/1
4 TABLET, FILM COATED ORAL EVERY 8 HOURS PRN
Qty: 30 TABLET | Refills: 1 | Status: SHIPPED | OUTPATIENT
Start: 2022-04-19

## 2022-04-19 RX ORDER — BUTALBITAL, ACETAMINOPHEN AND CAFFEINE 50; 325; 40 MG/1; MG/1; MG/1
TABLET ORAL
Qty: 60 TABLET | Refills: 2 | OUTPATIENT
Start: 2022-04-19

## 2022-04-19 RX ORDER — VALPROIC ACID 250 MG/1
250 CAPSULE, LIQUID FILLED ORAL 2 TIMES DAILY
Qty: 60 CAPSULE | Refills: 1 | Status: SHIPPED | OUTPATIENT
Start: 2022-04-19 | End: 2022-04-25

## 2022-04-19 NOTE — PROGRESS NOTES
"Subjective:       Patient ID: Josseline Stinson is a 76 y.o. female.    Chief Complaint: Headache (Daily) and Medication Refill    Here for follow up for migraines which she gets several times per month. Stress exacerbates them. Hungry all the time and unable to afford rent which has increased along with food.     Review of Systems    Objective:      Vitals:    04/19/22 0858   BP: 112/68   BP Location: Left arm   Patient Position: Sitting   BP Method: Medium (Manual)   Pulse: 75   Resp: 16   Temp: 97.7 °F (36.5 °C)   TempSrc: Oral   SpO2: 99%   Weight: 55.3 kg (121 lb 14.6 oz)   Height: 5' 4" (1.626 m)     Physical Exam  Vitals and nursing note reviewed.   Constitutional:       General: She is in acute distress.      Appearance: She is well-developed.   HENT:      Head: Normocephalic and atraumatic.      Nose: Nose normal.   Eyes:      Conjunctiva/sclera: Conjunctivae normal.   Pulmonary:      Effort: Pulmonary effort is normal. No respiratory distress.   Abdominal:      Tenderness: There is no abdominal tenderness.   Neurological:      Mental Status: She is alert.      Cranial Nerves: No cranial nerve deficit.             Lab Results   Component Value Date     10/28/2021    K 4.2 10/28/2021     10/28/2021    CO2 21 (L) 10/28/2021    BUN 9 10/28/2021    CREATININE 0.7 10/28/2021    ANIONGAP 9 10/28/2021     Lab Results   Component Value Date    HGBA1C 5.6 05/05/2019     Lab Results   Component Value Date     (H) 05/13/2019     (H) 05/11/2019     (H) 05/04/2019       Lab Results   Component Value Date    WBC 7.26 10/28/2021    HGB 15.5 10/28/2021    HCT 48.6 (H) 10/28/2021    HCT 39 01/16/2020     10/28/2021    GRAN 4.2 10/28/2021    GRAN 57.9 10/28/2021     Lab Results   Component Value Date    CHOL 137 06/23/2021    HDL 37 (L) 06/23/2021    LDLCALC 55.4 (L) 06/23/2021    TRIG 223 (H) 06/23/2021          Current Outpatient Medications:     albuterol (PROVENTIL/VENTOLIN HFA) 90 " mcg/actuation inhaler, INHALE ONE PUFF BY MOUTH EVERY 4 TO 6 HOURS AS NEEDED, Disp: 8.5 g, Rfl: 5    alendronate (FOSAMAX) 70 MG tablet, Take 1 tablet (70 mg total) by mouth every 7 days., Disp: 12 tablet, Rfl: 3    atorvastatin (LIPITOR) 40 MG tablet, TAKE ONE TABLET BY MOUTH ONCE DAILY, Disp: 30 tablet, Rfl: 5    clopidogreL (PLAVIX) 75 mg tablet, TAKE ONE TABLET BY MOUTH ONCE DAILY, Disp: 30 tablet, Rfl: 5    cyclobenzaprine (FLEXERIL) 10 MG tablet, TAKE ONE TABLET BY MOUTH AT BEDTIME as needed for MUSCLE SPASMS IF NEEDED MAY INCREASE TO EVERY 8 HOURS AS NEEDED, Disp: 100 tablet, Rfl: 5    DULoxetine (CYMBALTA) 30 MG capsule, Take 1 capsule (30 mg total) by mouth once daily., Disp: 30 capsule, Rfl: 11    levocetirizine (XYZAL) 5 MG tablet, TAKE ONE TABLET BY MOUTH AT BEDTIME, Disp: 30 tablet, Rfl: 5    metoprolol succinate (TOPROL-XL) 25 MG 24 hr tablet, TAKE ONE TABLET BY MOUTH ONCE DAILY, Disp: 30 tablet, Rfl: 5    omeprazole (PRILOSEC) 40 MG capsule, TAKE ONE CAPSULE BY MOUTH ONCE DAILY, Disp: 30 capsule, Rfl: 5    traZODone (DESYREL) 100 MG tablet, TAKE ONE TABLET BY MOUTH AT BEDTIME AS NEEDED FOR INSOMNIA, Disp: 30 tablet, Rfl: 5    acetaminophen-codeine 300-30mg (TYLENOL #3) 300-30 mg Tab, Take by mouth., Disp: , Rfl:     doxycycline (VIBRA-TABS) 100 MG tablet, Take 1 tablet (100 mg total) by mouth every 12 (twelve) hours., Disp: 28 tablet, Rfl: 0    fluticasone-umeclidin-vilanter (TRELEGY ELLIPTA) 200-62.5-25 mcg inhaler, Inhale 1 puff into the lungs once daily. (Patient not taking: No sig reported), Disp: 60 each, Rfl: 11    ondansetron (ZOFRAN) 4 MG tablet, Take 1 tablet (4 mg total) by mouth every 8 (eight) hours as needed for Nausea., Disp: 30 tablet, Rfl: 1    valproic acid (DEPAKENE) 250 mg capsule, Take 1 capsule (250 mg total) by mouth 2 (two) times daily., Disp: 60 capsule, Rfl: 1        Assessment:       1. 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 Hungry, initial encounter           Plan:       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  -     valproic acid (DEPAKENE) 250 mg capsule; Take 1 capsule (250 mg total) by mouth 2 (two) times daily.  Dispense: 60 capsule; Refill: 1  -     ondansetron (ZOFRAN) 4 MG tablet; Take 1 tablet (4 mg total) by mouth every 8 (eight) hours as needed for Nausea.  Dispense: 30 tablet; Refill: 1  Headaches persistent. Again requesting fioricet which writer feels is contributing to her HA along with substance use issues which she suffers from. Trail of valproic acid which may help with mood issues as well.     Hungry, initial encounter  -     Ambulatory referral/consult to Outpatient Case Management  Housing costs and food access issues. Writer unaware of local food pantry availability. Referral to case management.

## 2022-04-25 ENCOUNTER — OFFICE VISIT (OUTPATIENT)
Dept: CARDIOLOGY | Facility: CLINIC | Age: 77
End: 2022-04-25
Payer: MEDICARE

## 2022-04-25 VITALS
SYSTOLIC BLOOD PRESSURE: 124 MMHG | DIASTOLIC BLOOD PRESSURE: 71 MMHG | WEIGHT: 120.38 LBS | HEART RATE: 94 BPM | OXYGEN SATURATION: 98 % | HEIGHT: 64 IN | BODY MASS INDEX: 20.55 KG/M2

## 2022-04-25 DIAGNOSIS — R51.9 CHRONIC NONINTRACTABLE HEADACHE, UNSPECIFIED HEADACHE TYPE: ICD-10-CM

## 2022-04-25 DIAGNOSIS — Z98.61 S/P PTCA (PERCUTANEOUS TRANSLUMINAL CORONARY ANGIOPLASTY): Primary | ICD-10-CM

## 2022-04-25 DIAGNOSIS — I25.118 CORONARY ARTERY DISEASE OF NATIVE ARTERY OF NATIVE HEART WITH STABLE ANGINA PECTORIS: ICD-10-CM

## 2022-04-25 DIAGNOSIS — I25.2 OLD MI (MYOCARDIAL INFARCTION): ICD-10-CM

## 2022-04-25 DIAGNOSIS — Z87.891 HISTORY OF TOBACCO ABUSE: ICD-10-CM

## 2022-04-25 DIAGNOSIS — G89.29 CHRONIC NONINTRACTABLE HEADACHE, UNSPECIFIED HEADACHE TYPE: ICD-10-CM

## 2022-04-25 DIAGNOSIS — I73.9 PERIPHERAL VASCULAR DISEASE, UNSPECIFIED: ICD-10-CM

## 2022-04-25 DIAGNOSIS — I10 ESSENTIAL HYPERTENSION: ICD-10-CM

## 2022-04-25 DIAGNOSIS — E78.2 MIXED HYPERLIPIDEMIA: ICD-10-CM

## 2022-04-25 DIAGNOSIS — J42 CHRONIC BRONCHITIS, UNSPECIFIED CHRONIC BRONCHITIS TYPE: Chronic | ICD-10-CM

## 2022-04-25 PROCEDURE — 1125F AMNT PAIN NOTED PAIN PRSNT: CPT | Mod: CPTII,S$GLB,, | Performed by: INTERNAL MEDICINE

## 2022-04-25 PROCEDURE — 3074F SYST BP LT 130 MM HG: CPT | Mod: CPTII,S$GLB,, | Performed by: INTERNAL MEDICINE

## 2022-04-25 PROCEDURE — 1159F PR MEDICATION LIST DOCUMENTED IN MEDICAL RECORD: ICD-10-PCS | Mod: CPTII,S$GLB,, | Performed by: INTERNAL MEDICINE

## 2022-04-25 PROCEDURE — 1160F PR REVIEW ALL MEDS BY PRESCRIBER/CLIN PHARMACIST DOCUMENTED: ICD-10-PCS | Mod: CPTII,S$GLB,, | Performed by: INTERNAL MEDICINE

## 2022-04-25 PROCEDURE — 1160F RVW MEDS BY RX/DR IN RCRD: CPT | Mod: CPTII,S$GLB,, | Performed by: INTERNAL MEDICINE

## 2022-04-25 PROCEDURE — 99205 OFFICE O/P NEW HI 60 MIN: CPT | Mod: 25,S$GLB,, | Performed by: INTERNAL MEDICINE

## 2022-04-25 PROCEDURE — 99999 PR PBB SHADOW E&M-EST. PATIENT-LVL III: CPT | Mod: PBBFAC,,, | Performed by: INTERNAL MEDICINE

## 2022-04-25 PROCEDURE — 3078F DIAST BP <80 MM HG: CPT | Mod: CPTII,S$GLB,, | Performed by: INTERNAL MEDICINE

## 2022-04-25 PROCEDURE — 93000 EKG 12-LEAD: ICD-10-PCS | Mod: S$GLB,,, | Performed by: INTERNAL MEDICINE

## 2022-04-25 PROCEDURE — 1159F MED LIST DOCD IN RCRD: CPT | Mod: CPTII,S$GLB,, | Performed by: INTERNAL MEDICINE

## 2022-04-25 PROCEDURE — 3078F PR MOST RECENT DIASTOLIC BLOOD PRESSURE < 80 MM HG: ICD-10-PCS | Mod: CPTII,S$GLB,, | Performed by: INTERNAL MEDICINE

## 2022-04-25 PROCEDURE — 93000 ELECTROCARDIOGRAM COMPLETE: CPT | Mod: S$GLB,,, | Performed by: INTERNAL MEDICINE

## 2022-04-25 PROCEDURE — 3074F PR MOST RECENT SYSTOLIC BLOOD PRESSURE < 130 MM HG: ICD-10-PCS | Mod: CPTII,S$GLB,, | Performed by: INTERNAL MEDICINE

## 2022-04-25 PROCEDURE — 99205 PR OFFICE/OUTPT VISIT, NEW, LEVL V, 60-74 MIN: ICD-10-PCS | Mod: 25,S$GLB,, | Performed by: INTERNAL MEDICINE

## 2022-04-25 PROCEDURE — 99499 RISK ADDL DX/OHS AUDIT: ICD-10-PCS | Mod: S$GLB,,, | Performed by: INTERNAL MEDICINE

## 2022-04-25 PROCEDURE — 3288F PR FALLS RISK ASSESSMENT DOCUMENTED: ICD-10-PCS | Mod: CPTII,S$GLB,, | Performed by: INTERNAL MEDICINE

## 2022-04-25 PROCEDURE — 1157F PR ADVANCE CARE PLAN OR EQUIV PRESENT IN MEDICAL RECORD: ICD-10-PCS | Mod: CPTII,S$GLB,, | Performed by: INTERNAL MEDICINE

## 2022-04-25 PROCEDURE — 1101F PR PT FALLS ASSESS DOC 0-1 FALLS W/OUT INJ PAST YR: ICD-10-PCS | Mod: CPTII,S$GLB,, | Performed by: INTERNAL MEDICINE

## 2022-04-25 PROCEDURE — 99499 UNLISTED E&M SERVICE: CPT | Mod: S$GLB,,, | Performed by: INTERNAL MEDICINE

## 2022-04-25 PROCEDURE — 3288F FALL RISK ASSESSMENT DOCD: CPT | Mod: CPTII,S$GLB,, | Performed by: INTERNAL MEDICINE

## 2022-04-25 PROCEDURE — 99999 PR PBB SHADOW E&M-EST. PATIENT-LVL III: ICD-10-PCS | Mod: PBBFAC,,, | Performed by: INTERNAL MEDICINE

## 2022-04-25 PROCEDURE — 1101F PT FALLS ASSESS-DOCD LE1/YR: CPT | Mod: CPTII,S$GLB,, | Performed by: INTERNAL MEDICINE

## 2022-04-25 PROCEDURE — 1157F ADVNC CARE PLAN IN RCRD: CPT | Mod: CPTII,S$GLB,, | Performed by: INTERNAL MEDICINE

## 2022-04-25 PROCEDURE — 1125F PR PAIN SEVERITY QUANTIFIED, PAIN PRESENT: ICD-10-PCS | Mod: CPTII,S$GLB,, | Performed by: INTERNAL MEDICINE

## 2022-04-25 RX ORDER — ESCITALOPRAM OXALATE 10 MG/1
10 TABLET ORAL DAILY
COMMUNITY
Start: 2022-03-30

## 2022-04-25 RX ORDER — GABAPENTIN 300 MG/1
300 CAPSULE ORAL DAILY
COMMUNITY
Start: 2022-02-21

## 2022-04-25 NOTE — PROGRESS NOTES
"  Subjective:      Patient ID: Josseline Stinson is a 76 y.o. female.    Chief Complaint: Coronary Artery Disease (S/P stents. Has not seen a cardiologist in 3 yrs) and Chest Pain    HPI:  Pt had 15 stents placed in [place] by Dr Arturo Bates.  [facility].    Pt c/o left posterior shoulder pain radiating to the left anterior chest and shoulder.    The chest pain lasts an hour.    Pt fell after falling on uneven ground about a week ago and the shoulder began hurting after that.    Pt mows the lawn and cleans house.  "I live by myself"    During exertion pt has to sit and rest due to chest pains and shortness of breath.  Pt has had these symptoms for many months but they are getting worse.    Pt cannot recall last stress test.    Pt c/o frequent headaches.    Pt has had 5 hip surgeries on right    Review of Systems   Cardiovascular: Positive for chest pain, claudication and dyspnea on exertion. Negative for irregular heartbeat, leg swelling, near-syncope, orthopnea, palpitations and syncope.      Pt has pain in both calves after walking a couple of blocks    Pt requests Fioricet for headaches.      Past Medical History:   Diagnosis Date    Anxiety     Arthritis     CHF (congestive heart failure)     Chronic pain syndrome 6/4/2019    COPD (chronic obstructive pulmonary disease)     Coronary artery disease involving native coronary artery of native heart without angina pectoris 3/21/2016    Degenerative disc disease, lumbar     Encounter for blood transfusion     Essential hypertension 3/21/2016    GERD (gastroesophageal reflux disease)     History of gastric ulcer 5/14/2019    Hx of heart artery stent 5/14/2019    Hypercholesteremia 3/21/2016    MI (myocardial infarction)     x4    NSTEMI (non-ST elevated myocardial infarction) 1/2/2018    Occult blood positive stool     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 3/21/2016    Pneumonia of right lower lobe due to infectious organism " 5/14/2019    Positive FIT (fecal immunochemical test)     Positive occult stool blood test     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    Thyroid disease     Ulcer         Past Surgical History:   Procedure Laterality Date    carotid artery surgeriy      catheterization cardiac cath Left 01/03/2017    Angioplasty    COLONOSCOPY N/A 7/9/2019    Procedure: COLONOSCOPY;  Surgeon: Lorna Paula MD;  Location: [facility];  Service: Endoscopy;  Laterality: N/A;    ESOPHAGOGASTRODUODENOSCOPY N/A 7/5/2019    Procedure: ESOPHAGOGASTRODUODENOSCOPY (EGD);  Surgeon: Dane Khoury MD;  Location: [facility] ENDO;  Service: Endoscopy;  Laterality: N/A;    ESOPHAGOGASTRODUODENOSCOPY N/A 7/8/2019    Procedure: ESOPHAGOGASTRODUODENOSCOPY (EGD);  Surgeon: Sandra Alicea MD;  Location: [facility];  Service: Endoscopy;  Laterality: N/A;    FRACTURE SURGERY Left     elbow and wrist    HIP SURGERY Right     x 4    HYSTERECTOMY  1972       Family History   Problem Relation Age of Onset    Cancer Mother         colon    Diabetes Mother     Heart disease Mother     Heart disease Father     Cancer Sister         breast    Heart disease Sister     Cancer Brother         leukemia    Heart disease Brother     Heart disease Maternal Grandmother     Heart disease Maternal Grandfather     Breast cancer Daughter        Social History     Socioeconomic History    Marital status:    Occupational History    Occupation: disabled    Tobacco Use    Smoking status: Former Smoker     Packs/day: 1.00     Years: 51.00     Pack years: 51.00     Types: Cigarettes     Start date: 1968     Quit date: 2019     Years since quitting: 3.3    Smokeless tobacco: Never Used    Tobacco comment: Patient enrolled in tobacco trust and ambulatory referral  to cessation   Substance and Sexual Activity    Alcohol use: No     Alcohol/week: 0.0 standard drinks    Drug use: No    Sexual activity: Yes     Partners: Male       Current  Outpatient Medications on File Prior to Visit   Medication Sig Dispense Refill    albuterol (PROVENTIL/VENTOLIN HFA) 90 mcg/actuation inhaler INHALE ONE PUFF BY MOUTH EVERY 4 TO 6 HOURS AS NEEDED 8.5 g 5    alendronate (FOSAMAX) 70 MG tablet Take 1 tablet (70 mg total) by mouth every 7 days. 12 tablet 3    atorvastatin (LIPITOR) 40 MG tablet TAKE ONE TABLET BY MOUTH ONCE DAILY 30 tablet 5    clopidogreL (PLAVIX) 75 mg tablet TAKE ONE TABLET BY MOUTH ONCE DAILY 30 tablet 5    cyclobenzaprine (FLEXERIL) 10 MG tablet TAKE ONE TABLET BY MOUTH AT BEDTIME as needed for MUSCLE SPASMS IF NEEDED MAY INCREASE TO EVERY 8 HOURS AS NEEDED 100 tablet 5    EScitalopram oxalate (LEXAPRO) 10 MG tablet Take 10 mg by mouth once daily.      gabapentin (NEURONTIN) 300 MG capsule Take 300 mg by mouth Daily.      levocetirizine (XYZAL) 5 MG tablet TAKE ONE TABLET BY MOUTH AT BEDTIME 30 tablet 5    metoprolol succinate (TOPROL-XL) 25 MG 24 hr tablet TAKE ONE TABLET BY MOUTH ONCE DAILY 30 tablet 5    omeprazole (PRILOSEC) 40 MG capsule TAKE ONE CAPSULE BY MOUTH ONCE DAILY 30 capsule 5    ondansetron (ZOFRAN) 4 MG tablet Take 1 tablet (4 mg total) by mouth every 8 (eight) hours as needed for Nausea. 30 tablet 1    traZODone (DESYREL) 100 MG tablet TAKE ONE TABLET BY MOUTH AT BEDTIME AS NEEDED FOR INSOMNIA 30 tablet 5    [DISCONTINUED] acetaminophen-codeine 300-30mg (TYLENOL #3) 300-30 mg Tab Take by mouth.      [DISCONTINUED] doxycycline (VIBRA-TABS) 100 MG tablet Take 1 tablet (100 mg total) by mouth every 12 (twelve) hours. (Patient not taking: Reported on 4/25/2022) 28 tablet 0    [DISCONTINUED] DULoxetine (CYMBALTA) 30 MG capsule Take 1 capsule (30 mg total) by mouth once daily. (Patient not taking: Reported on 4/25/2022) 30 capsule 11    [DISCONTINUED] fluticasone-umeclidin-vilanter (TRELEGY ELLIPTA) 200-62.5-25 mcg inhaler Inhale 1 puff into the lungs once daily. (Patient not taking: No sig reported) 60 each 11     "[DISCONTINUED] valproic acid (DEPAKENE) 250 mg capsule Take 1 capsule (250 mg total) by mouth 2 (two) times daily. (Patient not taking: Reported on 4/25/2022) 60 capsule 1     No current facility-administered medications on file prior to visit.       Review of patient's allergies indicates:   Allergen Reactions    Cortisone Swelling     SWELLING    Darvocet a500 [propoxyphene n-acetaminophen] Nausea And Vomiting    Toradol [ketorolac] Nausea And Vomiting    Tramadol Nausea And Vomiting     Objective:     Vitals:    04/25/22 1042   BP: 124/71   BP Location: Left arm   Patient Position: Sitting   BP Method: Medium (Automatic)   Pulse: 94   SpO2: 98%   Weight: 54.6 kg (120 lb 5.9 oz)   Height: 5' 4" (1.626 m)        Physical Exam  Vitals reviewed.   Constitutional:       Appearance: She is well-developed.   Eyes:      General: No scleral icterus.  Neck:      Vascular: No carotid bruit or JVD.   Cardiovascular:      Rate and Rhythm: Normal rate and regular rhythm.      Heart sounds: No murmur heard.    No gallop.   Pulmonary:      Breath sounds: Normal breath sounds.   Skin:     General: Skin is warm and dry.   Neurological:      Mental Status: She is alert and oriented to person, place, and time.   Psychiatric:         Behavior: Behavior normal.         Thought Content: Thought content normal.         Judgment: Judgment normal.       ECG today reviewed by me: SOO BOWLES    Admission on 10/28/2021, Discharged on 10/28/2021   Component Date Value Ref Range Status    Specimen UA 10/28/2021 Urine, Clean Catch   Final    Color, UA 10/28/2021 Yellow  Yellow, Straw, Jordyn Final    Appearance, UA 10/28/2021 Clear  Clear Final    pH, UA 10/28/2021 6.0  5.0 - 8.0 Final    Specific [place], UA 10/28/2021 1.015  1.005 - 1.030 Final    Protein, UA 10/28/2021 Negative  Negative Final    Glucose, UA 10/28/2021 Negative  Negative Final    Ketones, UA 10/28/2021 Negative  Negative Final    Bilirubin (UA) 10/28/2021 Negative  " Negative Final    Occult Blood UA 10/28/2021 1+ (A) Negative Final    Nitrite, UA 10/28/2021 Negative  Negative Final    Urobilinogen, UA 10/28/2021 Negative  Negative EU/dL Final    Leukocytes, UA 10/28/2021 Negative  Negative Final    RBC, UA 10/28/2021 9 (A) 0 - 4 /hpf Final    Bacteria 10/28/2021 Occasional  None-Occ /hpf Final    Squam Epithel, UA 10/28/2021 5  /hpf Final    Microscopic Comment 10/28/2021 SEE COMMENT   Final    POC Rapid COVID 10/28/2021 Negative  Negative Final     Acceptable 10/28/2021 Yes   Final    WBC 10/28/2021 7.26  3.90 - 12.70 K/uL Final    RBC 10/28/2021 5.33  4.00 - 5.40 M/uL Final    Hemoglobin 10/28/2021 15.5  12.0 - 16.0 g/dL Final    Hematocrit 10/28/2021 48.6 (A) 37.0 - 48.5 % Final    MCV 10/28/2021 91  82 - 98 fL Final    MCH 10/28/2021 29.1  27.0 - 31.0 pg Final    MCHC 10/28/2021 31.9 (A) 32.0 - 36.0 g/dL Final    RDW 10/28/2021 14.1  11.5 - 14.5 % Final    Platelets 10/28/2021 211  150 - 450 K/uL Final    MPV 10/28/2021 8.9 (A) 9.2 - 12.9 fL Final    Immature Granulocytes 10/28/2021 1.9 (A) 0.0 - 0.5 % Final    Gran # (ANC) 10/28/2021 4.2  1.8 - 7.7 K/uL Final    Immature Grans (Abs) 10/28/2021 0.14 (A) 0.00 - 0.04 K/uL Final    Lymph # 10/28/2021 2.0  1.0 - 4.8 K/uL Final    Mono # 10/28/2021 0.8  0.3 - 1.0 K/uL Final    Eos # 10/28/2021 0.1  0.0 - 0.5 K/uL Final    Baso # 10/28/2021 0.06  0.00 - 0.20 K/uL Final    nRBC 10/28/2021 0  0 /100 WBC Final    Gran % 10/28/2021 57.9  38.0 - 73.0 % Final    Lymph % 10/28/2021 28.0  18.0 - 48.0 % Final    Mono % 10/28/2021 10.3  4.0 - 15.0 % Final    Eosinophil % 10/28/2021 1.1  0.0 - 8.0 % Final    Basophil % 10/28/2021 0.8  0.0 - 1.9 % Final    Large/Giant Platelets 10/28/2021 Present   Final    Differential Method 10/28/2021 Automated   Final    Sodium 10/28/2021 139  136 - 145 mmol/L Final    Potassium 10/28/2021 4.2  3.5 - 5.1 mmol/L Final    Chloride 10/28/2021 109  95 -  110 mmol/L Final    CO2 10/28/2021 21 (A) 23 - 29 mmol/L Final    Glucose 10/28/2021 81  70 - 110 mg/dL Final    BUN 10/28/2021 9  8 - 23 mg/dL Final    Creatinine 10/28/2021 0.7  0.5 - 1.4 mg/dL Final    Calcium 10/28/2021 9.2  8.7 - 10.5 mg/dL Final    Total Protein 10/28/2021 7.5  6.0 - 8.4 g/dL Final    Albumin 10/28/2021 3.2 (A) 3.5 - 5.2 g/dL Final    Total Bilirubin 10/28/2021 0.1  0.1 - 1.0 mg/dL Final    Alkaline Phosphatase 10/28/2021 126  55 - 135 U/L Final    AST 10/28/2021 34  10 - 40 U/L Final    ALT 10/28/2021 21  10 - 44 U/L Final    Anion Gap 10/28/2021 9  8 - 16 mmol/L Final    eGFR if African American 10/28/2021 >60.0  >60 mL/min/1.73 m^2 Final    eGFR if non African American 10/28/2021 >60.0  >60 mL/min/1.73 m^2 Final   (    Accession #: [id number]    Josseline Stinson  Cardiac catheterization  Order# [id number]  Reading physician: Ramakrishna Baltazar MD Ordering physician: Ramakrishna Baltazar MD Study date: 1/3/18       Patient Information    Name MRN Description   Josseline Stinson [id number] 72 y.o. female     Physicians    Panel Physicians Referring Physician Case Authorizing Physician   Ramakrishna Baltazar MD (Primary) Aaareferral Self Ramakrishna Baltazar MD     Indications    NSTEMI (non-ST elevated myocardial infarction) [I21.4 (ICD-10-CM)]     Summary       · The ejection fraction is 40-50% by visual estimate.  · Dist LAD lesion 50% stenosed.  · Prox RCA to Mid RCA lesion 30% stenosed.  · 2nd Mrg lesion 50% stenosed.  · Mid Cx lesion 99% stenosed, successfully stented  · There were no complications during the procedure. Estimated blood loss is 20 mL. No specimen was collected.  · Performed procedures include successful PCI.        Procedure Log documented by Documenter: Daljit Alaniz RN and verified by Ramakrishna Baltazar.        Date: 1/3/2018  Time: 1:32 PM            Procedures    Stent KAN coronary   Left heart cath     Accession #: [id number]    Josseline Stinson  Holter monitor - 24 hour  Order# [id number]  Reading  physician: Ramakrishna Baltazar MD Ordering physician: Dony Woo MD Study date: 5/21/19       Reason for Exam  Priority: Routine  Dx: Dizziness [R42 (ICD-10-CM)]; Syncope, unspecified syncope type [R55 (ICD-10-CM)]     Conclusion       · The total recordered time was 22 hours and 52 minutes and this is a technically adequate study.  · The patient remained in NSR with an average HR of 68 bpm (range: bpm).  · There were very rare ventricular ectopic beats with a total of 36 during the recording. No ventricular tachyarrhythmias were present.  · There were very rare supraventricular ectopic beats with a total of 40 during the recording. No supraventricular tachyarrhythmias or atrial were fibrillation present.  · There were no significant sinoatrial, AV blocks or pauses.  · The diary was not returned                Accession #: [id number]    Transthoracic echo (TTE) complete  Order# [id number]  Reading physician: Ramakrishna Baltazar MD Ordering physician: Dony Woo MD Study date: 5/21/19       Reason for Exam  Priority: Routine  Dx: Dizziness [R42 (ICD-10-CM)]; Syncope, unspecified syncope type [R55 (ICD-10-CM)]     Result Image Hyperlink     Show images for Transthoracic echo (TTE) 2D with Color Flow    Summary    · Mild concentric left ventricular hypertrophy.  · Normal left ventricular systolic function. The estimated ejection fraction is 60%  · Grade I (mild) left ventricular diastolic dysfunction consistent with impaired relaxation.  · Normal right ventricular systolic function.  · Mild left atrial enlargement.  · Mild mitral regurgitation.  · Normal central venous pressure (3 mm Hg).  · The estimated PA systolic pressure is 28 mm Hg         Vitals    Height Weight BMI (Calculated) BSA (Calculated - sq m) BP Pulse             years)       Chol HDL LDL CHOLc) TG   06/23/21 1038 137 37 Important  55.4 Important  223 Important    05/27/20 1020 151 44 74 166 Important    05/27/20 1020 151          Assessment:      1. S/P PTCA (percutaneous transluminal coronary angioplasty)    2. Old MI (myocardial infarction)    3. Essential hypertension    4. Mixed hyperlipidemia    5. Chronic bronchitis, unspecified chronic bronchitis type    6. History of tobacco abuse    7. Peripheral vascular disease, unspecified    8. Chronic nonintractable headache, unspecified headache type    9. Coronary artery disease of native artery of native heart with stable angina pectoris      Plan:   Josseline was seen today for coronary artery disease and chest pain.    Diagnoses and all orders for this visit:    S/P PTCA (percutaneous transluminal coronary angioplasty)  -     IN OFFICE EKG 12-LEAD (to Muse)  -     NM Myocardial Perfusion Spect Multi Pharmacologic; Future  -     Nuclear Stress Test; Future  -     US Lower Extremity Arteries Bilateral; Future  -     CBC Auto Differential; Future  -     Comprehensive Metabolic Panel; Future  -     Lipid Panel; Future  -     TSH; Future    Old MI (myocardial infarction)  -     IN OFFICE EKG 12-LEAD (to Muse)  -     NM Myocardial Perfusion Spect Multi Pharmacologic; Future  -     Nuclear Stress Test; Future  -     US Lower Extremity Arteries Bilateral; Future  -     CBC Auto Differential; Future  -     Comprehensive Metabolic Panel; Future  -     Lipid Panel; Future  -     TSH; Future    Essential hypertension  -     IN OFFICE EKG 12-LEAD (to Muse)  -     NM Myocardial Perfusion Spect Multi Pharmacologic; Future  -     Nuclear Stress Test; Future  -     US Lower Extremity Arteries Bilateral; Future  -     CBC Auto Differential; Future  -     Comprehensive Metabolic Panel; Future  -     Lipid Panel; Future  -     TSH; Future    Mixed hyperlipidemia  -     IN OFFICE EKG 12-LEAD (to Muse)  -     NM Myocardial Perfusion Spect Multi Pharmacologic; Future  -     Nuclear Stress Test; Future  -     US Lower Extremity Arteries Bilateral; Future  -     CBC Auto Differential; Future  -     Comprehensive Metabolic Panel;  Future  -     Lipid Panel; Future  -     TSH; Future    Chronic bronchitis, unspecified chronic bronchitis type  -     IN OFFICE EKG 12-LEAD (to Muse)  -     NM Myocardial Perfusion Spect Multi Pharmacologic; Future  -     Nuclear Stress Test; Future  -     US Lower Extremity Arteries Bilateral; Future  -     CBC Auto Differential; Future  -     Comprehensive Metabolic Panel; Future  -     Lipid Panel; Future  -     TSH; Future    History of tobacco abuse  -     NM Myocardial Perfusion Spect Multi Pharmacologic; Future  -     Nuclear Stress Test; Future  -     US Lower Extremity Arteries Bilateral; Future  -     CBC Auto Differential; Future  -     Comprehensive Metabolic Panel; Future  -     Lipid Panel; Future  -     TSH; Future    Peripheral vascular disease, unspecified  -     NM Myocardial Perfusion Spect Multi Pharmacologic; Future  -     Nuclear Stress Test; Future  -     US Lower Extremity Arteries Bilateral; Future  -     CBC Auto Differential; Future  -     Comprehensive Metabolic Panel; Future  -     Lipid Panel; Future  -     TSH; Future    Chronic nonintractable headache, unspecified headache type  -     NM Myocardial Perfusion Spect Multi Pharmacologic; Future  -     Nuclear Stress Test; Future  -     US Lower Extremity Arteries Bilateral; Future  -     CBC Auto Differential; Future  -     Comprehensive Metabolic Panel; Future  -     Lipid Panel; Future  -     TSH; Future    Coronary artery disease of native artery of native heart with stable angina pectoris  -     NM Myocardial Perfusion Spect Multi Pharmacologic; Future  -     Nuclear Stress Test; Future  -     US Lower Extremity Arteries Bilateral; Future  -     CBC Auto Differential; Future  -     Comprehensive Metabolic Panel; Future  -     Lipid Panel; Future  -     TSH; Future     Pt may have a musculoskeletal component to her left anterior chest discomfort associated with left shoulder pain.    Some of pt's dyspnea on exertion could also be due  to COPD    Due to exertional chest pain and know multiple prior PTCA's will get Lexiscan Cardiolite stress test.  Pt instructed to hold the metoprolol AM of test.    Due to absent pedal pulses and intermittent claudication will get arterial ultrasound    Same meds including metoprolol and Plavix and atorvastatin.  Note pt stopped ASA due to a bleeding ulcer.    RTC 6 months with lab    F/u with Dr Chang    Follow up in about 6 months (around 10/25/2022).

## 2022-05-03 ENCOUNTER — TELEPHONE (OUTPATIENT)
Dept: PRIMARY CARE CLINIC | Facility: CLINIC | Age: 77
End: 2022-05-03

## 2022-05-03 ENCOUNTER — OFFICE VISIT (OUTPATIENT)
Dept: PRIMARY CARE CLINIC | Facility: CLINIC | Age: 77
End: 2022-05-03
Payer: MEDICARE

## 2022-05-03 VITALS
TEMPERATURE: 98 F | BODY MASS INDEX: 20.68 KG/M2 | HEART RATE: 97 BPM | DIASTOLIC BLOOD PRESSURE: 62 MMHG | WEIGHT: 121.13 LBS | RESPIRATION RATE: 18 BRPM | SYSTOLIC BLOOD PRESSURE: 98 MMHG | OXYGEN SATURATION: 100 % | HEIGHT: 64 IN

## 2022-05-03 DIAGNOSIS — M51.36 DDD (DEGENERATIVE DISC DISEASE), LUMBAR: Primary | ICD-10-CM

## 2022-05-03 DIAGNOSIS — G43.509 PERSISTENT MIGRAINE AURA WITHOUT CEREBRAL INFARCTION AND WITHOUT STATUS MIGRAINOSUS, NOT INTRACTABLE: Primary | ICD-10-CM

## 2022-05-03 DIAGNOSIS — M25.532 ACUTE PAIN OF LEFT WRIST: ICD-10-CM

## 2022-05-03 DIAGNOSIS — R55 SYNCOPE AND COLLAPSE: ICD-10-CM

## 2022-05-03 DIAGNOSIS — M54.50 CHRONIC BILATERAL LOW BACK PAIN, UNSPECIFIED WHETHER SCIATICA PRESENT: ICD-10-CM

## 2022-05-03 DIAGNOSIS — G89.29 CHRONIC BILATERAL LOW BACK PAIN, UNSPECIFIED WHETHER SCIATICA PRESENT: ICD-10-CM

## 2022-05-03 PROCEDURE — 3078F DIAST BP <80 MM HG: CPT | Mod: CPTII,S$GLB,, | Performed by: FAMILY MEDICINE

## 2022-05-03 PROCEDURE — 3078F PR MOST RECENT DIASTOLIC BLOOD PRESSURE < 80 MM HG: ICD-10-PCS | Mod: CPTII,S$GLB,, | Performed by: FAMILY MEDICINE

## 2022-05-03 PROCEDURE — 1157F PR ADVANCE CARE PLAN OR EQUIV PRESENT IN MEDICAL RECORD: ICD-10-PCS | Mod: CPTII,S$GLB,, | Performed by: FAMILY MEDICINE

## 2022-05-03 PROCEDURE — 99499 UNLISTED E&M SERVICE: CPT | Mod: S$GLB,,, | Performed by: FAMILY MEDICINE

## 2022-05-03 PROCEDURE — 1101F PR PT FALLS ASSESS DOC 0-1 FALLS W/OUT INJ PAST YR: ICD-10-PCS | Mod: CPTII,S$GLB,, | Performed by: FAMILY MEDICINE

## 2022-05-03 PROCEDURE — 3288F FALL RISK ASSESSMENT DOCD: CPT | Mod: CPTII,S$GLB,, | Performed by: FAMILY MEDICINE

## 2022-05-03 PROCEDURE — 1159F MED LIST DOCD IN RCRD: CPT | Mod: CPTII,S$GLB,, | Performed by: FAMILY MEDICINE

## 2022-05-03 PROCEDURE — 1157F ADVNC CARE PLAN IN RCRD: CPT | Mod: CPTII,S$GLB,, | Performed by: FAMILY MEDICINE

## 2022-05-03 PROCEDURE — 1159F PR MEDICATION LIST DOCUMENTED IN MEDICAL RECORD: ICD-10-PCS | Mod: CPTII,S$GLB,, | Performed by: FAMILY MEDICINE

## 2022-05-03 PROCEDURE — 99214 PR OFFICE/OUTPT VISIT, EST, LEVL IV, 30-39 MIN: ICD-10-PCS | Mod: S$GLB,,, | Performed by: FAMILY MEDICINE

## 2022-05-03 PROCEDURE — 1125F AMNT PAIN NOTED PAIN PRSNT: CPT | Mod: CPTII,S$GLB,, | Performed by: FAMILY MEDICINE

## 2022-05-03 PROCEDURE — 99999 PR PBB SHADOW E&M-EST. PATIENT-LVL V: CPT | Mod: PBBFAC,,, | Performed by: FAMILY MEDICINE

## 2022-05-03 PROCEDURE — 3074F PR MOST RECENT SYSTOLIC BLOOD PRESSURE < 130 MM HG: ICD-10-PCS | Mod: CPTII,S$GLB,, | Performed by: FAMILY MEDICINE

## 2022-05-03 PROCEDURE — 99214 OFFICE O/P EST MOD 30 MIN: CPT | Mod: S$GLB,,, | Performed by: FAMILY MEDICINE

## 2022-05-03 PROCEDURE — 1125F PR PAIN SEVERITY QUANTIFIED, PAIN PRESENT: ICD-10-PCS | Mod: CPTII,S$GLB,, | Performed by: FAMILY MEDICINE

## 2022-05-03 PROCEDURE — 99999 PR PBB SHADOW E&M-EST. PATIENT-LVL V: ICD-10-PCS | Mod: PBBFAC,,, | Performed by: FAMILY MEDICINE

## 2022-05-03 PROCEDURE — 3288F PR FALLS RISK ASSESSMENT DOCUMENTED: ICD-10-PCS | Mod: CPTII,S$GLB,, | Performed by: FAMILY MEDICINE

## 2022-05-03 PROCEDURE — 1101F PT FALLS ASSESS-DOCD LE1/YR: CPT | Mod: CPTII,S$GLB,, | Performed by: FAMILY MEDICINE

## 2022-05-03 PROCEDURE — 3074F SYST BP LT 130 MM HG: CPT | Mod: CPTII,S$GLB,, | Performed by: FAMILY MEDICINE

## 2022-05-03 PROCEDURE — 99499 RISK ADDL DX/OHS AUDIT: ICD-10-PCS | Mod: S$GLB,,, | Performed by: FAMILY MEDICINE

## 2022-05-03 RX ORDER — DICLOFENAC SODIUM 50 MG/1
50 TABLET, DELAYED RELEASE ORAL 2 TIMES DAILY
Qty: 20 TABLET | Refills: 0 | Status: SHIPPED | OUTPATIENT
Start: 2022-05-03 | End: 2022-05-13

## 2022-05-03 NOTE — TELEPHONE ENCOUNTER
----- Message from Jen Rudd sent at 5/3/2022  1:49 PM CDT -----  Contact: Pt Mobile  [phone]  Patient is calling in regards to her saying that she came in to see you on today and you were suppose to send two new scripts that you have prescribed for her to..    [facility] [place] [id number]  Phone: [phone] Fax: [phone]    Comment: Patient said that the scripts were not sent in and she would like for you to give her a call when you send them in please.

## 2022-05-03 NOTE — TELEPHONE ENCOUNTER
Received call that patient is expecting to  something from the pharmacist which was not writer's understanding as she had been requesting a controlled substance not agreed upon. Writer expresses willingness to prescribe an NSAID which she will try instead. Short course of diclofenac prescribed for back pain.

## 2022-05-03 NOTE — TELEPHONE ENCOUNTER
Called pt regarding new prescription. Informed pt she only have one prescription that has been sent to pharmacy. Pt verbalized understanding.

## 2022-05-03 NOTE — PROGRESS NOTES
"Subjective:       Patient ID: Josseline Stinson is a 76 y.o. female.    Chief Complaint: Headache (Daily)    Complains of frequent falls lately around her house and left writer pain after one of the falls. This happens about 3 x per week. No reported head trauma. She will be sitting at her desks and lose consciousness. She will also be walking around and this will occur at this time as well. She will wake up on the floor and not remember what happened. Current being evaluated by cardiology for CAD.   Complains of chronic bl back pain for which she is requesting tylenol #3 as another doctor has given her.   Desiring something more for her Has as well which were not helped by valproic acid. HA's are chronic and unchanged. Again requesting fioricet as the only medicine that has worked thus far.     Review of Systems    Objective:      Vitals:    05/03/22 0959   BP: 98/62   BP Location: Left arm   Patient Position: Sitting   BP Method: Medium (Manual)   Pulse: 97   Resp: 18   Temp: 98.3 °F (36.8 °C)   TempSrc: Oral   SpO2: 100%   Weight: 54.9 kg (121 lb 2.3 oz)   Height: 5' 4" (1.626 m)     Physical Exam  Vitals and nursing note reviewed.   Constitutional:       Appearance: She is well-developed.   HENT:      Head: Normocephalic and atraumatic.      Nose: Nose normal.   Eyes:      Conjunctiva/sclera: Conjunctivae normal.   Pulmonary:      Effort: Pulmonary effort is normal. No respiratory distress.   Abdominal:      General: There is no distension.      Palpations: Abdomen is soft.      Tenderness: There is no abdominal tenderness.   Musculoskeletal:      Comments: Left wrist lateral pain to palpation across soft and bony surfaces. No erythema or induration.     Neurological:      Mental Status: She is alert.      Cranial Nerves: No cranial nerve deficit.             Lab Results   Component Value Date     10/28/2021    K 4.2 10/28/2021     10/28/2021    CO2 21 (L) 10/28/2021    BUN 9 10/28/2021    CREATININE 0.7 " 10/28/2021    ANIONGAP 9 10/28/2021     Lab Results   Component Value Date    HGBA1C 5.6 05/05/2019     Lab Results   Component Value Date     (H) 05/13/2019     (H) 05/11/2019     (H) 05/04/2019       Lab Results   Component Value Date    WBC 7.26 10/28/2021    HGB 15.5 10/28/2021    HCT 48.6 (H) 10/28/2021    HCT 39 01/16/2020     10/28/2021    GRAN 4.2 10/28/2021    GRAN 57.9 10/28/2021     Lab Results   Component Value Date    CHOL 137 06/23/2021    HDL 37 (L) 06/23/2021    LDLCALC 55.4 (L) 06/23/2021    TRIG 223 (H) 06/23/2021          Current Outpatient Medications:     albuterol (PROVENTIL/VENTOLIN HFA) 90 mcg/actuation inhaler, INHALE ONE PUFF BY MOUTH EVERY 4 TO 6 HOURS AS NEEDED, Disp: 8.5 g, Rfl: 5    alendronate (FOSAMAX) 70 MG tablet, Take 1 tablet (70 mg total) by mouth every 7 days., Disp: 12 tablet, Rfl: 3    atorvastatin (LIPITOR) 40 MG tablet, TAKE ONE TABLET BY MOUTH ONCE DAILY, Disp: 30 tablet, Rfl: 5    clopidogreL (PLAVIX) 75 mg tablet, TAKE ONE TABLET BY MOUTH ONCE DAILY, Disp: 30 tablet, Rfl: 5    cyclobenzaprine (FLEXERIL) 10 MG tablet, TAKE ONE TABLET BY MOUTH AT BEDTIME as needed for MUSCLE SPASMS IF NEEDED MAY INCREASE TO EVERY 8 HOURS AS NEEDED, Disp: 100 tablet, Rfl: 5    EScitalopram oxalate (LEXAPRO) 10 MG tablet, Take 10 mg by mouth once daily., Disp: , Rfl:     gabapentin (NEURONTIN) 300 MG capsule, Take 300 mg by mouth Daily., Disp: , Rfl:     levocetirizine (XYZAL) 5 MG tablet, TAKE ONE TABLET BY MOUTH AT BEDTIME, Disp: 30 tablet, Rfl: 5    metoprolol succinate (TOPROL-XL) 25 MG 24 hr tablet, TAKE ONE TABLET BY MOUTH ONCE DAILY, Disp: 30 tablet, Rfl: 5    omeprazole (PRILOSEC) 40 MG capsule, TAKE ONE CAPSULE BY MOUTH ONCE DAILY, Disp: 30 capsule, Rfl: 5    ondansetron (ZOFRAN) 4 MG tablet, Take 1 tablet (4 mg total) by mouth every 8 (eight) hours as needed for Nausea., Disp: 30 tablet, Rfl: 1    traZODone (DESYREL) 100 MG tablet, TAKE ONE  TABLET BY MOUTH AT BEDTIME AS NEEDED FOR INSOMNIA, Disp: 30 tablet, Rfl: 5        Assessment:       1. 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 and without status migrainosus, not intractable    2. Syncope and collapse    3. Chronic bilateral low back pain, unspecified whether sciatica present    4. Acute pain of left wrist           Plan:       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 and without status migrainosus, not intractable  -     Ambulatory referral/consult to Neurology; Future; Expected date: 05/10/2022  Writer not writing fioricet or fiorinal. Patient exhibiting significant substance seeking behaviors. She has been referred several times prior to neuro and pain and has not followed up. Informed that writer will not ever be prescribing controlled substances for her.     Syncope:  Longstanding. Hx is not clear but occurring from resting position. No knowledge of seizure activity. Denies substance use induced. Seeing cardiology for ongoing workup for CAD in the coming days. She is encouraged to review with them more.         Chronic bilateral low back pain, unspecified whether sciatica present  -     Ambulatory referral/consult to Pain Clinic; Future; Expected date: 05/10/2022  Chronic. As above.     Acute pain of left wrist  -     X-Ray Wrist Complete 3 views Left; Future; Expected date: 05/03/2022  S/p fall. Do not suspect a fracture. Getting xrays.

## 2022-05-10 ENCOUNTER — OFFICE VISIT (OUTPATIENT)
Dept: PRIMARY CARE CLINIC | Facility: CLINIC | Age: 77
End: 2022-05-10
Payer: MEDICARE

## 2022-05-10 VITALS
HEIGHT: 64 IN | DIASTOLIC BLOOD PRESSURE: 82 MMHG | OXYGEN SATURATION: 100 % | TEMPERATURE: 98 F | SYSTOLIC BLOOD PRESSURE: 122 MMHG | HEART RATE: 88 BPM | RESPIRATION RATE: 16 BRPM | BODY MASS INDEX: 20.67 KG/M2 | WEIGHT: 121.06 LBS

## 2022-05-10 DIAGNOSIS — R10.9 FLANK PAIN: Primary | ICD-10-CM

## 2022-05-10 DIAGNOSIS — M51.36 DDD (DEGENERATIVE DISC DISEASE), LUMBAR: ICD-10-CM

## 2022-05-10 PROCEDURE — 99213 PR OFFICE/OUTPT VISIT, EST, LEVL III, 20-29 MIN: ICD-10-PCS | Mod: S$GLB,,, | Performed by: FAMILY MEDICINE

## 2022-05-10 PROCEDURE — 1101F PR PT FALLS ASSESS DOC 0-1 FALLS W/OUT INJ PAST YR: ICD-10-PCS | Mod: CPTII,S$GLB,, | Performed by: FAMILY MEDICINE

## 2022-05-10 PROCEDURE — 1159F PR MEDICATION LIST DOCUMENTED IN MEDICAL RECORD: ICD-10-PCS | Mod: CPTII,S$GLB,, | Performed by: FAMILY MEDICINE

## 2022-05-10 PROCEDURE — 3079F DIAST BP 80-89 MM HG: CPT | Mod: CPTII,S$GLB,, | Performed by: FAMILY MEDICINE

## 2022-05-10 PROCEDURE — 1101F PT FALLS ASSESS-DOCD LE1/YR: CPT | Mod: CPTII,S$GLB,, | Performed by: FAMILY MEDICINE

## 2022-05-10 PROCEDURE — 3074F PR MOST RECENT SYSTOLIC BLOOD PRESSURE < 130 MM HG: ICD-10-PCS | Mod: CPTII,S$GLB,, | Performed by: FAMILY MEDICINE

## 2022-05-10 PROCEDURE — 1125F PR PAIN SEVERITY QUANTIFIED, PAIN PRESENT: ICD-10-PCS | Mod: CPTII,S$GLB,, | Performed by: FAMILY MEDICINE

## 2022-05-10 PROCEDURE — 1157F PR ADVANCE CARE PLAN OR EQUIV PRESENT IN MEDICAL RECORD: ICD-10-PCS | Mod: CPTII,S$GLB,, | Performed by: FAMILY MEDICINE

## 2022-05-10 PROCEDURE — 99999 PR PBB SHADOW E&M-EST. PATIENT-LVL IV: ICD-10-PCS | Mod: PBBFAC,,, | Performed by: FAMILY MEDICINE

## 2022-05-10 PROCEDURE — 3079F PR MOST RECENT DIASTOLIC BLOOD PRESSURE 80-89 MM HG: ICD-10-PCS | Mod: CPTII,S$GLB,, | Performed by: FAMILY MEDICINE

## 2022-05-10 PROCEDURE — 99213 OFFICE O/P EST LOW 20 MIN: CPT | Mod: S$GLB,,, | Performed by: FAMILY MEDICINE

## 2022-05-10 PROCEDURE — 99999 PR PBB SHADOW E&M-EST. PATIENT-LVL IV: CPT | Mod: PBBFAC,,, | Performed by: FAMILY MEDICINE

## 2022-05-10 PROCEDURE — 1125F AMNT PAIN NOTED PAIN PRSNT: CPT | Mod: CPTII,S$GLB,, | Performed by: FAMILY MEDICINE

## 2022-05-10 PROCEDURE — 3288F FALL RISK ASSESSMENT DOCD: CPT | Mod: CPTII,S$GLB,, | Performed by: FAMILY MEDICINE

## 2022-05-10 PROCEDURE — 3288F PR FALLS RISK ASSESSMENT DOCUMENTED: ICD-10-PCS | Mod: CPTII,S$GLB,, | Performed by: FAMILY MEDICINE

## 2022-05-10 PROCEDURE — 1159F MED LIST DOCD IN RCRD: CPT | Mod: CPTII,S$GLB,, | Performed by: FAMILY MEDICINE

## 2022-05-10 PROCEDURE — 1157F ADVNC CARE PLAN IN RCRD: CPT | Mod: CPTII,S$GLB,, | Performed by: FAMILY MEDICINE

## 2022-05-10 PROCEDURE — 3074F SYST BP LT 130 MM HG: CPT | Mod: CPTII,S$GLB,, | Performed by: FAMILY MEDICINE

## 2022-05-10 NOTE — PROGRESS NOTES
"Subjective:       Patient ID: Josseline Stinson is a 76 y.o. female.    Chief Complaint: Abdominal Pain (Left Side Pain )    Complains of severe left sided back pain back and abdominal pain for the past 3 days. No fevers, no urinary complaints. No blood in urine. No constipation or diarrhea. Denies any falls or trauma. Interested in something more for pain. Has not seen anyone from pain service.     Review of Systems    Objective:      Vitals:    05/10/22 1114   BP: 122/82   BP Location: Right arm   Patient Position: Sitting   BP Method: Medium (Manual)   Pulse: 88   Resp: 16   Temp: 98.2 °F (36.8 °C)   TempSrc: Oral   SpO2: 100%   Weight: 54.9 kg (121 lb 0.5 oz)   Height: 5' 4" (1.626 m)     Physical Exam  Vitals and nursing note reviewed.   Constitutional:       General: She is in acute distress.      Appearance: She is well-developed.   HENT:      Head: Normocephalic and atraumatic.      Nose: Nose normal.   Eyes:      Conjunctiva/sclera: Conjunctivae normal.   Cardiovascular:      Heart sounds: Normal heart sounds.   Pulmonary:      Effort: Pulmonary effort is normal. No respiratory distress.      Breath sounds: Normal breath sounds. No wheezing or rales.   Abdominal:      General: There is no distension.      Palpations: Abdomen is soft.      Tenderness: There is no abdominal tenderness.   Musculoskeletal:        Arms:       Comments: Poorly localized left flank lower back pain to palpation. No rash, echymosis, or abnormal skin findings.    Neurological:      Mental Status: She is alert.      Cranial Nerves: No cranial nerve deficit.             Lab Results   Component Value Date     10/28/2021    K 4.2 10/28/2021     10/28/2021    CO2 21 (L) 10/28/2021    BUN 9 10/28/2021    CREATININE 0.7 10/28/2021    ANIONGAP 9 10/28/2021     Lab Results   Component Value Date    HGBA1C 5.6 05/05/2019     Lab Results   Component Value Date     (H) 05/13/2019     (H) 05/11/2019     (H) 05/04/2019 "       Lab Results   Component Value Date    WBC 7.26 10/28/2021    HGB 15.5 10/28/2021    HCT 48.6 (H) 10/28/2021    HCT 39 01/16/2020     10/28/2021    GRAN 4.2 10/28/2021    GRAN 57.9 10/28/2021     Lab Results   Component Value Date    CHOL 137 06/23/2021    HDL 37 (L) 06/23/2021    LDLCALC 55.4 (L) 06/23/2021    TRIG 223 (H) 06/23/2021          Current Outpatient Medications:     albuterol (PROVENTIL/VENTOLIN HFA) 90 mcg/actuation inhaler, INHALE ONE PUFF BY MOUTH EVERY 4 TO 6 HOURS AS NEEDED, Disp: 8.5 g, Rfl: 5    alendronate (FOSAMAX) 70 MG tablet, Take 1 tablet (70 mg total) by mouth every 7 days., Disp: 12 tablet, Rfl: 3    atorvastatin (LIPITOR) 40 MG tablet, TAKE ONE TABLET BY MOUTH ONCE DAILY, Disp: 30 tablet, Rfl: 5    clopidogreL (PLAVIX) 75 mg tablet, TAKE ONE TABLET BY MOUTH ONCE DAILY, Disp: 30 tablet, Rfl: 5    cyclobenzaprine (FLEXERIL) 10 MG tablet, TAKE ONE TABLET BY MOUTH AT BEDTIME as needed for MUSCLE SPASMS IF NEEDED MAY INCREASE TO EVERY 8 HOURS AS NEEDED, Disp: 100 tablet, Rfl: 5    diclofenac (VOLTAREN) 50 MG EC tablet, Take 1 tablet (50 mg total) by mouth 2 (two) times daily. for 10 days, Disp: 20 tablet, Rfl: 0    EScitalopram oxalate (LEXAPRO) 10 MG tablet, Take 10 mg by mouth once daily., Disp: , Rfl:     gabapentin (NEURONTIN) 300 MG capsule, Take 300 mg by mouth Daily., Disp: , Rfl:     levocetirizine (XYZAL) 5 MG tablet, TAKE ONE TABLET BY MOUTH AT BEDTIME, Disp: 30 tablet, Rfl: 5    metoprolol succinate (TOPROL-XL) 25 MG 24 hr tablet, TAKE ONE TABLET BY MOUTH ONCE DAILY, Disp: 30 tablet, Rfl: 5    omeprazole (PRILOSEC) 40 MG capsule, TAKE ONE CAPSULE BY MOUTH ONCE DAILY, Disp: 30 capsule, Rfl: 5    ondansetron (ZOFRAN) 4 MG tablet, Take 1 tablet (4 mg total) by mouth every 8 (eight) hours as needed for Nausea., Disp: 30 tablet, Rfl: 1    traZODone (DESYREL) 100 MG tablet, TAKE ONE TABLET BY MOUTH AT BEDTIME AS NEEDED FOR INSOMNIA, Disp: 30 tablet, Rfl: 5         Assessment:       1. Flank pain    2. DDD (degenerative disc disease), lumbar           Plan:       Flank pain  -     Urinalysis, Reflex to Urine Culture Urine, Clean Catch; Future; Expected date: 05/10/2022  Left     DDD (degenerative disc disease), lumbar     Poorly localized LLQ and low back pain for three days. She is requesting something for pain. Has not followed up with pain service as recommended. Again re-emphasized that writer is not prescribing narcotics ever. Prior PCP had similar experience with patient and controlled substances. Offered another refill on diclofenac or ibupfrofen . Due to chronic muskuloskeletal pain recommended PT as well which she is not interested in.

## 2022-05-13 ENCOUNTER — NURSE TRIAGE (OUTPATIENT)
Dept: ADMINISTRATIVE | Facility: CLINIC | Age: 77
End: 2022-05-13
Payer: MEDICARE

## 2022-05-13 ENCOUNTER — TELEPHONE (OUTPATIENT)
Dept: PRIMARY CARE CLINIC | Facility: CLINIC | Age: 77
End: 2022-05-13
Payer: MEDICARE

## 2022-05-13 NOTE — TELEPHONE ENCOUNTER
----- Message from Blanquita Zavala sent at 5/13/2022 12:36 PM CDT -----  Contact: [phone]  .1 Patient would like to get medical advice.  Symptoms (please be specific):Left side of the body  How long has patient had these symptoms: 3 weeks  Pharmacy name and phone#:[facility] - [place], [place] - [address] Phone:  [phone]  Fax:  [phone]  Any drug allergies: on file  Comments: Patient would like to get medical advice. Patient would like and advise from nurse. Concha mejia

## 2022-05-13 NOTE — TELEPHONE ENCOUNTER
Severe pain under left breast. Advised, per protocol. Verbalizes understanding.    Reason for Disposition   Chest pain lasting longer than 5 minutes and ANY of the following:* Over 44 years old* Over 30 years old and at least one cardiac risk factor (e.g., diabetes mellitus, high blood pressure, high cholesterol, smoker, or strong family history of heart disease)* History of heart disease (i.e., angina, heart attack, heart failure, bypass surgery, takes nitroglycerin)* Pain is crushing, pressure-like, or heavy    Protocols used: CHEST PAIN-A-OH

## 2022-05-16 ENCOUNTER — TELEPHONE (OUTPATIENT)
Dept: SURGERY | Facility: CLINIC | Age: 77
End: 2022-05-16
Payer: MEDICARE

## 2022-05-16 NOTE — TELEPHONE ENCOUNTER
----- Message from Nena Rodriguez sent at 5/16/2022 10:48 AM CDT -----  Regarding: pt  Patient Requesting Sooner Appointment.     Reason for sooner appt.: er fu pt was seen in the ER Friday and was told to be seen asap for her gallbladder   When is the first available appointment? N/A   Communication Preference: can you please call pt at [phone]  Additional Information: none    JAYSON

## 2022-05-16 NOTE — TELEPHONE ENCOUNTER
Spoke with pt and she was scheduled for tomorrow at 830 am   Did advise that we can see her at 8am if she comes early. Pt v/u and confirmed arrival time of 8am

## 2022-05-17 ENCOUNTER — OFFICE VISIT (OUTPATIENT)
Dept: SURGERY | Facility: CLINIC | Age: 77
End: 2022-05-17
Payer: MEDICARE

## 2022-05-17 ENCOUNTER — TELEPHONE (OUTPATIENT)
Dept: PRIMARY CARE CLINIC | Facility: CLINIC | Age: 77
End: 2022-05-17
Payer: MEDICARE

## 2022-05-17 ENCOUNTER — PATIENT OUTREACH (OUTPATIENT)
Dept: ADMINISTRATIVE | Facility: OTHER | Age: 77
End: 2022-05-17
Payer: MEDICARE

## 2022-05-17 VITALS
HEIGHT: 64 IN | OXYGEN SATURATION: 98 % | WEIGHT: 115.44 LBS | DIASTOLIC BLOOD PRESSURE: 83 MMHG | BODY MASS INDEX: 19.71 KG/M2 | SYSTOLIC BLOOD PRESSURE: 176 MMHG | TEMPERATURE: 97 F | HEART RATE: 77 BPM

## 2022-05-17 DIAGNOSIS — R10.9 FLANK PAIN: Primary | ICD-10-CM

## 2022-05-17 LAB
AMORPH CRY URNS QL MICRO: ABNORMAL
BACTERIA #/AREA URNS HPF: ABNORMAL /HPF
BILIRUB UR QL STRIP: NEGATIVE
CAOX CRY URNS QL MICRO: ABNORMAL
CLARITY UR: ABNORMAL
COLOR UR: YELLOW
GLUCOSE UR QL STRIP: NEGATIVE
HGB UR QL STRIP: ABNORMAL
KETONES UR QL STRIP: ABNORMAL
LEUKOCYTE ESTERASE UR QL STRIP: NEGATIVE
MICROSCOPIC COMMENT: ABNORMAL
NITRITE UR QL STRIP: NEGATIVE
PH UR STRIP: 6 [PH] (ref 5–8)
PROT UR QL STRIP: ABNORMAL
RBC #/AREA URNS HPF: 6 /HPF (ref 0–4)
SP GR UR STRIP: 1.02 (ref 1–1.03)
URN SPEC COLLECT METH UR: ABNORMAL
UROBILINOGEN UR STRIP-ACNC: 1 EU/DL
WBC #/AREA URNS HPF: 3 /HPF (ref 0–5)

## 2022-05-17 PROCEDURE — 1125F PR PAIN SEVERITY QUANTIFIED, PAIN PRESENT: ICD-10-PCS | Mod: CPTII,S$GLB,, | Performed by: SURGERY

## 2022-05-17 PROCEDURE — 1101F PT FALLS ASSESS-DOCD LE1/YR: CPT | Mod: CPTII,S$GLB,, | Performed by: SURGERY

## 2022-05-17 PROCEDURE — 1157F PR ADVANCE CARE PLAN OR EQUIV PRESENT IN MEDICAL RECORD: ICD-10-PCS | Mod: CPTII,S$GLB,, | Performed by: SURGERY

## 2022-05-17 PROCEDURE — 99999 PR PBB SHADOW E&M-EST. PATIENT-LVL III: ICD-10-PCS | Mod: PBBFAC,,, | Performed by: SURGERY

## 2022-05-17 PROCEDURE — 3077F SYST BP >= 140 MM HG: CPT | Mod: CPTII,S$GLB,, | Performed by: SURGERY

## 2022-05-17 PROCEDURE — 1125F AMNT PAIN NOTED PAIN PRSNT: CPT | Mod: CPTII,S$GLB,, | Performed by: SURGERY

## 2022-05-17 PROCEDURE — 99204 PR OFFICE/OUTPT VISIT, NEW, LEVL IV, 45-59 MIN: ICD-10-PCS | Mod: S$GLB,,, | Performed by: SURGERY

## 2022-05-17 PROCEDURE — 3077F PR MOST RECENT SYSTOLIC BLOOD PRESSURE >= 140 MM HG: ICD-10-PCS | Mod: CPTII,S$GLB,, | Performed by: SURGERY

## 2022-05-17 PROCEDURE — 1159F PR MEDICATION LIST DOCUMENTED IN MEDICAL RECORD: ICD-10-PCS | Mod: CPTII,S$GLB,, | Performed by: SURGERY

## 2022-05-17 PROCEDURE — 3079F DIAST BP 80-89 MM HG: CPT | Mod: CPTII,S$GLB,, | Performed by: SURGERY

## 2022-05-17 PROCEDURE — 1159F MED LIST DOCD IN RCRD: CPT | Mod: CPTII,S$GLB,, | Performed by: SURGERY

## 2022-05-17 PROCEDURE — 3079F PR MOST RECENT DIASTOLIC BLOOD PRESSURE 80-89 MM HG: ICD-10-PCS | Mod: CPTII,S$GLB,, | Performed by: SURGERY

## 2022-05-17 PROCEDURE — 99999 PR PBB SHADOW E&M-EST. PATIENT-LVL III: CPT | Mod: PBBFAC,,, | Performed by: SURGERY

## 2022-05-17 PROCEDURE — 1101F PR PT FALLS ASSESS DOC 0-1 FALLS W/OUT INJ PAST YR: ICD-10-PCS | Mod: CPTII,S$GLB,, | Performed by: SURGERY

## 2022-05-17 PROCEDURE — 1157F ADVNC CARE PLAN IN RCRD: CPT | Mod: CPTII,S$GLB,, | Performed by: SURGERY

## 2022-05-17 PROCEDURE — 99204 OFFICE O/P NEW MOD 45 MIN: CPT | Mod: S$GLB,,, | Performed by: SURGERY

## 2022-05-17 PROCEDURE — 3288F FALL RISK ASSESSMENT DOCD: CPT | Mod: CPTII,S$GLB,, | Performed by: SURGERY

## 2022-05-17 PROCEDURE — 3288F PR FALLS RISK ASSESSMENT DOCUMENTED: ICD-10-PCS | Mod: CPTII,S$GLB,, | Performed by: SURGERY

## 2022-05-17 NOTE — PROGRESS NOTES
Health Maintenance Due   Topic Date Due    LDCT Lung Screen  Never done    Shingles Vaccine (2 of 3) 02/03/2017    COVID-19 Vaccine (4 - Booster for Pfizer series) 04/09/2022    DEXA Scan  05/21/2022     Updates were requested from care everywhere.  Chart was reviewed for overdue Proactive [facility] Encounters ([facility]) topics (CRS, Breast Cancer Screening, Eye exam)  Health Maintenance has been updated.  LINKS immunization registry triggered.  Immunizations were reconciled.

## 2022-05-17 NOTE — TELEPHONE ENCOUNTER
Returned patients call. No answer. Left VM.    Secondary to pneumonia and rhino virus  Completed antibiotic treatment prior to stepped down to the floor  Wean O2 as tolerated and nebulizer treatment  Will need to monitor with repeat CBC given WBC increased  Aspiration precautions

## 2022-05-17 NOTE — TELEPHONE ENCOUNTER
----- Message from Aed Mckeon sent at 5/17/2022 12:47 PM CDT -----  Contact: Patient, [phone]  Calling to speak with the nurse regarding Rx butalbital-acetaminophen-caffeine -40 mg (FIORICET, ESGIC) -40 mg per tablet . Please call her. Thanks.

## 2022-05-20 ENCOUNTER — PATIENT OUTREACH (OUTPATIENT)
Dept: ADMINISTRATIVE | Facility: OTHER | Age: 77
End: 2022-05-20

## 2022-05-21 NOTE — PROGRESS NOTES
History & Physical    SUBJECTIVE:     History of Present Illness:  Patient is a 76 y.o. female presents with constant left-sided abdominal pain and flank pain.  She had a recent CT scan which showed some concern for some gallbladder thickening however her pain is on the right side at all.  Told that this pain is likely not associated with her gallbladder.  States the pain is not associated with meals.      Chief Complaint   Patient presents with    Abdominal Pain     Pain lasting 2 weeks. ER visit on 5/13/22       Review of patient's allergies indicates:   Allergen Reactions    Cortisone Swelling     SWELLING    Darvocet a500 [propoxyphene n-acetaminophen] Nausea And Vomiting    Toradol [ketorolac] Nausea And Vomiting    Tramadol Nausea And Vomiting       Current Outpatient Medications   Medication Sig Dispense Refill    albuterol (PROVENTIL/VENTOLIN HFA) 90 mcg/actuation inhaler INHALE ONE PUFF BY MOUTH EVERY 4 TO 6 HOURS AS NEEDED 8.5 g 5    alendronate (FOSAMAX) 70 MG tablet Take 1 tablet (70 mg total) by mouth every 7 days. 12 tablet 3    atorvastatin (LIPITOR) 40 MG tablet TAKE ONE TABLET BY MOUTH ONCE DAILY 30 tablet 5    clopidogreL (PLAVIX) 75 mg tablet TAKE ONE TABLET BY MOUTH ONCE DAILY 30 tablet 5    EScitalopram oxalate (LEXAPRO) 10 MG tablet Take 10 mg by mouth once daily.      metoprolol succinate (TOPROL-XL) 25 MG 24 hr tablet TAKE ONE TABLET BY MOUTH ONCE DAILY 30 tablet 5    omeprazole (PRILOSEC) 40 MG capsule TAKE ONE CAPSULE BY MOUTH ONCE DAILY 30 capsule 5    traZODone (DESYREL) 100 MG tablet TAKE ONE TABLET BY MOUTH AT BEDTIME AS NEEDED FOR INSOMNIA 30 tablet 5    cyclobenzaprine (FLEXERIL) 10 MG tablet TAKE ONE TABLET BY MOUTH AT BEDTIME as needed for MUSCLE SPASMS IF NEEDED MAY INCREASE TO EVERY 8 HOURS AS NEEDED (Patient not taking: Reported on 5/17/2022) 100 tablet 5    gabapentin (NEURONTIN) 300 MG capsule Take 300 mg by mouth Daily.      HYDROcodone-acetaminophen (NORCO)  5-325 mg per tablet Take 1 tablet by mouth every 4 (four) hours as needed for Pain. (Patient not taking: Reported on 5/17/2022) 10 tablet 0    levocetirizine (XYZAL) 5 MG tablet TAKE ONE TABLET BY MOUTH AT BEDTIME (Patient not taking: Reported on 5/17/2022) 30 tablet 5    ondansetron (ZOFRAN) 4 MG tablet Take 1 tablet (4 mg total) by mouth every 8 (eight) hours as needed for Nausea. (Patient not taking: Reported on 5/17/2022) 30 tablet 1    ondansetron (ZOFRAN-ODT) 4 MG TbDL Take 1 tablet (4 mg total) by mouth every 6 (six) hours as needed. 15 tablet 0     No current facility-administered medications for this visit.       Past Medical History:   Diagnosis Date    Anxiety     Arthritis     CHF (congestive heart failure)     Chronic pain syndrome 6/4/2019    COPD (chronic obstructive pulmonary disease)     Coronary artery disease involving native coronary artery of native heart without angina pectoris 3/21/2016    Degenerative disc disease, lumbar     Encounter for blood transfusion     Essential hypertension 3/21/2016    GERD (gastroesophageal reflux disease)     History of gastric ulcer 5/14/2019    Hx of heart artery stent 5/14/2019    Hypercholesteremia 3/21/2016    MI (myocardial infarction)     x4    NSTEMI (non-ST elevated myocardial infarction) 1/2/2018    Occult blood positive stool     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 3/21/2016    Pneumonia of right lower lobe due to infectious organism 5/14/2019    Positive FIT (fecal immunochemical test)     Positive occult stool blood test     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    Thyroid disease     Ulcer      Past Surgical History:   Procedure Laterality Date    carotid artery surgeriy      catheterization cardiac cath Left 01/03/2017    Angioplasty    COLONOSCOPY N/A 7/9/2019    Procedure: COLONOSCOPY;  Surgeon: Lorna Paula MD;  Location: [facility];  Service: Endoscopy;  Laterality: N/A;     ESOPHAGOGASTRODUODENOSCOPY N/A 7/5/2019    Procedure: ESOPHAGOGASTRODUODENOSCOPY (EGD);  Surgeon: Dane Khoury MD;  Location[facility];  Service: Endoscopy;  Laterality: N/A;    ESOPHAGOGASTRODUODENOSCOPY N/A 7/8/2019    Procedure: ESOPHAGOGASTRODUODENOSCOPY (EGD);  Surgeon: Sandra Alicea MD;  Location: [facility];  Service: Endoscopy;  Laterality: N/A;    FRACTURE SURGERY Left     elbow and wrist    HIP SURGERY Right     x 4    HYSTERECTOMY  1972     Family History   Problem Relation Age of Onset    Cancer Mother         colon    Diabetes Mother     Heart disease Mother     Heart disease Father     Cancer Sister         breast    Heart disease Sister     Cancer Brother         leukemia    Heart disease Brother     Heart disease Maternal Grandmother     Heart disease Maternal Grandfather     Breast cancer Daughter      Social History     Tobacco Use    Smoking status: Former Smoker     Packs/day: 1.00     Years: 51.00     Pack years: 51.00     Types: Cigarettes     Start date: 1968     Quit date: 2019     Years since quitting: 3.3    Smokeless tobacco: Never Used    Tobacco comment: Patient enrolled in tobacco trust and ambulatory referral  to cessation   Substance Use Topics    Alcohol use: No     Alcohol/week: 0.0 standard drinks    Drug use: No        Review of Systems:  Review of Systems   Constitutional: Negative for appetite change, fatigue, fever and unexpected weight change.   HENT: Negative for sore throat and trouble swallowing.    Eyes: Negative.    Respiratory: Negative for cough, shortness of breath and wheezing.    Cardiovascular: Negative for chest pain and leg swelling.   Gastrointestinal: Positive for abdominal pain (Left side). Negative for abdominal distention, blood in stool, constipation, diarrhea, nausea and vomiting.   Endocrine: Negative.    Genitourinary: Negative.    Musculoskeletal: Negative for back pain.   Skin: Negative.  Negative for rash.  "  Allergic/Immunologic: Negative.    Neurological: Negative.    Hematological: Negative.    Psychiatric/Behavioral: Negative for confusion.       OBJECTIVE:     Vital Signs (Most Recent)  Temp: 97.4 °F (36.3 °C) (05/17/22 0819)  Pulse: 77 (05/17/22 0819)  BP: (!) 176/83 (05/17/22 0819)  SpO2: 98 % (05/17/22 0819)  5' 4" (1.626 m)  52.3 kg (115 lb 6.6 oz)     Physical Exam:  Physical Exam  Vitals and nursing note reviewed.   Constitutional:       Appearance: She is well-developed.   HENT:      Head: Normocephalic and atraumatic.   Cardiovascular:      Rate and Rhythm: Normal rate.      Heart sounds: Normal heart sounds.   Pulmonary:      Effort: Pulmonary effort is normal.   Abdominal:      General: Bowel sounds are normal. There is no distension.      Palpations: Abdomen is soft.      Tenderness: There is abdominal tenderness.      Hernia: Hernia: Left upper quadrant.   Musculoskeletal:         General: Normal range of motion.      Cervical back: Normal range of motion.   Skin:     General: Skin is warm and dry.      Capillary Refill: Capillary refill takes less than 2 seconds.   Neurological:      Mental Status: She is alert and oriented to person, place, and time.   Psychiatric:         Behavior: Behavior normal.         Laboratory  CBC: Reviewed  CMP: Reviewed    Diagnostic Results:  CT: Reviewed  No obvious acute abnormalities on the left side, gallbladder wall thickening    ASSESSMENT/PLAN:     76-year-old female with left-sided abdominal pain and concern for some gallbladder wall thickening.  Pain is not associated with meals.  Recommend conservative management for now.    Get urinalysis to rule out any kidney or bladder issues.  Possible UTI rule out           "

## 2022-05-24 ENCOUNTER — PATIENT MESSAGE (OUTPATIENT)
Dept: PAIN MEDICINE | Facility: CLINIC | Age: 77
End: 2022-05-24
Payer: MEDICARE

## 2022-05-25 ENCOUNTER — TELEPHONE (OUTPATIENT)
Dept: SURGERY | Facility: CLINIC | Age: 77
End: 2022-05-25
Payer: MEDICARE

## 2022-05-26 ENCOUNTER — TELEPHONE (OUTPATIENT)
Dept: SURGERY | Facility: CLINIC | Age: 77
End: 2022-05-26
Payer: MEDICARE

## 2022-05-26 NOTE — TELEPHONE ENCOUNTER
Attempted to reach pt by phone for more information about todays visit  Family member answered and states pt has passed away

## 2022-06-02 ENCOUNTER — TELEPHONE (OUTPATIENT)
Dept: PRIMARY CARE CLINIC | Facility: CLINIC | Age: 77
End: 2022-06-02
Payer: MEDICARE

## 2022-06-02 NOTE — TELEPHONE ENCOUNTER
----- Message from Shannon Martinez sent at 2022  2:05 PM CDT -----  Contact: JEMMA    SB 's office is calling to advise patient  on 2022,  would like to know if Dr. Chang is willing to sign death cert. Please advise

## 2022-06-05 DIAGNOSIS — G43.511 INTRACTABLE PERSISTENT MIGRAINE AURA WITHOUT CEREBRAL INFARCTION AND WITH STATUS MIGRAINOSUS: ICD-10-CM

## 2022-06-05 RX ORDER — VALPROIC ACID 250 MG/1
CAPSULE, LIQUID FILLED ORAL
Qty: 60 CAPSULE | Refills: 1 | OUTPATIENT
Start: 2022-06-05

## 2022-06-13 RX ORDER — METOPROLOL SUCCINATE 25 MG/1
TABLET, EXTENDED RELEASE ORAL
Qty: 30 TABLET | Refills: 5 | OUTPATIENT
Start: 2022-06-13

## 2022-06-13 RX ORDER — ESCITALOPRAM OXALATE 10 MG/1
TABLET ORAL
Qty: 30 TABLET | Refills: 5 | OUTPATIENT
Start: 2022-06-13

## 2022-06-13 RX ORDER — LEVOCETIRIZINE DIHYDROCHLORIDE 5 MG/1
TABLET, FILM COATED ORAL
Qty: 30 TABLET | Refills: 5 | OUTPATIENT
Start: 2022-06-13

## 2022-06-13 RX ORDER — OMEPRAZOLE 40 MG/1
CAPSULE, DELAYED RELEASE ORAL
Qty: 30 CAPSULE | Refills: 5 | OUTPATIENT
Start: 2022-06-13

## 2022-09-12 NOTE — PROGRESS NOTES
CHW - Initial Contact    Lexus Jade, contacted Ms. Stinson  via telephone today.  PT voicemail has not been setup.   No future outreach task assigned

## 2023-02-23 NOTE — TELEPHONE ENCOUNTER
----- Message from Tierra Ayala sent at 10/22/2020 12:20 PM CDT -----  Regarding: Refill request  Contact: "[facility]  [phone]  Patient has run out of medication.  Pharmacy wants to know if it can be filled early since patient takes it 3 times a day.    [facility] - [place], [place] - [address] [phone] (Phone)  [phone] (Fax)       [facility]tama Pregnancy And Lactation Text: It is unknown if this medication can cause problems during pregnancy and breastfeeding.

## 2024-09-03 NOTE — TELEPHONE ENCOUNTER
Patient is requesting a RX for antibiotics, says she has cold, cough and fever. states she does not havea ride to come in to the clinc   intact